# Patient Record
Sex: FEMALE | Race: OTHER | Employment: OTHER | URBAN - METROPOLITAN AREA
[De-identification: names, ages, dates, MRNs, and addresses within clinical notes are randomized per-mention and may not be internally consistent; named-entity substitution may affect disease eponyms.]

---

## 2017-09-29 ENCOUNTER — HOSPITAL ENCOUNTER (OUTPATIENT)
Dept: NEUROLOGY | Age: 62
Discharge: HOME | End: 2017-09-29
Payer: COMMERCIAL

## 2017-09-29 PROCEDURE — 95910 NRV CNDJ TEST 7-8 STUDIES: CPT

## 2017-09-29 PROCEDURE — 95886 MUSC TEST DONE W/N TEST COMP: CPT

## 2017-11-15 PROBLEM — G56.03 BILATERAL CARPAL TUNNEL SYNDROME: Chronic | Status: ACTIVE | Noted: 2017-11-15

## 2017-11-15 PROBLEM — M54.41 CHRONIC MIDLINE LOW BACK PAIN WITH BILATERAL SCIATICA: Chronic | Status: ACTIVE | Noted: 2017-11-15

## 2017-11-15 PROBLEM — M54.2 CERVICALGIA: Chronic | Status: ACTIVE | Noted: 2017-11-15

## 2017-11-15 PROBLEM — Z13.1 SCREENING FOR DIABETES MELLITUS: Chronic | Status: ACTIVE | Noted: 2017-11-15

## 2017-11-15 PROBLEM — M54.42 CHRONIC MIDLINE LOW BACK PAIN WITH BILATERAL SCIATICA: Chronic | Status: ACTIVE | Noted: 2017-11-15

## 2017-11-15 PROBLEM — Z13.220 SCREENING, LIPID: Chronic | Status: ACTIVE | Noted: 2017-11-15

## 2017-11-15 PROBLEM — G89.29 CHRONIC MIDLINE LOW BACK PAIN WITH BILATERAL SCIATICA: Chronic | Status: ACTIVE | Noted: 2017-11-15

## 2017-12-04 ENCOUNTER — HOSPITAL ENCOUNTER (OUTPATIENT)
Dept: PHYSICAL THERAPY | Age: 62
Setting detail: THERAPIES SERIES
Discharge: HOME | End: 2017-12-04
Payer: COMMERCIAL

## 2017-12-04 PROCEDURE — G8978 MOBILITY CURRENT STATUS: HCPCS

## 2017-12-04 PROCEDURE — G8979 MOBILITY GOAL STATUS: HCPCS

## 2017-12-04 PROCEDURE — 97162 PT EVAL MOD COMPLEX 30 MIN: CPT

## 2017-12-04 NOTE — PROGRESS NOTES
[address]     Ph: [phone]  Fax: [phone]    [x] Certification  [] Recertification []  Plan of Care  [] Progress Note [] Discharge      To:  Referring Practitioner: Dr. Eduarda Majano      From:  Krystian Jacobs, PT  Patient: Ashleigh Paige     : 1955  Diagnosis: Cervicalgia, Chronic midline low back pain with bilateral sciatica     Date: 2017  Treatment Diagnosis: LBP       Progress Report Period from:  2017  to 2017    Total # of Visits to Date: 1   No Show: 0    Canceled Appointment: 0     OBJECTIVE:   Long Term Goals - Time Frame for Long term goals : 4-5 weeks  Goals Current/ Discharge status Met   Long term goal 1: Improve lumbar ROM to WNL to improve functional mobility. Spine  Lumbar: Flex 30deg, Ext 20deg, SB: Rt 17deg, Lt 15deg [] yes  [] no   Long term goal 2: Reduce pain to </= 2/10 with prolonged positioning. 0-8/101. Sitting down 6-7/101 then after a while becomes an 8/10 [] yes  [] no   Long term goal 3: Improve janeth LE strength to increased ease with functional mobility. Strength RLE  Comment: Hip abd 4/5, hip ext 4-/5  Strength LLE  Comment: Hip abd 4/5, hip ext 4-/5 [] yes  [] no   Long term goal 4: Oswestry </= 30% 18/45 (40%) [] yes  [] no     Body structures, Functions, Activity limitations: Decreased functional mobility , Decreased ROM, Decreased strength  Assessment: Pt presents with ongoing LBP that worsens with prolonged positioning especially sitting. Pt's primary complaint this day was her LB therefore her cervical spine was not assessed. Pt with decreased flexibility and strength in janeth LEs likely contibuting to her pain as well as her poor posture with an increased lumbar lordosisand thoracic kyphosis.   Prognosis: Good  Discharge Recommendations: Continue to assess pending progress      PLAN: [x] Evaluate and Treat  Frequency/Duration:  Plan  Times per week: 2  Plan weeks: 4-5  Current Treatment Recommendations: Strengthening, ROM, Functional Mobility Training, Neuromuscular Re-education, Manual Therapy - Soft Tissue Mobilization, Manual Therapy - Joint Manipulation, Home Exercise Program, Patient/Caregiver Education & Training, Equipment Evaluation, Education, & procurement, Modalities  Plan Comment: Transfer care to Ramon Salazar PT, DPT     Patient Status:[x] Continue/ Initiate plan of Care    [] Discharge PT. Recommend pt continue with HEP. [] Additional visits requested, Please re-certify for additional visits:          Signature: Electronically signed by Mayra Rodriguez PT on 12/4/17 at 5:16 PM      If you have any questions or concerns, please don't hesitate to call. Thank you for your referral.    I have reviewed this plan of care and certify a need for medically necessary rehabilitation services.     Physician Signature:__________________________________________________________  Date:  Please sign and return

## 2017-12-04 NOTE — PROGRESS NOTES
functional mobility , Decreased ROM, Decreased strength  Assessment: Pt presents with ongoing LBP that worsens with prolonged positioning especially sitting. Pt's primary complaint this day was her LB therefore her cervical spine was not assessed. Pt with decreased flexibility and strength in janeth LEs likely contibuting to her pain as well as her poor posture with an increased lumbar lordosisand thoracic kyphosis. Prognosis: Good  Discharge Recommendations: Continue to assess pending progress  Activity Tolerance: Patient Tolerated treatment well     Decision Making: Medium Complexity  History: PMH: Kidney problems, OA  Exam: Decreased ROM and strength with increased pain impacting ADLs and mobility. Clinical Presentation: Evolving        Plan  Frequency/Duration:  Plan  Times per week: 2  Plan weeks: 4-5  Current Treatment Recommendations: Strengthening, ROM, Functional Mobility Training, Neuromuscular Re-education, Manual Therapy - Soft Tissue Mobilization, Manual Therapy - Joint Manipulation, Home Exercise Program, Patient/Caregiver Education & Training, Equipment Evaluation, Education, & procurement, Modalities  Plan Comment: Transfer care to Mary Spangler PT, DPT    POST-PAIN     Pain Rating (0-10 pain scale):  0 /10  Location and pain description same as pre-treatment unless indicated. Action: [x] NA  [] Call Physician  [] Perform HEP  [] Meds as prescribed    Evaluation and patient rights have been reviewed and patient agrees with plan of care.   Yes  [x]  No  []   Explain:       Damian Fall Risk Assessment  Risk Factor Scale  Score   History of Falls [] Yes  [x] No 25  0 0   Secondary Diagnosis [] Yes  [x] No 15  0 0   Ambulatory Aid [] Furniture  [] Crutches/cane/walker  [x] None/bedrest/wheelchair/nurse 30  15  0 0   IV/Heparin Lock [] Yes  [x] No 20  0 0   Gait/Transferring [] Impaired  [] Weak  [x] Normal/bedrest/immobile 20  10  0 0   Mental Status [] Forgets limitations  [x] Oriented to own ability 15  0 0      Total:0     Based on the Assessment score: check the appropriate box. [x]  No intervention needed   Low =   Score of 0-24  []  Use standard prevention interventions Moderate =  Score of 24-44   [] Discuss fall prevention strategies   [] Indicate moderate falls risk on eval  []  Use high risk prevention interventions High = Score of 45 and higher   [] Discuss fall prevention strategies   [] Provide supervision during treatment time    Goals  Long term goals  Time Frame for Long term goals : 4-5 weeks  Long term goal 1: Improve lumbar ROM to WNL to improve functional mobility. Long term goal 2: Reduce pain to </= 2/10 with prolonged positioning. Long term goal 3: Improve janeth LE strength to increased ease with functional mobility.   Long term goal 4: Oswestry </= 30%    PT Individual Minutes  Time In: 1512  Time Out: 1986  Minutes: 45     Procedure Minutes:45'  Electronically signed by Mireya Palomo PT on 12/4/17 at 5:12 PM

## 2017-12-05 ENCOUNTER — APPOINTMENT (OUTPATIENT)
Dept: PHYSICAL THERAPY | Age: 62
End: 2017-12-05
Payer: COMMERCIAL

## 2017-12-06 DIAGNOSIS — Z13.220 SCREENING, LIPID: Chronic | ICD-10-CM

## 2017-12-06 DIAGNOSIS — Z13.1 SCREENING FOR DIABETES MELLITUS: Chronic | ICD-10-CM

## 2017-12-06 DIAGNOSIS — Z11.4 SCREENING FOR HIV (HUMAN IMMUNODEFICIENCY VIRUS): ICD-10-CM

## 2017-12-06 DIAGNOSIS — Z11.59 NEED FOR HEPATITIS C SCREENING TEST: ICD-10-CM

## 2017-12-06 LAB
CHOLESTEROL, FASTING: 237 MG/DL (ref 0–199)
GLUCOSE FASTING: 97 MG/DL (ref 74–109)
HDLC SERPL-MCNC: 42 MG/DL (ref 40–59)
HEPATITIS C ANTIBODY INTERPRETATION: NORMAL
LDL CHOLESTEROL CALCULATED: 169 MG/DL (ref 0–129)
TRIGLYCERIDE, FASTING: 130 MG/DL (ref 0–200)

## 2017-12-07 ENCOUNTER — APPOINTMENT (OUTPATIENT)
Dept: PHYSICAL THERAPY | Age: 62
End: 2017-12-07
Payer: COMMERCIAL

## 2017-12-08 LAB — HIV-1 AND HIV-2 ANTIBODIES: NEGATIVE

## 2017-12-11 ENCOUNTER — HOSPITAL ENCOUNTER (OUTPATIENT)
Dept: PHYSICAL THERAPY | Age: 62
Setting detail: THERAPIES SERIES
Discharge: HOME | End: 2017-12-11
Payer: COMMERCIAL

## 2017-12-11 PROCEDURE — 97110 THERAPEUTIC EXERCISES: CPT

## 2017-12-11 ASSESSMENT — PAIN DESCRIPTION - ORIENTATION: ORIENTATION: LEFT;LOWER

## 2017-12-11 ASSESSMENT — PAIN DESCRIPTION - DESCRIPTORS: DESCRIPTORS: NUMBNESS;ACHING

## 2017-12-11 ASSESSMENT — PAIN DESCRIPTION - LOCATION: LOCATION: SHOULDER;BACK

## 2017-12-11 ASSESSMENT — PAIN SCALES - GENERAL: PAINLEVEL_OUTOF10: 4

## 2017-12-11 NOTE — PROGRESS NOTES
[address] [address]  Outpatient Physical Therapy    Treatment Note        Date: 2017  Patient: Bryan Herrera  : 1955  ACCT #: [de-identified]  Referring Practitioner: Dr. Vivien Alfaro  Diagnosis: Cervicalgia, Chronic midline low back pain with bilateral sciatica    Visit Information:  PT Visit Information  PT Insurance Information: [facility]  Total # of Visits to Date: 2  No Show: 0  Canceled Appointment: 0  Progress Note Counter: 2/10    Subjective: Pt presenting to appt w/ \"mild\" pain of 4-5/10 in Lt shldr and LB. Pt reports most pain w/ prolonged standing at home. Comments: [employer] utilized during session. HEP Compliance:  [x] Good [] Fair [] Poor [] Reports not doing due to:    Vital Signs  Patient Currently in Pain: Yes   Pain Screening  Patient Currently in Pain: Yes  Pain Assessment  Pain Level: 4 (4-5)  Pain Location: Shoulder;Back  Pain Orientation: Left; Lower  Pain Descriptors: Numbness; Aching    OBJECTIVE:   Exercises  Exercise 1: LTR x10  Exercise 2: SKTC 10s x5 janeth  Exercise 3: TA sam 5\"x10  Exercise 4: DLS (3 way) L58 ea   Exercise 5: Hip series: SLR, S/L ABD x10 ea   Exercise 6: Prone hip ext x10 B  Exercise 7: PBall HS curls 5\"x10  Exercise 8: Bridges 5\"x10  Exercise 20: HEP:     Strength: [x] NT  [] MMT completed:     ROM: [x] NT  [] ROM measurements:      Assessment: Body structures, Functions, Activity limitations: Decreased functional mobility , Decreased ROM, Decreased strength  Assessment: Initiated stretchinng and core strengthening exs this date per POC. Utilized blue phones to communicate during session, good participation. Overall good aga to all exs w/o increased pain. Treatment Diagnosis: LBP  Prognosis: Good     Goals:   Long term goals  Time Frame for Long term goals : 4-5 weeks  Long term goal 1: Improve lumbar ROM to WNL to improve functional mobility. Long term goal 2: Reduce pain to </= 2/10 with prolonged positioning.   Long term goal 3: Improve janeth LE strength to increased ease with functional mobility. Long term goal 4: Oswestry </= 30%  Progress toward goals: Progress exs as able     POST-PAIN       Pain Rating (0-10 pain scale):   3/10   Location and pain description same as pre-treatment unless indicated. Action: [] NA   [x] Perform HEP  [] Meds as prescribed  [] Modalities as prescribed   [] Call Physician     Frequency/Duration:  Plan  Times per week: 2  Plan weeks: 4-5  Current Treatment Recommendations: Strengthening, ROM, Functional Mobility Training, Neuromuscular Re-education, Manual Therapy - Soft Tissue Mobilization, Manual Therapy - Joint Manipulation, Home Exercise Program, Patient/Caregiver Education & Training, Equipment Evaluation, Education, & procurement, Modalities  Plan Comment: Transfer care to [facility] PT, DPT     Pt to continue current HEP. See objective section for any therapeutic exercise changes, additions or modifications this date.     PT Individual Minutes  Time In: 4146  Time Out: 1782  Minutes: 38  Timed Code Treatment Minutes: 38 Minutes  Procedure Minutes: N/A    Signature:  Electronically signed by Manuela Bonilla PTA on 12/11/17 at 4:00 PM

## 2017-12-13 PROBLEM — Z12.11 COLON CANCER SCREENING: Chronic | Status: ACTIVE | Noted: 2017-12-13

## 2017-12-18 ENCOUNTER — HOSPITAL ENCOUNTER (OUTPATIENT)
Dept: PHYSICAL THERAPY | Age: 62
Setting detail: THERAPIES SERIES
Discharge: HOME | End: 2017-12-18
Payer: COMMERCIAL

## 2017-12-18 PROCEDURE — 97110 THERAPEUTIC EXERCISES: CPT

## 2017-12-18 ASSESSMENT — PAIN DESCRIPTION - ORIENTATION: ORIENTATION: LOWER;LEFT

## 2017-12-18 ASSESSMENT — PAIN DESCRIPTION - PAIN TYPE: TYPE: CHRONIC PAIN

## 2017-12-18 ASSESSMENT — PAIN DESCRIPTION - LOCATION: LOCATION: BACK

## 2017-12-18 ASSESSMENT — PAIN SCALES - GENERAL: PAINLEVEL_OUTOF10: 7

## 2017-12-18 ASSESSMENT — PAIN DESCRIPTION - DESCRIPTORS: DESCRIPTORS: ACHING

## 2017-12-20 ENCOUNTER — HOSPITAL ENCOUNTER (OUTPATIENT)
Dept: NEUROLOGY | Age: 62
Discharge: HOME | End: 2017-12-20
Payer: COMMERCIAL

## 2017-12-20 PROCEDURE — 95886 MUSC TEST DONE W/N TEST COMP: CPT

## 2017-12-20 PROCEDURE — 95910 NRV CNDJ TEST 7-8 STUDIES: CPT

## 2017-12-20 NOTE — PROCEDURES
[address]                       [address] [facility], [address] [facility]                               ELECTROMYOGRAM REPORT    PATIENT NAME: Yenifer Yeager                   :        1955  MED REC NO:   [id number]                            ROOM:  ACCOUNT NO:   [de-identified]                           ADMIT DATE: 2017  PROVIDER:     Gosia Ely MD    DATE OF EM2017    REFERRING PROVIDER:  Dr. Jessica Tapia. REASON FOR THE STUDY:  The patient was having pain in the back with  radiation to the thigh. She has a positive family history of diabetes. EMG study was done to look  for peripheral nerve entrapments versus peripheral neuropathy versus lumbar  radiculopathy. Motor nerve conduction velocities and F wave latencies are normal in all  the nerves tested. Distal motor latencies are normal in all the nerves tested. Distal sensory latencies could not be obtained in the right superficial peroneal nerve and are normal in all other nerves tested. Amplitude of motor and sensory responses are decreased in most of the  nerves tested. On the concentric needle electrode examination, mild denervation changes  are apparent in the L5 root distribution bilaterally. CLINICAL INTERPRETATION:  EMG studies are showing changes of mild bilateral  L5 radiculopathy. The patient could be tried on conservative management with weight control,  exercise program.    If clinically indicated, imaging of the lumbar canal may be done to look  for compromise of the spinal canal and/or foramina. Low amplitude of motor and sensory responses does suggest component of  peripheral neuropathic process. She has a positive family history of  diabetes. Other causes of peripheral neuropathy could also be looked into such as  exposure to toxins, autoimmune disorder, hypothyroidism, etc.    If clinically indicated we could repeat the study in a year.     Thank you very much Dr. Lucinda Hinds for allowing me to see the  patient. Please feel free to call me if I can be of any further assistance  regarding this patient's evaluation.       Stephanie Garnett MD    D: 12/20/2017 15:57:02       T: 12/20/2017 17:18:55     [email or web address]  Job#: [id number]     Doc#: [id number]    CC:

## 2017-12-21 NOTE — PROGRESS NOTES
[address]       Physical Therapy  Cancellation/No-show Note  Patient Name:  Quirino Flores  :  1955   Date:  2017  Referring Practitioner: Dr. Valeriano Ferro  Diagnosis: Cervicalgia, Chronic midline low back pain with bilateral sciatica    Visit Information:  PT Visit Information  PT Insurance Information: [facility]  Total # of Visits to Date: 3  No Show: 1  Canceled Appointment: 0  Progress Note Counter: 3/10( cx )    For today's appointment patient:  [x]  Cancelled  []  Rescheduled appointment  []  No-show   []  Called pt to remind of next appointment     Reason given by patient:  []  Patient ill  [x]  Conflicting appointment 24 hr. Notice for  cx  []  No transportation    []  Conflict with work  []  No reason given  []  Other:       Comments:       Signature: Electronically signed by Kelly Staples PTA on 17 at 2:32 PM

## 2017-12-22 ENCOUNTER — HOSPITAL ENCOUNTER (OUTPATIENT)
Dept: PHYSICAL THERAPY | Age: 62
Setting detail: THERAPIES SERIES
Discharge: HOME | End: 2017-12-22
Payer: COMMERCIAL

## 2017-12-27 ENCOUNTER — HOSPITAL ENCOUNTER (OUTPATIENT)
Dept: PHYSICAL THERAPY | Age: 62
Setting detail: THERAPIES SERIES
Discharge: HOME | End: 2017-12-27
Payer: COMMERCIAL

## 2017-12-27 NOTE — PROGRESS NOTES
[address]       Physical Therapy  Cancellation/No-show Note  Patient Name:  Darwin Kebede  :  1955   Date:  2017  Referring Practitioner: Dr. Rodger Loving  Diagnosis: Cervicalgia, Chronic midline low back pain with bilateral sciatica    Visit Information:  PT Visit Information  [facility]  Total # of Visits to Date: 3  No Show: 1  Canceled Appointment: 1  Progress Note Counter: 3/10( cx )    For today's appointment patient:  [x]  Cancelled  []  Rescheduled appointment  []  No-show   []  Called pt to remind of next appointment     Reason given by patient:  []  Patient ill  []  Conflicting appointment  [x]  No transportation    []  Conflict with work  []  No reason given  []  Other:       Comments:       Signature: Electronically signed by Lexi De La Torre PTA on 17 at 10:17 AM

## 2017-12-29 ENCOUNTER — HOSPITAL ENCOUNTER (OUTPATIENT)
Dept: PHYSICAL THERAPY | Age: 62
Setting detail: THERAPIES SERIES
Discharge: HOME | End: 2017-12-29
Payer: COMMERCIAL

## 2017-12-29 PROCEDURE — 97110 THERAPEUTIC EXERCISES: CPT

## 2017-12-29 PROCEDURE — G8978 MOBILITY CURRENT STATUS: HCPCS

## 2017-12-29 PROCEDURE — G8979 MOBILITY GOAL STATUS: HCPCS

## 2017-12-29 PROCEDURE — G8980 MOBILITY D/C STATUS: HCPCS

## 2017-12-29 ASSESSMENT — PAIN SCALES - GENERAL: PAINLEVEL_OUTOF10: 6

## 2017-12-29 ASSESSMENT — PAIN DESCRIPTION - LOCATION: LOCATION: BACK

## 2017-12-29 ASSESSMENT — PAIN DESCRIPTION - ORIENTATION: ORIENTATION: LOWER;LEFT

## 2017-12-29 ASSESSMENT — PAIN DESCRIPTION - DESCRIPTORS: DESCRIPTORS: ACHING

## 2017-12-29 ASSESSMENT — PAIN DESCRIPTION - FREQUENCY: FREQUENCY: CONTINUOUS

## 2017-12-29 ASSESSMENT — PAIN DESCRIPTION - PAIN TYPE: TYPE: CHRONIC PAIN

## 2017-12-29 NOTE — PROGRESS NOTES
interpretation. Concluded tx w/ HP to LB for pain/tightnes. Treatment Diagnosis: LBP  Prognosis: Good     Goals:   Long term goals  Time Frame for Long term goals : 4-5 weeks  Long term goal 1: Improve lumbar ROM to WNL to improve functional mobility. Long term goal 2: Reduce pain to </= 2/10 with prolonged positioning. Long term goal 3: Improve janeth LE strength to increased ease with functional mobility. Long term goal 4: Oswestry </= 30%  Progress toward goals: Cont to progress exs as able. POST-PAIN       Pain Rating (0-10 pain scale):   0/10   Location and pain description same as pre-treatment unless indicated. Action: [] NA   [] Perform HEP  [] Meds as prescribed  [] Modalities as prescribed   [] Call Physician     Frequency/Duration:  Plan  Times per week: 2  Plan weeks: 4-5  Current Treatment Recommendations: Strengthening, ROM, Functional Mobility Training, Neuromuscular Re-education, Manual Therapy - Soft Tissue Mobilization, Manual Therapy - Joint Manipulation, Home Exercise Program, Patient/Caregiver Education & Training, Equipment Evaluation, Education, & procurement, Modalities  Plan Comment: Transfer care to Neva Lopez PT, DPT     Pt to continue current HEP. See objective section for any therapeutic exercise changes, additions or modifications this date.     PT Individual Minutes  Time In: 2126  Time Out: 3984  Minutes: 49  Timed Code Treatment Minutes: 39 Minutes  Procedure Minutes: 10 min     Signature:  Electronically signed by Sylvia Fleming PTA on 12/29/17 at 1:51 PM

## 2018-01-03 ENCOUNTER — HOSPITAL ENCOUNTER (OUTPATIENT)
Dept: PHYSICAL THERAPY | Age: 63
Setting detail: THERAPIES SERIES
Discharge: HOME | End: 2018-01-03
Payer: COMMERCIAL

## 2018-01-05 ENCOUNTER — HOSPITAL ENCOUNTER (OUTPATIENT)
Dept: PHYSICAL THERAPY | Age: 63
Setting detail: THERAPIES SERIES
Discharge: HOME | End: 2018-01-05
Payer: COMMERCIAL

## 2018-01-05 PROCEDURE — 97110 THERAPEUTIC EXERCISES: CPT

## 2018-01-05 ASSESSMENT — PAIN DESCRIPTION - DESCRIPTORS: DESCRIPTORS: ACHING

## 2018-01-05 ASSESSMENT — PAIN DESCRIPTION - LOCATION: LOCATION: BACK

## 2018-01-05 ASSESSMENT — PAIN SCALES - GENERAL: PAINLEVEL_OUTOF10: 6

## 2018-01-05 ASSESSMENT — PAIN DESCRIPTION - ORIENTATION: ORIENTATION: LOWER;LEFT

## 2018-01-08 ENCOUNTER — HOSPITAL ENCOUNTER (OUTPATIENT)
Dept: PHYSICAL THERAPY | Age: 63
Setting detail: THERAPIES SERIES
Discharge: HOME | End: 2018-01-08
Payer: COMMERCIAL

## 2018-01-08 PROCEDURE — G8980 MOBILITY D/C STATUS: HCPCS

## 2018-01-08 PROCEDURE — 97110 THERAPEUTIC EXERCISES: CPT

## 2018-01-08 PROCEDURE — G8979 MOBILITY GOAL STATUS: HCPCS

## 2018-01-08 PROCEDURE — G8978 MOBILITY CURRENT STATUS: HCPCS

## 2018-01-08 ASSESSMENT — PAIN DESCRIPTION - LOCATION: LOCATION: BACK

## 2018-01-08 ASSESSMENT — PAIN DESCRIPTION - PAIN TYPE: TYPE: CHRONIC PAIN

## 2018-01-08 ASSESSMENT — PAIN DESCRIPTION - DESCRIPTORS: DESCRIPTORS: ACHING

## 2018-01-08 ASSESSMENT — PAIN DESCRIPTION - ORIENTATION: ORIENTATION: LOWER;LEFT

## 2018-01-08 ASSESSMENT — PAIN SCALES - GENERAL: PAINLEVEL_OUTOF10: 6

## 2018-01-12 ENCOUNTER — HOSPITAL ENCOUNTER (OUTPATIENT)
Dept: PHYSICAL THERAPY | Age: 63
Setting detail: THERAPIES SERIES
Discharge: HOME | End: 2018-01-12
Payer: COMMERCIAL

## 2018-01-15 ENCOUNTER — HOSPITAL ENCOUNTER (OUTPATIENT)
Dept: PHYSICAL THERAPY | Age: 63
Setting detail: THERAPIES SERIES
End: 2018-01-15
Payer: COMMERCIAL

## 2018-01-19 ENCOUNTER — APPOINTMENT (OUTPATIENT)
Dept: PHYSICAL THERAPY | Age: 63
End: 2018-01-19
Payer: COMMERCIAL

## 2018-01-22 ENCOUNTER — APPOINTMENT (OUTPATIENT)
Dept: PHYSICAL THERAPY | Age: 63
End: 2018-01-22
Payer: COMMERCIAL

## 2018-01-28 ENCOUNTER — HOSPITAL ENCOUNTER (EMERGENCY)
Age: 63
Discharge: HOME | End: 2018-01-28
Payer: COMMERCIAL

## 2018-01-28 VITALS
DIASTOLIC BLOOD PRESSURE: 89 MMHG | WEIGHT: 170 LBS | HEIGHT: 64 IN | BODY MASS INDEX: 29.02 KG/M2 | HEART RATE: 93 BPM | TEMPERATURE: 98.4 F | SYSTOLIC BLOOD PRESSURE: 153 MMHG | OXYGEN SATURATION: 97 % | RESPIRATION RATE: 20 BRPM

## 2018-01-28 DIAGNOSIS — H65.03 BILATERAL ACUTE SEROUS OTITIS MEDIA, RECURRENCE NOT SPECIFIED: Primary | ICD-10-CM

## 2018-01-28 DIAGNOSIS — J02.9 ACUTE PHARYNGITIS, UNSPECIFIED ETIOLOGY: ICD-10-CM

## 2018-01-28 DIAGNOSIS — J40 BRONCHITIS: ICD-10-CM

## 2018-01-28 LAB
RAPID INFLUENZA  B AGN: NEGATIVE
RAPID INFLUENZA A AGN: NEGATIVE
S PYO AG THROAT QL: NEGATIVE

## 2018-01-28 PROCEDURE — 86403 PARTICLE AGGLUT ANTBDY SCRN: CPT

## 2018-01-28 PROCEDURE — 99283 EMERGENCY DEPT VISIT LOW MDM: CPT

## 2018-01-28 PROCEDURE — 87880 STREP A ASSAY W/OPTIC: CPT

## 2018-01-28 PROCEDURE — 6370000000 HC RX 637 (ALT 250 FOR IP): Performed by: PHYSICIAN ASSISTANT

## 2018-01-28 PROCEDURE — 87081 CULTURE SCREEN ONLY: CPT

## 2018-01-28 RX ORDER — AMOXICILLIN 500 MG/1
500 CAPSULE ORAL ONCE
Status: COMPLETED | OUTPATIENT
Start: 2018-01-28 | End: 2018-01-28

## 2018-01-28 RX ORDER — AMOXICILLIN 500 MG/1
500 CAPSULE ORAL 3 TIMES DAILY
Qty: 30 CAPSULE | Refills: 0 | Status: SHIPPED | OUTPATIENT
Start: 2018-01-28 | End: 2018-02-07

## 2018-01-28 RX ORDER — GUAIFENESIN AND CODEINE PHOSPHATE 100; 10 MG/5ML; MG/5ML
5 SOLUTION ORAL 3 TIMES DAILY PRN
Qty: 60 ML | Refills: 0 | Status: SHIPPED | OUTPATIENT
Start: 2018-01-28 | End: 2018-01-30 | Stop reason: ALTCHOICE

## 2018-01-28 RX ORDER — CODEINE PHOSPHATE AND GUAIFENESIN 10; 100 MG/5ML; MG/5ML
10 SOLUTION ORAL ONCE
Status: COMPLETED | OUTPATIENT
Start: 2018-01-28 | End: 2018-01-28

## 2018-01-28 RX ORDER — BENZONATATE 100 MG/1
100 CAPSULE ORAL ONCE
Status: COMPLETED | OUTPATIENT
Start: 2018-01-28 | End: 2018-01-28

## 2018-01-28 RX ORDER — BENZONATATE 100 MG/1
100 CAPSULE ORAL 3 TIMES DAILY PRN
Qty: 12 CAPSULE | Refills: 0 | Status: SHIPPED | OUTPATIENT
Start: 2018-01-28 | End: 2018-02-04

## 2018-01-28 RX ADMIN — GUAIFENESIN AND CODEINE PHOSPHATE 10 ML: 100; 10 SOLUTION ORAL at 23:26

## 2018-01-28 RX ADMIN — BENZONATATE 100 MG: 100 CAPSULE ORAL at 23:26

## 2018-01-28 RX ADMIN — AMOXICILLIN 500 MG: 500 CAPSULE ORAL at 23:26

## 2018-01-28 ASSESSMENT — PAIN SCALES - GENERAL: PAINLEVEL_OUTOF10: 8

## 2018-01-28 ASSESSMENT — ENCOUNTER SYMPTOMS
ANAL BLEEDING: 0
APNEA: 0
COUGH: 1
SORE THROAT: 1
ABDOMINAL DISTENTION: 0
NAUSEA: 0
VOMITING: 0
SHORTNESS OF BREATH: 0
VOICE CHANGE: 0
CHOKING: 0
PHOTOPHOBIA: 0
EYE DISCHARGE: 0

## 2018-01-28 ASSESSMENT — PAIN DESCRIPTION - LOCATION: LOCATION: THROAT

## 2018-01-29 ENCOUNTER — HOSPITAL ENCOUNTER (OUTPATIENT)
Dept: PHYSICAL THERAPY | Age: 63
Setting detail: THERAPIES SERIES
Discharge: HOME | End: 2018-01-29
Payer: COMMERCIAL

## 2018-01-30 ENCOUNTER — OFFICE VISIT (OUTPATIENT)
Dept: FAMILY MEDICINE CLINIC | Age: 63
End: 2018-01-30
Payer: COMMERCIAL

## 2018-01-30 ENCOUNTER — HOSPITAL ENCOUNTER (OUTPATIENT)
Dept: GENERAL RADIOLOGY | Age: 63
Discharge: HOME | End: 2018-02-01
Payer: COMMERCIAL

## 2018-01-30 VITALS
RESPIRATION RATE: 18 BRPM | HEIGHT: 64 IN | OXYGEN SATURATION: 98 % | TEMPERATURE: 98.6 F | WEIGHT: 172 LBS | BODY MASS INDEX: 29.37 KG/M2 | SYSTOLIC BLOOD PRESSURE: 122 MMHG | HEART RATE: 80 BPM | DIASTOLIC BLOOD PRESSURE: 68 MMHG

## 2018-01-30 DIAGNOSIS — J06.9 VIRAL URI: Primary | ICD-10-CM

## 2018-01-30 DIAGNOSIS — R04.2 COUGH WITH HEMOPTYSIS: ICD-10-CM

## 2018-01-30 DIAGNOSIS — H69.83 EUSTACHIAN TUBE DYSFUNCTION, BILATERAL: ICD-10-CM

## 2018-01-30 DIAGNOSIS — J04.0 LARYNGITIS, ACUTE: ICD-10-CM

## 2018-01-30 LAB
APTT: 28 SEC (ref 21.6–35.4)
BASOPHILS ABSOLUTE: 0.1 K/UL (ref 0–0.2)
BASOPHILS RELATIVE PERCENT: 0.6 %
EOSINOPHILS ABSOLUTE: 0.1 K/UL (ref 0–0.7)
EOSINOPHILS RELATIVE PERCENT: 0.8 %
HCT VFR BLD CALC: 42.9 % (ref 37–47)
HEMOGLOBIN: 14.1 G/DL (ref 12–16)
INR BLD: 0.9
LYMPHOCYTES ABSOLUTE: 2.3 K/UL (ref 1–4.8)
LYMPHOCYTES RELATIVE PERCENT: 22.4 %
MCH RBC QN AUTO: 31.8 PG (ref 27–31.3)
MCHC RBC AUTO-ENTMCNC: 33 % (ref 33–37)
MCV RBC AUTO: 96.5 FL (ref 82–100)
MONOCYTES ABSOLUTE: 0.8 K/UL (ref 0.2–0.8)
MONOCYTES RELATIVE PERCENT: 7.9 %
NEUTROPHILS ABSOLUTE: 7.2 K/UL (ref 1.4–6.5)
NEUTROPHILS RELATIVE PERCENT: 68.3 %
PDW BLD-RTO: 13.2 % (ref 11.5–14.5)
PLATELET # BLD: 278 K/UL (ref 130–400)
PROTHROMBIN TIME: 9.8 SEC (ref 8.1–13.7)
RBC # BLD: 4.45 M/UL (ref 4.2–5.4)
WBC # BLD: 10.5 K/UL (ref 4.8–10.8)

## 2018-01-30 PROCEDURE — G8427 DOCREV CUR MEDS BY ELIG CLIN: HCPCS | Performed by: FAMILY MEDICINE

## 2018-01-30 PROCEDURE — G8419 CALC BMI OUT NRM PARAM NOF/U: HCPCS | Performed by: FAMILY MEDICINE

## 2018-01-30 PROCEDURE — 3014F SCREEN MAMMO DOC REV: CPT | Performed by: FAMILY MEDICINE

## 2018-01-30 PROCEDURE — 99214 OFFICE O/P EST MOD 30 MIN: CPT | Performed by: FAMILY MEDICINE

## 2018-01-30 PROCEDURE — 3017F COLORECTAL CA SCREEN DOC REV: CPT | Performed by: FAMILY MEDICINE

## 2018-01-30 PROCEDURE — 71046 X-RAY EXAM CHEST 2 VIEWS: CPT

## 2018-01-30 PROCEDURE — G8484 FLU IMMUNIZE NO ADMIN: HCPCS | Performed by: FAMILY MEDICINE

## 2018-01-30 PROCEDURE — 1036F TOBACCO NON-USER: CPT | Performed by: FAMILY MEDICINE

## 2018-01-30 RX ORDER — PREDNISONE 20 MG/1
60 TABLET ORAL DAILY
Qty: 15 TABLET | Refills: 0 | Status: SHIPPED | OUTPATIENT
Start: 2018-01-30 | End: 2018-02-04

## 2018-01-30 RX ORDER — GUAIFENESIN 600 MG/1
600 TABLET, EXTENDED RELEASE ORAL 2 TIMES DAILY
Qty: 30 TABLET | Refills: 0 | Status: SHIPPED | OUTPATIENT
Start: 2018-01-30 | End: 2018-03-13 | Stop reason: ALTCHOICE

## 2018-01-30 RX ORDER — PSEUDOEPHEDRINE HYDROCHLORIDE 30 MG/1
30 TABLET ORAL EVERY 6 HOURS PRN
Qty: 30 TABLET | Refills: 0 | Status: SHIPPED | OUTPATIENT
Start: 2018-01-30 | End: 2018-03-13 | Stop reason: ALTCHOICE

## 2018-01-30 NOTE — PROGRESS NOTES
Prescriptions   Medication Sig Dispense Refill    predniSONE (DELTASONE) 20 MG tablet Take 3 tablets by mouth daily for 5 days 15 tablet 0    guaiFENesin (MUCINEX) 600 MG extended release tablet Take 1 tablet by mouth 2 times daily 30 tablet 0    pseudoephedrine (DECONGESTANT) 30 MG tablet Take 1 tablet by mouth every 6 hours as needed for Congestion 30 tablet 0    amoxicillin (AMOXIL) 500 MG capsule Take 1 capsule by mouth 3 times daily for 10 days 30 capsule 0    benzonatate (TESSALON PERLES) 100 MG capsule Take 1 capsule by mouth 3 times daily as needed for Cough 12 capsule 0    fluticasone (FLOVENT HFA) 110 MCG/ACT inhaler Inhale 1 puff into the lungs 2 times daily 1 Inhaler 5    albuterol sulfate  (90 Base) MCG/ACT inhaler Inhale 2 puffs into the lungs every 6 hours as needed for Wheezing      miconazole (MICONAZOLE 7) 2 % vaginal cream Place 1 applicator vaginally nightly Place vaginally nightly.  FLUoxetine (PROZAC) 10 MG capsule Take 1 capsule by mouth daily 30 capsule 5    omeprazole (PRILOSEC) 40 MG delayed release capsule Take 1 capsule by mouth daily 30 capsule 2    naproxen (NAPROSYN) 500 MG tablet Take 1 tablet by mouth 2 times daily (with meals) 60 tablet 1    diphenhydrAMINE (BANOPHEN) 50 MG capsule Take 1 capsule by mouth nightly as needed for Itching 30 capsule 5     No current facility-administered medications for this visit. PMH, Surgical Hx, Family Hx, and Social Hx reviewed and updated. Health Maintenance reviewed. Objective    Vitals:    01/30/18 0935   BP: 122/68   Site: Left Arm   Position: Sitting   Cuff Size: Medium Adult   Pulse: 80   Resp: 18   Temp: 98.6 °F (37 °C)   TempSrc: Temporal   SpO2: 98%   Weight: 172 lb (78 kg)   Height: 5' 4\" (1.626 m)       Physical Exam   Constitutional: She is oriented to person, place, and time. She appears well-developed and well-nourished. HENT:   Head: Normocephalic and atraumatic.    Right Ear: External ear normal. Tympanic membrane is erythematous and bulging. A middle ear effusion (serrous) is present. Left Ear: External ear normal. Tympanic membrane is erythematous and bulging. A middle ear effusion (serrous) is present. Nose: Nose normal.   Mouth/Throat: Oropharynx is clear and moist and mucous membranes are normal.   Sputum is light yellow without blood. Voice is hoarse and hypophonic. Eyes: Conjunctivae are normal. No scleral icterus. Neck: Normal range of motion. Neck supple. No thyromegaly present. Cardiovascular: Normal rate, regular rhythm and normal heart sounds. Pulmonary/Chest: Effort normal and breath sounds normal.   Lymphadenopathy:     She has no cervical adenopathy. Neurological: She is alert and oriented to person, place, and time. Skin: Skin is warm and dry. Psychiatric: She has a normal mood and affect. Assessment & Plan   1. Viral URI  guaiFENesin (MUCINEX) 600 MG extended release tablet    pseudoephedrine (DECONGESTANT) 30 MG tablet   2. Eustachian tube dysfunction, bilateral  guaiFENesin (MUCINEX) 600 MG extended release tablet    pseudoephedrine (DECONGESTANT) 30 MG tablet   3. Cough with hemoptysis  CBC With Auto Differential    Protime-INR    APTT    XR CHEST STANDARD (2 VW)    Quantiferon (R) Tb Gold, (Incubated)    guaiFENesin (MUCINEX) 600 MG extended release tablet   4. Laryngitis, acute  predniSONE (DELTASONE) 20 MG tablet     Viral URI,laryngitis, hemoptysis, ET dysfunction - finish Amox, Mucinex, sudafed and prednisone rx'd. Check IGR, CBC, coags and chest xray. F/U in 2 weeks or sooner if worsening.     Reviewed with the patient: current clinical status, medications, activities and diet.      Side effects, adverse effects of the medication prescribed today, as well as treatment plan/ rationale and result expectations have been discussed with the patient who expresses understanding and desires to proceed.     Close follow up to evaluate treatment results and for

## 2018-01-30 NOTE — PATIENT INSTRUCTIONS
The only thing that cures a cold is time. Meanwhile to be as comfortable as possible drink at least 64 ounces of water a day to stay hydrated. Rest when you can. Take over-the-counter Sudafed or Sudafed PE up to 4 times a day for nasal congestion; Mucinex 600-1200 mg twice a day to loosen up mucous; generic Zyrtec or Allegra for itchy, watery eyes, runny nose per package instructions; OTC Tylenol and/or Naprosyn per package instructions for aches and pains. Also consider Sinus Rinse or Neti Pot twice a day to clear out secretions and decrease swelling in nose and sinuses. Patient Education        Laringitis: Instrucciones de cuidado - [ Laryngitis: Care Instructions ]  Instrucciones de cuidado    La laringitis es antonietta inflamación de la laringe que hace que ladd voz se vuelva rasposa o ronca. Puede durar poco o [employer]. [address] veces, la laringitis aparece de forma repentina y dura hasta 2 semanas. Es causada por exceso de [place], irritación o infección de las cuerdas vocales que están adentro de la laringe. Algunas de las causas más comunes son resfriado, gripe o alergias. Hablar en voz muy tiesha, gritar, vitorear o cantar también pueden ser antonietta causa de laringitis. El ácido estomacal que se devuelve a la garganta también puede causar pérdida de la voz. Darle descanso a ladd voz y seguir otras medidas en el hogar pueden ayudarle a recuperar la voz. La atención de seguimiento es antonietta parte clave de ladd tratamiento y seguridad. Asegúrese de hacer y acudir a todas las citas, y llame a ladd médico si está teniendo problemas. También es antonietta buena idea saber los resultados de nancy exámenes y mantener antonietta lista de los medicamentos que jordan. ¿Cómo puede cuidarse en el hogar? · [address] ladd médico para tratar la afección que causó la pérdida de voz. Si ladd médico le recetó antibióticos, tómelos según las indicaciones. No deje de tomarlos por el hecho de sentirse mejor.  Debe romero todos los antibióticos hasta terminarlos. · Antes de usar medicamentos para la tos y los resfriados, revise la [place]. Estos medicamentos podrían no ser seguros para los niños pequeños o las personas con ciertos problemas de [place]. · Trate de evitar que el ácido estomacal se devuelva a la garganta. No coma olivier antes de acostarse. Reduzca la cantidad de café y alcohol que jason, y consuma alimentos saludables. Raymond reductores de ácido de venta anson puede ayudar si estas medidas no son suficiente. En algunos casos, podría necesitar medicamentos recetados. · Descanse la voz. No tiene que dejar de hablar, kishor use ladd voz lo menos posible. Hable en voz baja, kishor sin susurrar; susurrar puede lastimarle la laringe más que si habla en voz baja. Evite hablar por teléfono o tratar de hablar en voz tiesha. · Trate de no carraspear. [place] puede irritar más la laringe. Si tiene tos seca que no produce mucosidad y [employer] lo recomienda, tome un antitusivo (medicamento contra la tos) de venta anson. · No fume ni permita que otros fumen cerca de usted. Si necesita ayuda para dejar de fumar, hable con ladd médico sobre programas y medicamentos para dejar de fumar. Estos pueden aumentar nancy probabilidades de dejar el hábito para siempre. · Use un humidificador o un vaporizador para añadir humedad en ladd dormitorio. La humedad ayuda a adelgazar la mucosidad de las membranas nasales que causa congestión o goteo retronasal. Siga las instrucciones para limpiar el aparato. · Lolita abundantes líquidos, suficientes zackery para que ladd orina sea de color amarillo maureen o transparente [employer]. Si tiene [employer], el corazón o el hígado y tiene que Santiago's líquidos, hable con ladd médico antes de aumentar ladd consumo. · Use un lavado nasal con solución salina (agua salada) para ayudar a mantener abiertas las fosas nasales y para eliminar la mucosidad y las bacterias.  Puede comprar gotas nasales de solución salina en un supermercado o [email or web address] e ingrese a estevez cuenta de MyChart. Linnea Power N424 en el cuadro \"Search Health Information\" para más información (en inglés) sobre \"Lavados nasales salinos: Instrucciones de cuidado - [ Saline Nasal Washes: Care Instructions ]. \"     Si no tiene antonietta cuenta, madelyn maged en el enlace \"Sign Up Now\". Revisado: [address] [place], 56  Versión del contenido: 11.5  © 7112-4598 Healthwise, Incorporated. Las instrucciones de cuidado fueron adaptadas bajo licencia por [facility] ([facility]). Si usted tiene [place] afección médica o sobre estas instrucciones, siempre pregunte a estevez profesional de joy. Healthwise, Incorporated niega toda garantía o responsabilidad por estevez uso de esta información. Patient Education        Tos con lobito: Instrucciones de cuidado - [ Coughing Up Blood: Care Instructions ]  Instrucciones de cuidado    Toser lobito puede ser algo alarmante. La lobito podría provenir de los pulmones, del estómago o de la garganta. Usted podría toser con rastros finos de lobito de color krishna vivo. Orovada probablemente no sea motivo de preocupación. [place] si tose grandes cantidades de lobito de color krishna vivo o de mucosidad herrumbrosa de los pulmones, puede ser un síntoma de antonietta afección más grave. Varias afecciones pueden hacer que tosa lobito de los pulmones. Entre ellas se incluyen bronquitis y neumonía, o problemas más graves zackery cáncer o un coágulo de lobito en los pulmones (embolia pulmonar). Dependiendo de la causa de la tos, los síntomas pueden desaparecer después de tratar estevez enfermedad. Estevez médico podría sugerirle no intentar aliviar la tos con los medicamentos para la tos si considera que es mejor que expulse la [place]. La atención de seguimiento es antonietta parte clave de estevez tratamiento y seguridad. Asegúrese de hacer y acudir a todas las citas, y llame a estevez médico si está teniendo problemas.  También es antonietta buena idea saber los resultados de nancy exámenes y [employer] Keyona Srivastava de los [place] jordan. ¿Cómo puede cuidarse en el hogar? · Anote en qué momento y [address] [place]. Asimismo, anote si tose saliva con antonietta pequeña cantidad de lobito o [employer]. Presente esta información a ladd médico en ladd siguiente consulta. · Aumente ladd consumo de líquidos hasta antonietta cantidad de al menos 8 a 10 vasos de agua diariamente. [place] ayuda a que la mucosidad sea menos espesa y a que la expectore. Si tiene antonietta enfermedad del riñón, del corazón o del hígado y tiene que Santiago's líquidos, hable con ladd médico antes de aumentar ladd consumo. · Si ladd médico le recetó antibióticos, tómelos según las indicaciones. No deje de tomarlos por el hecho de sentirse mejor. Debe romero todos los antibióticos hasta terminarlos. · No tome medicamentos para la tos sin consultar a ladd médico. Pueden causar problemas si usted tiene otros problemas de [place]. También pueden interactuar con otros medicamentos. · No fume ni use otras formas de tabaco, sobre todo si tiene tos. Fumar puede agravar la tos. Si necesita ayuda para dejar de usar tabaco, hable con ladd médico [employer] y medicamentos para dejar de usarlo. Éstos pueden aumentar nancy probabilidades de dejar el hábito para siempre. · Evite exponerse al humo, polvo y otros contaminantes. ¿Cuándo debe pedir ayuda? Llame al 911 en cualquier momento que considere que necesita atención de emergencia. Por ejemplo, llame si:  ? · Tiene dolor repentino en el pecho y falta de aire. ? · Tiene graves dificultades para respirar. ? Llame a ladd médico ahora mismo o busque atención médica inmediata si:  ? · Tiene respiración sibilante (con silbidos) y dificultades para respirar. ? · [employer] o aturdimiento, o que está a punto de [place]. ? · Tose coágulos de lobito. ?Preste especial atención a los cambios en ladd joy y asegúrese de comunicarse con ladd médico si:  ? · No mejora zackery se esperaba.    ? · Tiene nuevos síntomas, tales zackery dolor de pecho con dificultad para respirar o fiebre. ¿Dónde puede encontrar más información en inglés? Radha [place] a [email or web address] e ingrese a ladd cuenta de MyChart. Lucas Stinson M550 en el [place] \"Search Health Information\" para más información (en inglés) sobre \"Tos con lobito: Instrucciones de cuidado - [ Coughing Up Blood: Care Instructions ]. \"     Si no tiene antonietta cuenta, madelyn maged en el enlace \"Sign Up Now\". RevisadoDepatricia Hernandez 2017  Versión del contenido: 11.5  © 9266-9563 Healthwise, Incorporated. Las instrucciones de cuidado fueron adaptadas bajo licencia por [facility] ([facility]). Si usted tiene [place] afección médica o sobre estas instrucciones, siempre pregunte a ladd profesional de joy. [employer] niega toda garantía o responsabilidad por ladd uso de esta información.

## 2018-01-31 LAB — S PYO THROAT QL CULT: NORMAL

## 2018-02-02 LAB
QUANTIFERON (R) TB GOLD (INCUBATED): NEGATIVE
QUANTIFERON MITOGEN: >10 IU/ML
QUANTIFERON NIL: 0.05 IU/ML
QUANTIFERON TB AG MINUS NIL: 0.04 IU/ML (ref 0–0.34)

## 2018-02-13 ENCOUNTER — OFFICE VISIT (OUTPATIENT)
Dept: FAMILY MEDICINE CLINIC | Age: 63
End: 2018-02-13
Payer: COMMERCIAL

## 2018-02-13 VITALS
TEMPERATURE: 97 F | SYSTOLIC BLOOD PRESSURE: 118 MMHG | BODY MASS INDEX: 29.53 KG/M2 | HEIGHT: 64 IN | DIASTOLIC BLOOD PRESSURE: 66 MMHG | HEART RATE: 75 BPM | RESPIRATION RATE: 16 BRPM | OXYGEN SATURATION: 98 % | WEIGHT: 173 LBS

## 2018-02-13 DIAGNOSIS — H69.82 EUSTACHIAN TUBE DYSFUNCTION, LEFT: Primary | ICD-10-CM

## 2018-02-13 PROCEDURE — G8419 CALC BMI OUT NRM PARAM NOF/U: HCPCS | Performed by: FAMILY MEDICINE

## 2018-02-13 PROCEDURE — 1036F TOBACCO NON-USER: CPT | Performed by: FAMILY MEDICINE

## 2018-02-13 PROCEDURE — 3014F SCREEN MAMMO DOC REV: CPT | Performed by: FAMILY MEDICINE

## 2018-02-13 PROCEDURE — G8484 FLU IMMUNIZE NO ADMIN: HCPCS | Performed by: FAMILY MEDICINE

## 2018-02-13 PROCEDURE — G8427 DOCREV CUR MEDS BY ELIG CLIN: HCPCS | Performed by: FAMILY MEDICINE

## 2018-02-13 PROCEDURE — 3017F COLORECTAL CA SCREEN DOC REV: CPT | Performed by: FAMILY MEDICINE

## 2018-02-13 PROCEDURE — 99213 OFFICE O/P EST LOW 20 MIN: CPT | Performed by: FAMILY MEDICINE

## 2018-02-13 NOTE — PROGRESS NOTES
Subjective  Dede Marcial, 63 y.o. female presents today with:  Chief Complaint   Patient presents with    Follow-up     Patient is here for a follow up with her URI. She is feeling better. States that congestion, cough and laryngitis have resolved. Still has a little left ear pain and pain in upper left neck. No fevers, chills, sweats. No other questions and or concerns for today's visit      Review of Systems - as above      Past Medical History:   Diagnosis Date    Acid reflux     Asthma     Chronic back pain     [facility]ch[facility] disease) ([facility])     Depression     Hyperlipidemia     Obesity     Venous insufficiency      Past Surgical History:   Procedure Laterality Date    KIDNEY SURGERY       Social History     Social History    Marital status:      Spouse name: N/A    Number of children: N/A    Years of education: N/A     Occupational History    Not on file.      Social History Main Topics    Smoking status: Never Smoker    Smokeless tobacco: Never Used    Alcohol use No    Drug use: No    Sexual activity: Yes     Other Topics Concern    Not on file     Social History Narrative    No narrative on file     Family History   Problem Relation Age of Onset    Diabetes Mother     High Blood Pressure Mother     Diabetes Brother     Diabetes Maternal Grandmother     Diabetes Maternal Grandfather     Diabetes Paternal Grandmother     Diabetes Paternal Grandfather     Diabetes Other     High Blood Pressure Sister      Allergies   Allergen Reactions    Other      Perfumes      Current Outpatient Prescriptions   Medication Sig Dispense Refill    guaiFENesin (MUCINEX) 600 MG extended release tablet Take 1 tablet by mouth 2 times daily 30 tablet 0    pseudoephedrine (DECONGESTANT) 30 MG tablet Take 1 tablet by mouth every 6 hours as needed for Congestion 30 tablet 0    fluticasone (FLOVENT HFA) 110 MCG/ACT inhaler Inhale 1 puff into the lungs 2 times daily 1 Inhaler 5    albuterol sulfate  (90 Base) MCG/ACT inhaler Inhale 2 puffs into the lungs every 6 hours as needed for Wheezing      miconazole (MICONAZOLE 7) 2 % vaginal cream Place 1 applicator vaginally nightly Place vaginally nightly.  FLUoxetine (PROZAC) 10 MG capsule Take 1 capsule by mouth daily 30 capsule 5    omeprazole (PRILOSEC) 40 MG delayed release capsule Take 1 capsule by mouth daily 30 capsule 2    naproxen (NAPROSYN) 500 MG tablet Take 1 tablet by mouth 2 times daily (with meals) 60 tablet 1     No current facility-administered medications for this visit. PMH, Surgical Hx, Family Hx, and Social Hx reviewed and updated. Health Maintenance reviewed. Objective    Vitals:    02/13/18 1532   BP: 118/66   Site: Left Arm   Position: Sitting   Cuff Size: Medium Adult   Pulse: 75   Resp: 16   Temp: 97 °F (36.1 °C)   TempSrc: Temporal   SpO2: 98%   Weight: 173 lb (78.5 kg)   Height: 5' 4\" (1.626 m)       Physical Exam   Constitutional: She is oriented to person, place, and time. She appears well-developed and well-nourished. HENT:   Head: Normocephalic and atraumatic. Right Ear: Tympanic membrane, external ear and ear canal normal.   Left Ear: Tympanic membrane, external ear and ear canal normal. No decreased hearing is noted. Nose: Nose normal.   Mouth/Throat: Oropharynx is clear and moist.   Eyes: Conjunctivae are normal. No scleral icterus. Neck: Normal range of motion. Neck supple. No thyromegaly present. Cardiovascular: Normal rate, regular rhythm and normal heart sounds. Pulmonary/Chest: Effort normal and breath sounds normal.   Lymphadenopathy:     She has no cervical adenopathy. Neurological: She is alert and oriented to person, place, and time. Skin: Skin is warm and dry. Psychiatric: She has a normal mood and affect. Assessment & Plan   1.  Eustachian tube dysfunction, left       Naproxen BID prn with food and water as previously

## 2018-02-16 ENCOUNTER — OFFICE VISIT (OUTPATIENT)
Dept: FAMILY MEDICINE CLINIC | Age: 63
End: 2018-02-16
Payer: COMMERCIAL

## 2018-02-16 VITALS
OXYGEN SATURATION: 98 % | HEIGHT: 64 IN | WEIGHT: 171 LBS | HEART RATE: 70 BPM | BODY MASS INDEX: 29.19 KG/M2 | SYSTOLIC BLOOD PRESSURE: 110 MMHG | TEMPERATURE: 97.6 F | DIASTOLIC BLOOD PRESSURE: 66 MMHG | RESPIRATION RATE: 14 BRPM

## 2018-02-16 DIAGNOSIS — Z12.4 SCREENING FOR CERVICAL CANCER: Chronic | ICD-10-CM

## 2018-02-16 DIAGNOSIS — N76.1 CHRONIC VAGINITIS: Chronic | ICD-10-CM

## 2018-02-16 PROCEDURE — 3017F COLORECTAL CA SCREEN DOC REV: CPT | Performed by: FAMILY MEDICINE

## 2018-02-16 PROCEDURE — 1036F TOBACCO NON-USER: CPT | Performed by: FAMILY MEDICINE

## 2018-02-16 PROCEDURE — G8484 FLU IMMUNIZE NO ADMIN: HCPCS | Performed by: FAMILY MEDICINE

## 2018-02-16 PROCEDURE — G8427 DOCREV CUR MEDS BY ELIG CLIN: HCPCS | Performed by: FAMILY MEDICINE

## 2018-02-16 PROCEDURE — 99213 OFFICE O/P EST LOW 20 MIN: CPT | Performed by: FAMILY MEDICINE

## 2018-02-16 PROCEDURE — 3014F SCREEN MAMMO DOC REV: CPT | Performed by: FAMILY MEDICINE

## 2018-02-16 PROCEDURE — G8419 CALC BMI OUT NRM PARAM NOF/U: HCPCS | Performed by: FAMILY MEDICINE

## 2018-02-16 RX ORDER — ESTRADIOL 0.1 MG/G
CREAM VAGINAL
Qty: 1 TUBE | Refills: 3 | Status: SHIPPED | OUTPATIENT
Start: 2018-02-16 | End: 2018-03-13

## 2018-02-17 LAB
CLUE CELLS: NORMAL
TRICHOMONAS PREP: NORMAL
TRICHOMONAS VAGINALIS SCREEN: NEGATIVE
YEAST WET PREP: NORMAL

## 2018-02-22 LAB
HPV COMMENT: NORMAL
HPV TYPE 16: NOT DETECTED
HPV TYPE 18: NOT DETECTED
HPVOH (OTHER TYPES): NOT DETECTED

## 2018-03-13 ENCOUNTER — OFFICE VISIT (OUTPATIENT)
Dept: FAMILY MEDICINE CLINIC | Age: 63
End: 2018-03-13
Payer: COMMERCIAL

## 2018-03-13 VITALS
HEART RATE: 73 BPM | SYSTOLIC BLOOD PRESSURE: 122 MMHG | WEIGHT: 171 LBS | HEIGHT: 64 IN | DIASTOLIC BLOOD PRESSURE: 60 MMHG | OXYGEN SATURATION: 99 % | BODY MASS INDEX: 29.19 KG/M2 | TEMPERATURE: 97.5 F | RESPIRATION RATE: 14 BRPM

## 2018-03-13 DIAGNOSIS — M54.42 CHRONIC MIDLINE LOW BACK PAIN WITH BILATERAL SCIATICA: Primary | Chronic | ICD-10-CM

## 2018-03-13 DIAGNOSIS — G89.29 CHRONIC MIDLINE LOW BACK PAIN WITH BILATERAL SCIATICA: Primary | Chronic | ICD-10-CM

## 2018-03-13 DIAGNOSIS — K21.9 GASTROESOPHAGEAL REFLUX DISEASE WITHOUT ESOPHAGITIS: ICD-10-CM

## 2018-03-13 DIAGNOSIS — M54.41 CHRONIC MIDLINE LOW BACK PAIN WITH BILATERAL SCIATICA: Primary | Chronic | ICD-10-CM

## 2018-03-13 DIAGNOSIS — F39 MOOD DISORDER (HCC): Chronic | ICD-10-CM

## 2018-03-13 DIAGNOSIS — M54.2 CERVICALGIA: Chronic | ICD-10-CM

## 2018-03-13 DIAGNOSIS — N95.2 ATROPHIC VAGINITIS: Chronic | ICD-10-CM

## 2018-03-13 PROCEDURE — G8419 CALC BMI OUT NRM PARAM NOF/U: HCPCS | Performed by: FAMILY MEDICINE

## 2018-03-13 PROCEDURE — 3014F SCREEN MAMMO DOC REV: CPT | Performed by: FAMILY MEDICINE

## 2018-03-13 PROCEDURE — G8482 FLU IMMUNIZE ORDER/ADMIN: HCPCS | Performed by: FAMILY MEDICINE

## 2018-03-13 PROCEDURE — G8427 DOCREV CUR MEDS BY ELIG CLIN: HCPCS | Performed by: FAMILY MEDICINE

## 2018-03-13 PROCEDURE — 99214 OFFICE O/P EST MOD 30 MIN: CPT | Performed by: FAMILY MEDICINE

## 2018-03-13 PROCEDURE — 3017F COLORECTAL CA SCREEN DOC REV: CPT | Performed by: FAMILY MEDICINE

## 2018-03-13 PROCEDURE — 1036F TOBACCO NON-USER: CPT | Performed by: FAMILY MEDICINE

## 2018-03-13 RX ORDER — OMEPRAZOLE 40 MG/1
40 CAPSULE, DELAYED RELEASE ORAL DAILY
Qty: 30 CAPSULE | Refills: 2 | Status: SHIPPED | OUTPATIENT
Start: 2018-03-13 | End: 2018-05-08 | Stop reason: SDUPTHER

## 2018-03-13 RX ORDER — NAPROXEN 500 MG/1
500 TABLET ORAL 2 TIMES DAILY WITH MEALS
Qty: 60 TABLET | Refills: 1 | Status: SHIPPED | OUTPATIENT
Start: 2018-03-13 | End: 2018-05-05 | Stop reason: SDUPTHER

## 2018-03-13 RX ORDER — DIPHENHYDRAMINE HCL 50 MG/1
CAPSULE ORAL
COMMUNITY
Start: 2018-03-06 | End: 2018-03-13 | Stop reason: ALTCHOICE

## 2018-03-13 RX ORDER — ACETAMINOPHEN 160 MG
TABLET,DISINTEGRATING ORAL
COMMUNITY
End: 2018-07-13 | Stop reason: SDUPTHER

## 2018-03-13 ASSESSMENT — PATIENT HEALTH QUESTIONNAIRE - PHQ9
SUM OF ALL RESPONSES TO PHQ9 QUESTIONS 1 & 2: 0
2. FEELING DOWN, DEPRESSED OR HOPELESS: 0
1. LITTLE INTEREST OR PLEASURE IN DOING THINGS: 0
SUM OF ALL RESPONSES TO PHQ QUESTIONS 1-9: 0

## 2018-03-13 NOTE — PROGRESS NOTES
Subjective  Alaina Pang, 66 y.o. female presents today with:  Chief Complaint   Patient presents with    Follow-up     Patient is here for her 3 month follow up with chronic conditions    Otalgia     Patient has pain in her left ear, x1 week. Has chronic ear pain, intermittent headaches and intermittent low back pain for which she take an occasional naproxen with food and water. Has gone to PT and does her exercises at home. She says they help her a good bit. Steroid cream no help for atrophic vaginitis. Couldn't afford estradiol cream.     Depression and anxiety is well-controlled on Prozac. No SI. No hallucinations. GERD controlled with PRilosec. Avoids caffeine and low pH foods. Stops eating 3 hours before HS. No other questions and or concerns for today's visit      Review of Systems      Past Medical History:   Diagnosis Date    Acid reflux     Asthma     Chronic back pain     COPD (chronic obstructive pulmonary disease) (HCC)     Depression     Hyperlipidemia     Obesity     Venous insufficiency      Past Surgical History:   Procedure Laterality Date    KIDNEY SURGERY       Social History     Social History    Marital status:      Spouse name: N/A    Number of children: N/A    Years of education: N/A     Occupational History    Not on file.      Social History Main Topics    Smoking status: Never Smoker    Smokeless tobacco: Never Used    Alcohol use No    Drug use: No    Sexual activity: Yes     Other Topics Concern    Not on file     Social History Narrative    No narrative on file     Family History   Problem Relation Age of Onset    Diabetes Mother     High Blood Pressure Mother     Diabetes Brother     Diabetes Maternal Grandmother     Diabetes Maternal Grandfather     Diabetes Paternal Grandmother     Diabetes Paternal Grandfather     Diabetes Other     High Blood Pressure Sister      Allergies   Allergen Reactions    Other

## 2018-03-27 ENCOUNTER — HOSPITAL ENCOUNTER (OUTPATIENT)
Dept: WOMENS IMAGING | Age: 63
Discharge: HOME | End: 2018-03-29
Payer: COMMERCIAL

## 2018-03-27 DIAGNOSIS — Z12.39 SCREENING FOR BREAST CANCER: ICD-10-CM

## 2018-03-27 PROCEDURE — 77067 SCR MAMMO BI INCL CAD: CPT

## 2018-04-11 PROBLEM — Z12.11 COLON CANCER SCREENING: Chronic | Status: RESOLVED | Noted: 2017-12-13 | Resolved: 2018-04-11

## 2018-05-05 DIAGNOSIS — G89.29 CHRONIC MIDLINE LOW BACK PAIN WITH BILATERAL SCIATICA: Chronic | ICD-10-CM

## 2018-05-05 DIAGNOSIS — M54.41 CHRONIC MIDLINE LOW BACK PAIN WITH BILATERAL SCIATICA: Chronic | ICD-10-CM

## 2018-05-05 DIAGNOSIS — M54.42 CHRONIC MIDLINE LOW BACK PAIN WITH BILATERAL SCIATICA: Chronic | ICD-10-CM

## 2018-05-05 DIAGNOSIS — M54.2 CERVICALGIA: Chronic | ICD-10-CM

## 2018-05-08 NOTE — PROGRESS NOTES
[facility]    Progress Note    Marlene Moon Patient Status:  Inpatient    1947 MRN [id number]   Location [facility] 5SW/SE Attending Marvell Soulier, MD   Hosp Day # 4 PCP Alexandr Anguiano MD     Subjective:     Psychiatric/B 05/05/2018   K 3.8 05/05/2018    05/05/2018   CO2 24 05/05/2018    (H) 05/05/2018   CA 8.8 05/05/2018   ALB 3.8 04/02/2018   ALKPHO 56 04/02/2018   BILT 0.4 04/02/2018   TP 6.1 04/02/2018   AST 20 04/02/2018   ALT 15 04/02/2018   T4F 0.95 12/2 nightly.  FLUoxetine (PROZAC) 10 MG capsule Take 1 capsule by mouth daily 30 capsule 5    omeprazole (PRILOSEC) 40 MG delayed release capsule Take 1 capsule by mouth daily 30 capsule 2    naproxen (NAPROSYN) 500 MG tablet Take 1 tablet by mouth 2 times daily (with meals) 60 tablet 1    diphenhydrAMINE (BANOPHEN) 50 MG capsule Take 1 capsule by mouth nightly as needed for Itching 30 capsule 5     No current facility-administered medications for this visit. PMH, Surgical Hx, Family Hx, and Social Hx reviewed and updated. Health Maintenance reviewed. Objective    There were no vitals filed for this visit. Physical Exam    Assessment & Plan   No diagnosis found. ***  No orders of the defined types were placed in this encounter. No orders of the defined types were placed in this encounter. There are no discontinued medications. No Follow-up on file.         Controlled Substances Monitoring:                                Tiffanie Sanchez MD

## 2018-06-13 ENCOUNTER — OFFICE VISIT (OUTPATIENT)
Dept: FAMILY MEDICINE CLINIC | Age: 63
End: 2018-06-13
Payer: COMMERCIAL

## 2018-06-13 VITALS
HEIGHT: 64 IN | DIASTOLIC BLOOD PRESSURE: 80 MMHG | WEIGHT: 170.25 LBS | BODY MASS INDEX: 29.06 KG/M2 | RESPIRATION RATE: 14 BRPM | TEMPERATURE: 97.3 F | OXYGEN SATURATION: 98 % | HEART RATE: 72 BPM | SYSTOLIC BLOOD PRESSURE: 118 MMHG

## 2018-06-13 DIAGNOSIS — F39 MOOD DISORDER (HCC): Primary | Chronic | ICD-10-CM

## 2018-06-13 DIAGNOSIS — N95.2 ATROPHIC VAGINITIS: Chronic | ICD-10-CM

## 2018-06-13 DIAGNOSIS — R11.0 NAUSEA: Chronic | ICD-10-CM

## 2018-06-13 DIAGNOSIS — R10.9 ABDOMINAL PAIN, UNSPECIFIED ABDOMINAL LOCATION: ICD-10-CM

## 2018-06-13 PROCEDURE — 3017F COLORECTAL CA SCREEN DOC REV: CPT | Performed by: FAMILY MEDICINE

## 2018-06-13 PROCEDURE — G8427 DOCREV CUR MEDS BY ELIG CLIN: HCPCS | Performed by: FAMILY MEDICINE

## 2018-06-13 PROCEDURE — 1036F TOBACCO NON-USER: CPT | Performed by: FAMILY MEDICINE

## 2018-06-13 PROCEDURE — G8419 CALC BMI OUT NRM PARAM NOF/U: HCPCS | Performed by: FAMILY MEDICINE

## 2018-06-13 PROCEDURE — 99214 OFFICE O/P EST MOD 30 MIN: CPT | Performed by: FAMILY MEDICINE

## 2018-06-13 RX ORDER — DIPHENHYDRAMINE HCL 50 MG/1
CAPSULE ORAL
Refills: 2 | COMMUNITY
Start: 2018-05-01 | End: 2020-01-03

## 2018-06-13 RX ORDER — ESCITALOPRAM OXALATE 10 MG/1
10 TABLET ORAL DAILY
Qty: 30 TABLET | Refills: 5 | Status: SHIPPED | OUTPATIENT
Start: 2018-06-13 | End: 2018-11-24 | Stop reason: SDUPTHER

## 2018-06-13 ASSESSMENT — ENCOUNTER SYMPTOMS
SHORTNESS OF BREATH: 0
VOMITING: 0
ABDOMINAL PAIN: 1
DIARRHEA: 0
NAUSEA: 1
CHEST TIGHTNESS: 0
COUGH: 0
CONSTIPATION: 0
RECTAL PAIN: 0
BLOOD IN STOOL: 0

## 2018-07-13 ENCOUNTER — OFFICE VISIT (OUTPATIENT)
Dept: FAMILY MEDICINE CLINIC | Age: 63
End: 2018-07-13
Payer: COMMERCIAL

## 2018-07-13 VITALS
OXYGEN SATURATION: 98 % | DIASTOLIC BLOOD PRESSURE: 66 MMHG | RESPIRATION RATE: 16 BRPM | SYSTOLIC BLOOD PRESSURE: 104 MMHG | HEIGHT: 63 IN | HEART RATE: 66 BPM | WEIGHT: 171 LBS | TEMPERATURE: 97.5 F | BODY MASS INDEX: 30.3 KG/M2

## 2018-07-13 DIAGNOSIS — K21.9 GASTROESOPHAGEAL REFLUX DISEASE WITHOUT ESOPHAGITIS: ICD-10-CM

## 2018-07-13 DIAGNOSIS — N95.2 ATROPHIC VAGINITIS: Primary | Chronic | ICD-10-CM

## 2018-07-13 DIAGNOSIS — M54.41 CHRONIC MIDLINE LOW BACK PAIN WITH BILATERAL SCIATICA: Chronic | ICD-10-CM

## 2018-07-13 DIAGNOSIS — M54.42 CHRONIC MIDLINE LOW BACK PAIN WITH BILATERAL SCIATICA: Chronic | ICD-10-CM

## 2018-07-13 DIAGNOSIS — G89.29 CHRONIC MIDLINE LOW BACK PAIN WITH BILATERAL SCIATICA: Chronic | ICD-10-CM

## 2018-07-13 DIAGNOSIS — L29.9 ITCHING: Chronic | ICD-10-CM

## 2018-07-13 DIAGNOSIS — E55.9 VITAMIN D DEFICIENCY: Chronic | ICD-10-CM

## 2018-07-13 DIAGNOSIS — E78.00 PURE HYPERCHOLESTEROLEMIA: Chronic | ICD-10-CM

## 2018-07-13 DIAGNOSIS — F39 MOOD DISORDER (HCC): Chronic | ICD-10-CM

## 2018-07-13 PROCEDURE — 3017F COLORECTAL CA SCREEN DOC REV: CPT | Performed by: FAMILY MEDICINE

## 2018-07-13 PROCEDURE — 1036F TOBACCO NON-USER: CPT | Performed by: FAMILY MEDICINE

## 2018-07-13 PROCEDURE — 99214 OFFICE O/P EST MOD 30 MIN: CPT | Performed by: FAMILY MEDICINE

## 2018-07-13 PROCEDURE — G8417 CALC BMI ABV UP PARAM F/U: HCPCS | Performed by: FAMILY MEDICINE

## 2018-07-13 PROCEDURE — G8427 DOCREV CUR MEDS BY ELIG CLIN: HCPCS | Performed by: FAMILY MEDICINE

## 2018-07-13 RX ORDER — OMEPRAZOLE 40 MG/1
40 CAPSULE, DELAYED RELEASE ORAL DAILY
Qty: 30 CAPSULE | Refills: 2 | Status: SHIPPED | OUTPATIENT
Start: 2018-07-13 | End: 2019-03-04 | Stop reason: SDUPTHER

## 2018-07-13 RX ORDER — ALBUTEROL SULFATE 90 UG/1
2 AEROSOL, METERED RESPIRATORY (INHALATION) EVERY 6 HOURS PRN
Qty: 1 INHALER | Status: CANCELLED | OUTPATIENT
Start: 2018-07-13

## 2018-07-13 RX ORDER — ACETAMINOPHEN 160 MG
1 TABLET,DISINTEGRATING ORAL DAILY
Qty: 30 CAPSULE | Refills: 11 | Status: SHIPPED | OUTPATIENT
Start: 2018-07-13 | End: 2019-05-29 | Stop reason: SDUPTHER

## 2018-07-13 NOTE — PROGRESS NOTES
 Not on file     Social History Narrative    No narrative on file     Family History   Problem Relation Age of Onset    Diabetes Mother     High Blood Pressure Mother     Diabetes Brother     Diabetes Maternal Grandmother     Diabetes Maternal Grandfather     Diabetes Paternal Grandmother     Diabetes Paternal Grandfather     Diabetes Other     High Blood Pressure Sister      Allergies   Allergen Reactions    Other      Perfumes      Current Outpatient Prescriptions   Medication Sig Dispense Refill    omeprazole (PRILOSEC) 40 MG delayed release capsule Take 1 capsule by mouth daily 30 capsule 2    Cholecalciferol (VITAMIN D3) 2000 units CAPS Take 1 capsule by mouth daily 30 capsule 11    NAPROXEN 500 MG EC tablet TAKE 1 TABLET BY MOUTH 2 TIMES DAILY (WITH MEALS) 60 tablet 1    BANOPHEN 50 MG capsule   2    escitalopram (LEXAPRO) 10 MG tablet Take 1 tablet by mouth daily 30 tablet 5    albuterol sulfate  (90 Base) MCG/ACT inhaler Inhale 2 puffs into the lungs every 6 hours as needed for Wheezing      ESTRING 2 MG vaginal ring PLACE 2 MG VAGINALLY ONCE FOR 1 DOSE FOLLOW PACKAGE DIRECTIONS. REMOVE AFTER 90 DAYS AND REPLACE WITH NEW RING. 1 each 3     No current facility-administered medications for this visit. PMH, Surgical Hx, Family Hx, and Social Hx reviewed and updated. Health Maintenance reviewed. Objective    Vitals:    07/13/18 1124   BP: 104/66   Site: Left Arm   Position: Sitting   Cuff Size: Medium Adult   Pulse: 66   Resp: 16   Temp: 97.5 °F (36.4 °C)   TempSrc: Temporal   SpO2: 98%   Weight: 171 lb (77.6 kg)   Height: 5' 3\" (1.6 m)       Physical Exam   Constitutional: She is oriented to person, place, and time. She appears well-developed and well-nourished. HENT:   Head: Normocephalic. Eyes: Conjunctivae are normal.   Pulmonary/Chest: Effort normal.   Neurological: She is alert and oriented to person, place, and time. Psychiatric: She has a normal mood and affect.

## 2018-09-18 DIAGNOSIS — E78.00 PURE HYPERCHOLESTEROLEMIA: Chronic | ICD-10-CM

## 2018-09-18 DIAGNOSIS — L29.9 ITCHING: Chronic | ICD-10-CM

## 2018-09-18 LAB
ALBUMIN SERPL-MCNC: 4.2 G/DL (ref 3.9–4.9)
ALP BLD-CCNC: 87 U/L (ref 40–130)
ALT SERPL-CCNC: 17 U/L (ref 0–33)
ANION GAP SERPL CALCULATED.3IONS-SCNC: 14 MEQ/L (ref 7–13)
AST SERPL-CCNC: 17 U/L (ref 0–35)
BILIRUB SERPL-MCNC: 0.7 MG/DL (ref 0–1.2)
BUN BLDV-MCNC: 19 MG/DL (ref 8–23)
CALCIUM SERPL-MCNC: 9.4 MG/DL (ref 8.6–10.2)
CHLORIDE BLD-SCNC: 102 MEQ/L (ref 98–107)
CHOLESTEROL, FASTING: 224 MG/DL (ref 0–199)
CO2: 25 MEQ/L (ref 22–29)
CREAT SERPL-MCNC: 0.75 MG/DL (ref 0.5–0.9)
GFR AFRICAN AMERICAN: >60
GFR NON-AFRICAN AMERICAN: >60
GLOBULIN: 2.5 G/DL (ref 2.3–3.5)
GLUCOSE FASTING: 93 MG/DL (ref 74–109)
HDLC SERPL-MCNC: 50 MG/DL (ref 40–59)
LDL CHOLESTEROL CALCULATED: 153 MG/DL (ref 0–129)
POTASSIUM SERPL-SCNC: 4.9 MEQ/L (ref 3.5–5.1)
SODIUM BLD-SCNC: 141 MEQ/L (ref 132–144)
TOTAL PROTEIN: 6.7 G/DL (ref 6.4–8.1)
TRIGLYCERIDE, FASTING: 106 MG/DL (ref 0–200)

## 2018-10-19 ENCOUNTER — OFFICE VISIT (OUTPATIENT)
Dept: FAMILY MEDICINE CLINIC | Age: 63
End: 2018-10-19
Payer: COMMERCIAL

## 2018-10-19 VITALS
HEIGHT: 63 IN | DIASTOLIC BLOOD PRESSURE: 76 MMHG | HEART RATE: 71 BPM | BODY MASS INDEX: 31.01 KG/M2 | WEIGHT: 175 LBS | SYSTOLIC BLOOD PRESSURE: 134 MMHG | TEMPERATURE: 97.6 F | OXYGEN SATURATION: 98 % | RESPIRATION RATE: 16 BRPM

## 2018-10-19 DIAGNOSIS — J30.89 CHRONIC NON-SEASONAL ALLERGIC RHINITIS: ICD-10-CM

## 2018-10-19 DIAGNOSIS — K21.9 GASTROESOPHAGEAL REFLUX DISEASE WITHOUT ESOPHAGITIS: Primary | ICD-10-CM

## 2018-10-19 DIAGNOSIS — Z80.0 FAMILY HISTORY OF GASTRIC CANCER: Chronic | ICD-10-CM

## 2018-10-19 DIAGNOSIS — Z23 FLU VACCINE NEED: ICD-10-CM

## 2018-10-19 DIAGNOSIS — N95.2 ATROPHIC VAGINITIS: Chronic | ICD-10-CM

## 2018-10-19 DIAGNOSIS — R10.12 LUQ PAIN: Chronic | ICD-10-CM

## 2018-10-19 DIAGNOSIS — F39 MOOD DISORDER (HCC): Chronic | ICD-10-CM

## 2018-10-19 DIAGNOSIS — J45.21 MILD INTERMITTENT ASTHMA WITH ACUTE EXACERBATION: Chronic | ICD-10-CM

## 2018-10-19 DIAGNOSIS — E55.9 VITAMIN D DEFICIENCY: Chronic | ICD-10-CM

## 2018-10-19 DIAGNOSIS — L29.9 PRURITIC DERMATITIS: Chronic | ICD-10-CM

## 2018-10-19 PROBLEM — L30.8 PRURITIC DERMATITIS: Chronic | Status: ACTIVE | Noted: 2018-10-19

## 2018-10-19 PROCEDURE — G8417 CALC BMI ABV UP PARAM F/U: HCPCS | Performed by: FAMILY MEDICINE

## 2018-10-19 PROCEDURE — 3017F COLORECTAL CA SCREEN DOC REV: CPT | Performed by: FAMILY MEDICINE

## 2018-10-19 PROCEDURE — 90688 IIV4 VACCINE SPLT 0.5 ML IM: CPT | Performed by: FAMILY MEDICINE

## 2018-10-19 PROCEDURE — G0008 ADMIN INFLUENZA VIRUS VAC: HCPCS | Performed by: FAMILY MEDICINE

## 2018-10-19 PROCEDURE — G8482 FLU IMMUNIZE ORDER/ADMIN: HCPCS | Performed by: FAMILY MEDICINE

## 2018-10-19 PROCEDURE — 99215 OFFICE O/P EST HI 40 MIN: CPT | Performed by: FAMILY MEDICINE

## 2018-10-19 PROCEDURE — G8427 DOCREV CUR MEDS BY ELIG CLIN: HCPCS | Performed by: FAMILY MEDICINE

## 2018-10-19 PROCEDURE — 1036F TOBACCO NON-USER: CPT | Performed by: FAMILY MEDICINE

## 2018-10-19 RX ORDER — ESTRADIOL 2 MG/1
RING VAGINAL
Refills: 3 | COMMUNITY
Start: 2018-09-10 | End: 2018-10-19 | Stop reason: SDUPTHER

## 2018-10-19 RX ORDER — MONTELUKAST SODIUM 10 MG/1
10 TABLET ORAL DAILY
Qty: 30 TABLET | Refills: 5 | Status: SHIPPED | OUTPATIENT
Start: 2018-10-19 | End: 2019-05-29 | Stop reason: SDUPTHER

## 2018-11-01 DIAGNOSIS — L29.9 PRURITIC DERMATITIS: Chronic | ICD-10-CM

## 2018-11-01 LAB
BASOPHILS ABSOLUTE: 0.1 K/UL (ref 0–0.2)
BASOPHILS RELATIVE PERCENT: 1.1 %
EOSINOPHILS ABSOLUTE: 0.3 K/UL (ref 0–0.7)
EOSINOPHILS RELATIVE PERCENT: 5.7 %
HCT VFR BLD CALC: 43 % (ref 37–47)
HEMOGLOBIN: 14.9 G/DL (ref 12–16)
LYMPHOCYTES ABSOLUTE: 2.3 K/UL (ref 1–4.8)
LYMPHOCYTES RELATIVE PERCENT: 42.6 %
MCH RBC QN AUTO: 32.5 PG (ref 27–31.3)
MCHC RBC AUTO-ENTMCNC: 34.7 % (ref 33–37)
MCV RBC AUTO: 93.7 FL (ref 82–100)
MONOCYTES ABSOLUTE: 0.4 K/UL (ref 0.2–0.8)
MONOCYTES RELATIVE PERCENT: 7.2 %
NEUTROPHILS ABSOLUTE: 2.3 K/UL (ref 1.4–6.5)
NEUTROPHILS RELATIVE PERCENT: 43.4 %
PDW BLD-RTO: 12.8 % (ref 11.5–14.5)
PLATELET # BLD: 252 K/UL (ref 130–400)
RBC # BLD: 4.59 M/UL (ref 4.2–5.4)
WBC # BLD: 5.4 K/UL (ref 4.8–10.8)

## 2018-11-13 ENCOUNTER — OFFICE VISIT (OUTPATIENT)
Dept: GASTROENTEROLOGY | Age: 63
End: 2018-11-13
Payer: COMMERCIAL

## 2018-11-13 ENCOUNTER — ANESTHESIA EVENT (OUTPATIENT)
Dept: ENDOSCOPY | Age: 63
End: 2018-11-13
Payer: COMMERCIAL

## 2018-11-13 VITALS
SYSTOLIC BLOOD PRESSURE: 123 MMHG | HEART RATE: 74 BPM | WEIGHT: 175 LBS | DIASTOLIC BLOOD PRESSURE: 76 MMHG | BODY MASS INDEX: 31 KG/M2

## 2018-11-13 DIAGNOSIS — R10.12 LUQ ABDOMINAL PAIN: ICD-10-CM

## 2018-11-13 DIAGNOSIS — R13.10 DYSPHAGIA, UNSPECIFIED TYPE: Primary | ICD-10-CM

## 2018-11-13 PROCEDURE — G8482 FLU IMMUNIZE ORDER/ADMIN: HCPCS | Performed by: INTERNAL MEDICINE

## 2018-11-13 PROCEDURE — 99203 OFFICE O/P NEW LOW 30 MIN: CPT | Performed by: INTERNAL MEDICINE

## 2018-11-13 PROCEDURE — G8417 CALC BMI ABV UP PARAM F/U: HCPCS | Performed by: INTERNAL MEDICINE

## 2018-11-13 PROCEDURE — G8427 DOCREV CUR MEDS BY ELIG CLIN: HCPCS | Performed by: INTERNAL MEDICINE

## 2018-11-13 PROCEDURE — 1036F TOBACCO NON-USER: CPT | Performed by: INTERNAL MEDICINE

## 2018-11-13 PROCEDURE — 3017F COLORECTAL CA SCREEN DOC REV: CPT | Performed by: INTERNAL MEDICINE

## 2018-11-14 ENCOUNTER — HOSPITAL ENCOUNTER (OUTPATIENT)
Age: 63
Setting detail: OUTPATIENT SURGERY
Discharge: HOME | End: 2018-11-14
Attending: INTERNAL MEDICINE | Admitting: INTERNAL MEDICINE
Payer: COMMERCIAL

## 2018-11-14 ENCOUNTER — ANESTHESIA (OUTPATIENT)
Dept: ENDOSCOPY | Age: 63
End: 2018-11-14
Payer: COMMERCIAL

## 2018-11-14 VITALS
RESPIRATION RATE: 16 BRPM | HEIGHT: 65 IN | TEMPERATURE: 98.5 F | SYSTOLIC BLOOD PRESSURE: 120 MMHG | DIASTOLIC BLOOD PRESSURE: 67 MMHG | WEIGHT: 170 LBS | BODY MASS INDEX: 28.32 KG/M2 | HEART RATE: 69 BPM | OXYGEN SATURATION: 98 %

## 2018-11-14 VITALS
OXYGEN SATURATION: 100 % | RESPIRATION RATE: 23 BRPM | DIASTOLIC BLOOD PRESSURE: 70 MMHG | SYSTOLIC BLOOD PRESSURE: 138 MMHG

## 2018-11-14 PROCEDURE — 6370000000 HC RX 637 (ALT 250 FOR IP): Performed by: INTERNAL MEDICINE

## 2018-11-14 PROCEDURE — 2580000003 HC RX 258: Performed by: NURSE ANESTHETIST, CERTIFIED REGISTERED

## 2018-11-14 PROCEDURE — 7100000011 HC PHASE II RECOVERY - ADDTL 15 MIN: Performed by: INTERNAL MEDICINE

## 2018-11-14 PROCEDURE — 43248 EGD GUIDE WIRE INSERTION: CPT | Performed by: INTERNAL MEDICINE

## 2018-11-14 PROCEDURE — 88305 TISSUE EXAM BY PATHOLOGIST: CPT

## 2018-11-14 PROCEDURE — 88342 IMHCHEM/IMCYTCHM 1ST ANTB: CPT

## 2018-11-14 PROCEDURE — 6360000002 HC RX W HCPCS: Performed by: NURSE ANESTHETIST, CERTIFIED REGISTERED

## 2018-11-14 PROCEDURE — 43251 EGD REMOVE LESION SNARE: CPT | Performed by: INTERNAL MEDICINE

## 2018-11-14 PROCEDURE — 3700000001 HC ADD 15 MINUTES (ANESTHESIA): Performed by: INTERNAL MEDICINE

## 2018-11-14 PROCEDURE — 43239 EGD BIOPSY SINGLE/MULTIPLE: CPT | Performed by: INTERNAL MEDICINE

## 2018-11-14 PROCEDURE — 7100000010 HC PHASE II RECOVERY - FIRST 15 MIN: Performed by: INTERNAL MEDICINE

## 2018-11-14 PROCEDURE — 2580000003 HC RX 258: Performed by: INTERNAL MEDICINE

## 2018-11-14 PROCEDURE — 2500000003 HC RX 250 WO HCPCS: Performed by: NURSE ANESTHETIST, CERTIFIED REGISTERED

## 2018-11-14 PROCEDURE — 3700000000 HC ANESTHESIA ATTENDED CARE: Performed by: INTERNAL MEDICINE

## 2018-11-14 PROCEDURE — 3609017100 HC EGD: Performed by: INTERNAL MEDICINE

## 2018-11-14 RX ORDER — PROPOFOL 10 MG/ML
INJECTION, EMULSION INTRAVENOUS PRN
Status: DISCONTINUED | OUTPATIENT
Start: 2018-11-14 | End: 2018-11-14 | Stop reason: SDUPTHER

## 2018-11-14 RX ORDER — ONDANSETRON 2 MG/ML
4 INJECTION INTRAMUSCULAR; INTRAVENOUS
Status: DISCONTINUED | OUTPATIENT
Start: 2018-11-14 | End: 2018-11-14 | Stop reason: HOSPADM

## 2018-11-14 RX ORDER — LIDOCAINE HYDROCHLORIDE 20 MG/ML
INJECTION, SOLUTION INFILTRATION; PERINEURAL PRN
Status: DISCONTINUED | OUTPATIENT
Start: 2018-11-14 | End: 2018-11-14 | Stop reason: SDUPTHER

## 2018-11-14 RX ORDER — SODIUM CHLORIDE 0.9 % (FLUSH) 0.9 %
10 SYRINGE (ML) INJECTION EVERY 12 HOURS SCHEDULED
Status: DISCONTINUED | OUTPATIENT
Start: 2018-11-14 | End: 2018-11-14 | Stop reason: HOSPADM

## 2018-11-14 RX ORDER — LIDOCAINE HYDROCHLORIDE 10 MG/ML
1 INJECTION, SOLUTION EPIDURAL; INFILTRATION; INTRACAUDAL; PERINEURAL
Status: DISCONTINUED | OUTPATIENT
Start: 2018-11-14 | End: 2018-11-14 | Stop reason: HOSPADM

## 2018-11-14 RX ORDER — GLYCOPYRROLATE 1 MG/5 ML
SYRINGE (ML) INTRAVENOUS PRN
Status: DISCONTINUED | OUTPATIENT
Start: 2018-11-14 | End: 2018-11-14 | Stop reason: SDUPTHER

## 2018-11-14 RX ORDER — SODIUM CHLORIDE 0.9 % (FLUSH) 0.9 %
10 SYRINGE (ML) INJECTION PRN
Status: DISCONTINUED | OUTPATIENT
Start: 2018-11-14 | End: 2018-11-14 | Stop reason: HOSPADM

## 2018-11-14 RX ORDER — SODIUM CHLORIDE 9 MG/ML
INJECTION, SOLUTION INTRAVENOUS CONTINUOUS PRN
Status: DISCONTINUED | OUTPATIENT
Start: 2018-11-14 | End: 2018-11-14 | Stop reason: SDUPTHER

## 2018-11-14 RX ORDER — SODIUM CHLORIDE 9 MG/ML
INJECTION, SOLUTION INTRAVENOUS CONTINUOUS
Status: DISCONTINUED | OUTPATIENT
Start: 2018-11-14 | End: 2018-11-14 | Stop reason: HOSPADM

## 2018-11-14 RX ADMIN — SODIUM CHLORIDE: 9 INJECTION, SOLUTION INTRAVENOUS at 11:43

## 2018-11-14 RX ADMIN — Medication 0.2 MG: at 11:53

## 2018-11-14 RX ADMIN — PROPOFOL 50 MG: 10 INJECTION, EMULSION INTRAVENOUS at 11:59

## 2018-11-14 RX ADMIN — LIDOCAINE HYDROCHLORIDE 60 MG: 20 INJECTION, SOLUTION INFILTRATION; PERINEURAL at 11:53

## 2018-11-14 RX ADMIN — PROPOFOL 50 MG: 10 INJECTION, EMULSION INTRAVENOUS at 12:04

## 2018-11-14 RX ADMIN — PROPOFOL 50 MG: 10 INJECTION, EMULSION INTRAVENOUS at 11:54

## 2018-11-14 RX ADMIN — SODIUM CHLORIDE: 9 INJECTION, SOLUTION INTRAVENOUS at 11:49

## 2018-11-14 RX ADMIN — PROPOFOL 50 MG: 10 INJECTION, EMULSION INTRAVENOUS at 11:53

## 2018-11-14 RX ADMIN — LIDOCAINE HYDROCHLORIDE 15 ML: 20 SOLUTION ORAL; TOPICAL at 11:51

## 2018-11-14 ASSESSMENT — PAIN SCALES - GENERAL
PAINLEVEL_OUTOF10: 0
PAINLEVEL_OUTOF10: 0

## 2018-11-14 ASSESSMENT — PAIN - FUNCTIONAL ASSESSMENT: PAIN_FUNCTIONAL_ASSESSMENT: 0-10

## 2018-11-14 NOTE — ANESTHESIA POSTPROCEDURE EVALUATION
Department of Anesthesiology  Postprocedure Note    Patient: Ignacio Class  MRN: [id number]  YOB: 1955  Date of evaluation: 11/14/2018  Time:  12:06 PM     Procedure Summary     Date:  11/14/18 Room / Location:  [address]    Anesthesia Start:  6320 Anesthesia Stop:      Procedure:  EGD ESOPHAGOGASTRODUODENOSCOPY WITH DILATION (N/A ) Diagnosis:  (LUQ PAIN, DYSPHAGIA R10.12, R13.10  (CPT [phone]))    Surgeon:  Katy Levy MD Responsible Provider:  ASAEL Stephens - CRNA    Anesthesia Type:  MAC ASA Status:  3          Anesthesia Type: MAC    Fady Phase I: Fady Score: 10    Fady Phase II:      Last vitals: Reviewed and per EMR flowsheets.        Anesthesia Post Evaluation    Patient location during evaluation: PACU  Patient participation: complete - patient cannot participate  Level of consciousness: sleepy but conscious  Pain score: 0  Airway patency: patent  Nausea & Vomiting: no nausea and no vomiting  Complications: no  Cardiovascular status: hemodynamically stable  Respiratory status: acceptable  Hydration status: euvolemic

## 2018-11-20 ENCOUNTER — OFFICE VISIT (OUTPATIENT)
Dept: FAMILY MEDICINE CLINIC | Age: 63
End: 2018-11-20
Payer: COMMERCIAL

## 2018-11-20 VITALS
OXYGEN SATURATION: 100 % | BODY MASS INDEX: 29.52 KG/M2 | RESPIRATION RATE: 14 BRPM | TEMPERATURE: 97.7 F | HEART RATE: 73 BPM | DIASTOLIC BLOOD PRESSURE: 66 MMHG | HEIGHT: 65 IN | SYSTOLIC BLOOD PRESSURE: 116 MMHG | WEIGHT: 177.2 LBS

## 2018-11-20 DIAGNOSIS — J45.21 MILD INTERMITTENT ASTHMA WITH ACUTE EXACERBATION: Primary | Chronic | ICD-10-CM

## 2018-11-20 DIAGNOSIS — K29.50 CHRONIC GASTRITIS WITHOUT BLEEDING, UNSPECIFIED GASTRITIS TYPE: ICD-10-CM

## 2018-11-20 DIAGNOSIS — F39 MOOD DISORDER (HCC): Chronic | ICD-10-CM

## 2018-11-20 DIAGNOSIS — L29.9 ITCHING: Chronic | ICD-10-CM

## 2018-11-20 DIAGNOSIS — J30.89 CHRONIC NON-SEASONAL ALLERGIC RHINITIS: ICD-10-CM

## 2018-11-20 PROCEDURE — G8417 CALC BMI ABV UP PARAM F/U: HCPCS | Performed by: FAMILY MEDICINE

## 2018-11-20 PROCEDURE — G8482 FLU IMMUNIZE ORDER/ADMIN: HCPCS | Performed by: FAMILY MEDICINE

## 2018-11-20 PROCEDURE — 1036F TOBACCO NON-USER: CPT | Performed by: FAMILY MEDICINE

## 2018-11-20 PROCEDURE — G8427 DOCREV CUR MEDS BY ELIG CLIN: HCPCS | Performed by: FAMILY MEDICINE

## 2018-11-20 PROCEDURE — 3017F COLORECTAL CA SCREEN DOC REV: CPT | Performed by: FAMILY MEDICINE

## 2018-11-20 PROCEDURE — 99214 OFFICE O/P EST MOD 30 MIN: CPT | Performed by: FAMILY MEDICINE

## 2018-11-20 RX ORDER — FLUTICASONE PROPIONATE 110 UG/1
2 AEROSOL, METERED RESPIRATORY (INHALATION) 2 TIMES DAILY
Qty: 1 INHALER | Refills: 3 | Status: SHIPPED | OUTPATIENT
Start: 2018-11-20 | End: 2019-05-29 | Stop reason: SDUPTHER

## 2018-11-20 RX ORDER — HYDROXYZINE HYDROCHLORIDE 25 MG/1
25 TABLET, FILM COATED ORAL EVERY 8 HOURS PRN
Qty: 60 TABLET | Refills: 2 | Status: SHIPPED | OUTPATIENT
Start: 2018-11-20 | End: 2019-01-14 | Stop reason: SDUPTHER

## 2018-11-20 RX ORDER — ALBUTEROL SULFATE 90 UG/1
2 AEROSOL, METERED RESPIRATORY (INHALATION) EVERY 6 HOURS PRN
Qty: 1 INHALER | Refills: 2 | Status: SHIPPED | OUTPATIENT
Start: 2018-11-20 | End: 2020-01-03 | Stop reason: SDUPTHER

## 2018-11-20 RX ORDER — RANITIDINE 150 MG/1
150 TABLET ORAL 2 TIMES DAILY
Qty: 60 TABLET | Refills: 3 | Status: SHIPPED | OUTPATIENT
Start: 2018-11-20 | End: 2019-05-29 | Stop reason: SDUPTHER

## 2018-11-20 NOTE — PROGRESS NOTES
Subjective  [facility], 66 y.o. female presents today with:  Chief Complaint   Patient presents with    Check-Up     4 week follow up           HPI    Asthma - States that it is well controlled over the summer but that it gets worse over the winter with frequent use of albuterol. Has EGD recently - gastric polyp and chronic gastritis. Still with epigastric burning in spite of high dose omeprazole. Has f/u with GI. Chronic AR and chronic diffuse itching. Unrelieved by Singulair. ZYrtec not paid for by insurance. Patient is being treated for depression and has been compliant with meds which do not cause side effects. Mood is improved. No suicidal ideation. Sleep is normal.        No other questions and or concerns for today's visit      Review of Systems  This patient has no chest pain or pressure. There is no shortness of breath. There is no nausea, diaphoresis or radiation of pain into the neck or arm or back. Past Medical History:   Diagnosis Date    Acid reflux     Asthma     Chronic back pain     COPD (chronic obstructive pulmonary disease) (HCC)     Depression     Hyperlipidemia     Obesity     Venous insufficiency      Past Surgical History:   Procedure Laterality Date    COLONOSCOPY      ENDOSCOPY, COLON, DIAGNOSTIC      KIDNEY SURGERY      OVARIAN CYST REMOVAL Left 11/06/2004    TN ESOPHAGOGASTRODUODENOSCOPY TRANSORAL DIAGNOSTIC N/A 11/14/2018    EGD ESOPHAGOGASTRODUODENOSCOPY WITH DILATION performed by Mychal Phillips MD at [facility] 32 Marital status:      Spouse name: N/A    Number of children: N/A    Years of education: N/A     Occupational History    Not on file.      Social History Main Topics    Smoking status: Never Smoker    Smokeless tobacco: Never Used    Alcohol use No    Drug use: No    Sexual activity: Yes     Other Topics Concern    Not on file     Social History Narrative    No narrative oriented to person, place, and time. She appears well-developed and well-nourished. No distress. HENT:   Head: Normocephalic and atraumatic. Eyes: Conjunctivae are normal. No scleral icterus. Neck: Normal range of motion. Neck supple. No thyromegaly present. Cardiovascular: Normal rate, regular rhythm and normal heart sounds. Pulmonary/Chest: No respiratory distress. She has no wheezes. She has no rales. Lymphadenopathy:     She has no cervical adenopathy. Neurological: She is alert and oriented to person, place, and time. Skin: Skin is warm and dry. Psychiatric: She has a normal mood and affect. Lab Results   Component Value Date    LABA1C 6.0 (H) 03/09/2015     Lab Results   Component Value Date    CREATININE 0.75 09/18/2018     Lab Results   Component Value Date    ALT 17 09/18/2018    AST 17 09/18/2018     Lab Results   Component Value Date    CHOL 219 (H) 04/01/2015    TRIG 105 04/01/2015    HDL 50 09/18/2018    LDLCALC 153 (H) 09/18/2018        Assessment & Plan   Visit Diagnoses and Associated Orders     Mild intermittent asthma with acute exacerbation    -  Primary    Add Flovent and reassess in 1 month    fluticasone (FLOVENT HFA) 110 MCG/ACT inhaler [59556]      albuterol sulfate  (90 Base) MCG/ACT inhaler [[id number]]           Chronic non-seasonal allergic rhinitis        Not improved with SIngulair. Consider stopping    hydrOXYzine (ATARAX) 25 MG tablet [[id number]]           Chronic gastritis without bleeding, unspecified gastritis type        Continue Prilosec. ranitidine (ZANTAC) 150 MG tablet [[id number]]           Itching        PResumed allergy related. Start Atarax prn.    hydrOXYzine (ATARAX) 25 MG tablet [[id number]]           Mood disorder (HCC)        Stable on Lexapro. Follow clinically.                Reviewed with the patient: all disease processes, current clinical status, medications, activities and diet.      Side effects, adverse effects of the medication prescribed was reviewed today.        Karolina Peterson MD

## 2018-11-24 DIAGNOSIS — F39 MOOD DISORDER (HCC): Chronic | ICD-10-CM

## 2018-11-26 RX ORDER — ESCITALOPRAM OXALATE 10 MG/1
10 TABLET ORAL DAILY
Qty: 30 TABLET | Refills: 5 | Status: SHIPPED | OUTPATIENT
Start: 2018-11-26 | End: 2019-05-29 | Stop reason: SDUPTHER

## 2018-12-04 ENCOUNTER — OFFICE VISIT (OUTPATIENT)
Dept: GASTROENTEROLOGY | Age: 63
End: 2018-12-04
Payer: COMMERCIAL

## 2018-12-04 VITALS
HEART RATE: 84 BPM | WEIGHT: 178 LBS | RESPIRATION RATE: 16 BRPM | OXYGEN SATURATION: 98 % | BODY MASS INDEX: 29.66 KG/M2 | SYSTOLIC BLOOD PRESSURE: 146 MMHG | HEIGHT: 65 IN | DIASTOLIC BLOOD PRESSURE: 80 MMHG

## 2018-12-04 DIAGNOSIS — R13.10 DYSPHAGIA, UNSPECIFIED TYPE: Primary | ICD-10-CM

## 2018-12-04 DIAGNOSIS — R04.2 HEMOPTYSIS: ICD-10-CM

## 2018-12-04 PROCEDURE — 99212 OFFICE O/P EST SF 10 MIN: CPT | Performed by: INTERNAL MEDICINE

## 2018-12-04 PROCEDURE — 1036F TOBACCO NON-USER: CPT | Performed by: INTERNAL MEDICINE

## 2018-12-04 PROCEDURE — 3017F COLORECTAL CA SCREEN DOC REV: CPT | Performed by: INTERNAL MEDICINE

## 2018-12-04 PROCEDURE — G8482 FLU IMMUNIZE ORDER/ADMIN: HCPCS | Performed by: INTERNAL MEDICINE

## 2018-12-04 PROCEDURE — G8427 DOCREV CUR MEDS BY ELIG CLIN: HCPCS | Performed by: INTERNAL MEDICINE

## 2018-12-04 PROCEDURE — G8417 CALC BMI ABV UP PARAM F/U: HCPCS | Performed by: INTERNAL MEDICINE

## 2018-12-18 ENCOUNTER — OFFICE VISIT (OUTPATIENT)
Dept: FAMILY MEDICINE CLINIC | Age: 63
End: 2018-12-18
Payer: COMMERCIAL

## 2018-12-18 VITALS
BODY MASS INDEX: 29.66 KG/M2 | HEIGHT: 65 IN | SYSTOLIC BLOOD PRESSURE: 112 MMHG | DIASTOLIC BLOOD PRESSURE: 62 MMHG | TEMPERATURE: 97.3 F | HEART RATE: 65 BPM | OXYGEN SATURATION: 98 % | RESPIRATION RATE: 18 BRPM | WEIGHT: 178 LBS

## 2018-12-18 DIAGNOSIS — K21.9 GASTROESOPHAGEAL REFLUX DISEASE WITHOUT ESOPHAGITIS: ICD-10-CM

## 2018-12-18 DIAGNOSIS — J45.21 MILD INTERMITTENT ASTHMA WITH ACUTE EXACERBATION: Primary | Chronic | ICD-10-CM

## 2018-12-18 DIAGNOSIS — L29.9 ITCHING: Chronic | ICD-10-CM

## 2018-12-18 DIAGNOSIS — R13.10 DYSPHAGIA, UNSPECIFIED TYPE: ICD-10-CM

## 2018-12-18 DIAGNOSIS — F39 MOOD DISORDER (HCC): Chronic | ICD-10-CM

## 2018-12-18 DIAGNOSIS — R04.2 HEMOPTYSIS: ICD-10-CM

## 2018-12-18 PROBLEM — Z80.0 FAMILY HISTORY OF GASTRIC CANCER: Chronic | Status: RESOLVED | Noted: 2018-10-19 | Resolved: 2018-12-18

## 2018-12-18 PROBLEM — R11.0 NAUSEA: Chronic | Status: RESOLVED | Noted: 2018-06-13 | Resolved: 2018-12-18

## 2018-12-18 PROCEDURE — 1036F TOBACCO NON-USER: CPT | Performed by: FAMILY MEDICINE

## 2018-12-18 PROCEDURE — G8428 CUR MEDS NOT DOCUMENT: HCPCS | Performed by: FAMILY MEDICINE

## 2018-12-18 PROCEDURE — G8417 CALC BMI ABV UP PARAM F/U: HCPCS | Performed by: FAMILY MEDICINE

## 2018-12-18 PROCEDURE — 99214 OFFICE O/P EST MOD 30 MIN: CPT | Performed by: FAMILY MEDICINE

## 2018-12-18 PROCEDURE — 3017F COLORECTAL CA SCREEN DOC REV: CPT | Performed by: FAMILY MEDICINE

## 2018-12-18 PROCEDURE — G8482 FLU IMMUNIZE ORDER/ADMIN: HCPCS | Performed by: FAMILY MEDICINE

## 2018-12-18 NOTE — PROGRESS NOTES
esophageal dilatation               Reviewed with the patient: all disease processes, current clinical status, medications, activities and diet.      Side effects, adverse effects of the medication prescribed today, as well as treatment plan/ rationale and result expectations have been discussed with the patient who expresses understanding and desires to proceed.     Close follow up to evaluate treatment results and for coordination of care. I have reviewed the patient's medical history in detail and updated the computerized patient record. More than 50% of the appointment was spent in face-to-face counseling, education and care coordination. Please note this report has been partially produced using speech recognition software and may contain mistakes related to that system including errors in grammar, punctuation and spelling as well as words and phrases that may seem inappropriate. If there are questions or concerns, please feel free to contact me to clarify. Orders Placed This Encounter   Procedures    Amb External Referral To ENT     Referral Priority:   Routine     Referral Type:   Eval and Treat     Referral Reason:   Specialty Services Required     Referred to Provider:   Olegario Montes MD     Requested Specialty:   Otolaryngology     Number of Visits Requested:   1     No orders of the defined types were placed in this encounter. There are no discontinued medications. Return in about 6 months (around 6/18/2019) for Mood Disorder, Asthma. Controlled Substances Monitoring:     RX Monitoring 11/15/2017   Attestation The Prescription Monitoring Report for this patient was reviewed today.        Mary Anne Quintanilla MD

## 2019-01-14 DIAGNOSIS — J30.89 CHRONIC NON-SEASONAL ALLERGIC RHINITIS: ICD-10-CM

## 2019-01-14 DIAGNOSIS — L29.9 ITCHING: Chronic | ICD-10-CM

## 2019-01-14 RX ORDER — HYDROXYZINE HYDROCHLORIDE 25 MG/1
25 TABLET, FILM COATED ORAL EVERY 8 HOURS PRN
Qty: 60 TABLET | Refills: 2 | Status: SHIPPED | OUTPATIENT
Start: 2019-01-14 | End: 2019-04-17 | Stop reason: SDUPTHER

## 2019-03-02 DIAGNOSIS — K21.9 GASTROESOPHAGEAL REFLUX DISEASE WITHOUT ESOPHAGITIS: ICD-10-CM

## 2019-03-04 RX ORDER — OMEPRAZOLE 40 MG/1
40 CAPSULE, DELAYED RELEASE ORAL DAILY
Qty: 30 CAPSULE | Refills: 2 | Status: SHIPPED | OUTPATIENT
Start: 2019-03-04 | End: 2019-05-29 | Stop reason: SDUPTHER

## 2019-05-29 ENCOUNTER — OFFICE VISIT (OUTPATIENT)
Dept: FAMILY MEDICINE CLINIC | Age: 64
End: 2019-05-29
Payer: COMMERCIAL

## 2019-05-29 VITALS
OXYGEN SATURATION: 98 % | DIASTOLIC BLOOD PRESSURE: 68 MMHG | TEMPERATURE: 98.1 F | SYSTOLIC BLOOD PRESSURE: 128 MMHG | WEIGHT: 185 LBS | HEIGHT: 65 IN | BODY MASS INDEX: 30.82 KG/M2 | RESPIRATION RATE: 16 BRPM | HEART RATE: 74 BPM

## 2019-05-29 DIAGNOSIS — K29.50 CHRONIC GASTRITIS WITHOUT BLEEDING, UNSPECIFIED GASTRITIS TYPE: ICD-10-CM

## 2019-05-29 DIAGNOSIS — K21.9 GASTROESOPHAGEAL REFLUX DISEASE WITHOUT ESOPHAGITIS: ICD-10-CM

## 2019-05-29 DIAGNOSIS — M54.41 CHRONIC MIDLINE LOW BACK PAIN WITH BILATERAL SCIATICA: Chronic | ICD-10-CM

## 2019-05-29 DIAGNOSIS — E55.9 VITAMIN D DEFICIENCY: Chronic | ICD-10-CM

## 2019-05-29 DIAGNOSIS — R73.09 ABNORMAL GLUCOSE: Chronic | ICD-10-CM

## 2019-05-29 DIAGNOSIS — K44.9 HIATAL HERNIA: Chronic | ICD-10-CM

## 2019-05-29 DIAGNOSIS — F39 MOOD DISORDER (HCC): Chronic | ICD-10-CM

## 2019-05-29 DIAGNOSIS — Z12.39 BREAST CANCER SCREENING: Primary | ICD-10-CM

## 2019-05-29 DIAGNOSIS — M54.42 CHRONIC MIDLINE LOW BACK PAIN WITH BILATERAL SCIATICA: Chronic | ICD-10-CM

## 2019-05-29 DIAGNOSIS — L29.9 ITCHING: Chronic | ICD-10-CM

## 2019-05-29 DIAGNOSIS — Z12.11 COLON CANCER SCREENING: ICD-10-CM

## 2019-05-29 DIAGNOSIS — G89.29 CHRONIC MIDLINE LOW BACK PAIN WITH BILATERAL SCIATICA: Chronic | ICD-10-CM

## 2019-05-29 DIAGNOSIS — J30.89 CHRONIC NON-SEASONAL ALLERGIC RHINITIS: ICD-10-CM

## 2019-05-29 LAB — HBA1C MFR BLD: 5.7 % (ref 4.8–5.9)

## 2019-05-29 PROCEDURE — G8427 DOCREV CUR MEDS BY ELIG CLIN: HCPCS | Performed by: FAMILY MEDICINE

## 2019-05-29 PROCEDURE — 1036F TOBACCO NON-USER: CPT | Performed by: FAMILY MEDICINE

## 2019-05-29 PROCEDURE — 99214 OFFICE O/P EST MOD 30 MIN: CPT | Performed by: FAMILY MEDICINE

## 2019-05-29 PROCEDURE — 3017F COLORECTAL CA SCREEN DOC REV: CPT | Performed by: FAMILY MEDICINE

## 2019-05-29 PROCEDURE — G8417 CALC BMI ABV UP PARAM F/U: HCPCS | Performed by: FAMILY MEDICINE

## 2019-05-29 RX ORDER — RANITIDINE 150 MG/1
150 TABLET ORAL 2 TIMES DAILY
Qty: 60 TABLET | Refills: 5 | Status: SHIPPED | OUTPATIENT
Start: 2019-05-29 | End: 2019-11-09 | Stop reason: SDUPTHER

## 2019-05-29 RX ORDER — HYDROXYZINE HYDROCHLORIDE 25 MG/1
25 TABLET, FILM COATED ORAL EVERY 8 HOURS PRN
Qty: 60 TABLET | Refills: 5 | Status: SHIPPED | OUTPATIENT
Start: 2019-05-29 | End: 2020-01-03 | Stop reason: SDUPTHER

## 2019-05-29 RX ORDER — ACETAMINOPHEN 160 MG
1 TABLET,DISINTEGRATING ORAL DAILY
Qty: 30 CAPSULE | Refills: 11 | Status: SHIPPED | OUTPATIENT
Start: 2019-05-29 | End: 2020-01-03 | Stop reason: SDUPTHER

## 2019-05-29 RX ORDER — FLUTICASONE PROPIONATE 110 UG/1
2 AEROSOL, METERED RESPIRATORY (INHALATION) 2 TIMES DAILY
Qty: 1 INHALER | Refills: 3 | Status: SHIPPED | OUTPATIENT
Start: 2019-05-29 | End: 2020-01-03 | Stop reason: SDUPTHER

## 2019-05-29 RX ORDER — OMEPRAZOLE 40 MG/1
40 CAPSULE, DELAYED RELEASE ORAL DAILY PRN
Qty: 30 CAPSULE | Refills: 2 | Status: SHIPPED | OUTPATIENT
Start: 2019-05-29 | End: 2019-09-21 | Stop reason: SDUPTHER

## 2019-05-29 RX ORDER — ESCITALOPRAM OXALATE 10 MG/1
10 TABLET ORAL DAILY
Qty: 30 TABLET | Refills: 5 | Status: SHIPPED | OUTPATIENT
Start: 2019-05-29 | End: 2019-10-29 | Stop reason: SDUPTHER

## 2019-05-29 RX ORDER — MONTELUKAST SODIUM 10 MG/1
10 TABLET ORAL DAILY
Qty: 30 TABLET | Refills: 5 | Status: SHIPPED | OUTPATIENT
Start: 2019-05-29 | End: 2019-05-29 | Stop reason: ALTCHOICE

## 2019-05-29 ASSESSMENT — PATIENT HEALTH QUESTIONNAIRE - PHQ9
2. FEELING DOWN, DEPRESSED OR HOPELESS: 0
SUM OF ALL RESPONSES TO PHQ QUESTIONS 1-9: 0
SUM OF ALL RESPONSES TO PHQ9 QUESTIONS 1 & 2: 0
SUM OF ALL RESPONSES TO PHQ QUESTIONS 1-9: 0
1. LITTLE INTEREST OR PLEASURE IN DOING THINGS: 0
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
## 2019-07-02 ENCOUNTER — HOSPITAL ENCOUNTER (OUTPATIENT)
Dept: WOMENS IMAGING | Age: 64
Discharge: HOME | End: 2019-07-04
Payer: COMMERCIAL

## 2019-07-02 DIAGNOSIS — Z12.39 BREAST CANCER SCREENING: ICD-10-CM

## 2019-07-02 PROCEDURE — 77063 BREAST TOMOSYNTHESIS BI: CPT

## 2019-10-29 ENCOUNTER — OFFICE VISIT (OUTPATIENT)
Dept: FAMILY MEDICINE CLINIC | Age: 64
End: 2019-10-29
Payer: COMMERCIAL

## 2019-10-29 VITALS
OXYGEN SATURATION: 99 % | DIASTOLIC BLOOD PRESSURE: 60 MMHG | WEIGHT: 190.8 LBS | BODY MASS INDEX: 31.79 KG/M2 | HEART RATE: 77 BPM | TEMPERATURE: 96.3 F | RESPIRATION RATE: 16 BRPM | SYSTOLIC BLOOD PRESSURE: 130 MMHG | HEIGHT: 65 IN

## 2019-10-29 DIAGNOSIS — J30.89 CHRONIC NON-SEASONAL ALLERGIC RHINITIS: ICD-10-CM

## 2019-10-29 DIAGNOSIS — K21.9 GASTROESOPHAGEAL REFLUX DISEASE WITHOUT ESOPHAGITIS: ICD-10-CM

## 2019-10-29 DIAGNOSIS — F39 MOOD DISORDER (HCC): Primary | Chronic | ICD-10-CM

## 2019-10-29 DIAGNOSIS — R73.09 ABNORMAL GLUCOSE: Chronic | ICD-10-CM

## 2019-10-29 DIAGNOSIS — Z23 NEED FOR VACCINATION: ICD-10-CM

## 2019-10-29 DIAGNOSIS — B36.0 TINEA VERSICOLOR: Chronic | ICD-10-CM

## 2019-10-29 PROCEDURE — G8482 FLU IMMUNIZE ORDER/ADMIN: HCPCS | Performed by: FAMILY MEDICINE

## 2019-10-29 PROCEDURE — 99214 OFFICE O/P EST MOD 30 MIN: CPT | Performed by: FAMILY MEDICINE

## 2019-10-29 PROCEDURE — G8417 CALC BMI ABV UP PARAM F/U: HCPCS | Performed by: FAMILY MEDICINE

## 2019-10-29 PROCEDURE — 3017F COLORECTAL CA SCREEN DOC REV: CPT | Performed by: FAMILY MEDICINE

## 2019-10-29 PROCEDURE — 90688 IIV4 VACCINE SPLT 0.5 ML IM: CPT | Performed by: FAMILY MEDICINE

## 2019-10-29 PROCEDURE — G8427 DOCREV CUR MEDS BY ELIG CLIN: HCPCS | Performed by: FAMILY MEDICINE

## 2019-10-29 PROCEDURE — G0008 ADMIN INFLUENZA VIRUS VAC: HCPCS | Performed by: FAMILY MEDICINE

## 2019-10-29 PROCEDURE — 1036F TOBACCO NON-USER: CPT | Performed by: FAMILY MEDICINE

## 2019-10-29 RX ORDER — FLUCONAZOLE 100 MG/1
100 TABLET ORAL DAILY
Qty: 14 TABLET | Refills: 0 | Status: SHIPPED | OUTPATIENT
Start: 2019-10-29 | End: 2019-11-12

## 2019-10-29 RX ORDER — GUAIFENESIN 600 MG/1
600 TABLET, EXTENDED RELEASE ORAL 2 TIMES DAILY
Qty: 30 TABLET | Refills: 0 | Status: SHIPPED | OUTPATIENT
Start: 2019-10-29 | End: 2020-01-03

## 2019-10-29 RX ORDER — FLUTICASONE PROPIONATE 50 MCG
2 SPRAY, SUSPENSION (ML) NASAL DAILY
Qty: 3 BOTTLE | Refills: 1 | Status: SHIPPED | OUTPATIENT
Start: 2019-10-29 | End: 2020-01-03

## 2019-10-29 RX ORDER — PANTOPRAZOLE SODIUM 40 MG/1
40 TABLET, DELAYED RELEASE ORAL
Qty: 30 TABLET | Refills: 2 | Status: SHIPPED | OUTPATIENT
Start: 2019-10-29 | End: 2020-01-03 | Stop reason: SDUPTHER

## 2019-10-29 RX ORDER — ESCITALOPRAM OXALATE 10 MG/1
10 TABLET ORAL DAILY
Qty: 30 TABLET | Refills: 5 | Status: SHIPPED | OUTPATIENT
Start: 2019-10-29 | End: 2020-01-03 | Stop reason: SDUPTHER

## 2019-11-09 DIAGNOSIS — K29.50 CHRONIC GASTRITIS WITHOUT BLEEDING, UNSPECIFIED GASTRITIS TYPE: ICD-10-CM

## 2019-11-10 RX ORDER — RANITIDINE 150 MG/1
150 TABLET ORAL 2 TIMES DAILY
Qty: 60 TABLET | Refills: 5 | Status: SHIPPED | OUTPATIENT
Start: 2019-11-10 | End: 2020-01-03 | Stop reason: ALTCHOICE

## 2019-12-26 DIAGNOSIS — R73.09 ABNORMAL GLUCOSE: Chronic | ICD-10-CM

## 2019-12-26 LAB — HBA1C MFR BLD: 6 % (ref 4.8–5.9)

## 2020-01-03 ENCOUNTER — OFFICE VISIT (OUTPATIENT)
Dept: FAMILY MEDICINE CLINIC | Age: 65
End: 2020-01-03
Payer: COMMERCIAL

## 2020-01-03 VITALS
OXYGEN SATURATION: 98 % | SYSTOLIC BLOOD PRESSURE: 124 MMHG | HEART RATE: 78 BPM | RESPIRATION RATE: 16 BRPM | DIASTOLIC BLOOD PRESSURE: 68 MMHG | TEMPERATURE: 96.7 F | WEIGHT: 190 LBS | HEIGHT: 65 IN | BODY MASS INDEX: 31.65 KG/M2

## 2020-01-03 PROBLEM — J45.20 MILD INTERMITTENT ASTHMA WITHOUT COMPLICATION: Chronic | Status: ACTIVE | Noted: 2018-10-19

## 2020-01-03 PROCEDURE — 3017F COLORECTAL CA SCREEN DOC REV: CPT | Performed by: FAMILY MEDICINE

## 2020-01-03 PROCEDURE — G8417 CALC BMI ABV UP PARAM F/U: HCPCS | Performed by: FAMILY MEDICINE

## 2020-01-03 PROCEDURE — G8427 DOCREV CUR MEDS BY ELIG CLIN: HCPCS | Performed by: FAMILY MEDICINE

## 2020-01-03 PROCEDURE — 1036F TOBACCO NON-USER: CPT | Performed by: FAMILY MEDICINE

## 2020-01-03 PROCEDURE — G8482 FLU IMMUNIZE ORDER/ADMIN: HCPCS | Performed by: FAMILY MEDICINE

## 2020-01-03 PROCEDURE — 99215 OFFICE O/P EST HI 40 MIN: CPT | Performed by: FAMILY MEDICINE

## 2020-01-03 RX ORDER — OMEPRAZOLE 40 MG/1
40 CAPSULE, DELAYED RELEASE ORAL DAILY
COMMUNITY
End: 2020-01-03 | Stop reason: ALTCHOICE

## 2020-01-03 RX ORDER — ACETAMINOPHEN 160 MG
1 TABLET,DISINTEGRATING ORAL DAILY
Qty: 90 CAPSULE | Refills: 4 | Status: SHIPPED | OUTPATIENT
Start: 2020-01-03 | End: 2020-09-23

## 2020-01-03 RX ORDER — SODIUM FLUORIDE 5 MG/ML
PASTE, DENTIFRICE DENTAL
Status: ON HOLD | COMMUNITY
End: 2021-02-03

## 2020-01-03 RX ORDER — NYSTATIN 100000 U/G
CREAM TOPICAL
Qty: 60 G | Refills: 5 | Status: SHIPPED | OUTPATIENT
Start: 2020-01-03 | End: 2021-12-02 | Stop reason: SDUPTHER

## 2020-01-03 RX ORDER — OMEPRAZOLE 40 MG/1
40 CAPSULE, DELAYED RELEASE ORAL DAILY
Qty: 30 CAPSULE | Refills: 5 | Status: CANCELLED | OUTPATIENT
Start: 2020-01-03

## 2020-01-03 RX ORDER — ALBUTEROL SULFATE 90 UG/1
2 AEROSOL, METERED RESPIRATORY (INHALATION) EVERY 6 HOURS PRN
Qty: 1 INHALER | Refills: 2 | Status: SHIPPED | OUTPATIENT
Start: 2020-01-03 | End: 2021-07-26 | Stop reason: ALTCHOICE

## 2020-01-03 RX ORDER — ESCITALOPRAM OXALATE 10 MG/1
10 TABLET ORAL DAILY
Qty: 90 TABLET | Refills: 2 | Status: SHIPPED | OUTPATIENT
Start: 2020-01-03 | End: 2020-05-19

## 2020-01-03 RX ORDER — HYDROXYZINE HYDROCHLORIDE 25 MG/1
25 TABLET, FILM COATED ORAL EVERY 8 HOURS PRN
Qty: 90 TABLET | Refills: 4 | Status: SHIPPED | OUTPATIENT
Start: 2020-01-03 | End: 2020-08-18

## 2020-01-03 RX ORDER — METHYLPREDNISOLONE 4 MG/1
4 TABLET ORAL SEE ADMIN INSTRUCTIONS
COMMUNITY
End: 2020-01-03 | Stop reason: ALTCHOICE

## 2020-01-03 RX ORDER — FLUCONAZOLE 100 MG/1
100 TABLET ORAL DAILY
COMMUNITY
End: 2020-01-03 | Stop reason: ALTCHOICE

## 2020-01-03 RX ORDER — PANTOPRAZOLE SODIUM 40 MG/1
40 TABLET, DELAYED RELEASE ORAL
Qty: 90 TABLET | Refills: 2 | Status: SHIPPED | OUTPATIENT
Start: 2020-01-03 | End: 2020-06-28

## 2020-01-03 RX ORDER — FLUCONAZOLE 100 MG/1
100 TABLET ORAL DAILY
Qty: 30 TABLET | Refills: 5 | Status: CANCELLED | OUTPATIENT
Start: 2020-01-03

## 2020-01-03 RX ORDER — FLUTICASONE PROPIONATE 110 UG/1
2 AEROSOL, METERED RESPIRATORY (INHALATION) 2 TIMES DAILY
Qty: 3 INHALER | Refills: 4 | Status: ON HOLD | OUTPATIENT
Start: 2020-01-03 | End: 2021-02-03

## 2020-01-03 RX ORDER — DEXTRAN 70, GLYCERIN, HYPROMELLOSE 1; 2; 3 MG/ML; MG/ML; MG/ML
SOLUTION/ DROPS OPHTHALMIC
Refills: 3 | COMMUNITY
Start: 2019-10-30 | End: 2020-01-03

## 2020-01-03 RX ORDER — ESTRADIOL 2 MG/1
2 RING VAGINAL
Qty: 1 EACH | Refills: 4 | Status: SHIPPED | OUTPATIENT
Start: 2020-01-03 | End: 2020-10-08 | Stop reason: ALTCHOICE

## 2020-01-03 RX ORDER — SELENIUM SULFIDE 2.5 MG/100ML
LOTION TOPICAL
Qty: 118 ML | Refills: 5 | Status: SHIPPED | OUTPATIENT
Start: 2020-01-03 | End: 2021-07-26

## 2020-01-03 ASSESSMENT — PATIENT HEALTH QUESTIONNAIRE - PHQ9
1. LITTLE INTEREST OR PLEASURE IN DOING THINGS: 1
SUM OF ALL RESPONSES TO PHQ9 QUESTIONS 1 & 2: 1
2. FEELING DOWN, DEPRESSED OR HOPELESS: 0
SUM OF ALL RESPONSES TO PHQ QUESTIONS 1-9: 1
SUM OF ALL RESPONSES TO PHQ QUESTIONS 1-9: 1

## 2020-01-03 NOTE — PROGRESS NOTES
[address], 60 y.o. female presents today with:  Chief Complaint   Patient presents with    Diabetes     3 month follow up. 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 No thoughts of suicide. Sleep is normal.  Significant mood swings.     Notes that she has several small pale areas on her arms which seem to cause significant itching. She thinks that she has a fungus in her hands and arms caused by working in soil. The rash does seem to be worse and itch year when exposed to heat insulin.     She continues to experience postnasal drip and mild nasal congestion with expectoration of thick green clots of sputum especially in the morning. She states that her heartburn is not always very well controlled on Prilosec. She does not consume significant amounts of caffeine.     She is concerned that she has diabetes because she is thirsty all of the time. She is has no weight loss and no polyphasia. Mild polyuria. Last hemoglobin A1c was 5.7.    01/03/2019: Her most recent A1C was 6.0. Prior one was 5.7. GERD. Well-controlled with PPI, caffeine restriction, diet restriction. No weight loss. No abdominal pain. No bloody or black tarry stools. Patient is being treated for depression and has been compliant with meds which does cause side effects - anorgasmia and low libido. Mood is improved. No suicidal ideation. Sleep is normal.    Patient is here for hyperglycemia f/u. Sugars have been moderately well-controlled and patient has not been experiencing hyper- or hypoglycemic symptoms. Patient exercises occasionally and tries to eat healthy. Is up to date on foot and eye exams and immunizations. Complains of B/L shoulder pain and swelling over the lastseveral months. With associated tingling in B/L hands. Left hand feels numb, Naprosyn, taken only occasionally, helps some.  Pain in No    Sexual activity: Yes   Lifestyle    Physical activity:     Days per week: Not on file     Minutes per session: Not on file    Stress: Not on file   Relationships    Social connections:     Talks on phone: Not on file     Gets together: Not on file     Attends Hinduism service: Not on file     Active member of club or organization: Not on file     Attends meetings of clubs or organizations: Not on file     Relationship status: Not on file    Intimate partner violence:     Fear of current or ex partner: Not on file     Emotionally abused: Not on file     Physically abused: Not on file     Forced sexual activity: Not on file   Other Topics Concern    Not on file   Social History Narrative    Not on file     Family History   Problem Relation Age of Onset    Diabetes Mother     High Blood Pressure Mother     Diabetes Brother     Diabetes Maternal Grandmother     Diabetes Maternal Grandfather     Diabetes Paternal Grandmother     Diabetes Paternal Grandfather     Diabetes Other     High Blood Pressure Sister     Colon Cancer Paternal Uncle      Allergies   Allergen Reactions    Other      Perfumes      Current Outpatient Medications   Medication Sig Dispense Refill    SODIUM FLUORIDE, DENTAL GEL, 1.1 % CREA Place onto teeth      nystatin (MYCOSTATIN) 819990 UNIT/GM cream Apply topically 2 times daily. 60 g 5    selenium sulfide (SELSUN) 2.5 % lotion Apply for 10 minutes on skin daily for 1-4 weeks OR one overnight application.  118 mL 5    hydrOXYzine (ATARAX) 25 MG tablet Take 1 tablet by mouth every 8 hours as needed for Itching 90 tablet 4    pantoprazole (PROTONIX) 40 MG tablet Take 1 tablet by mouth every morning (before breakfast) 90 tablet 2    escitalopram (LEXAPRO) 10 MG tablet Take 1 tablet by mouth daily 90 tablet 2    Cholecalciferol (VITAMIN D3) 50 MCG (2000 UT) CAPS Take 1 capsule by mouth daily 90 capsule 4    albuterol sulfate  (90 Base) MCG/ACT inhaler Inhale 2 puffs into the lungs every 6 hours as needed for Wheezing 1 Inhaler 2    fluticasone (FLOVENT HFA) 110 MCG/ACT inhaler Inhale 2 puffs into the lungs 2 times daily 3 Inhaler 4    ESTRING 2 MG vaginal ring Place 2 mg vaginally every 3 months 1 each 4     No current facility-administered medications for this visit. PMH, Surgical Hx, Family Hx, and Social Hxreviewed and updated. Health Maintenance reviewed. Objective    Vitals:    01/03/20 0826   BP: 124/68   Site: Left Upper Arm   Position: Sitting   Cuff Size: Medium Adult   Pulse: 78   Resp: 16   Temp: 96.7 °F (35.9 °C)   TempSrc: Temporal   SpO2: 98%   Weight: 190 lb (86.2 kg)   Height: 5' 5\" (1.651 m)        Physical Exam  Constitutional:       Appearance: She is well-developed. HENT:      Head: Normocephalic. Eyes:      Conjunctiva/sclera: Conjunctivae normal.   Pulmonary:      Effort: Pulmonary effort is normal.   Neurological:      Mental Status: She is alert and oriented to person, place, and time. Psychiatric:         Behavior: Behavior normal.         Thought Content: Thought content normal.         Judgment: Judgment normal.           Lab Results   Component Value Date    LABA1C 6.0 (H) 12/26/2019    LABA1C 5.7 05/29/2019    LABA1C 6.0 (H) 03/09/2015     Lab Results   Component Value Date    CREATININE 0.75 09/18/2018     Lab Results   Component Value Date    ALT 17 09/18/2018    AST 17 09/18/2018     Lab Results   Component Value Date    CHOL 219 (H) 04/01/2015    TRIG 105 04/01/2015    HDL 50 09/18/2018    LDLCALC 153 (H) 09/18/2018        Assessment & Plan   Visit Diagnoses and Associated Orders     Mild intermittent asthma without complication    -  Primary    Stable and well-controlled on current meds    fluticasone (FLOVENT HFA) 110 MCG/ACT inhaler [[id number]]           Tinea versicolor        Myconazole.  Selenium sulfide    nystatin (MYCOSTATIN) 414636 UNIT/GM cream [5732]      selenium sulfide (SELSUN) 2.5 % lotion [41089] Gastroesophageal reflux disease without esophagitis        Improving but required clarification re: which PPI and H2B she should take. Reviewed diet. pantoprazole (PROTONIX) 40 MG tablet [26282]           Mood disorder ([address]        improving, well-controlled with SEs. Does not desire change in meds. escitalopram (LEXAPRO) 10 MG tablet [43295]           Abnormal glucose        Reviewed diet and exercise    Hemoglobin A1C [09658 Custom]   - Future Order         Itching        Suspect anxiety related. Cont Atarax prn.    hydrOXYzine (ATARAX) 25 MG tablet [0554]           Chronic non-seasonal allergic rhinitis        flonase. No singuair    hydrOXYzine (ATARAX) 25 MG tablet [3774]           Gastroesophageal reflux disease without esophagitis        Change to Protonix. Reviewed symptom reduction strategies. pantoprazole (PROTONIX) 40 MG tablet [26225]           Mood disorder (HCC)        continue Lexapro 10mg, consider Buspar    escitalopram (LEXAPRO) 10 MG tablet [[id number]]           Vitamin D deficiency        Resume vitamin D. Cholecalciferol (VITAMIN D3) 50 MCG (2000 UT) CAPS [36682]           Mild intermittent asthma with acute exacerbation        Add Flovent and reassess in 1 month    albuterol sulfate  (90 Base) MCG/ACT inhaler [54852]           Atrophic vaginitis        ESTRING 2 MG vaginal ring [38843]           Cervicalgia        Defers further treatment until her return from [address]. Bilateral carpal tunnel syndrome        Defers further treatment until her return from [address]          ORDERS WITHOUT AN ASSOCIATED DIAGNOSIS    SODIUM FLUORIDE, DENTAL GEL, 1.1 % CREA [32188]              Reviewed with the patient: all disease processes, current clinical status, medications, activities and diet.      Side effects, adverse effects of the medication prescribed today, as well as treatment plan/ rationale and result expectations have been discussed with the patient who expresses understanding and desires to proceed.     Close follow up to evaluate treatment results and for coordination of care. I have reviewed the patient's medical history in detail and updated the computerized patient record. More than 50% of the 40 minute appointment was spent in face-to-face counseling, education and care coordination. Please note this report has been partially produced using speech recognition software and may contain mistakes related to that system including errors in grammar, punctuation and spelling as well as words and phrases that may seem inappropriate. If there are questions or concerns, please feel free to contact me to clarify. Orders Placed This Encounter   Procedures    Hemoglobin A1C     Standing Status:   Future     Standing Expiration Date:   1/3/2021     Orders Placed This Encounter   Medications    nystatin (MYCOSTATIN) 926716 UNIT/GM cream     Sig: Apply topically 2 times daily. Dispense:  60 g     Refill:  5    selenium sulfide (SELSUN) 2.5 % lotion     Sig: Apply for 10 minutes on skin daily for 1-4 weeks OR one overnight application.      Dispense:  118 mL     Refill:  5    hydrOXYzine (ATARAX) 25 MG tablet     Sig: Take 1 tablet by mouth every 8 hours as needed for Itching     Dispense:  90 tablet     Refill:  4    pantoprazole (PROTONIX) 40 MG tablet     Sig: Take 1 tablet by mouth every morning (before breakfast)     Dispense:  90 tablet     Refill:  2    escitalopram (LEXAPRO) 10 MG tablet     Sig: Take 1 tablet by mouth daily     Dispense:  90 tablet     Refill:  2    Cholecalciferol (VITAMIN D3) 50 MCG (2000 UT) CAPS     Sig: Take 1 capsule by mouth daily     Dispense:  90 capsule     Refill:  4    albuterol sulfate  (90 Base) MCG/ACT inhaler     Sig: Inhale 2 puffs into the lungs every 6 hours as needed for Wheezing     Dispense:  1 Inhaler     Refill:  2    fluticasone (FLOVENT HFA) 110 MCG/ACT inhaler     Sig: Inhale 2 puffs into the lungs 2 times daily

## 2020-01-27 ENCOUNTER — OFFICE VISIT (OUTPATIENT)
Dept: FAMILY MEDICINE CLINIC | Age: 65
End: 2020-01-27
Payer: COMMERCIAL

## 2020-01-27 VITALS
HEIGHT: 65 IN | RESPIRATION RATE: 16 BRPM | TEMPERATURE: 97.9 F | SYSTOLIC BLOOD PRESSURE: 120 MMHG | BODY MASS INDEX: 32.06 KG/M2 | OXYGEN SATURATION: 100 % | DIASTOLIC BLOOD PRESSURE: 78 MMHG | HEART RATE: 79 BPM | WEIGHT: 192.4 LBS

## 2020-01-27 DIAGNOSIS — R30.0 DYSURIA: ICD-10-CM

## 2020-01-27 LAB
BILIRUBIN, POC: NEGATIVE
BLOOD URINE, POC: ABNORMAL
CLARITY, POC: ABNORMAL
COLOR, POC: YELLOW
GLUCOSE URINE, POC: NEGATIVE
KETONES, POC: NEGATIVE
LEUKOCYTE EST, POC: ABNORMAL
NITRITE, POC: NEGATIVE
PH, POC: 6
PROTEIN, POC: ABNORMAL
SPECIFIC GRAVITY, POC: 1.01
UROBILINOGEN, POC: NEGATIVE

## 2020-01-27 PROCEDURE — 1036F TOBACCO NON-USER: CPT | Performed by: PHYSICIAN ASSISTANT

## 2020-01-27 PROCEDURE — 3017F COLORECTAL CA SCREEN DOC REV: CPT | Performed by: PHYSICIAN ASSISTANT

## 2020-01-27 PROCEDURE — 81003 URINALYSIS AUTO W/O SCOPE: CPT | Performed by: PHYSICIAN ASSISTANT

## 2020-01-27 PROCEDURE — G8417 CALC BMI ABV UP PARAM F/U: HCPCS | Performed by: PHYSICIAN ASSISTANT

## 2020-01-27 PROCEDURE — 99213 OFFICE O/P EST LOW 20 MIN: CPT | Performed by: PHYSICIAN ASSISTANT

## 2020-01-27 PROCEDURE — G8427 DOCREV CUR MEDS BY ELIG CLIN: HCPCS | Performed by: PHYSICIAN ASSISTANT

## 2020-01-27 PROCEDURE — G8482 FLU IMMUNIZE ORDER/ADMIN: HCPCS | Performed by: PHYSICIAN ASSISTANT

## 2020-01-27 RX ORDER — FLUTICASONE PROPIONATE 50 MCG
2 SPRAY, SUSPENSION (ML) NASAL DAILY
Qty: 48 G | Refills: 1 | Status: SHIPPED | OUTPATIENT
Start: 2020-01-27 | End: 2020-07-14 | Stop reason: ALTCHOICE

## 2020-01-27 RX ORDER — METHYLPREDNISOLONE 4 MG/1
TABLET ORAL
COMMUNITY
Start: 2020-01-07 | End: 2020-05-13 | Stop reason: ALTCHOICE

## 2020-01-27 RX ORDER — NITROFURANTOIN 25; 75 MG/1; MG/1
100 CAPSULE ORAL 2 TIMES DAILY
Qty: 20 CAPSULE | Refills: 0 | Status: SHIPPED | OUTPATIENT
Start: 2020-01-27 | End: 2020-02-06

## 2020-01-27 RX ORDER — IBUPROFEN 800 MG/1
TABLET ORAL
COMMUNITY
Start: 2020-01-07 | End: 2021-01-08

## 2020-01-27 ASSESSMENT — ENCOUNTER SYMPTOMS
ABDOMINAL DISTENTION: 0
DIARRHEA: 0
NAUSEA: 0
CONSTIPATION: 0

## 2020-01-27 NOTE — PROGRESS NOTES
[address], 60 y.o. female presents today with:  Chief Complaint   Patient presents with    Urinary Tract Infection     10 days, taking over the counter medications     RICARDO Lopez is in the office today with complaints of UTI symptoms.  phone used today as patient is Amharic speaking. Dysuria. Complaining of pelvic fullness/dysuria x 10 days. Initially had at-home assessment with RN from insurance. Was told that she has a uti per u/a. Initially had no symptoms. Progressively worsened over the past couple of days. Has tried OTC UTI-relief which has not given relief. Denies gross hematuria. Denies flank pain. She is sexually active. Denies vaginal discharge or pain at this time. Review of Systems   Constitutional: Negative for activity change, chills, diaphoresis, fatigue and fever. Gastrointestinal: Negative for abdominal distention, constipation, diarrhea and nausea. Genitourinary: Positive for decreased urine volume, dysuria, frequency, pelvic pain and urgency. Negative for difficulty urinating.      Past Medical History:   Diagnosis Date    Acid reflux     Asthma     Chronic back pain     COPD (chronic obstructive pulmonary disease) (HCC)     Depression     Hyperlipidemia     Obesity     Venous insufficiency      Past Surgical History:   Procedure Laterality Date    COLONOSCOPY      ENDOSCOPY, COLON, DIAGNOSTIC      KIDNEY SURGERY      OVARIAN CYST REMOVAL Left 11/06/2004    NH ESOPHAGOGASTRODUODENOSCOPY TRANSORAL DIAGNOSTIC N/A 11/14/2018    EGD ESOPHAGOGASTRODUODENOSCOPY WITH DILATION performed by Qamar Menjivar MD at [address] [place] [address] Marital status:      Spouse name: Not on file    Number of children: Not on file    Years of education: Not on file    Highest education level: Not on file   Occupational History    Not on file   Social Needs    Financial resource strain: Not on file    Food insecurity:     Worry: Not on file     Inability: Not on file    Transportation needs:     Medical: Not on file     Non-medical: Not on file   Tobacco Use    Smoking status: Never Smoker    Smokeless tobacco: Never Used   Substance and Sexual Activity    Alcohol use: No    Drug use: No    Sexual activity: Yes   Lifestyle    Physical activity:     Days per week: Not on file     Minutes per session: Not on file    Stress: Not on file   Relationships    Social connections:     Talks on phone: Not on file     Gets together: Not on file     Attends Latter day service: Not on file     Active member of club or organization: Not on file     Attends meetings of clubs or organizations: Not on file     Relationship status: Not on file    Intimate partner violence:     Fear of current or ex partner: Not on file     Emotionally abused: Not on file     Physically abused: Not on file     Forced sexual activity: Not on file   Other Topics Concern    Not on file   Social History Narrative    Not on file     Family History   Problem Relation Age of Onset    Diabetes Mother     High Blood Pressure Mother     Diabetes Brother     Diabetes Maternal Grandmother     Diabetes Maternal Grandfather     Diabetes Paternal Grandmother     Diabetes Paternal Grandfather     Diabetes Other     High Blood Pressure Sister     Colon Cancer Paternal Uncle      Allergies   Allergen Reactions    Other      Perfumes      Current Outpatient Medications   Medication Sig Dispense Refill    fluticasone (FLONASE) 50 MCG/ACT nasal spray 2 SPRAYS BY EACH NOSTRIL ROUTE DAILY 48 g 1    methylPREDNISolone (MEDROL DOSEPACK) 4 MG tablet TAKE BY MOUTH AS DIRECTED ON INSIDE OF PACKAGE      ibuprofen (ADVIL;MOTRIN) 800 MG tablet TAKE 1 TABLET BY MOUTH EVERY 6 HOURS AS NEEDED FOR PAIN      nitrofurantoin, macrocrystal-monohydrate, (MACROBID) 100 MG capsule Take 1 capsule by mouth 2 times daily for 10 days 20 capsule 0    are moist.   Cardiovascular:      Rate and Rhythm: Normal rate. Pulmonary:      Effort: Pulmonary effort is normal.   Abdominal:      Palpations: Abdomen is soft. Tenderness: There is abdominal tenderness in the suprapubic area. There is no right CVA tenderness or left CVA tenderness. Skin:     General: Skin is warm and dry. Neurological:      General: No focal deficit present. Mental Status: She is alert and oriented to person, place, and time. Grady Jackson was seen today for urinary tract infection. Diagnoses and all orders for this visit:    Acute cystitis with hematuria  -     nitrofurantoin, macrocrystal-monohydrate, (MACROBID) 100 MG capsule; Take 1 capsule by mouth 2 times daily for 10 days    Dysuria  -     POCT Urinalysis No Micro (Auto)  -     Urine Culture; Future    Follow up if symptoms worsen or fail to improve. No other questions at this time. Orders Placed This Encounter   Procedures    Urine Culture     Standing Status:   Future     Standing Expiration Date:   2/27/2020     Order Specific Question:   Specify (ex-cath, midstream, cysto, etc)? Answer:   midstream    POCT Urinalysis No Micro (Auto)     Orders Placed This Encounter   Medications    nitrofurantoin, macrocrystal-monohydrate, (MACROBID) 100 MG capsule     Sig: Take 1 capsule by mouth 2 times daily for 10 days     Dispense:  20 capsule     Refill:  0     There are no discontinued medications. No follow-ups on file. Reviewed with the patient: current clinical status, medications, activities and diet. Side effects, adverse effects of the medication prescribed today, as well as treatment plan/ rationale and result expectations have been discussed with the patient who expresses understanding and desires to proceed. Close follow up to evaluate treatment results and for coordination of care.   I have reviewed the patient's medical history in detail and updated the computerized patient record.     Brinda Mcneil PA-C

## 2020-01-30 LAB
ORGANISM: ABNORMAL
URINE CULTURE, ROUTINE: ABNORMAL

## 2020-02-06 ENCOUNTER — OFFICE VISIT (OUTPATIENT)
Dept: FAMILY MEDICINE CLINIC | Age: 65
End: 2020-02-06
Payer: COMMERCIAL

## 2020-02-06 VITALS
RESPIRATION RATE: 16 BRPM | BODY MASS INDEX: 32.52 KG/M2 | HEART RATE: 81 BPM | DIASTOLIC BLOOD PRESSURE: 72 MMHG | TEMPERATURE: 97.4 F | WEIGHT: 195.2 LBS | SYSTOLIC BLOOD PRESSURE: 124 MMHG | OXYGEN SATURATION: 98 % | HEIGHT: 65 IN

## 2020-02-06 LAB
BILIRUBIN, POC: NORMAL
BLOOD URINE, POC: NORMAL
CLARITY, POC: CLEAR
COLOR, POC: YELLOW
GLUCOSE URINE, POC: NORMAL
KETONES, POC: NORMAL
LEUKOCYTE EST, POC: NORMAL
NITRITE, POC: NORMAL
PH, POC: 6
PROTEIN, POC: NORMAL
SPECIFIC GRAVITY, POC: 1.02
UROBILINOGEN, POC: 3.5

## 2020-02-06 PROCEDURE — 1036F TOBACCO NON-USER: CPT | Performed by: FAMILY MEDICINE

## 2020-02-06 PROCEDURE — G8427 DOCREV CUR MEDS BY ELIG CLIN: HCPCS | Performed by: FAMILY MEDICINE

## 2020-02-06 PROCEDURE — 3017F COLORECTAL CA SCREEN DOC REV: CPT | Performed by: FAMILY MEDICINE

## 2020-02-06 PROCEDURE — 99213 OFFICE O/P EST LOW 20 MIN: CPT | Performed by: FAMILY MEDICINE

## 2020-02-06 PROCEDURE — 81002 URINALYSIS NONAUTO W/O SCOPE: CPT | Performed by: FAMILY MEDICINE

## 2020-02-06 PROCEDURE — G8482 FLU IMMUNIZE ORDER/ADMIN: HCPCS | Performed by: FAMILY MEDICINE

## 2020-02-06 PROCEDURE — G8417 CALC BMI ABV UP PARAM F/U: HCPCS | Performed by: FAMILY MEDICINE

## 2020-02-06 RX ORDER — DEXTRAN 70, GLYCERIN, HYPROMELLOSE 1; 2; 3 MG/ML; MG/ML; MG/ML
SOLUTION/ DROPS OPHTHALMIC
COMMUNITY
Start: 2020-01-30 | End: 2022-01-26 | Stop reason: SDUPTHER

## 2020-02-06 NOTE — PROGRESS NOTES
[address], 60 y.o. female presents today with:  Chief Complaint   Patient presents with    Urinary Tract Infection     Patient present today for her [facility] Follow Up for UTI. Patient was seen by PA. Zhang Mary was giving nitrofurantoin, macrocrystal-monohydrate, (MACROBID) 100 MG capsule; Take 1 capsule by mouth 2 times daily for 10 days. Patient states feel better. HPI    Mrs. Alley Clarke was recently treated with nitrofurantoin for UTI for 10 days. Her urine culture grew out fewer than 25,000 colonies of E. coli. Culture was sensitive to nitrofurantoin. Patient states that she has no further symptoms. No dysuria, hematuria, polyuria. No other questions and or concerns for today's visit      Review of Systems  No fevers, chills, sweats. No unintended weight loss. No abdominal pain, nausea, vomiting, diarrhea, constipation, bloody stools, black tarry stools. No rashes. No swollen glands.       Past Medical History:   Diagnosis Date    Acid reflux     Asthma     Chronic back pain     COPD (chronic obstructive pulmonary disease) (HCC)     Depression     Hyperlipidemia     Obesity     Venous insufficiency      Past Surgical History:   Procedure Laterality Date    COLONOSCOPY      ENDOSCOPY, COLON, DIAGNOSTIC      KIDNEY SURGERY      OVARIAN CYST REMOVAL Left 11/06/2004    [place] ESOPHAGOGASTRODUODENOSCOPY TRANSORAL DIAGNOSTIC N/A 11/14/2018    EGD ESOPHAGOGASTRODUODENOSCOPY WITH DILATION performed by Velasquez Rojas MD at [address] [place] [address] Marital status:      Spouse name: Not on file    Number of children: Not on file    Years of education: Not on file    Highest education level: Not on file   Occupational History    Not on file   Social Needs    Financial resource strain: Not on file    Food insecurity:     Worry: Not on file     Inability: Not on file    Transportation needs:     Medical: Not on file This Encounter   Procedures    POCT Urinalysis no Micro     No orders of the defined types were placed in this encounter. There are no discontinued medications. Return if symptoms worsen or fail to improve. Controlled Substance Monitoring:    Acute and Chronic Pain Monitoring:   RX Monitoring 11/15/2017   Attestation The Prescription Monitoring Report for this patient was reviewed today.            Gladis Schwab MD

## 2020-05-13 ENCOUNTER — TELEPHONE (OUTPATIENT)
Dept: ADMINISTRATIVE | Age: 65
End: 2020-05-13

## 2020-05-13 ENCOUNTER — NURSE TRIAGE (OUTPATIENT)
Dept: OTHER | Facility: CLINIC | Age: 65
End: 2020-05-13

## 2020-05-13 ENCOUNTER — OFFICE VISIT (OUTPATIENT)
Dept: FAMILY MEDICINE CLINIC | Age: 65
End: 2020-05-13
Payer: COMMERCIAL

## 2020-05-13 VITALS
BODY MASS INDEX: 37.11 KG/M2 | WEIGHT: 189 LBS | SYSTOLIC BLOOD PRESSURE: 122 MMHG | HEIGHT: 60 IN | TEMPERATURE: 98.3 F | HEART RATE: 76 BPM | OXYGEN SATURATION: 98 % | DIASTOLIC BLOOD PRESSURE: 70 MMHG | RESPIRATION RATE: 16 BRPM

## 2020-05-13 PROCEDURE — G8427 DOCREV CUR MEDS BY ELIG CLIN: HCPCS | Performed by: NURSE PRACTITIONER

## 2020-05-13 PROCEDURE — G8417 CALC BMI ABV UP PARAM F/U: HCPCS | Performed by: NURSE PRACTITIONER

## 2020-05-13 PROCEDURE — 1036F TOBACCO NON-USER: CPT | Performed by: NURSE PRACTITIONER

## 2020-05-13 PROCEDURE — 3017F COLORECTAL CA SCREEN DOC REV: CPT | Performed by: NURSE PRACTITIONER

## 2020-05-13 PROCEDURE — 99213 OFFICE O/P EST LOW 20 MIN: CPT | Performed by: NURSE PRACTITIONER

## 2020-05-13 RX ORDER — METHYLPREDNISOLONE 4 MG/1
TABLET ORAL
Qty: 1 KIT | Refills: 0 | Status: SHIPPED | OUTPATIENT
Start: 2020-05-13 | End: 2020-10-08 | Stop reason: ALTCHOICE

## 2020-05-13 RX ORDER — FLUCONAZOLE 100 MG/1
100 TABLET ORAL DAILY
Qty: 3 TABLET | Refills: 0 | Status: SHIPPED | OUTPATIENT
Start: 2020-05-13 | End: 2020-05-16

## 2020-05-13 RX ORDER — TIZANIDINE 2 MG/1
2 TABLET ORAL EVERY 8 HOURS PRN
Qty: 40 TABLET | Refills: 0 | Status: SHIPPED | OUTPATIENT
Start: 2020-05-13 | End: 2020-07-14 | Stop reason: ALTCHOICE

## 2020-05-13 ASSESSMENT — ENCOUNTER SYMPTOMS
VOMITING: 0
SHORTNESS OF BREATH: 0
COUGH: 0
NAUSEA: 0
DIARRHEA: 0
ABDOMINAL PAIN: 0
CHEST TIGHTNESS: 0

## 2020-05-13 NOTE — TELEPHONE ENCOUNTER
PER SENG - NURSE TRIAGE  Pt having lt neck pain w/ numbness. See pt today - if not able to see pt - pt to go to [facility]. Unable to see today - pt will be going to [facility] for assessment.

## 2020-05-13 NOTE — PROGRESS NOTES
[address], 63 y.o. female presents today with:  Chief Complaint   Patient presents with    Shoulder Pain     She complains of having left shoulder pain, pain radiates down left arm , numbness in her arm and left side of face since last night    Nausea     When she has the pain and numbness she feels nausea.  Vaginal Itching     She  complains of vaginal itch. Shoulder Pain    The pain is present in the left shoulder and left arm (face and chest, nausea with pain). This is a new problem. The current episode started yesterday. Associated symptoms include tingling (burning sensation from left finger to shoulder). Pertinent negatives include no fever or numbness. Vaginal Discharge   The patient's primary symptoms include genital itching and vaginal discharge (white clumpy). Pertinent negatives include no abdominal pain, diarrhea, fever, headaches, nausea or vomiting. The vaginal discharge was white and thick. She has not been passing clots. She has not been passing tissue. She has tried nothing for the symptoms. The treatment provided no relief. Review of Systems   Constitutional: Positive for activity change (d/t neck and arm pain). Negative for diaphoresis, fatigue and fever. Respiratory: Negative for cough, chest tightness and shortness of breath. Cardiovascular: Negative for chest pain. Gastrointestinal: Negative for abdominal pain, diarrhea, nausea and vomiting. Genitourinary: Positive for vaginal discharge (white clumpy) and vaginal pain (itching and discomfort). Musculoskeletal: Positive for myalgias and neck pain (left side). Neurological: Positive for tingling (burning sensation from left finger to shoulder) and weakness (left arm weakness with push/pulls due to pain). Negative for dizziness, facial asymmetry, numbness and headaches. PMH, Surgical Hx, Family Hx, and Social Hx reviewed and updated.   Health Maintenance

## 2020-06-15 ENCOUNTER — NURSE TRIAGE (OUTPATIENT)
Dept: OTHER | Facility: CLINIC | Age: 65
End: 2020-06-15
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
## 2020-07-14 ENCOUNTER — OFFICE VISIT (OUTPATIENT)
Dept: FAMILY MEDICINE CLINIC | Age: 65
End: 2020-07-14
Payer: COMMERCIAL

## 2020-07-14 VITALS
HEIGHT: 66 IN | DIASTOLIC BLOOD PRESSURE: 76 MMHG | WEIGHT: 191.6 LBS | SYSTOLIC BLOOD PRESSURE: 124 MMHG | RESPIRATION RATE: 16 BRPM | OXYGEN SATURATION: 98 % | HEART RATE: 68 BPM | TEMPERATURE: 98 F | BODY MASS INDEX: 30.79 KG/M2

## 2020-07-14 LAB
BILIRUBIN, POC: NORMAL
BLOOD URINE, POC: NORMAL
CLARITY, POC: CLEAR
COLOR, POC: YELLOW
GLUCOSE URINE, POC: NORMAL
KETONES, POC: NORMAL
LEUKOCYTE EST, POC: NORMAL
NITRITE, POC: NORMAL
PH, POC: 7.5
PROTEIN, POC: NORMAL
SPECIFIC GRAVITY, POC: 1.01
UROBILINOGEN, POC: 3.5

## 2020-07-14 PROCEDURE — 3017F COLORECTAL CA SCREEN DOC REV: CPT | Performed by: FAMILY MEDICINE

## 2020-07-14 PROCEDURE — G8427 DOCREV CUR MEDS BY ELIG CLIN: HCPCS | Performed by: FAMILY MEDICINE

## 2020-07-14 PROCEDURE — G8417 CALC BMI ABV UP PARAM F/U: HCPCS | Performed by: FAMILY MEDICINE

## 2020-07-14 PROCEDURE — 1036F TOBACCO NON-USER: CPT | Performed by: FAMILY MEDICINE

## 2020-07-14 PROCEDURE — 99214 OFFICE O/P EST MOD 30 MIN: CPT | Performed by: FAMILY MEDICINE

## 2020-07-14 PROCEDURE — 81003 URINALYSIS AUTO W/O SCOPE: CPT | Performed by: FAMILY MEDICINE

## 2020-07-14 RX ORDER — ESCITALOPRAM OXALATE 10 MG/1
10 TABLET ORAL DAILY
Qty: 30 TABLET | Refills: 5 | Status: SHIPPED | OUTPATIENT
Start: 2020-07-14 | End: 2021-12-02 | Stop reason: SDUPTHER

## 2020-07-14 NOTE — PROGRESS NOTES
Surgical History:   Procedure Laterality Date    COLONOSCOPY      ENDOSCOPY, COLON, DIAGNOSTIC      KIDNEY SURGERY      OVARIAN CYST REMOVAL Left 11/06/2004    [place] ESOPHAGOGASTRODUODENOSCOPY TRANSORAL DIAGNOSTIC N/A 11/14/2018    EGD ESOPHAGOGASTRODUODENOSCOPY WITH DILATION performed by Park Downs MD at [address] [place] [address] Marital status:      Spouse name: Not on file    Number of children: Not on file    Years of education: Not on file    Highest education level: Not on file   Occupational History    Not on file   Social Needs    Financial resource strain: Not on file    Food insecurity     Worry: Not on file     Inability: Not on file    Transportation needs     Medical: Not on file     Non-medical: Not on file   Tobacco Use    Smoking status: Never Smoker    Smokeless tobacco: Never Used   Substance and Sexual Activity    Alcohol use: No    Drug use: No    Sexual activity: Yes   Lifestyle    Physical activity     Days per week: Not on file     Minutes per session: Not on file    Stress: Not on file   Relationships    Social connections     Talks on phone: Not on file     Gets together: Not on file     Attends Quaker service: Not on file     Active member of club or organization: Not on file     Attends meetings of clubs or organizations: Not on file     Relationship status: Not on file    Intimate partner violence     Fear of current or ex partner: Not on file     Emotionally abused: Not on file     Physically abused: Not on file     Forced sexual activity: Not on file   Other Topics Concern    Not on file   Social History Narrative    Not on file     Family History   Problem Relation Age of Onset    Diabetes Mother     High Blood Pressure Mother     Diabetes Brother     Diabetes Maternal Grandmother     Diabetes Maternal Grandfather     Diabetes Paternal Grandmother     Diabetes Paternal Grandfather     Diabetes Other     High Blood Pressure Sister     Colon Cancer Paternal Uncle      Allergies   Allergen Reactions    Other      Perfumes      Current Outpatient Medications   Medication Sig Dispense Refill    escitalopram (LEXAPRO) 10 MG tablet Take 1 tablet by mouth daily 30 tablet 5    pantoprazole (PROTONIX) 40 MG tablet TAKE 1 TABLET BY MOUTH EVERY MORNING (BEFORE BREAKFAST) 90 tablet 1    methylPREDNISolone (MEDROL, AMAYA,) 4 MG tablet Take by mouth. 1 kit 0    Artificial Tear Solution (GENTEAL TEARS) 0.1-0.2-0.3 % SOLN       ibuprofen (ADVIL;MOTRIN) 800 MG tablet TAKE 1 TABLET BY MOUTH EVERY 6 HOURS AS NEEDED FOR PAIN      SODIUM FLUORIDE, DENTAL GEL, 1.1 % CREA Place onto teeth      nystatin (MYCOSTATIN) 073672 UNIT/GM cream Apply topically 2 times daily. 60 g 5    selenium sulfide (SELSUN) 2.5 % lotion Apply for 10 minutes on skin daily for 1-4 weeks OR one overnight application. 118 mL 5    hydrOXYzine (ATARAX) 25 MG tablet Take 1 tablet by mouth every 8 hours as needed for Itching 90 tablet 4    Cholecalciferol (VITAMIN D3) 50 MCG (2000 UT) CAPS Take 1 capsule by mouth daily 90 capsule 4    albuterol sulfate  (90 Base) MCG/ACT inhaler Inhale 2 puffs into the lungs every 6 hours as needed for Wheezing 1 Inhaler 2    fluticasone (FLOVENT HFA) 110 MCG/ACT inhaler Inhale 2 puffs into the lungs 2 times daily 3 Inhaler 4    ESTRING 2 MG vaginal ring Place 2 mg vaginally every 3 months 1 each 4     No current facility-administered medications for this visit. PMH, Surgical Hx, Family Hx, and Social Hxreviewed and updated. Health Maintenance reviewed. Objective    Vitals:    07/14/20 1334   BP: 124/76   Site: Right Upper Arm   Position: Sitting   Cuff Size: Large Adult   Pulse: 68   Resp: 16   Temp: 98 °F (36.7 °C)   TempSrc: Tympanic   SpO2: 98%   Weight: 191 lb 9.6 oz (86.9 kg)   Height: 5' 6\" (1.676 m)        Physical Exam  Constitutional:       Appearance: She is well-developed.    HENT: Head: Normocephalic and atraumatic. Eyes:      General: No scleral icterus. Conjunctiva/sclera: Conjunctivae normal.   Neck:      Thyroid: No thyroid mass, thyromegaly or thyroid tenderness. Cardiovascular:      Rate and Rhythm: Normal rate and regular rhythm. Heart sounds: Normal heart sounds. Pulmonary:      Effort: Pulmonary effort is normal.      Breath sounds: Normal breath sounds. Abdominal:      General: Bowel sounds are normal. There is no distension. Palpations: Abdomen is soft. There is no mass. Tenderness: There is no abdominal tenderness. There is no right CVA tenderness or left CVA tenderness. Lymphadenopathy:      Cervical: No cervical adenopathy. Skin:     General: Skin is warm and dry. Neurological:      Mental Status: She is alert and oriented to person, place, and time. Psychiatric:         Attention and Perception: Perception normal. She is inattentive. Mood and Affect: Mood and affect normal.         Speech: Speech is tangential.         Behavior: Behavior is cooperative. Cognition and Memory: Cognition and memory normal.         Judgment: Judgment normal.      Comments: Rapid speech and flight of thought present, baseline           Lab Results   Component Value Date    LABA1C 6.0 (H) 12/26/2019    LABA1C 5.7 05/29/2019    LABA1C 6.0 (H) 03/09/2015     Lab Results   Component Value Date    CREATININE 0.75 09/18/2018     Lab Results   Component Value Date    ALT 17 09/18/2018    AST 17 09/18/2018     Lab Results   Component Value Date    CHOL 219 (H) 04/01/2015    TRIG 105 04/01/2015    HDL 50 09/18/2018    LDLCALC 153 (H) 09/18/2018        Assessment & Plan   Visit Diagnoses and Associated Orders     Abnormal glucose    -  Primary    POC hemoglobin A1c. Sent to lab for blood draw.     POCT glycosylated hemoglobin (Hb A1C) [[id number] Custom]      Hemoglobin A1C [[id number] Custom]   - Future Order         Mood disorder (Qiana Venegas)        improving, well-controlled with SEs. Does not desire change in meds. escitalopram (LEXAPRO) 10 MG tablet [[id number]]           Mild intermittent asthma without complication         Stable and well-controlled on Flovent and albuterol         Gastroesophageal reflux disease without esophagitis        Stable and well-controlled with pantoprazole         Encounter for screening mammogram for malignant neoplasm of breast        AFSHAN DIGITAL SCREEN W CAD BILATERAL [[id number] Custom]   - Future Order         Throat pain        Normal exam.  Given the high level of Anxiety we will check TSH    TSH Without Reflex [[id number] Custom]   - Future Order         Lower abdominal pain        Return for further evaluation    POCT Urinalysis No Micro (Auto) [[id number] Custom]           Cervicalgia        Chronic persistent. Did not follow-up with pain management or NSI. Defers physical therapy. Referred to Dr. Alka Arce DO, Physical Medicine Rehab, [facility] [[id number] Custom]           Chronic midline low back pain with bilateral sciatica        see above    Liza Borges DO, Physical Medicine Rehab, [facility] [[id number] Custom]               This visit with Jory Juan was challenging in that she had significant flight of thoughts/tangential thought. However, this observation has been made by me at several visits over the last three years. will bring back for evaluation of additional problems. Reviewed with the patient: all disease processes, current clinical status, medications, activities and diet.      Side effects, adverse effects of the medication prescribed today, as well as treatment plan/ rationale and result expectations have been discussed with the patient who expresses understanding and desires to proceed.     Close follow up to evaluate treatment results and for coordination of care. I have reviewed the patient's medical history in detail and updated the computerized patient record.     More than 50% of the appointment was spent in face-to-face counseling, education and care coordination. Orders Placed This Encounter   Procedures    AFSHAN DIGITAL SCREEN W CAD BILATERAL     Standing Status:   Future     Standing Expiration Date:   9/14/2021    TSH Without Reflex     Standing Status:   Future     Standing Expiration Date:   7/14/2021    Hemoglobin A1C     Standing Status:   Future     Standing Expiration Date:   9/14/2020   [address] DO, Physical Medicine Rehab, [place]     Referral Priority:   Routine     Referral Type:   Eval and Treat     Referral Reason:   Specialty Services Required     Referred to Provider:   Jg Salcedo DO     Requested Specialty:   Physical Medicine and Rehab     Number of Visits Requested:   1    POCT glycosylated hemoglobin (Hb A1C)    POCT Urinalysis No Micro (Auto)     Orders Placed This Encounter   Medications    escitalopram (LEXAPRO) 10 MG tablet     Sig: Take 1 tablet by mouth daily     Dispense:  30 tablet     Refill:  5     Medications Discontinued During This Encounter   Medication Reason    tiZANidine (ZANAFLEX) 2 MG tablet Therapy completed    fluticasone (FLONASE) 50 MCG/ACT nasal spray Therapy completed    escitalopram (LEXAPRO) 10 MG tablet REORDER     Return for vaginal discharge - OV. Controlled Substance Monitoring:    Acute and Chronic Pain Monitoring:   RX Monitoring 11/15/2017   Attestation The Prescription Monitoring Report for this patient was reviewed today.            Mp Martínez MD

## 2020-07-29 ENCOUNTER — OFFICE VISIT (OUTPATIENT)
Dept: FAMILY MEDICINE CLINIC | Age: 65
End: 2020-07-29
Payer: COMMERCIAL

## 2020-07-29 VITALS
DIASTOLIC BLOOD PRESSURE: 82 MMHG | HEART RATE: 89 BPM | OXYGEN SATURATION: 98 % | RESPIRATION RATE: 15 BRPM | TEMPERATURE: 98 F | SYSTOLIC BLOOD PRESSURE: 124 MMHG

## 2020-07-29 DIAGNOSIS — N39.0 URINARY TRACT INFECTION WITH HEMATURIA, SITE UNSPECIFIED: ICD-10-CM

## 2020-07-29 DIAGNOSIS — R73.09 ABNORMAL GLUCOSE: ICD-10-CM

## 2020-07-29 DIAGNOSIS — R31.9 URINARY TRACT INFECTION WITH HEMATURIA, SITE UNSPECIFIED: ICD-10-CM

## 2020-07-29 DIAGNOSIS — R07.0 THROAT PAIN: ICD-10-CM

## 2020-07-29 LAB
BILIRUBIN, POC: ABNORMAL
BLOOD URINE, POC: ABNORMAL
CLARITY, POC: ABNORMAL
COLOR, POC: YELLOW
GLUCOSE URINE, POC: ABNORMAL
HBA1C MFR BLD: 6.2 % (ref 4.8–5.9)
KETONES, POC: ABNORMAL
LEUKOCYTE EST, POC: ABNORMAL
NITRITE, POC: ABNORMAL
PH, POC: 8.5
PROTEIN, POC: ABNORMAL
SPECIFIC GRAVITY, POC: 1.01
TSH SERPL DL<=0.05 MIU/L-ACNC: 1.65 UIU/ML (ref 0.44–3.86)
UROBILINOGEN, POC: ABNORMAL

## 2020-07-29 PROCEDURE — 81003 URINALYSIS AUTO W/O SCOPE: CPT | Performed by: NURSE PRACTITIONER

## 2020-07-29 PROCEDURE — G8427 DOCREV CUR MEDS BY ELIG CLIN: HCPCS | Performed by: NURSE PRACTITIONER

## 2020-07-29 PROCEDURE — 99213 OFFICE O/P EST LOW 20 MIN: CPT | Performed by: NURSE PRACTITIONER

## 2020-07-29 PROCEDURE — 3017F COLORECTAL CA SCREEN DOC REV: CPT | Performed by: NURSE PRACTITIONER

## 2020-07-29 PROCEDURE — 1036F TOBACCO NON-USER: CPT | Performed by: NURSE PRACTITIONER

## 2020-07-29 PROCEDURE — G8417 CALC BMI ABV UP PARAM F/U: HCPCS | Performed by: NURSE PRACTITIONER

## 2020-07-29 RX ORDER — PHENAZOPYRIDINE HYDROCHLORIDE 200 MG/1
200 TABLET, FILM COATED ORAL 3 TIMES DAILY PRN
Qty: 20 TABLET | Refills: 0 | Status: ON HOLD | OUTPATIENT
Start: 2020-07-29 | End: 2021-02-03

## 2020-07-29 RX ORDER — NITROFURANTOIN 25; 75 MG/1; MG/1
100 CAPSULE ORAL 2 TIMES DAILY
Qty: 14 CAPSULE | Refills: 0 | Status: SHIPPED | OUTPATIENT
Start: 2020-07-29 | End: 2020-08-05

## 2020-07-29 ASSESSMENT — ENCOUNTER SYMPTOMS
SHORTNESS OF BREATH: 0
EYES NEGATIVE: 1
ABDOMINAL PAIN: 1
VOMITING: 0
ALLERGIC/IMMUNOLOGIC NEGATIVE: 1
SORE THROAT: 0
RHINORRHEA: 0
ABDOMINAL DISTENTION: 0
BACK PAIN: 0
WHEEZING: 0
NAUSEA: 0

## 2020-07-29 NOTE — PROGRESS NOTES
CYST REMOVAL Left 11/06/2004    [place] ESOPHAGOGASTRODUODENOSCOPY TRANSORAL DIAGNOSTIC N/A 11/14/2018    EGD ESOPHAGOGASTRODUODENOSCOPY WITH DILATION performed by Aleisha Georges MD at [address] [place] [address] Marital status:      Spouse name: Not on file    Number of children: Not on file    Years of education: Not on file    Highest education level: Not on file   Occupational History    Not on file   Social Needs    Financial resource strain: Not on file    Food insecurity     Worry: Not on file     Inability: Not on file    Transportation needs     Medical: Not on file     Non-medical: Not on file   Tobacco Use    Smoking status: Never Smoker    Smokeless tobacco: Never Used   Substance and Sexual Activity    Alcohol use: No    Drug use: No    Sexual activity: Yes   Lifestyle    Physical activity     Days per week: Not on file     Minutes per session: Not on file    Stress: Not on file   Relationships    Social connections     Talks on phone: Not on file     Gets together: Not on file     Attends Evangelical service: Not on file     Active member of club or organization: Not on file     Attends meetings of clubs or organizations: Not on file     Relationship status: Not on file    Intimate partner violence     Fear of current or ex partner: Not on file     Emotionally abused: Not on file     Physically abused: Not on file     Forced sexual activity: Not on file   Other Topics Concern    Not on file   Social History Narrative    Not on file     Family History   Problem Relation Age of Onset    Diabetes Mother     High Blood Pressure Mother     Diabetes Brother     Diabetes Maternal Grandmother     Diabetes Maternal Grandfather     Diabetes Paternal Grandmother     Diabetes Paternal Grandfather     Diabetes Other     High Blood Pressure Sister     Colon Cancer Paternal Uncle      Allergies   Allergen Reactions    Other      Perfumes Current Outpatient Medications   Medication Sig Dispense Refill    escitalopram (LEXAPRO) 10 MG tablet Take 1 tablet by mouth daily 30 tablet 5    pantoprazole (PROTONIX) 40 MG tablet TAKE 1 TABLET BY MOUTH EVERY MORNING (BEFORE BREAKFAST) 90 tablet 1    methylPREDNISolone (MEDROL, AMAYA,) 4 MG tablet Take by mouth. 1 kit 0    Artificial Tear Solution (GENTEAL TEARS) 0.1-0.2-0.3 % SOLN       ibuprofen (ADVIL;MOTRIN) 800 MG tablet TAKE 1 TABLET BY MOUTH EVERY 6 HOURS AS NEEDED FOR PAIN      SODIUM FLUORIDE, DENTAL GEL, 1.1 % CREA Place onto teeth      nystatin (MYCOSTATIN) 512636 UNIT/GM cream Apply topically 2 times daily. 60 g 5    selenium sulfide (SELSUN) 2.5 % lotion Apply for 10 minutes on skin daily for 1-4 weeks OR one overnight application. 118 mL 5    hydrOXYzine (ATARAX) 25 MG tablet Take 1 tablet by mouth every 8 hours as needed for Itching 90 tablet 4    Cholecalciferol (VITAMIN D3) 50 MCG (2000 UT) CAPS Take 1 capsule by mouth daily 90 capsule 4    albuterol sulfate  (90 Base) MCG/ACT inhaler Inhale 2 puffs into the lungs every 6 hours as needed for Wheezing 1 Inhaler 2    fluticasone (FLOVENT HFA) 110 MCG/ACT inhaler Inhale 2 puffs into the lungs 2 times daily 3 Inhaler 4    ESTRING 2 MG vaginal ring Place 2 mg vaginally every 3 months 1 each 4     No current facility-administered medications for this visit. PMH, Surgical Hx, Family Hx, and Social Hxreviewed and updated. Health Maintenance reviewed. Objective    Vitals:    07/29/20 1125   BP: 124/82   Pulse: 89   Resp: 15   Temp: 98 °F (36.7 °C)   TempSrc: Oral   SpO2: 98%       Physical Exam    Assessment & Plan    Diagnosis Orders   1. Urinary frequency  POCT Urinalysis No Micro (Auto)   2.  Urinary tract infection with hematuria, site unspecified  Culture, Urine     Orders Placed This Encounter   Procedures    Culture, Urine     Standing Status:   Future     Standing Expiration Date:   7/29/2021     Order Specific

## 2020-07-29 NOTE — PATIENT INSTRUCTIONS
Patient Education        Nena Kenny en las mujeres: Instrucciones de cuidado  Urinary Tract Infection in Women: Care Instructions  Instrucciones de cuidado    Antonietta infección urinaria (UTI, por nancy siglas en inglés) es un término general que hace referencia a antonietta infección que se produce en cualquier parte entre los riñones y la uretra (conducto por el cual se expulsa la orina). La mayoría de las UTI son infecciones de la vejiga. Con frecuencia, causan dolor o ardor al [place]. Las UTI son causadas por bacterias y pueden curarse con antibióticos. Asegúrese de completar el tratamiento para que la infección desaparezca. La atención de seguimiento es antonietta parte clave de ladd tratamiento y seguridad. Asegúrese de hacer y acudir a todas las citas, y llame a ladd médico si está teniendo problemas. También es antonietta buena idea saber los resultados de nancy exámenes y mantener antonietta lista de los medicamentos que jordan. ¿Cómo puede cuidarse en el hogar? · [address] No deje de tomarlos por el hecho de sentirse mejor. Debe romero todos los antibióticos hasta terminarlos. · [place] los próximos lorne o [address], lolita mayor cantidad de [place] y otros líquidos. [place] puede ayudar a eliminar las bacterias que provocan la infección. (Si tiene antonietta enfermedad de los riñones, el corazón o el hígado y tiene que [place]'s líquidos, hable con ladd médico antes de aumentar ladd consumo). · Evite las bebidas gaseosas o con cafeína. Pueden irritar la vejiga. · Orine con frecuencia. Trate de vaciar la vejiga cada vez que orine. · Para aliviar el dolor, tome un baño caliente o colóquese antonietta almohadilla térmica a baja temperatura sobre la parte baja del abdomen o la jesu genital. Nunca se duerma mientras [place] antonietta almohadilla térmica. Para prevenir las infecciones urinarias  · Lolita abundante agua todos los días. [place] la ayuda a orinar con frecuencia, lo que elimina las bacterias de ladd organismo.  (Si tiene [place] enfermedad de los riñones, el corazón o el hígado y tiene que [place]'s líquidos, hable con ladd médico antes de aumentar ladd consumo). · Orine cuando necesite hacerlo. · Orine inmediatamente después de nikki tenido [employer]. · Cámbiese las toallas sanitarias con frecuencia. · Evite el uso de lavados vaginales, los cortez de burbujas, los [place] de higiene femenina y otros productos para la higiene femenina que contengan desodorantes. · Después de ir al baño, límpiese de adelante hacia atrás. ¿Cuándo debes pedir ayuda? Llama a tu médico ahora mismo o busca atención médica inmediata si:  · Aparecen síntomas zackery fiebre, escalofríos, náuseas o vómitos por Lynnda Bakes, o empeoran. · Te empieza a doler la espalda, olivier debajo de la caja torácica. A esto se le llama dolor en el flanco.  · Aparece lobito o pus en la orina. · Tienes problemas con los antibióticos. Presta especial atención a los cambios en tu joy y asegúrate de comunicarte con tu médico si:  · No mejoras después de nikki tomado un antibiótico benito 2 días. · Los síntomas desaparecen y [place]. ¿Dónde puede encontrar más información en [facility]? Lizbet Guillory a [email or web address] e ingrese a ladd cuenta de Celestine Cedillo G021 en el [place] \"Search Health Information\" para más información (en inglés) sobre \"Infección urinaria en las mujeres: Instrucciones de cuidado. \"     Si no tiene antonietta cuenta, madelyn saraic en el enlace \"Sign Up Now\". Revisado: 22 agosto, 1579               MTXLZWY del contenido: 12.5  © 1621-9381 [employer], [employer]. Las instrucciones de cuidado fueron adaptadas bajo licencia por [facility] ([facility]). Si usted tiene [place] afección médica o sobre estas instrucciones, siempre pregunte a ladd profesional de joy. [employer] niega toda garantía o responsabilidad por ladd uso de esta información.

## 2020-08-01 LAB
ORGANISM: ABNORMAL
URINE CULTURE, ROUTINE: ABNORMAL

## 2020-08-27 ENCOUNTER — OFFICE VISIT (OUTPATIENT)
Dept: FAMILY MEDICINE CLINIC | Age: 65
End: 2020-08-27
Payer: COMMERCIAL

## 2020-08-27 VITALS
TEMPERATURE: 97.2 F | BODY MASS INDEX: 30.22 KG/M2 | HEART RATE: 62 BPM | WEIGHT: 188 LBS | HEIGHT: 66 IN | OXYGEN SATURATION: 100 % | DIASTOLIC BLOOD PRESSURE: 72 MMHG | RESPIRATION RATE: 16 BRPM | SYSTOLIC BLOOD PRESSURE: 122 MMHG

## 2020-08-27 DIAGNOSIS — Z11.51 SCREENING FOR HUMAN PAPILLOMAVIRUS (HPV): ICD-10-CM

## 2020-08-27 DIAGNOSIS — Z12.4 SCREENING FOR CERVICAL CANCER: ICD-10-CM

## 2020-08-27 DIAGNOSIS — R10.2 PELVIC PAIN: ICD-10-CM

## 2020-08-27 PROCEDURE — 99214 OFFICE O/P EST MOD 30 MIN: CPT | Performed by: FAMILY MEDICINE

## 2020-08-27 PROCEDURE — G8417 CALC BMI ABV UP PARAM F/U: HCPCS | Performed by: FAMILY MEDICINE

## 2020-08-27 PROCEDURE — 3017F COLORECTAL CA SCREEN DOC REV: CPT | Performed by: FAMILY MEDICINE

## 2020-08-27 PROCEDURE — 1036F TOBACCO NON-USER: CPT | Performed by: FAMILY MEDICINE

## 2020-08-27 PROCEDURE — G8427 DOCREV CUR MEDS BY ELIG CLIN: HCPCS | Performed by: FAMILY MEDICINE

## 2020-08-27 RX ORDER — ZOSTER VACCINE RECOMBINANT, ADJUVANTED 50 MCG/0.5
0.5 KIT INTRAMUSCULAR SEE ADMIN INSTRUCTIONS
Qty: 0.5 ML | Refills: 0 | Status: SHIPPED | OUTPATIENT
Start: 2020-08-27 | End: 2020-10-08

## 2020-08-27 RX ORDER — FLUCONAZOLE 150 MG/1
TABLET ORAL
Qty: 2 TABLET | Refills: 1 | Status: SHIPPED | OUTPATIENT
Start: 2020-08-27 | End: 2020-10-08 | Stop reason: ALTCHOICE

## 2020-08-27 RX ORDER — NITROFURANTOIN MACROCRYSTALS 100 MG/1
CAPSULE ORAL
COMMUNITY
Start: 2020-07-29 | End: 2021-07-26

## 2020-08-27 NOTE — PROGRESS NOTES
organization: Not on file     Attends meetings of clubs or organizations: Not on file     Relationship status: Not on file    Intimate partner violence     Fear of current or ex partner: Not on file     Emotionally abused: Not on file     Physically abused: Not on file     Forced sexual activity: Not on file   Other Topics Concern    Not on file   Social History Narrative    Not on file     Family History   Problem Relation Age of Onset    Diabetes Mother     High Blood Pressure Mother     Diabetes Brother     Diabetes Maternal Grandmother     Diabetes Maternal Grandfather     Diabetes Paternal Grandmother     Diabetes Paternal Grandfather     Diabetes Other     High Blood Pressure Sister     Colon Cancer Paternal Uncle      Allergies   Allergen Reactions    Other      Perfumes      Current Outpatient Medications   Medication Sig Dispense Refill    zoster recombinant adjuvanted vaccine (SHINGRIX) 50 MCG/0.5ML SUSR injection Inject 0.5 mLs into the muscle See Admin Instructions 1 dose now and repeat in 2-6 months 0.5 mL 0    fluconazole (DIFLUCAN) 150 MG tablet Take one tablet now and repeat in 48 hours if symptoms persist 2 tablet 1    hydrOXYzine (ATARAX) 25 MG tablet NANCY 1 TABLETA POR BOCA CADA 8 HORAS CUANDO SEA NECESARIO PARA EL PICOR 60 tablet 0    escitalopram (LEXAPRO) 10 MG tablet Take 1 tablet by mouth daily 30 tablet 5    pantoprazole (PROTONIX) 40 MG tablet TAKE 1 TABLET BY MOUTH EVERY MORNING (BEFORE BREAKFAST) 90 tablet 1    methylPREDNISolone (MEDROL, AMAYA,) 4 MG tablet Take by mouth. 1 kit 0    Artificial Tear Solution (GENTEAL TEARS) 0.1-0.2-0.3 % SOLN       ibuprofen (ADVIL;MOTRIN) 800 MG tablet TAKE 1 TABLET BY MOUTH EVERY 6 HOURS AS NEEDED FOR PAIN      SODIUM FLUORIDE, DENTAL GEL, 1.1 % CREA Place onto teeth      nystatin (MYCOSTATIN) 534359 UNIT/GM cream Apply topically 2 times daily.  60 g 5    selenium sulfide (SELSUN) 2.5 % lotion Apply for 10 minutes on skin daily for 1-4 weeks OR one overnight application. 118 mL 5    Cholecalciferol (VITAMIN D3) 50 MCG (2000 UT) CAPS Take 1 capsule by mouth daily 90 capsule 4    albuterol sulfate  (90 Base) MCG/ACT inhaler Inhale 2 puffs into the lungs every 6 hours as needed for Wheezing 1 Inhaler 2    fluticasone (FLOVENT HFA) 110 MCG/ACT inhaler Inhale 2 puffs into the lungs 2 times daily 3 Inhaler 4    nitrofurantoin (MACRODANTIN) 100 MG capsule       phenazopyridine (PYRIDIUM) 200 MG tablet Take 1 tablet by mouth 3 times daily as needed for Pain (Painful Urination) May turn urine orange. (Patient not taking: Reported on 8/27/2020) 20 tablet 0    ESTRING 2 MG vaginal ring Place 2 mg vaginally every 3 months (Patient not taking: Reported on 8/27/2020) 1 each 4     No current facility-administered medications for this visit. PMH, Surgical Hx, Family Hx, and Social Hxreviewed and updated. Health Maintenance reviewed. Objective    Vitals:    08/27/20 1159   BP: 122/72   Site: Right Upper Arm   Position: Sitting   Cuff Size: Medium Adult   Pulse: 62   Resp: 16   Temp: 97.2 °F (36.2 °C)   TempSrc: Tympanic   SpO2: 100%   Weight: 188 lb (85.3 kg)   Height: 5' 6\" (1.676 m)        Physical Exam  Constitutional:       Appearance: She is well-developed. HENT:      Head: Normocephalic. Eyes:      Conjunctiva/sclera: Conjunctivae normal.   Pulmonary:      Effort: Pulmonary effort is normal.   Genitourinary:     Labia:         Right: No rash, tenderness, lesion or injury. Left: No rash, tenderness, lesion or injury. Urethra: No prolapse. Vagina: Normal.      Cervix: Discharge (white, cottage cheesy discharge) present. No cervical motion tenderness, friability, lesion, erythema, cervical bleeding or eversion. Uterus: Normal.       Adnexa: Right adnexa normal and left adnexa normal.   Lymphadenopathy:      Lower Body: No right inguinal adenopathy. No left inguinal adenopathy.    Neurological:      Mental Status: She is alert and oriented to person, place, and time. Psychiatric:         Behavior: Behavior normal.         Thought Content:  Thought content normal.         Judgment: Judgment normal.           Lab Results   Component Value Date    LABA1C 6.2 (H) 07/29/2020    LABA1C 6.0 (H) 12/26/2019    LABA1C 5.7 05/29/2019     Lab Results   Component Value Date    CREATININE 0.75 09/18/2018     Lab Results   Component Value Date    ALT 17 09/18/2018    AST 17 09/18/2018     Lab Results   Component Value Date    CHOL 219 (H) 04/01/2015    TRIG 105 04/01/2015    HDL 50 09/18/2018    LDLCALC 153 (H) 09/18/2018        Assessment & Plan   Visit Diagnoses and Associated Orders     Pelvic pain    -  Primary    Wet Prep, Genital [31586 Custom]   - Future Order    US PELVIS COMPLETE [07302 Custom]   - Future Order    US NON OB TRANSVAGINAL [[id number] Custom]   - Future Order    Pap Smear [ Custom]   - Future Order         Candidal vaginitis        fluconazole (DIFLUCAN) 150 MG tablet [[id number]]           Screen for colon cancer        Cologuard (For External Results Only) [[id number] Custom]   - Future Order         Need for zoster vaccination        zoster recombinant adjuvanted vaccine [facility]) 50 MCG/0.5ML SUSR injection [[id number]]           Screening for cervical cancer        Pap Smear [ Custom]   - Future Order         Screening for human papillomavirus (HPV)        Pap Smear [ Custom]   - Future Order         ORDERS WITHOUT AN ASSOCIATED DIAGNOSIS    nitrofurantoin (MACRODANTIN) 100 MG capsule [[id number]]              Reviewed with the patient: all disease processes, current clinical status, medications, activities and diet.      Side effects, adverse effects of the medication prescribed today, as well as treatment plan/ rationale and result expectations have been discussed with the patient who expresses understanding and desires to proceed.     Close follow up to evaluate treatment results and for coordination of care.  I have reviewed the patient's medical history in detail and updated the computerized patient record. More than 50% of the appointment was spent in face-to-face counseling, education and care coordination. Orders Placed This Encounter   Procedures    Cologuard (For External Results Only)     This test is performed by an external laboratory and is used for result attachment only. It is required that this order requisition be faxed to: Freddie Lopez @ [phone]. See [email or web address] for further information. Standing Status:   Future     Standing Expiration Date:   8/27/2021    Wet Prep, Genital     Standing Status:   Future     Standing Expiration Date:   8/27/2021    US PELVIS COMPLETE     Standing Status:   Future     Standing Expiration Date:   8/27/2021     Order Specific Question:   Reason for exam:     Answer:   left pelvic pain x 3 months    US NON OB TRANSVAGINAL     Standing Status:   Future     Standing Expiration Date:   8/27/2021     Order Specific Question:   Reason for exam:     Answer:   left pelvic pain    Pap Smear     Patient History:    No LMP recorded (lmp unknown). Patient is postmenopausal.  OBGYN Status: Postmenopausal  Past Surgical History:  No date: COLONOSCOPY  No date: ENDOSCOPY, COLON, DIAGNOSTIC  No date: KIDNEY SURGERY  11/06/2004: OVARIAN CYST REMOVAL; Left  11/14/2018: OR ESOPHAGOGASTRODUODENOSCOPY TRANSORAL DIAGNOSTIC; N/A      Comment:  EGD ESOPHAGOGASTRODUODENOSCOPY WITH DILATION performed                by Tre Adler MD at [address] [place] [address] History    Tobacco Use      Smoking status: Never Smoker      Smokeless tobacco: Never Used       Standing Status:   Future     Standing Expiration Date:   8/27/2021     Order Specific Question:   Collection Type     Answer:    Thin Prep     Order Specific Question:   Prior Abnormal Pap Test     Answer:   No     Order Specific Question:   Screening or Diagnostic     Answer:   Screening

## 2020-08-30 ENCOUNTER — HOSPITAL ENCOUNTER (OUTPATIENT)
Dept: ULTRASOUND IMAGING | Age: 65
Discharge: HOME | End: 2020-09-01
Payer: COMMERCIAL

## 2020-08-30 PROCEDURE — 76830 TRANSVAGINAL US NON-OB: CPT

## 2020-08-30 PROCEDURE — 76856 US EXAM PELVIC COMPLETE: CPT

## 2020-09-16 ENCOUNTER — HOSPITAL ENCOUNTER (OUTPATIENT)
Dept: WOMENS IMAGING | Age: 65
Discharge: HOME | End: 2020-09-18
Payer: COMMERCIAL

## 2020-09-16 PROCEDURE — 77063 BREAST TOMOSYNTHESIS BI: CPT

## 2020-09-19 NOTE — TELEPHONE ENCOUNTER
Rx request   Requested Prescriptions     Pending Prescriptions Disp Refills    pantoprazole (PROTONIX) 40 MG tablet [Pharmacy Med Name: PANTOPRAZOLE SOD DR 40 MG T 40 Tablet] 90 tablet 2     Sig: TAKE 1 TABLET BY MOUTH EVERY MORNING (BEFORE BREAKFAST)     LOV 8/27/2020  Next Visit Date:  Future Appointments   Date Time Provider Ellie Dunbar   9/23/2020  1:45 PM DO Isaiah Wesley [facility]   10/2/2020  9:00 AM Tolu Arevalo DO [address]   10/8/2020  1:30 PM MD [facility]   10/8/2020  1:45 PM Korina Shrestha MD [address] [place] [address]

## 2020-09-21 RX ORDER — PANTOPRAZOLE SODIUM 40 MG/1
40 TABLET, DELAYED RELEASE ORAL
Qty: 90 TABLET | Refills: 2 | Status: ON HOLD | OUTPATIENT
Start: 2020-09-21 | End: 2021-02-03

## 2020-09-23 ENCOUNTER — OFFICE VISIT (OUTPATIENT)
Dept: PHYSICAL MEDICINE AND REHAB | Age: 65
End: 2020-09-23
Payer: COMMERCIAL

## 2020-09-23 VITALS
BODY MASS INDEX: 33.31 KG/M2 | HEIGHT: 63 IN | WEIGHT: 188 LBS | SYSTOLIC BLOOD PRESSURE: 136 MMHG | DIASTOLIC BLOOD PRESSURE: 80 MMHG

## 2020-09-23 PROBLEM — M47.22 OSTEOARTHRITIS OF SPINE WITH RADICULOPATHY, CERVICAL REGION: Status: ACTIVE | Noted: 2020-09-23

## 2020-09-23 PROBLEM — M54.17 LUMBOSACRAL RADICULOPATHY AT L5: Status: ACTIVE | Noted: 2020-09-23

## 2020-09-23 PROBLEM — M54.12 C6 RADICULOPATHY: Status: ACTIVE | Noted: 2020-09-23

## 2020-09-23 PROBLEM — M47.26 OSTEOARTHRITIS OF SPINE WITH RADICULOPATHY, LUMBAR REGION: Status: ACTIVE | Noted: 2020-09-23

## 2020-09-23 PROBLEM — M54.12 C7 RADICULOPATHY: Status: ACTIVE | Noted: 2020-09-23

## 2020-09-23 PROCEDURE — 99205 OFFICE O/P NEW HI 60 MIN: CPT | Performed by: PHYSICAL MEDICINE & REHABILITATION

## 2020-09-23 PROCEDURE — G8427 DOCREV CUR MEDS BY ELIG CLIN: HCPCS | Performed by: PHYSICAL MEDICINE & REHABILITATION

## 2020-09-23 PROCEDURE — 3017F COLORECTAL CA SCREEN DOC REV: CPT | Performed by: PHYSICAL MEDICINE & REHABILITATION

## 2020-09-23 PROCEDURE — 1036F TOBACCO NON-USER: CPT | Performed by: PHYSICAL MEDICINE & REHABILITATION

## 2020-09-23 PROCEDURE — G8417 CALC BMI ABV UP PARAM F/U: HCPCS | Performed by: PHYSICAL MEDICINE & REHABILITATION

## 2020-09-23 RX ORDER — MULTIVIT-MIN/IRON/FOLIC ACID/K 18-600-40
1 CAPSULE ORAL 2 TIMES DAILY
Qty: 30 CAPSULE | Refills: 5 | Status: SHIPPED | OUTPATIENT
Start: 2020-09-23 | End: 2020-09-24 | Stop reason: SDUPTHER

## 2020-09-23 SDOH — ECONOMIC STABILITY: TRANSPORTATION INSECURITY
IN THE PAST 12 MONTHS, HAS LACK OF TRANSPORTATION KEPT YOU FROM MEETINGS, WORK, OR FROM GETTING THINGS NEEDED FOR DAILY LIVING?: NO

## 2020-09-23 SDOH — SOCIAL STABILITY: SOCIAL NETWORK: HOW OFTEN DO YOU GET TOGETHER WITH FRIENDS OR RELATIVES?: MORE THAN THREE TIMES A WEEK

## 2020-09-23 SDOH — SOCIAL STABILITY: SOCIAL INSECURITY: WITHIN THE LAST YEAR, HAVE YOU BEEN HUMILIATED OR EMOTIONALLY ABUSED IN OTHER WAYS BY YOUR PARTNER OR EX-PARTNER?: NO

## 2020-09-23 SDOH — ECONOMIC STABILITY: FOOD INSECURITY: WITHIN THE PAST 12 MONTHS, YOU WORRIED THAT YOUR FOOD WOULD RUN OUT BEFORE YOU GOT MONEY TO BUY MORE.: NEVER TRUE

## 2020-09-23 SDOH — ECONOMIC STABILITY: TRANSPORTATION INSECURITY
IN THE PAST 12 MONTHS, HAS THE LACK OF TRANSPORTATION KEPT YOU FROM MEDICAL APPOINTMENTS OR FROM GETTING MEDICATIONS?: NO

## 2020-09-23 SDOH — HEALTH STABILITY: PHYSICAL HEALTH: ON AVERAGE, HOW MANY MINUTES DO YOU ENGAGE IN EXERCISE AT THIS LEVEL?: 0 MIN

## 2020-09-23 SDOH — SOCIAL STABILITY: SOCIAL NETWORK
DO YOU BELONG TO ANY CLUBS OR ORGANIZATIONS SUCH AS CHURCH GROUPS UNIONS, FRATERNAL OR ATHLETIC GROUPS, OR SCHOOL GROUPS?: NO

## 2020-09-23 SDOH — ECONOMIC STABILITY: FOOD INSECURITY: WITHIN THE PAST 12 MONTHS, THE FOOD YOU BOUGHT JUST DIDN'T LAST AND YOU DIDN'T HAVE MONEY TO GET MORE.: NEVER TRUE

## 2020-09-23 SDOH — HEALTH STABILITY: PHYSICAL HEALTH: ON AVERAGE, HOW MANY DAYS PER WEEK DO YOU ENGAGE IN MODERATE TO STRENUOUS EXERCISE (LIKE A BRISK WALK)?: 0 DAYS

## 2020-09-23 SDOH — HEALTH STABILITY: MENTAL HEALTH
STRESS IS WHEN SOMEONE FEELS TENSE, NERVOUS, ANXIOUS, OR CAN'T SLEEP AT NIGHT BECAUSE THEIR MIND IS TROUBLED. HOW STRESSED ARE YOU?: TO SOME EXTENT

## 2020-09-23 SDOH — SOCIAL STABILITY: SOCIAL NETWORK: ARE YOU MARRIED, WIDOWED, DIVORCED, SEPARATED, NEVER MARRIED, OR LIVING WITH A PARTNER?: DIVORCED

## 2020-09-23 SDOH — SOCIAL STABILITY: SOCIAL INSECURITY: WITHIN THE LAST YEAR, HAVE YOU BEEN AFRAID OF YOUR PARTNER OR EX-PARTNER?: NO

## 2020-09-23 SDOH — SOCIAL STABILITY: SOCIAL NETWORK
IN A TYPICAL WEEK, HOW MANY TIMES DO YOU TALK ON THE PHONE WITH FAMILY, FRIENDS, OR NEIGHBORS?: MORE THAN THREE TIMES A WEEK

## 2020-09-23 SDOH — ECONOMIC STABILITY: INCOME INSECURITY: HOW HARD IS IT FOR YOU TO PAY FOR THE VERY BASICS LIKE FOOD, HOUSING, MEDICAL CARE, AND HEATING?: SOMEWHAT HARD

## 2020-09-23 SDOH — SOCIAL STABILITY: SOCIAL INSECURITY
WITHIN THE LAST YEAR, HAVE YOU BEEN KICKED, HIT, SLAPPED, OR OTHERWISE PHYSICALLY HURT BY YOUR PARTNER OR EX-PARTNER?: NO

## 2020-09-23 SDOH — SOCIAL STABILITY: SOCIAL NETWORK: HOW OFTEN DO YOU ATTEND CHURCH OR RELIGIOUS SERVICES?: 1 TO 4 TIMES PER YEAR

## 2020-09-23 SDOH — SOCIAL STABILITY: SOCIAL NETWORK: HOW OFTEN DO YOU ATTENT MEETINGS OF THE CLUB OR ORGANIZATION YOU BELONG TO?: NEVER

## 2020-09-23 SDOH — SOCIAL STABILITY: SOCIAL INSECURITY
WITHIN THE LAST YEAR, HAVE TO BEEN RAPED OR FORCED TO HAVE ANY KIND OF SEXUAL ACTIVITY BY YOUR PARTNER OR EX-PARTNER?: NO

## 2020-09-23 ASSESSMENT — ENCOUNTER SYMPTOMS
EYE REDNESS: 0
VOMITING: 0
BLOOD IN STOOL: 0
BACK PAIN: 1
DIARRHEA: 0
COUGH: 0
SHORTNESS OF BREATH: 1
EYE PAIN: 0
CONSTIPATION: 1
ABDOMINAL PAIN: 0
WHEEZING: 0
PHOTOPHOBIA: 0
NAUSEA: 0
STRIDOR: 0
SORE THROAT: 0
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
## 2020-09-24 RX ORDER — MULTIVIT-MIN/IRON/FOLIC ACID/K 18-600-40
1 CAPSULE ORAL 2 TIMES DAILY
Qty: 30 CAPSULE | Refills: 5 | Status: SHIPPED | OUTPATIENT
Start: 2020-09-24 | End: 2020-12-21

## 2020-10-02 ENCOUNTER — OFFICE VISIT (OUTPATIENT)
Dept: OBGYN CLINIC | Age: 65
End: 2020-10-02
Payer: COMMERCIAL

## 2020-10-02 VITALS
HEART RATE: 70 BPM | BODY MASS INDEX: 32.34 KG/M2 | WEIGHT: 184 LBS | DIASTOLIC BLOOD PRESSURE: 66 MMHG | SYSTOLIC BLOOD PRESSURE: 114 MMHG

## 2020-10-02 DIAGNOSIS — N89.8 VAGINAL DISCHARGE: ICD-10-CM

## 2020-10-02 PROCEDURE — 99203 OFFICE O/P NEW LOW 30 MIN: CPT | Performed by: OBSTETRICS & GYNECOLOGY

## 2020-10-02 PROCEDURE — 3017F COLORECTAL CA SCREEN DOC REV: CPT | Performed by: OBSTETRICS & GYNECOLOGY

## 2020-10-02 PROCEDURE — 1036F TOBACCO NON-USER: CPT | Performed by: OBSTETRICS & GYNECOLOGY

## 2020-10-02 PROCEDURE — G8427 DOCREV CUR MEDS BY ELIG CLIN: HCPCS | Performed by: OBSTETRICS & GYNECOLOGY

## 2020-10-02 PROCEDURE — G8417 CALC BMI ABV UP PARAM F/U: HCPCS | Performed by: OBSTETRICS & GYNECOLOGY

## 2020-10-02 PROCEDURE — G8484 FLU IMMUNIZE NO ADMIN: HCPCS | Performed by: OBSTETRICS & GYNECOLOGY

## 2020-10-02 ASSESSMENT — ENCOUNTER SYMPTOMS
VOMITING: 0
ABDOMINAL PAIN: 0
COUGH: 0
CONSTIPATION: 0
SHORTNESS OF BREATH: 0
BLOOD IN STOOL: 0
ABDOMINAL DISTENTION: 0
CHEST TIGHTNESS: 0
SORE THROAT: 0
BACK PAIN: 0
WHEEZING: 0
NAUSEA: 0
VOICE CHANGE: 0
COLOR CHANGE: 0
TROUBLE SWALLOWING: 0

## 2020-10-02 NOTE — PROGRESS NOTES
Subjective: Patient is a 44-year-old female who is being evaluated for vaginal discomfort. Her ultrasound was normal.  Her most recent Pap smear were as was normal.  She apparently was using Estring for about a year. It sounds as if she had a yeast infection and when she removed the Estring had an abnormal appearing discharge. She is being evaluated today for that issue. Patient ID: Bunny Marquis is a 63 y.o. female. HPI    Review of Systems   Constitutional: Negative for activity change, appetite change, fatigue and unexpected weight change. HENT: Negative for dental problem, ear pain, hearing loss, nosebleeds, sore throat, trouble swallowing and voice change. Eyes: Negative for visual disturbance. Respiratory: Negative for cough, chest tightness, shortness of breath and wheezing. Cardiovascular: Negative for chest pain and palpitations. Gastrointestinal: Negative for abdominal distention, abdominal pain, blood in stool, constipation, nausea and vomiting. Endocrine: Negative for cold intolerance, heat intolerance, polydipsia, polyphagia and polyuria. Genitourinary: Negative for difficulty urinating, dyspareunia, dysuria, flank pain, frequency, genital sores, hematuria, menstrual problem, pelvic pain, urgency, vaginal bleeding, vaginal discharge and vaginal pain. Musculoskeletal: Negative for arthralgias, back pain, joint swelling and myalgias. Skin: Negative for color change and rash. Allergic/Immunologic: Negative for environmental allergies, food allergies and immunocompromised state. Neurological: Negative for dizziness, seizures, syncope, speech difficulty, weakness, numbness and headaches. Hematological: Negative for adenopathy. Does not bruise/bleed easily. Psychiatric/Behavioral: Negative for agitation, behavioral problems, confusion, decreased concentration, dysphoric mood and suicidal ideas. The patient is not nervous/anxious and is not hyperactive. Objective: Vulva is normal.  Vagina is well estrogenized no abnormal discharge is obtained but we did do a wet prep.   Pelvic exam is negative   Physical Exam    Assessment:    Lower genital atrophy      Plan:    Recommend Premarin vaginal cream half a gram twice weekly patient to follow-up here in 1 month for reevaluation        Geneva Acosta DO

## 2020-10-08 ENCOUNTER — OFFICE VISIT (OUTPATIENT)
Dept: FAMILY MEDICINE CLINIC | Age: 65
End: 2020-10-08
Payer: COMMERCIAL

## 2020-10-08 VITALS
RESPIRATION RATE: 16 BRPM | DIASTOLIC BLOOD PRESSURE: 70 MMHG | OXYGEN SATURATION: 98 % | HEART RATE: 63 BPM | SYSTOLIC BLOOD PRESSURE: 128 MMHG | WEIGHT: 187 LBS | TEMPERATURE: 96.8 F | HEIGHT: 63 IN | BODY MASS INDEX: 33.13 KG/M2

## 2020-10-08 VITALS
TEMPERATURE: 96.8 F | OXYGEN SATURATION: 98 % | HEIGHT: 63 IN | DIASTOLIC BLOOD PRESSURE: 70 MMHG | RESPIRATION RATE: 16 BRPM | BODY MASS INDEX: 33.13 KG/M2 | SYSTOLIC BLOOD PRESSURE: 128 MMHG | HEART RATE: 63 BPM | WEIGHT: 187 LBS

## 2020-10-08 PROBLEM — R93.89 ABNORMAL PELVIC ULTRASOUND: Chronic | Status: ACTIVE | Noted: 2020-10-08

## 2020-10-08 PROCEDURE — 3017F COLORECTAL CA SCREEN DOC REV: CPT | Performed by: FAMILY MEDICINE

## 2020-10-08 PROCEDURE — 99214 OFFICE O/P EST MOD 30 MIN: CPT | Performed by: FAMILY MEDICINE

## 2020-10-08 PROCEDURE — G8427 DOCREV CUR MEDS BY ELIG CLIN: HCPCS | Performed by: FAMILY MEDICINE

## 2020-10-08 PROCEDURE — 90688 IIV4 VACCINE SPLT 0.5 ML IM: CPT | Performed by: FAMILY MEDICINE

## 2020-10-08 PROCEDURE — G0438 PPPS, INITIAL VISIT: HCPCS | Performed by: FAMILY MEDICINE

## 2020-10-08 PROCEDURE — G8417 CALC BMI ABV UP PARAM F/U: HCPCS | Performed by: FAMILY MEDICINE

## 2020-10-08 PROCEDURE — G8482 FLU IMMUNIZE ORDER/ADMIN: HCPCS | Performed by: FAMILY MEDICINE

## 2020-10-08 PROCEDURE — 1036F TOBACCO NON-USER: CPT | Performed by: FAMILY MEDICINE

## 2020-10-08 PROCEDURE — G0008 ADMIN INFLUENZA VIRUS VAC: HCPCS | Performed by: FAMILY MEDICINE

## 2020-10-08 ASSESSMENT — LIFESTYLE VARIABLES: HOW OFTEN DO YOU HAVE A DRINK CONTAINING ALCOHOL: 0

## 2020-10-08 ASSESSMENT — PATIENT HEALTH QUESTIONNAIRE - PHQ9
SUM OF ALL RESPONSES TO PHQ QUESTIONS 1-9: 0
2. FEELING DOWN, DEPRESSED OR HOPELESS: 0
SUM OF ALL RESPONSES TO PHQ9 QUESTIONS 1 & 2: 0
SUM OF ALL RESPONSES TO PHQ QUESTIONS 1-9: 0
1. LITTLE INTEREST OR PLEASURE IN DOING THINGS: 0

## 2020-10-08 NOTE — PATIENT INSTRUCTIONS
Patient Education        Planificación anticipada de la atención médica: Instrucciones de cuidado  Advance Care Planning: Care Instructions  Instrucciones de cuidado    Puede ser difícil vivir con antonietta enfermedad incurable. Yoandy si ladd joy está empeorando, sería conveniente romero decisiones acerca de los cuidados al final de ladd jesus manuel. Planear el final de ladd jesus manuel no significa que se esté dando por vencido. Es antonietta forma de asegurarse de que se cumplan nancy deseos. Expresar claramente nancy deseos puede hacer todo más fácil para nancy seres queridos. Hacer planes mientras aún sea capaz también podría tranquilizarle y hacer que nancy últimos días tengan sentido y jan menos estresantes. La atención de seguimiento es antonietta parte clave de ladd tratamiento y seguridad. Asegúrese de hacer y acudir a todas las citas, y llame a ladd médico si está teniendo problemas. También es antonietta buena idea saber los resultados de nancy exámenes y mantener antonietta lista de los medicamentos que jordan. ¿Qué puede hacer para planear el final de ladd jesus manuel? · Usted puede sacar estos temas con ladd médico. No tiene que esperar a que ladd médico empiece la conversación. Podría comenzar diciendo \"No quisiera vivir con. .. \". Completar erika frase ayudará a ladd médico a entender nancy deseos. · Hable abierta y honestamente con ladd médico. Esta es la mejor manera de entender las decisiones que deberá romero a medida que cambie ladd joy. Sepa que siempre puede cambiar de opinión. · Pregúntele a ladd médico acerca de los tratamientos de soporte vital (mantenimiento artificial de la jesus manuel) más utilizados. Estos incluyen alimentación por sondas, respiradores y líquidos administrados a través de antonietta vena (por vía intravenosa o IV). Entender estos tratamientos le ayudará a decidir si desea recibirlos. · Podría elegir recibir Amaryllis [place] tratamiento de soporte vital benito un tiempo limitado. [place] le permitirá un período de prueba para kira si [employer] ayudan de [place].  Es posible que decida también que ladd médico tome solo ciertas medidas para mantenerle con jesus manuel. Es importante que especifique estas condiciones para que ladd médico y ladd bruce puedan entenderlas. · Hable con ladd médico sobre el tiempo estimado que le [facility]. Es posible que pueda darle antonietta idea de lo que sucede normalmente con ladd enfermedad específica. · Piense en preparar papeles que certifiquen nancy deseos. De esta manera, no habrá ninguna confusión respecto a nancy deseos. Puede cambiar nancy instrucciones en cualquier momento. ¿Qué documentos debería preparar? Las instrucciones médicas por anticipado son documentos legales que les indican a los médicos cómo quiere ser atendido al final de ladd jesus manuel. No necesita un abogado para redactar estas declaraciones. Pídale información a ladd médico o a ladd departamento de joy estatal sobre cómo redactar nancy instrucciones médicas por anticipado. Podrían darle formularios para cada lorne de estos tipos de certificación. Asegúrese de que ladd médico tenga antonietta copia de esos documentos en ladd expediente médico y sully otra copia a un familiar o Danice Hodgkins. · Considere antonietta orden de no reanimación (DNR, por nancy siglas en inglés). Esta orden pide que no se empleen tratamientos adicionales si ladd corazón araceli de latir o usted araceli de respirar. Los tratamientos adicionales pueden incluir reanimación cardiopulmonar (RCP), choques eléctricos para reactivarle el corazón o antonietta máquina que respira por usted. Si decide tener antonietta orden de no reanimación, pídale a ladd médico que se la explique y la escriba. Coloque la orden en ladd hogar en un lugar que todos puedan verla fácilmente. · Considere un testamento vital. Un testamento vital explica nancy deseos relacionados con el soporte vital y otros tratamientos al final de la jesus manuel si pierde la capacidad de hablar por sí mismo. Los testamentos vitales les informan a los médicos si deben utilizar o no tratamientos para mantenerle con jesus manuel.  [facility] antonietta enfermedad terminal?  Instrucciones de cuidado  Los líquidos que se administran en antonietta vena (por vía intravenosa o IV) y la alimentación por sonda se pueden utilizar cuando usted ya no pueda comer ni beber por la boca. Los líquidos intravenosos se administran a través de antonietta aguja que se coloca dentro de antonietta vena. Se pueden administrar alimentos líquidos a través de antonietta sonda que va por la nariz hasta el [place] Jackson. También se pueden administrar a través de antonietta sonda que se coloca en el abdomen mediante cirugía. Usted tiene la opción de decidir si desea recibir líquidos intravenosos o alimentación por sonda si ya no puede comer ni beber por sí solo. [place] no curará ladd enfermedad y puede resultarle incómodo. Yoandy también podría hacerle sentir mejor o vivir por [employer]. Sin los líquidos intravenosos y la alimentación por sonda, ladd cuerpo decaerá en forma natural. Lo más probable es que no sienta hambre. Además, ladd médico tomará medidas para mantenerlo cómodo hasta que fallezca. La decisión de recibir líquidos intravenosos y alimentación por sonda es personal. Podría decidir que prefiere lorne yoandy no el otro. Asegúrese de hablar con ladd médico y nancy seres queridos sobre esta decisión. La atención de seguimiento es antonietta parte clave de ladd tratamiento y seguridad. Asegúrese de hacer y acudir a todas las citas, y llame a ladd médico si está teniendo problemas. También es antonietta buena idea saber los resultados de nancy exámenes y mantener antonietta lista de los medicamentos que jordan. ¿Por qué podría querer recibir líquidos intravenosos o alimentación por sonda? · Es posible que le ayude a recuperarse de antonietta breve enfermedad o lesión. · Podría ayudarle a mejorar la calidad del tiempo que le [place]. · Jessica que se debería romero toda medida posible para preservar la jesus manuel, independientemente de ladd calidad. · Hay laith de que existe o pronto existirá antonietta spencer para ladd afección.   ¿Por qué podría no querer recibir líquidos intravenosos o alimentación por sonda? · No puede curar antonietta enfermedad terminal.  · Podría prolongarle la jesus manuel, kishor padmini vez no lo madelyn sentir más cómodo ni aumente la calidad de la jesus manuel que le [place]. · Hay más riesgos que beneficios. Los riesgos [employer], neumonía y [address], tales zackery diarrea. · No desea que lo mantengan vivo en forma artificial.  ¿Cuándo debe pedir ayuda? Asegúrese de comunicarse con ladd médico si:  · Desea más información sobre los líquidos intravenosos y la alimentación por sonda. · Desea cambiar ladd decisión sobre recibir líquidos intravenosos y alimentación por sonda. ¿Dónde puede encontrar más información en inglés? Bonnee Pleasure a [email or web address] e ingrese a ladd cuenta de MyCmayuri Alvarado P814 en el Eather Artie \"Search Health Information\" para más información (en inglés) sobre \"Decisión sobre la administración de líquidos intravenosos (IV) o alimentación por sonda cuando tiene antonietta enfermedad terminal.\"     Si no tiene antonietta cuenta, madelyn maged en el enlace \"Sign Up Now\". Revisado: 9 diciembre, 2019               Versión del contenido: 12.5  © 7983-7837 Healthwise, Incorporated. Las instrucciones de cuidado fueron adaptadas bajo licencia por [facility] Si usted tiene [place] afección médica o sobre estas instrucciones, siempre pregunte a ladd profesional de joy. Healthwise, Incorporated niega toda garantía o responsabilidad por ladd uso de esta información. Patient Education        Decisión sobre el tratamiento para prolongar la jesus manuel  Deciding About Life-Prolonging Treatment  ¿Cómo puede decidir sobre el tratamiento para prolongar la jesus manuel? ¿Qué es el tratamiento para prolongar la jesus manuel? Hay muchos tipos de tratamiento que pueden ayudarle a vivir [employer]. Estos pueden ser [employer] solo benito un corto tiempo hasta que ladd enfermedad mejore. O podría usarlos a ernestina plazo para ayudar a mantenerle vivo.   Algunos tratamientos bruce y a ladd médico sobre nancy deseos cuando no puede hablar por sí mismo. Es usado por los profesionales de joy que lo tratarán cuando usted está cerca del final de ladd jesus manuel o si se accidenta o enferma gravemente. Si usted pone por escrito nancy deseos, nancy seres queridos y los demás sabrán qué tipo de atención desea. No tendrán que adivinar. [place] puede darle ruiz interior y ser útil a los demás. Y usted puede cambiar o anular ladd testamento vital en cualquier momento. Un testamento vital no es lo mismo que un testamento de patrimonio o propiedad. Un testamento de patrimonio explica lo que usted quiere que suceda con ladd dinero y nancy bienes después de ladd muerte. ¿Cómo se [place]? Un testamento vital se [place] para describir las clases de tratamiento o de soporte vital que desea al acercarse al final de la jesus manuel o si se accidenta o enferma gravemente. Tenga en cuenta lo siguiente sobre los testamentos vitales. · Ladd testamento vital solo se utiliza si no puede hablar ni romero decisiones por sí mismo. La mayoría de las veces, lorne o más médicos deben certificar que usted no puede hablar ni decidir por sí mismo antes de que ladd testamento vital entre en vigor. · Si usted mejora y puede hablar por sí mismo nuevamente, puede aceptar o negarse a cualquier tratamiento. No importa lo que usted haya dicho en ladd testamento vital.  · Algunos estados pueden restringir ladd derecho a negarse a tratamiento en casos determinados. Por ejemplo, es posible que usted tenga que indicar claramente en ladd testamento vital que no desea hidratación ni nutrición artificial, zackery alimentación por sonda. ¿Es un testamento vital un documento legal?  Un testamento vital es un documento legal. Cada estado tiene nancy propias leyes sobre los testamentos vitales. Y un testamento vital podría llamarse de otra manera en ladd estado. A continuación hay algunas cosas que debe saber sobre los testamentos vitales.   · Usted no necesita un abogado para completar un máquinas? · ¿Desea que se realicen ciertos ritos religiosos si se enferma muy gravemente? · Si puede elegir, ¿dónde quiere ser atendido? ¿En ladd casa? ¿En un hospital o en un hogar Asha Sers? · Si tiene antonietta opción al final de ladd jesus manuel, ¿dónde preferiría morir? ¿En casa? ¿En un hospital u [facility]? Crys peres? · ¿Preferiría ser Liana Crea o cremado? · ¿Quiere que nancy órganos se donen después de ladd muerte? ¿Qué debe hacer con ladd testamento vital?  · Asegúrese de que nancy familiares y ladd representante de atención médica tengan copias de ladd testamento vital (también llamado declaración). · Nish a ladd médico antonietta copia de ladd testamento vital. Pídale que lo incluya zackery parte de ladd expediente médico. Si tiene más de un médico, asegúrese de que cada lorne tenga antonietta copia. · Coloque antonietta copia de ladd testamento vital donde pueda encontrarse fácilmente. Por ejemplo, algunas personas podrían poner antonietta copia en la mando de ladd refrigerador. Si [place] antonietta copia digital, asegúrese de que ladd médico, familiares y representante de atención médica sepan cómo encontrarlo y acceder al mismo. ¿Dónde puede encontrar más información en inglés? Akil Lindsey a [email or web address] e ingrese a ladd cuenta de MyChart. Brii Gracia E795 en el Vance Bliss \"Search Health Information\" para más información (en inglés) sobre \"Aprenda sobre los testamentos vitales. \"     Si no tiene antonietta cuenta, madelyn maged en el enlace \"Sign Up Now\". Revisado: 9 diciembre, 2019               Versión del contenido: 12.5  © 6749-0723 Healthwise, Incorporated. Las instrucciones de cuidado fueron adaptadas bajo licencia por [facility] Si usted tiene [place] afección médica o sobre estas instrucciones, siempre pregunte a ladd profesional de joy. Healthwise, Incorporated niega toda garantía o responsabilidad por ladd uso de esta información.          Patient Education        Adonis Loja poder notarial médico  Learning About Medical Power of   ¿Qué es un poder notarial médico?    Un poder notarial médico, también llamado poder notarial permanente para asuntos médicos, es un tipo de los documentos legales llamados instrucciones médicas por anticipado. Le permite designar a la persona que usted desea para que tome decisiones sobre tratamientos en ladd nombre si usted no puede hablar ni decidir por sí mismo. La persona que elija se llama representante de [employer]. Esta persona también se llama apoderado en asuntos de joy o agente de [employer]. En [employer], un poder notarial médico podría recibir un [facility]. ¿Cómo elige un representante de atención médica? Elija ladd representante de atención médica con cuidado. Esta persona puede ser o no ser un familiar. Hable con la persona antes de romero ladd decisión final. Asegúrese de que se sienta a gusto asumiendo esta responsabilidad. Es [place] buena idea considerar elegir a alguien que:  · Tenga al menos 25 años de edad. · Lo conozca john y entienda lo que tiene sentido en la jesus manuel para usted. · Entienda nancy valores religiosos y [place]. · Vaya a hacer lo que usted quiere, no lo que él o [place]. · Sea capaz de romero decisiones difíciles en momentos estresantes. · Sea capaz de rechazar o interrumpir un tratamiento, si eso es lo que usted desearía, incluso si usted pudiera morir. · Sea firme y seguro de sí mismo frente a los profesionales de la joy si es necesario. · Griselda preguntas para obtener la información necesaria. · Viva cerca de usted o esté dispuesto a viajar si fuera necesario. Ladd bruce puede ayudarle a romero decisiones médicas mientras usted todavía puede ser parte del [place]. Yoandy es importante elegir a antonietta persona para que sea ladd representante de atención médica en nichol de que usted no pueda romero decisiones por sí mismo.   Si usted no completa el documento legal y [place] un representante de atención médica, las decisiones que ladd bruce puede romero pueden ser Jessica [facility]. En [employer], un representante de [address] podría recibir [employer]. ¿Quién tomará decisiones por usted si no tiene un representante de atención médica? Si usted no tiene un representante de atención médica ni un testamento vital, padmini vez no reciba la atención que desea. Familiares que no están de acuerdo con ladd atención médica podrían ser [employer]. O quien jordan las decisiones podría ser un profesional médico que no lo conoce john a usted. En algunos casos, es un [place] Constable jordan las decisiones. Cuando usted [place] a un representante de atención médica, es muy maureen quién tiene la potestad de romero decisiones de joy por usted. ¿Cómo se nombra a un representante de atención médica? Usted nombra a ladd representante de atención médica en un documento legal. Nisa documento suele llamarse poder notarial médico. Pregunte en ladd hospital, colegio de abogados de ladd estado u oficina de la tercera edad acerca de dónde encontrar estos documentos. Debe firmar el documento para que sea legal. Iver Surya estados requieren que un notario certifique el documento. Iota significa que antonietta persona llamada notario público lo ve firmar el documento y luego firma él mismo. Algunos estados además exigen que al [place] testigos firmen el documento. Asegúrese de decirles a nancy familiares y [place] es ladd representante de [address]. ¿Dónde puede encontrar más información en inglés? Maryjane Powell a [email or web address] e ingrese a ladd cuenta de Celestine Emerson P737 en el Hahn Stagger \"Search Health Information\" para más información (en inglés) sobre \"Aprenda sobre el poder notarial médico.\"     Si no tiene antonietta cuenta, madelyn clic en el enlace \"Sign Up Now\". Revisado: 9 diciembre, 2019               Versión del contenido: 12.5  © 8873-5450 Healthwise, Incorporated. Las instrucciones de cuidado fueron adaptadas bajo licencia por [facility] ([facility]).  Si usted tiene [place] afección médica o sobre estas instrucciones, siempre pregunte a ladd profesional de joy. [facility], Incorporated niega toda garantía o responsabilidad por ladd uso de esta información. Patient Education        Instrucciones médicas por anticipado: Instrucciones de cuidado  Advance Directives: Care Instructions  Generalidades  Las instrucciones médicas por anticipado son Merribeth Srini legal de afirmar nancy deseos al final de ladd jesus manuel. Informan a ladd bruce y ladd médico acerca de lo que deben hacer si usted no puede expresar lo que desea. Yepez Hotels tipos principales de instrucciones médicas por anticipado. Usted puede modificarlas en cualquier momento que nancy deseos cambien. Testamento vital.   Nisa documento les comunica a ladd bruce y a ladd médico nancy deseos sobre soporte vital y demás tratamientos. El documento también se llama declaración. Poder notarial médico.   Nisa documento le permite designar a antonietta persona para que tome decisiones sobre el tratamiento en ladd nombre cuando usted no puede hablar por sí mismo. Esta persona se llama representante de atención médica (apoderado para asuntos médicos). El documento [place] se llama poder notarial permanente para asuntos médicos. Si no tiene instrucciones médicas por anticipado, las decisiones relacionadas con nancy cuidados médicos podrían ser [employer] por un familiar, o por un médico o un juez que no lo conoce. Puede ser útil pensar en las instrucciones médicas por anticipado zackery un regalo a las personas que lo Dala Fray. Si tiene instrucciones médicas por anticipado, nancy seres queridos no tendrán que romero decisiones difíciles por sí solos. La atención de seguimiento es antonietta parte clave de ladd tratamiento y seguridad. Asegúrese de hacer y acudir a todas las citas, y llame a ladd médico si está teniendo problemas. También es antonietta buena idea saber los resultados de nancy exámenes y mantener antonietta lista de los medicamentos que jordan.   ¿Qué debe incluir en nancy instrucciones médicas por anticipado? Muchos estados tienen un documento único de instrucciones médicas por anticipado. (Podría requerir que aborde cuestiones específicas). O podría usar un documento universal que haya sido aprobado por muchos estados. Si ladd documento no indica lo que debe tratar, puede ser difícil saber lo que debe incluir en nancy instrucciones por anticipado. Utilice las preguntas a continuación zackery ayuda para empezar. · ¿Quién quiere que tome las decisiones sobre ladd atención médica si usted no puede hacerlo? · ¿Qué medidas de soporte vital desea si tiene antonietta enfermedad grave que empeora con el tiempo o que no puede curarse? · ¿Qué es lo que más teme que podría pasar? (Podría tener miedo al [place], a perder la independencia o a que aparatos lo mantengan con jesus manuel). · ¿Dónde preferiría morir? (¿En ladd casa? ¿Un hospital? ¿Un hogar para ancianos o clínica?)  · ¿Le gustaría donar nancy órganos al morir? · ¿Le gustaría hacer alguna ceremonia religiosa antes de morir? ¿Cuándo debe pedir ayuda? Asegúrese de comunicarse con ladd médico si tiene preguntas. ¿Dónde puede encontrar más información en inglés? Julita Fleming a [email or web address] e ingrese a ladd cuenta de MyChart. Lois Yi B527 en el Monty Kussmaul \"Search Health Information\" para más información (en inglés) sobre \"Instrucciones médicas por anticipado: Instrucciones de cuidado. \"     Si no tiene antonietta cuenta, madelyn maged en el enlace \"Sign Up Now\". Revisado: 9 ashleymbmati, 2019               Versión del contenido: 12.5  © 0895-8945 Healthwise, Incorporated. Las instrucciones de cuidado fueron adaptadas bajo licencia por [facility] ([facility] Si usted tiene [place] afección médica o sobre estas instrucciones, siempre pregunte a ladd profesional de joy. Healthwise, Incorporated niega toda garantía o responsabilidad por ladd uso de esta información.          Patient Education        Zula Kinzaer estar conectado a un respirador cuando tiene antonietta enfermedad terminal  Deciding About Being on a Ventilator When You Have a Terminal Illness  ¿Cómo puede romero antonietta decisión acerca de estar conectado a un respirador cuando tiene antonietta enfermedad terminal?  Instrucciones de cuidado  Un respirador es antonietta máquina de soporte vital que le ayuda a respirar si ya no puede hacerlo por sí solo. Esta máquina lleva oxígeno a los pulmones a través de un tubo. El tubo entra por la boca y pasa por la garganta hasta los pulmones. La mayoría de las personas conectadas a un respirador deben alimentarse mediante otro tubo que llega al [place]. Es posible que piense que estar conectado a un respirador puede prevenir antonietta muerte \"natural\" o mantenerle con jesus manuel por más tiempo de lo necesario. O padmini vez piense que estar conectado a un respirador prolongaría ladd jesus manuel para poder hacer determinadas cosas, zackery despedirse de nancy seres queridos. La decisión de estar conectado a un respirador es personal. Asegúrese de hablar con ladd médico y nancy seres queridos sobre esta decisión. La atención de seguimiento es antonietta parte clave de ladd tratamiento y seguridad. Asegúrese de hacer y acudir a todas las citas, y llame a ladd médico si está teniendo problemas. También es antonietta buena idea saber los resultados de nancy exámenes y mantener antonietta lista de los medicamentos que jordan. ¿Por qué podría querer estar conectado a un respirador? · Piensa que podría retomar nancy actividades habituales. · Necesita ayuda para respirar debido a antonietta enfermedad a corto plazo o un problema que no está relacionado con ladd enfermedad terminal.  · Le gustaría tener más tiempo para despedirse. · Piensa que hay más beneficios que riesgos. ¿Por qué podría no querer estar conectado a un respirador? · Tiene otros problemas crónicos de [place]. · Quizás no pueda retomar nancy actividades habituales. · Desea antonietta muerte tranquila y en ruiz. No quiere pasar el humaira de ladd jesus manuel conectado a un respirador.   · Piensa que hay más riesgos que beneficios. ¿Cuándo debe pedir ayuda? Asegúrese de comunicarse con ladd médico si:  · Desea aprender más acerca de estar conectado a un respirador. · Cambia de opinión acerca de estar conectado a un respirador. ¿Dónde puede encontrar más información en inglés? Bonnee Pleasure a [email or web address] e ingrese a ladd cuenta de MyChartMarisol Alvarado Y946 en el Eather Artie \"Search Health Information\" para más información (en inglés) sobre \"Decisión sobre estar conectado a un respirador cuando tiene antonietta enfermedad terminal.\"     Si no tiene antonietta cuenta, madelyn maged en el enlace \"Sign Up Now\". Revisado: 9 diciembre, 2019               Versión del contenido: 12.5  © 0986-8687 Healthwise, Incorporated. Las instrucciones de cuidado fueron adaptadas bajo licencia por [facility] ([facility]). Si usted tiene [place] afección médica o sobre estas instrucciones, siempre pregunte a ladd profesional de joy. Healthwise, Incorporated niega toda garantía o responsabilidad por ladd uso de esta información. Patient Education        Nutrición para las personas mayores: Instrucciones de cuidado  Nutrition for Older Adults: Care Instructions  Instrucciones de cuidado    Antonietta buena nutrición es importante a cualquier edad. Yoandy es especialmente importante para las personas mayores. Alimentarse en forma saludable ayuda a ladd cuerpo a mantenerse jaquan. Y puede ayudar a reducir ladd riesgo de enfermedades. Con el paso de Cong Maynard Final, nancy necesidades de Tato. Ladd organismo necesita más de ciertos nutrientes. Estos incluyen vitamina B12, calcio y vitamina D. Yoandy puede ser más difícil para usted obtener estos y otros nutrientes importantes. Hawley podría ser por muchos motivos. Es posible que no tenga tanta hambre zackery Manpower Inc. O podría tener problemas con los dientes o la boca que pueden darle dificultades para masticar. O padmini vez no disfrute planear y preparar comidas, especialmente si vive solo.   Ahora que necesita obtener todos nancy nutrientes de menos cantidad de comida, es importante que planifique lo que come. Leldon Fell a continuación pueden ayudarle a conseguir la nutrición que necesita. Si todavía necesita ayuda, hable con ladd médico. Charli vez le recomiende que colabore con un dietista. Un dietista puede ayudarle a planificar las comidas. La atención de seguimiento es antonietta parte clave de ladd tratamiento y seguridad. Asegúrese de hacer y acudir a todas las citas, y llame a ladd médico si está teniendo problemas. También es antonietta buena idea saber los resultados de nancy exámenes y mantener antonietta lista de los medicamentos que jordan. ¿Cómo puede cuidarse en el hogar? Para mantenerse saludable  · Coma alimentos variados. Mientras mayor sea la variedad de alimentos que coma, más vitaminas, minerales y otros nutrientes obtiene. · [place] un multivitamínico todos los todd. Elija lorne con aproximadamente el 100% del valor diario (VD) de vitaminas y minerales. No tome más del 100% del valor diario de ninguna vitamina o mineral, a menos que ladd médico se lo indique. Hable con ladd médico si no está seguro de qué multivitamínico es el adecuado para usted. · Coma muchas frutas y verduras. Las verduras y las frutas frescas, congeladas o enlatadas, sin agregado de sal y con nancy propios jugos o almíbar con poca azúcar, son Ozella Wil opciones. · Incluya en ladd dieta alimentos que jan ricos en vitamina B12. Un desayuno que incluya cereales enriquecidos, [place] y otros productos lácteos sin Vincent Doreen o bajos en grasa, carne, aves, pescado y huevos son Ozella Wil opciones. · Obtenga suficiente calcio y vitamina D. Bony [place] y yogur descremados y semidescremados. Otras buenas opciones son tofu, jugo de naranja con calcio agregado, y algunas verduras de hoja, zackery la col berza y la col rizada.  Si no consume productos lácteos, hable con ladd médico acerca de suplementos de calcio y de vitamina D.  · Coma alimentos con proteínas todos los Leodis Check buenas opciones incluyen rogelio [place], pescado, carne de [place], irene y Leyla-barre. Otras buenas opciones son frijoles (habichuelas) cocidos, mantequilla de cacahuate (maní) y eddie secos y semillas. · Asegúrese de que la mitad de los granos que consume jna granos integrales. Busque que el pan sea 100% eitan integral, y ePark Systems, el arroz y los otros granos también jan integrales. Si tiene estreñimiento  · Coma alimentos ricos en BOOK A TIGER. Estos incluyen frutas, verduras, frijoles secos cocidos y granos integrales. · Lolita líquidos en abundancia, lo suficiente zackery para que ladd orina sea de color amarillo maureen o transparente zackery el agua. Si tiene enfermedad renal, cardíaca o hepática y tiene que restringir los líquidos, hable con ladd médico antes de aumentar la cantidad de líquidos que jason. · Pregúntele a ladd médico si los ablandadores de heces pueden ayudarle a regularizar el intestino. Si tiene problemas en la boca que le dificultan la masticación  · Elija frutas y verduras enlatadas o cocidas. Estas suelen ser más blandas. · Fidelina la carne de res, de ave y el pescado en trozos o tiras. Agréguele a la carne salsa o jugo de la propia carne para que no se seque. · Escoja otros alimentos con proteínas que jan blandos. Estos General Electric, la [place] Leroy, los frijoles cocidos, el queso requrafael y Lewis. Si tiene dificultades para hacer las compras  · Pida en la vijay que le envíen a ladd casa los productos que compre. · Comuníquese con un centro de voluntarios y [place]. · Pídale a un familiar o a un vecino que le ayude. Si tiene dificultades para cocinar  · Si puede, tome clases de cocina. · Use un horno de microondas para cocinar comidas preparadas en bandeja y otros alimentos congelados o preparados. · Participe en un programa grupal de comidas. Usted puede encontrar estos por medio de programas para la tercera edad.   · Hágase enviar las comidas a ladd domicilio. Ladd comunidad puede ofrecer programas que entreguen comidas a domicilio, zackery \"Meals on Publ[employer]". Si no tiene mucho apetito  · Trate de comer pequeñas cantidades de alimentos con más frecuencia. Por ejemplo, coma 4 o 5 comidas ligeras al día en lugar de 1 o 2 comidas grandes. · Coma con amigos y familiares. O participe de programas grupales de comidas ofrecidos por medio de programas voluntarios. [place] con otros puede ayudar con ladd apetito. Y le ayuda a tener más jesus manuel social.  · Pregúntele a ladd médico si dulce maria medicamentos podrían estar causándole problemas de apetito o con el sabor de los alimentos. Si es así, pregúntele si puede [employer] Beyond. · Trudell Jarbidge y hierbas para darles más sabor a las comidas. · Si piensa que está deprimido, pídale ayuda a ladd médico. La depresión puede afectarle el apetito. Y puede darle dificultades para hacer actividades diarias zackery hacer las compras y cocinar. El tratamiento puede ayudar. ¿Cuándo debe pedir ayuda? Llame [id number] en cualquier momento que considere que necesita atención de emergencia. Por ejemplo, llame si:  · Tiene dolor de estómago intenso. · Se desmayó (perdió el conocimiento). Llame a ladd médico ahora mismo o busque atención médica inmediata si:  · Tiene señales de necesitar más líquidos. Tiene los ojos hundidos y la boca seca, y orina sólo poca cantidad de color oscuro. · Tiene dolor e hinchazón en la jesu anal, o pus que drena de la jesu anal.  · Tiene fiebre o escalofríos. · Siente el estómago inflado. Vigile de cerca los cambios en ladd joy y asegúrese de comunicarse con ladd médico si:  · Dulce Maria síntomas Stevphen Chill. · Tiene diarrea por más de 2 semanas. · Tiene pérdida de peso inexplicada. ¿Dónde puede encontrar más información en inglés? Tyrese Cross a [email or web address] e ingrese a ladd cuenta de MyChart.  Mac Bryant F769 en el [facility] \"Search Health Information\" para más información (en inglés) sobre \"Nutrición para las personas mayores: Instrucciones de cuidado. \"     Si no tiene antonietta cuenta, madelyn clic en el enlace \"Sign Up Now\". Revisado: 22 agosto, 2019               Versión del contenido: 12.5  © 6679-6326 [employer], Gamida Cell. Las instrucciones de cuidado fueron adaptadas bajo licencia por [facility] ([facility]). Si usted tiene B[place] afección médica o sobre estas instrucciones, siempre pregunte a ladd profesional de joy. HealthVallejo, Incorporated niega toda garantía o responsabilidad por ladd uso de esta información. Patient Education        Aprenda sobre hacer actividad cuando es un adulto mayor  Learning About Being Active as an Older Adult  ¿Por qué es importante estar activo a medida que envejece? Estar activo es antonietta de las mejores cosas que puede hacer por ladd joy. Y nunca es demasiado tarde para comenzar. Mantenerse activo, o comenzar a hacer actividad si aún no la hace, tiene jovani beneficios. Puede:  · Darle más energía. · Mantener la [employer]South. · Mejorar el equilibrio para reducir el riesgo de caídas. · Ayudar a controlar las enfermedades crónicas con menos medicamentos. Sin importar la edad que tenga, ladd estado físico o los problemas de joy que tenga, hay un tipo de actividad que le funcionará a usted. Y cuanta más actividad física pueda hacer, mejor será ladd joy general.  ¿Cómo puede incorporar la actividad en ladd jesus manuel? Ser más Dole Food facilitará las actividades diarias. La actividad física incluye el ejercicio planificado y cosas que usted hace en ladd jesus manuel diaria. Hay cuatro tipos de [place]:  Aeróbica. Hacer actividad aeróbica fortalece el corazón y los pulmones. Incluye caminar, bailar y practicar la jardinería. Intente hacer al menos 2½ horas de [place] a lo ernestina de la semana. La Altria Group más energía y podría ayudarle a dormir mejor. Fortalecimiento muscular. Nisa tipo de actividad puede ayudarle a mantener la musculatura y Coca Cola.   Incluye subir escaleras, usar bandas elásticas y levantar o transportar cargas pesadas. Trate de Celanese Thundersoft tipo de actividad al [place] veces por semana. Puede ayudar a proteger Tilden Clarity y otras articulaciones. Estiramientos. Los estiramientos le brindan antonietta mayor amplitud de movimiento en las articulaciones y los músculos. Incluyen estiramientos de la parte superior del brazo, estiramientos de la pantorrilla y yoga suave. Trate de hacer estiramientos al SoundTag por semana, preferentemente después de que los músculos se hayan calentado matthew a Briones. Pueden ayudarle a desempeñarse mejor en la jesus manuel diaria. Equilibrio. Auberry le ayuda a mantener la coordinación y Willye Armstrong. Incluye la marcha en tándem, el ulises chi y ciertos tipos de yoga. Trate de hacer esto al menos mabel días a la semana. Puede reducir Smur[employer] Container de caídas. Incluso si tiene dificultad para cumplir con las recomendaciones, es mejor estar más activo que [place]. Niesha Burrs actividad que madelyn en cada categoría cuenta para el total semanal. Le sorprendería saber cómo se Aff[employer] Computer Services cotidianas, tales zackery cargar bolsas de alimentos, jugar con nancy nietos y subir las escaleras. ¿Qué le impide estar activo? Si ha tenido dificultad para hacer más actividad, usted no está solo. Quizás recuerde que antes podía Danielito's. O padmini vez nunca se haya considerado antonietta persona activa. Es frustrante no poder Barrie Perezewshreya Sons que desea. Estar más activo puede ayudar. ¿Qué lo detiene a usted? Cómo empezar. Tenga antonietta meta, kishor divídala en tareas fáciles. Los pequeños pasos se convierten en grandes logros. Cómo mantenerse motivado. Si siente que no tiene ganas de Wei, recuerde ladd meta. Padmini vez desea poder moverse mejor y mantenerse independiente. Niesha Burrs actividad que madelyn le servirá para acercarse a erika meta. Cloria Barnacle si no se siente perfectamente.    Comience con 5 minutos de [place] que disfrute. Demuéstrese que puede hacerlo. A medida que se sienta más cómodo, aumente la cantidad de [place]. Es posible que aún no se encuentre donde quiere estar. Yoandy está en proceso de llegar allí. Todo el filiberto empieza en algún lugar. ¿Cómo puede encontrar formas seguras de [place] activo? Hable con ladd médico sobre cualquier desafío físico que enfrente. Madelyn un plan con ladd médico si tiene un problema de joy o no está seguro de cómo empezar a hacer actividad. Si tiende a sentirse BJ's de romero medicamentos, evite la actividad en tracy momento. Trate de hacer actividad antes de romero nancy medicamentos. [place] reducirá ladd riesgo de caerse. Si planea hacer actividad en casa, asegúrese de despejar ladd espacio antes de comenzar. Retire cosas zackery cables de televisores, mesitas de café y tapetes. Lo más seguro es tener mucho espacio para moverse libremente. La clave para estar más activo es comenzar poco a poco y hacerlo con constancia. Procure mejorar solo de a poco por vez. Elija solo un área para mejorar al principio. Y si antonietta actividad le causa dolor, deténgase y hable con ladd médico.  ¿Dónde puede encontrar más información en inglés? Mat Somers a [email or web address] e ingrese a ladd cuenta de MyChart. Jayne Virgen P600 en el cuadro \"Search Health Information\" para más información (en inglés) sobre \"Aprenda sobre hacer actividad cuando es un adulto mayor. \"     Si no tiene antonietta cuenta, madelyn clic en el enlace \"Sign Up Now\". Revisado: 16 enero, 2020               Versión del contenido: 12.5  © 9657-5560 Healthwise, [employer]ox. Las instrucciones de cuidado fueron adaptadas bajo licencia por [facility] ([facility]). Si usted tiene [place] afección médica o sobre estas instrucciones, siempre pregunte a ladd profesional de joy. NetMovie, CertiVox niega toda garantía o responsabilidad por ladd uso de esta información.          Patient Education        Alarmas de seguridad en el hogar: Instrucciones de cuidado  Home Safety Alarms: Care Instructions  Instrucciones de cuidado  Las alarmas de seguridad en el hogar salvan vidas. Por ejemplo, un detector de humo puede detectar pequeñas cantidades de humo. Lawai puede darle tiempo para escapar de un incendio. Y un detector de monóxido de carbono puede avisarle de nisa gas mortal antes de que empiece a hacerle sentirse mal. Es importante tener las [address] clases de alarmas cerca de todos los lugares donde se duerme y en cada piso de ladd casa. Usted puede comprar alarmas con diferentes opciones. Por ejemplo, si usted tiene un detector de humo que se activa por el vapor o el humo de la cocina, puede comprar lorne con silenciador. Lawai le permite oprimir un botón que desactiva la alarma y hace al detector menos sensible benito un corto período de [place]. Si va a poner nuevas alarmas, busque alarmas de larga duración con pilas de litio. Es posible que también quiera fijarse en aquellas que pueden detectar tanto humo zackery monóxido de carbono. En construcciones más recientes las alarmas son instaladas por un electricista. Nisa tipo de alarma es eléctrica, con antonietta pila de respaldo. Ladd departamento de bomberos local puede darle más información sobre cómo prevenir incendios e intoxicación por monóxido de carbono. También puede ayudarle a preparar un plan de evacuación en nichol de incendio, utilizar dispositivos de seguridad contra incendios y proporcionar primeros auxilios. La atención de seguimiento es antonietta parte clave de ladd tratamiento y seguridad. Asegúrese de hacer y acudir a todas las citas, y llame a ladd médico si está teniendo problemas. También es antonietta buena idea saber los resultados de nancy exámenes y mantener antonietta lista de los medicamentos que jordan. ¿Cómo puede cuidarse en el hogar? · Instale detectores de humo y detectores de monóxido de carbono en ladd hogar. · Instale detectores de humo:  ?  En cada planta de ladd hogar, en el pasillo afuera de los dormitorios y en el interior de cada dormitorio. ? En el centro del techo o en antonietta pared a entre 6 y 15 pulgadas (entre 15 y 27 cm) del techo. Es aquí donde el humo se desplaza delicia. Evite los lugares cerca de las karly, las ventanas o los conductos de ventilación. · Instale un detector de monóxido de carbono en el pasillo afuera de las habitaciones en cada jesu del hogar en que haya dormitorios. El detector debería instalarse en la parte más tiesha de la pared. Asegúrese de que los muebles o las abdirahman no tapen el detector. · Asegúrese de que nancy alarmas de seguridad funcionen en todo momento. Puede probarlas cada mes presionando el botón de prueba. · Si antonietta alarma emite un chirrido, reemplace la pila de inmediato. · Reemplace dos veces al año las pilas que no jan de litio. Ponga esto en ladd calendario anticipadamente. Algunas personas McKesson pilas de las alarmas cuando hacen el cambio de horario en nancy relojes en la primavera y en el otoño. · Reemplace los detectores de humo cada 10 años. · Planifique y practique rutas de escape de incendios. Asegúrese de tener por lo Ramonwal 115 para cada área de ladd casa. [place] incluye los pisos superiores y el sótano. ¿Cuándo debe pedir ayuda? Llame [id number] en cualquier momento que considere que necesita atención de [place]. Por ejemplo, llame si:  · Suena el detector de humo o de monóxido de carbono. Dígales a todos que salgan del edificio. Permanezcan afuera hasta que lleguen los bomberos. Preste especial atención a los cambios en ladd joy y asegúrese de comunicarse con ladd médico si tiene preguntas sobre cómo utilizar antonietta alarma de seguridad en el hogar. ¿Dónde puede encontrar más información en inglés? Joanne Vargas a [email or web address] e ingrese a ladd cuenta de MyChart. [place]  C861 en el [place] \"Search Health Information\" para más información (en inglés) sobre \"Alarmas de seguridad en el hogar: Instrucciones de cuidado. \"     Si no tiene antonietta Marcelyn Cea maged en el enlace \"Sign Up Now\". Revisado: 22 agosto, 2019               Versión del contenido: 12.5  © 7199-3312 [facility], "GiveProps, Inc.". Las instrucciones de cuidado fueron adaptadas bajo licencia por [facility] ([facility]). Si usted tiene [place] afección médica o sobre estas instrucciones, siempre pregunte a ladd profesional de joy. HealthBango, Greenbox Technologies niega toda garantía o responsabilidad por ladd uso de esta información. Patient Education        Rasheeda Yaa a entrar y salir de la bañera en forma dupree  Learning About Getting In and Out of a Bathtub Safely  Introducción  Muchas caídas ocurren al bañarse. Raymundo Marmolejo erika agua hace que el cuarto de baño sea un lugar resbaladizo. · Es posible que ya no pueda superar el borde de la bañera con comodidad y seguridad. · Usted podría apoyarse en cosas que no están destinadas a soportar ladd peso, zackery el [address] [place] [address] Sgnam o la [place] de [place]. Hay varios tipos de dispositivos de apoyo que le ayudarán a [facility]. Puede comprarlos en ferreterías, en tiendas de mejoras para el hogar o en Internet. ¿Qué dispositivos puede utilizar para entrar y salir de la bañera? Angelique Solomon y tawanda de sujeción para la bañera   Hay muchos tipos de barras y barandillas que puede instalar en las dowell junto a la bañera o en el borde de la bañera. Gilda Hails a mantenerse seguro al entrar o salir de la bañera. Es importante instalarlas permanentemente, en lugar de hacerlo por medio de succión. Banco de transferencia   Hay varios tipos de bancos o asientos que se pueden usar en la bañera. Tristen de ellos se coloca con dos patas dentro la bañera y [place] fuera de [facility]. Usted se sienta en el banco y mete delicia antonietta pierna y luego la otra en la bañera, deslizando al mismo tiempo ladd cuerpo hacia el interior de la bañera. Otro banco común para bañera se asienta dentro de esta. Para usar jeevan tipo, usted debe ser capaz de entrar en forma dupree en la bañera antes de sentarse. Alfombrillas antideslizantes   El agua hace que el fondo de la bañera y el piso del baño estén resbaladizos. Puede comprar tiras Hernandez's que se adhieren al fondo de la bañera. O puede usar antonietta alfombrilla antideslizante para la bañera. Fuera de la bañera, asegúrese de usar antonietta alfombrilla que sea antideslizante por debajo. No use antonietta toalla zackery alfombrilla. Ducha manual   Con antonietta ducha (regadera) manual, usted puede lavarse sin tener que sentarse en la bañera. Use antonietta ind[place] de ducha para evitar que el agua se derrame en el piso. La atención de seguimiento es antonietta parte clave de estevez tratamiento y seguridad. Asegúrese de hacer y acudir a todas las citas, y llame a estevez médico si está teniendo problemas. También es antonietta buena idea saber los resultados de nancy exámenes y mantener antonietta lista de los medicamentos que jordan. ¿Dónde puede encontrar más información en inglés? Drew Glaeliot a [email or web address] e ingrese a estevez cuenta de MyChart. Caleb Porter YBora en el cuadro \"Search Health Information\" para más información (en inglés) sobre \"Aprenda a entrar y salir de la bañera en forma dupree. \"     Si no tiene antonietta cuenta, griselda maged en el enlace \"Sign Up Now\". Revisado: 2 marzo, 2020               Versión del contenido: 12.5  © 3736-1395 Healthwise, Incorporated. Las instrucciones de cuidado fueron adaptadas bajo licencia por [facility] ([facility]). Si usted tiene [place] afección médica o sobre estas instrucciones, siempre pregunte a estevez profesional de joy. Healthwise, Incorporated niega toda garantía o responsabilidad por estevez uso de esta información. Patient Education         Cómo prevenir caídas: Griselda que estevez casa sea un lugar seguro (subtitulado) (01:08)  Preventing Falls: Make Your Home Safe  Estevez profesional de la joy recomienda que sundeep jeevan breve video de joy en [place]. Aprenda a corregir algunos peligros comunes en estevez casa que podrían causar tropiezos.      [address] [facility] Kady código QR   o visite el Sustainable Real Estate Solutionsio web    [email or web address]   Revisado: 7 agosto, 2019               Versión del contenido: 12.5  © 2006-2020 [employer]. Las instrucciones de cuidado fueron adaptadas bajo licencia por [facility] ([facility]). Si usted tiene [place] afección médica o sobre estas instrucciones, siempre pregunte a ladd profesional de joy. Peerform, Incorporated niega toda garantía o responsabilidad por ladd uso de esta información. Patient Education         Prevención de caídas: Seguridad de los medicamentos (subtitulado) (01:41)  Preventing Falls: Medicine Safety  Ladd profesional de la joy recomienda que sundeep jeevan breve video de joy en [place]. Aprenda por qué algunos medicamentos pueden causarle mareos o somnolencia y cómo puede mantenerse seguro. Cómo mirar el video    Escanee el código QR   o visite el sitio web    [email or web address]   Revisado: 7 agosto, 2019               Versión del contenido: 12.5  © 2006-2020 [employer]. Las instrucciones de cuidado fueron adaptadas bajo licencia por [facility] ([facility]). Si usted tiene [employer] [place] afección médica o sobre estas instrucciones, siempre pregunte a aldd profesional de joy. Peerform, Incorporated niega toda garantía o responsabilidad por ladd uso de esta información. Patient Education         Prevención de caídas: Uso de antonietta lista de verificación de la seguridad en el hogar (subtitulado) (01:07)  Preventing Falls: Use a Home Safety Checklist  Ladd profesional de la joy recomienda que sundeep jeevan breve video de joy en [place]. Aprenda a detectar los peligros que hay en ladd casa usando antonietta lista de verificación de la seguridad en el hogar. Cómo mirar el video    Escanee el código QR   o visite el sitio web    [email or web address]   Revisado: 7 agosto, 2019               Versión del contenido: 12.5  © 8260-7540 [facility], [facility].    [address] fueron adaptadas bajo licencia por [facility] ([facility]). Si usted tiene [place] afección médica o sobre estas instrucciones, siempre pregunte a ladd profesional de joy. [facility] Incorporated niega toda garantía o responsabilidad por ladd uso de esta información. Personalized Preventive Plan for José  - 10/8/2020  Medicare offers a range of preventive health benefits. Some of the tests and screenings are paid in full while other may be subject to a deductible, co-insurance, and/or copay. Some of these benefits include a comprehensive review of your medical history including lifestyle, illnesses that may run in your family, and various assessments and screenings as appropriate. After reviewing your medical record and screening and assessments performed today your provider may have ordered immunizations, labs, imaging, and/or referrals for you. A list of these orders (if applicable) as well as your Preventive Care list are included within your After Visit Summary for your review. Other Preventive Recommendations:    · A preventive eye exam performed by an eye specialist is recommended every 1-2 years to screen for glaucoma; cataracts, macular degeneration, and other eye disorders. · A preventive dental visit is recommended every 6 months. · Try to get at least 150 minutes of exercise per week or 10,000 steps per day on a pedometer . · Order or download the FREE \"Exercise & Physical Activity: Your Everyday Guide\" from [employer]. Call 8-181.681.2416 or search [employer] online. · You need 6226-4571 mg of calcium and 1464-0037 IU of vitamin D per day. It is possible to meet your calcium requirement with diet alone, but a vitamin D supplement is usually necessary to meet this goal.  · When exposed to the sun, use a sunscreen that protects against both UVA and UVB radiation with an SPF of 30 or greater.  Reapply every 2 to 3 hours or after sweating, drying off with a towel, or swimming. · Always wear a seat belt when traveling in a car. Always wear a helmet when riding a bicycle or motorcycle. Heart-Healthy Diet   Sodium, Fat, and Cholesterol Controlled Diet       What Is a Heart Healthy Diet? A heart-healthy diet is one that limits sodium , certain types of fat , and cholesterol . This type of diet is recommended for:   People with any form of cardiovascular disease (eg, coronary heart disease , peripheral vascular disease , previous heart attack , previous stroke )   People with risk factors for cardiovascular disease, such as high blood pressure , high cholesterol , or diabetes   Anyone who wants to lower their risk of developing cardiovascular disease   Sodium    Sodium is a mineral found in many foods. In general, most people consume much more sodium than they need. Diets high in sodium can increase blood pressure and lead to edema (water retention). On a heart-healthy diet, you should consume no more than 2,300 mg (milligrams) of sodium per dayabout the amount in one teaspoon of table salt. The foods highest in sodium include table salt (about 50% sodium), processed foods, convenience foods, and preserved foods. Cholesterol    Cholesterol is a fat-like, waxy substance in your blood. Our bodies make some cholesterol. It is also found in animal products, with the highest amounts in fatty meat, egg yolks, whole milk, cheese, shellfish, and organ meats. On a heart-healthy diet, you should limit your cholesterol intake to less than 200 mg per day. It is normal and important to have some cholesterol in your bloodstream. But too much cholesterol can cause plaque to build up within your arteries, which can eventually lead to a heart attack or stroke.    The two types of cholesterol that are most commonly referred to are:   Low-density lipoprotein (LDL) cholesterol  Also known as bad cholesterol, this is the cholesterol that tends to build up along your arteries. Bad cholesterol levels are increased by eating fats that are saturated or hydrogenated. Optimal level of this cholesterol is less than 100. Over 130 starts to get risky for heart disease. High-density lipoprotein (HDL) cholesterol  Also known as good cholesterol, this type of cholesterol actually carries cholesterol away from your arteries and may, therefore, help lower your risk of having a heart attack. You want this level to be high (ideally greater than 60). It is a risk to have a level less than 40. You can raise this good cholesterol by eating olive oil, canola oil, avocados, or nuts. Exercise raises this level, too. Fat    Fat is calorie dense and packs a lot of calories into a small amount of food. Even though fats should be limited due to their high calorie content, not all fats are bad. In fact, some fats are quite healthful. Fat can be broken down into four main types.    The good-for-you fats are:   Monounsaturated fat  found in oils such as olive and canola, avocados, and nuts and natural nut butters; can decrease cholesterol levels, while keeping levels of HDL cholesterol high   Polyunsaturated fat  found in oils such as safflower, sunflower, soybean, corn, and sesame; can decrease total cholesterol and LDL cholesterol   Omega-3 fatty acids  particularly those found in fatty fish (such as salmon, trout, tuna, mackerel, herring, and sardines); can decrease risk of arrhythmias, decrease triglyceride levels, and slightly lower blood pressure   The fats that you want to limit are:   Saturated fat  found in animal products, many fast foods, and a few vegetables; increases total blood cholesterol, including LDL levels   Animal fats that are saturated include: butter, lard, whole-milk dairy products, meat fat, and poultry skin   Vegetable fats that are saturated include: hydrogenated shortening, palm oil, coconut oil, cocoa butter   Hydrogenated or trans fat  found in margarine and vegetable shortening, most shelf stable snack foods, and fried foods; increases LDL and decreases HDL     It is generally recommended that you limit your total fat for the day to less than 30% of your total calories. If you follow an 1800-calorie heart healthy diet, for example, this would mean 60 grams of fat or less per day. Saturated fat and trans fat in your diet raises your blood cholesterol the most, much more than dietary cholesterol does. For this reason, on a heart-healthy diet, less than 7% of your calories should come from saturated fat and ideally 0% from trans fat. On an 1800-calorie diet, this translates into less than 14 grams of saturated fat per day, leaving 46 grams of fat to come from mono- and polyunsaturated fats.    Food Choices on a Heart Healthy Diet   Food Category   Foods Recommended   Foods to Avoid   Grains   Breads and rolls without salted tops Most dry and cooked cereals Unsalted crackers and breadsticks Low-sodium or homemade breadcrumbs or stuffing All rice and pastas   Breads, rolls, and crackers with salted tops High-fat baked goods (eg, muffins, donuts, pastries) Quick breads, self-rising flour, and biscuit mixes Regular bread crumbs Instant hot cereals Commercially prepared rice, pasta, or stuffing mixes   Vegetables   Most fresh, frozen, and low-sodium canned vegetables Low-sodium and salt-free vegetable juices Canned vegetables if unsalted or rinsed   Regular canned vegetables and juices, including sauerkraut and pickled vegetables Frozen vegetables with sauces Commercially prepared potato and vegetable mixes   Fruits   Most fresh, frozen, and canned fruits All fruit juices   Fruits processed with salt or sodium   Milk   Nonfat or low-fat (1%) milk Nonfat or low-fat yogurt Cottage cheese, low-fat ricotta, cheeses labeled as low-fat and low-sodium   Whole milk Reduced-fat (2%) milk Malted and chocolate milk Full fat yogurt Most cheeses (unless low-fat and low salt) Buttermilk (no more than 1 cup per week)   Meats and Beans   Lean cuts of fresh or frozen beef, veal, lamb, or pork (look for the word loin) Fresh or frozen poultry without the skin Fresh or frozen fish and some shellfish Egg whites and egg substitutes (Limit whole eggs to three per week) Tofu Nuts or seeds (unsalted, dry-roasted), low-sodium peanut butter Dried peas, beans, and lentils   Any smoked, cured, salted, or canned meat, fish, or poultry (including moon, chipped beef, cold cuts, hot dogs, sausages, sardines, and anchovies) Poultry skins Breaded and/or fried fish or meats Canned peas, beans, and lentils Salted nuts   Fats and Oils   Olive oil and canola oil Low-sodium, low-fat salad dressings and mayonnaise   Butter, margarine, coconut and palm oils, moon fat   Snacks, Sweets, and Condiments   Low-sodium or unsalted versions of broths, soups, soy sauce, and condiments Pepper, herbs, and spices; vinegar, lemon, or lime juice Low-fat frozen desserts (yogurt, sherbet, fruit bars) Sugar, cocoa powder, honey, syrup, jam, and preserves Low-fat, trans-fat free cookies, cakes, and pies Chano and animal crackers, fig bars, suki snaps   High-fat desserts Broth, soups, gravies, and sauces, made from instant mixes or other high-sodium ingredients Salted snack foods Canned olives Meat tenderizers, seasoning salt, and most flavored vinegars   Beverages   Low-sodium carbonated beverages Tea and coffee in moderation Soy milk   Commercially softened water   Suggestions   Make whole grains, fruits, and vegetables the base of your diet. Choose heart-healthy fats such as canola, olive, and flaxseed oil, and foods high in heart-healthy fats, such as nuts, seeds, soybeans, tofu, and fish. Eat fish at least twice per week; the fish highest in omega-3 fatty acids and lowest in mercury include salmon, herring, mackerel, sardines, and canned chunk light tuna.  If you eat fish less than twice per week or have high triglycerides, talk to your doctor about taking fish oil supplements. Read food labels. For products low in fat and cholesterol, look for fat free, low-fat, cholesterol free, saturated fat free, and trans fat freeAlso scan the Nutrition Facts Label, which lists saturated fat, trans fat, and cholesterol amounts. For products low in sodium, look for sodium free, very low sodium, low sodium, no added salt, and unsalted   Skip the salt when cooking or at the table; if food needs more flavor, get creative and try out different herbs and spices. Garlic and onion also add substantial flavor to foods. Trim any visible fat off meat and poultry before cooking, and drain the fat off after sampson. Use cooking methods that require little or no added fat, such as grilling, boiling, baking, poaching, broiling, roasting, steaming, stir-frying, and sauting. Avoid fast food and convenience food. They tend to be high in saturated and trans fat and have a lot of added salt. Talk to a registered dietitian for individualized diet advice. Last Reviewed: March 2011 Indira Jones MS, MPH, RD   Updated: 3/29/2011   ·   Patient information: Weight loss treatments    INTRODUCTION -- Obesity is a major international problem, and Americans are among the heaviest people in the world. The percentage of obese people in the [place] has risen steadily. Many people find that although they initially lose weight by dieting, they quickly regain the weight after the diet ends. Because it so hard to keep weight off over time, it is important to have as much information and support as possible before starting a diet. You are most likely to be successful in losing weight and keeping it off when you believe that your body weight can be controlled. STARTING A WEIGHT LOSS PROGRAM -- Some people like to talk to their doctor or nurse to get help choosing the best plan, monitoring progress, and getting advice and support along the way.   To know what treatment (or combination of treatments) will work best, determine your body mass index (BMI) and waist circumference (measurement). The BMI is calculated from your height and weight. A person with a BMI between 25 and 29.9 is considered overweight   A person with a BMI of 30 or greater is considered to be obese  A waist circumference greater than 35 inches (88 cm) in women and 40 inches (102 cm) in men increases the risk of obesity-related complications, such as heart disease and diabetes. People who are obese and who have a larger waist size may need more aggressive weight loss treatment than others. Talk to your doctor or nurse for advice. Types of treatment -- Based on your measurements and your medical history, your doctor or nurse can determine what combination of weight loss treatments would work best for you. Treatments may include changes in lifestyle, exercise, dieting, and, in some cases, weight loss medicines or weight loss surgery. Weight loss surgery, also called bariatric surgery, is reserved for people with severe obesity who have not responded to other weight loss treatments. SETTING A WEIGHT LOSS GOAL -- It is important to set a realistic weight loss goal. Your first goal should be to avoid gaining more weight and staying at your current weight (or within 5 percent). Many people have a \"dream\" weight that is difficult or impossible to achieve. People at high risk of developing diabetes who are able to lose 5 percent of their body weight and maintain this weight will reduce their risk of developing diabetes by about 50 percent and reduce their blood pressure. This is a success. Losing more than 15 percent of your body weight and staying at this weight is an extremely good result, even if you never reach your \"dream\" or \"ideal\" weight. LIFESTYLE CHANGES -- Programs that help you to change your lifestyle are usually run by psychologists or other professionals.  The goals of lifestyle changes are to help you change your eating habits, become more active, and be more aware of how much you eat and exercise, helping you to make healthier choices. This type of treatment can be broken down into three steps: The triggers that make you want to eat   Eating   What happens after you eat  Triggers to eat -- Determining what triggers you to eat involves figuring out what foods you eat and where and when you eat. To figure out what triggers you to eat, keep a record for a few days of everything you eat, the places where you eat, how often you eat, and the emotions you were feeling when you ate. For some people, the trigger is related to a certain time of day or night. For others, the trigger is related to a certain place, like sitting at a desk working. Eating -- You can change your eating habits by breaking the chain of events between the trigger for eating and eating itself. There are many ways to do this. For instance, you can:  Limit where you eat to a few places (eg, dining room)   Restrict the number of utensils (eg, only a fork) used for eating   Drink a sip of water between each bite   Chew your food a certain number of times   Get up and stop eating every few minutes  What happens after you eat -- Rewarding yourself for good eating behaviors can help you to develop better habits. This is not a reward for weight loss; instead, it is a reward for changing unhealthy behaviors. Do not use food as a reward. Some people find money, clothing, or personal care (eg, a hair cut, manicure, or massage) to be effective rewards. Treat yourself immediately after making better eating choices to reinforce the value of the good behavior. You need to have clear behavior goals, and you must have a time frame for reaching your goals.  Reward small changes along the way to your final goal.  Other factors that contribute to successful weight loss -- Changing your behavior involves more than just changing unhealthy eating habits; it also involves finding people around you to support your weight loss, reducing stress, and learning to be strong when tempted by food. Establish a \"jarvis\" system -- Having a friend or family member available to provide support and reinforce good behavior is very helpful. The support person needs to understand your goals. Learn to be strong -- Learning to be strong when tempted by food is an important part of losing weight. As an example, you will need to learn how to say \"no\" and continue to say no when urged to eat at parties and social gatherings. Develop strategies for events before you go, such as eating before you go or taking low-calorie snacks and drinks with you. Develop a support system -- Having a support system is helpful when losing weight. This is why many [place] groups are successful. Family support is also essential; if your family does not support your efforts to lose weight, this can slow your progress or even keep you from losing weight. Positive thinking -- People often have conversations with themselves in their head; these conversations can be positive or negative. If you eat a piece of cake that was not planned, you may respond by thinking, \"Oh, you stupid idiot, you've blown your diet! \" and as a result, you may eat more cake. A positive thought for the same event could be, \"Well, I ate cake when it was not on my plan. Now I should do something to get back on track. \" A positive approach is much more likely to be successful than a negative one. Reduce stress -- Although stress is a part of everyday life, it can trigger uncontrolled eating in some people. It is important to find a way to get through these difficult times without eating or by eating low-calorie food, like raw vegetables. It may be helpful to imagine a relaxing place that allows you to temporarily escape from stress. With deep breaths and closed eyes, you can imagine this relaxing place for a few minutes.    Self-help programs -- Self-help programs like [employer] Watchers®, Overeaters Anonymous®, and Take Off Willis (TOPS)© work for some people. As with all weight loss programs, you are most likely to be successful with these plans if you make long-term changes in how you eat. CHOOSING A DIET -- A calorie is a unit of energy found in food. Your body needs calories to function. The goal of any diet is to burn up more calories than you eat. How quickly you lose weight depends upon several factors, such as your age, gender, and starting weight. Older people have a slower metabolism than young people, so they lose weight more slowly. Men lose more weight than women of similar height and weight when dieting because they use more energy. People who are extremely overweight lose weight more quickly than those who are only mildly overweight. Try not to drink alcohol or drinks with added sugar, and most sweets (candy, cakes, cookies), since they rarely contain important nutrients. Portion-controlled diets -- One simple way to diet is to buy packaged foods, like frozen low-calorie meals or meal-replacement canned drinks. A typical meal plan for one day may include:  A meal-replacement drink or breakfast bar for breakfast   A meal-replacement drink or a frozen low-calorie (250 to 350 calories) meal for lunch   A frozen low-calorie meal or other prepackaged, calorie-controlled meal, along with extra vegetables for dinner  This would give you 1000 to 1500 calories per day. Low-fat diet -- To reduce the amount of fat in your diet, you can:  Eat low-fat foods. Low-fat foods are those that contain less than 30 percent of calories from fat. Fat is listed on the food facts label (figure 1). Count fat grams. For a 1500 calorie diet, this would mean about 45 g or fewer of fat per day.   Low-carbohydrate diet -- Low- and very-low-carbohydrate diets (eg, Atkins diet, [employer]) have become popular ways to lose weight occur. Therefore, blood pressure is usually monitored during treatment. There is no evidence that sibutramine causes heart or lung problems (like dexfenfluramine and fenfluramine (Phen/Fen)). However, experts agree that sibutramine should not used by people with coronary heart disease, heart failure, uncontrolled hypertension, stroke, irregular heart rhythms, or peripheral vascular disease (poor circulation in the legs). Orlistat -- Orlistat (Xenical® 120 mg capsules) is a medicine that reduces the amount of fat your body absorbs from the foods you eat. A lower-dose version is now available without a prescription (Zach® 60 mg capsules) in many countries, including the [place]. The medicine is recommended three times per day, taken with a meal; you can skip a dose if you skip a meal or if the meal contains no fat. After one year of treatment with orlistat, the average weight loss is approximately 8 to 10 percent of initial body weight (4 percent more than in those who took a placebo). Cholesterol levels often improve, and blood pressure sometimes falls. In people with diabetes, orlistat may help control blood sugar levels. Side effects occur in 15 to 10 percent of people and may include stomach cramps, gas, diarrhea, leakage of stool, or oily stools. These problems are more likely when you take orlistat with a high-fat meal (if more than 30 percent of calories in the meal are from fat). Side effects usually improve as you learn to avoid high-fat foods. Severe liver injury has been reported rarely in patients taking orlistat, but it is not known if orlistat caused the liver problems. Diet supplements -- Diet supplements are widely used by people who are trying to lose weight, although the safety and efficacy of these supplements are often unproven.  A few of the more common diet supplements are discussed below; none of these are recommended because they have not been studied carefully, and there is no proof they are safe or effective. Chitosan and wheat dextrin are ineffective for weight loss, and their use is not recommended. Ephedra, a compound related to ephedrine, is no longer available in the [place] due to safety concerns. Many nonprescription diet pills previously contained ephedra. Although some studies have shown that ephedra helps with weight loss, there can be serious side effects (psychiatric symptoms, palpitations, and stomach upset), including death. There are not enough data about the safety and efficacy of chromium, ginseng, glucomannan, green tea, hydroxycitric acid, L carnitine, psyllium, pyruvate supplements, [place] wort, and conjugated linoleic acid. Two supplements from [facility], [address] [place] (also known as the [place] 15 pill) and Herbathin dietary supplement, have been shown to contain prescription drugs. Hoodia gordonii is a dietary supplement derived from a plant in [place]. It is not recommended because there is no proof that it is safe or effective. Bitter orange (Citrus aurantium) can increase your heart rate and blood pressure and is not recommended. SHOULD I HAVE SURGERY TO LOSE WEIGHT? -- Weight loss surgery is recommended ONLY for people with one of the following:  Severe obesity (body mass index above 40) (calculator 1 and calculator 2) who have not responded to diet, exercise, or weight loss medicines   Body mass index between 35 and 40, along with a serious medical problem (including diabetes, severe joint pain, or sleep apnea) that would improve with weight loss  You should be sure that you understand the potential risks and benefits of weight loss surgery. You must be motivated and willing to make lifelong changes in how you eat to reach and maintain a healthier weight after surgery. You must also be realistic about weight loss after surgery (see 'Effectiveness of weight loss surgery' below).   PREPARING FOR WEIGHT LOSS SURGERY -- Most people who have weight loss surgery will meet with several specialists before surgery is scheduled. This often includes a dietitian, mental health counselor, a doctor who specializes in care of obese people, and a surgeon who performs weight loss surgery (bariatric surgeon). You may need to work with these providers for several weeks or months before surgery. The nutritionist will explain what and how much you will be able to eat after surgery. You may also need to lose a small amount of weight before surgery. The mental health specialist will help you to cope with stress and other factors that can make it harder to lose weight or trigger you to eat   The medical doctor will determine whether you need other tests, counseling, or treatment before surgery. He or she might also help you begin a medical weight loss program so that you can lose some weight before surgery. The bariatric surgeon will meet with you to discuss the surgeries available to treat obesity. He or she will also make sure you are a good candidate for surgery. TYPES OF WEIGHT LOSS SURGERY -- There are several types of weight loss surgeries, the most common being lap banding, gastric bypass, and gastric sleeve. Lap banding -- Laparoscopic adjustable gastric banding (LAGB), or lap banding, is a surgery that uses an adjustable band around the opening to the stomach (figure 1). This reduces the amount of food that you can eat at one time. Lap banding is done through small incisions, with a laparoscope. The band can be adjusted after surgery, allowing you to eat more or less food. Adjustments to the size and tightness of the band are made by using a needle to add or remove fluid from a port (a small container under the skin that is connected to the band). Adding fluid to the band makes it tighter which restricts the amount of food you can eat and may help you to lose more weight.   Lap banding is a popular choice because it is relatively simple to perform, can be adjusted or removed, and has a low risk of serious complications immediately after surgery. However, weight loss with the lap band depends on your ability to follow the program closely. You will need to prepare nutritious meals that [facility] with\" the band, not against it. For example, the lap band will not work well if you eat or drink a large amount of liquid calories (like ice cream). The band will not help you to feel full when you eat/drink liquid calories. Weight loss ranges from 45 to 75 percent after two years. As an example, a person who is 120 pounds overweight could expect to lose approximately 54 to 90 pounds in the two years after lap banding. Gastric bypass -- Dimitri-en-Y gastric bypass, also called gastric bypass, helps you to lose weight by reducing the amount of food you can eat and reducing the number of calories and nutrients you absorb from the food you eat. To perform gastric bypass, a surgeon creates a small stomach pouch by dividing the stomach and attaching it to the small intestine. This helps you to lose weight in two ways: The smaller stomach can hold less food than before surgery. This causes you to feel full after eating a very small amount of food or liquid. Over time, the pouch might stretch, allowing you to eat more food. The body absorbs fewer calories, since food bypasses most of the stomach as well as the upper small intestine. This new arrangement seems to decrease your appetite and change how you break down foods by changing the release of various hormones. Gastric bypass can be performed as open surgery (through an incision on the abdomen) or laparoscopically, which uses smaller incisions and smaller instruments. Both the laparoscopic and open techniques have risks and benefits. You and your surgeon should work together to decide which surgery, if any, is right for you.   Gastric bypass has a high success rate, and people lose an average of 62 to 68 percent of their excess body weight in the first year. Weight loss typically levels off after one to two years, with an overall excess weight loss between 50 and 75 percent. For a person who is 120 pounds overweight, an average of 60 to 90 pounds of weight loss would be expected. Gastric sleeve -- Gastric sleeve, also known as sleeve gastrectomy, is a surgery that reduces the size of the stomach and makes it into a narrow tube (figure 3). The new stomach is much smaller and produces less of the hormone (ghrelin) that causes hunger, helping you feel satisfied with less food. Sleeve gastrectomy is safer than gastric bypass because the intestines are not rearranged, and there is less chance of malnutrition. It also appears to control hunger better than lap banding. It might be safer than the lap banding because no foreign materials are used. The gastric sleeve has a good success rate, and people lose an average of 33 percent of their excess body weight in the first year. For a person who is 120 pounds overweight, this would mean losing about 40 pounds in the first year. WEIGHT LOSS SURGERY COMPLICATIONS -- A variety of complications can occur with weight loss surgery. The risks of surgery depend upon which surgery you have and any medical problems you had before surgery. Some of the more common early surgical complications (one to six weeks after surgery) include:  Bleeding   Infection   Blockage or tear in the bowels   Need for further surgery  Important medical complications after surgery can include blood clots in the legs or lungs, heart attack, pneumonia, and urinary tract infection. Complications are less likely when surgery is performed in centers that are experienced in weight loss surgery.  In general, centers with experience in weight loss surgery have:  Board-certified doctors and surgeons   A team of support staff (dietitians, counselors, nurses)   Long-term follow-up after surgery   Hospital staff experienced with the care of weight loss patients. This includes nurses who are trained in the care of patients immediately after surgery and anesthesiologists who are experienced in caring for the morbidly obese. EFFECTIVENESS OF WEIGHT LOSS SURGERY -- The goal of weight loss surgery is to reduce the risk of illness or death associated with obesity. Weight loss surgery can also help you to feel and look better, reduce the amount of money you spend on medicines, and cut down on sick days. As an example, weight loss surgery can improve health problems related to obesity (diabetes, high blood pressure, high cholesterol, sleep apnea) to the point that you need less or no medicine. Finally, weight loss surgery might reduce your risk of developing heart disease, cancer, and certain infections. AFTER WEIGHT LOSS SURGERY -- You will need to stay in the hospital until your team feels that it is safe for you to leave (on average, one to three days). Do not drive if you are taking prescription pain medicine. Begin exercising as soon as possible once you have healed; most weight loss centers will design an exercise program for you. Once you are home, it is important to eat and drink exactly what your doctor and dietitian recommend. You will see your doctor, nurse, and dietitian on a regular basis after surgery to monitor your health, diet, and weight loss. You will be able to slowly increase how much you eat over time, although it will always be important to:  Eat small, frequent meals and not skip meals   Chew your food slowly and completely   Avoid eating while \"distracted\" (such as eating while watching TV)   Stop eating when you feel full   Drink liquids at least 30 minutes before or after eating   Avoid foods high in fat or sugar   Take vitamin supplements, as recommended  It can take several months to learn to listen to your body so that you know when you are hungry and when you are full.  You may dislike foods you previously loved, and you may begin to prefer new foods. This can be a frustrating process for some people, so talk to your dietitian if you are having trouble. It usually takes between one and two years to lose weight after surgery. After reaching their goal weight, some people have plastic surgery (called \"body contouring\") to remove excess skin from the body, particularly in the abdominal area. Before you decide to have weight loss surgery, you must commit to staying healthy for life. This includes following up with your healthcare team, exercising most days of the week, and eating a sensible diet every day. It can be difficult to develop new eating and exercise habits after weight loss surgery, and you will have to work hard to stick to your goals. Recovering from surgery and losing weight can be stressful and emotional, and it is important to have the support of family and friends. Working with a , therapist, or support group can help you through the ups and downs. WHERE TO GET MORE INFORMATION -- Your healthcare provider is the best source of information for questions and concerns related to your medical problem. This article will be updated as needed every four months on our Web site ([email or web address])  ·     High-Fiber Diet     What Is Fiber? Dietary fiber is a form of carbohydrate found in plants that cannot be digested by humans. All plants contain fiber, including fruits, vegetables, grains, and legumes. Fiber is often classified into two categories: soluble and insoluble. Soluble fiber draws water into the bowel and can help slow digestion. Examples of foods that are high in soluble fiber include oatmeal, oat bran, barley, legumes (eg, beans and peas), apples, and strawberries. Insoluble fiber speeds digestion and can add bulk to the stool. Examples of foods that are high in insoluble fiber include whole-wheat products, wheat bran, cauliflower, green beans, and potatoes.    Why Follow a High-Fiber Diet? A high-fiber diet is often recommended to prevent and treat constipation , hemorrhoids , diverticulitis , and irritable bowel syndrome . Eating a high-fiber diet can also help improve your cholesterol levels, lower your risk of coronary heart disease , reduce your risk of type 2 diabetes , and lower your weight. For people with type 1 or 2 diabetes, a high-fiber diet can also help stabilize blood sugar levels. How Much Fiber Should I Eat? A high-fiber diet should contain  20-35 grams  of fiber a day. This is actually the amount recommended for the general adult population; however, most Americans eat only 15 grams of fiber per day. Digestion of Fiber   Eating a higher fiber diet than usual can take some getting used to by your body's digestive system. To avoid the side effects of sudden increases in dietary fiber (eg, gas, cramping, bloating, and diarrhea), increase fiber gradually and be sure to drink plenty of fluids every day. Tips for Increasing Fiber Intake   Whenever possible, choose whole grains over refined grains (eg, brown rice instead of white rice, whole-wheat bread instead of white bread). Include a variety of grains in your diet, such as wheat, rye, barley, oats, quinoa, and bulgur. Eat more vegetarian-based meals. Here are some ideas: black bean burgers, eggplant lasagna, and veggie tofu stir-callaway. Choose high-fiber snacks, such as fruits, popcorn, whole-grain crackers, and nuts. Make whole-grain cereal or whole-grain toast part of your daily breakfast regime. When eating out, whether ordering a sandwich or dinner, ask for extra vegetables. When baking, replace part of the white flour with whole-wheat flour. Whole-wheat flour is particularly easy to incorporate into a recipe.     High-Fiber Diet Eating Guide   Food Category   Foods Recommended   Notes   Grains   Whole-grain breads, muffins, bagels, or charline bread Rye bread Whole-wheat crackers or crisp breads Whole-grain or bran cereals Oatmeal, oat bran, or grits Wheat germ Whole-wheat pasta and brown rice   Read the ingredients list on food labels. Look for products that list \"whole\" as the first ingredient (eg, whole-wheat, whole oats). Choose cereals with at least 2 grams of fiber per serving. Vegetables   All vegetables, especially asparagus, bean sprouts, broccoli, [place] sprouts, cabbage, carrots, cauliflower, celery, corn, greens, green beans, green pepper, onions, peas, potatoes (with skin), snow peas, spinach, squash, sweet potatoes, tomatoes, zucchini   For maximum fiber intake, eat the peels of fruits and vegetablesjust be sure to wash them well first.   Fruits   All fruits, especially apples, berries, grapefruits, mangoes, nectarines, oranges, peaches, pears, dried fruits (figs, dates, prunes, raisins)   Choose raw fruits and vegetables over juice, cooked, or cannedraw fruit has more fiber. Dried fruit is also a good source of fiber. Milk   With the exception of yogurt containing inulin (a type of fiber), dairy foods provide little fiber. Add more fiber by topping your yogurt or cottage cheese with fresh fruit, whole grain or bran cereals, nuts, or seeds. Meats and Beans   All beans and peas, especially Garbanzo beans, kidney beans, lentils, lima beans, split peas, and calabrese beans All nuts and seeds, especially almonds, peanuts, [place] nuts, cashews, peanut butter, walnuts, sesame and sunflower seeds All meat, poultry, fish, and eggs   Increase fiber in meat dishes by adding calabrese beans, kidney beans, black-eyed peas, bran, or oatmeal. If you are following a low-fat diet, use nuts and seeds only in moderation.    Fats and Oils   All in moderation   Fats and oils do not provide fiber   Snacks, Sweets, and Condiments   Fruit Nuts Popcorn, whole-wheat pretzels, or trail mix made with dried fruits, nuts, and seeds Cakes, breads, and cookies made with oatmeal or whole-wheat flour   Most snack foods do not provide much fiber. Choose snacks with at least 2 grams of fiber per serving. Last Reviewed: March 2011 Dina Cid MS, MPH, RD   Updated: 3/29/2011   ·     Keep Your Memory [place]       Many factors can affect your ability to remembera hectic lifestyle, aging, stress, chronic disease, and certain medicines. But, there are steps you can take to sharpen your mind and help preserve your memory. Challenge Your Brain   Regularly challenging your mind may help keeps it in top shape. Good mental exercises include:   Crossword puzzlesUse a dictionary if you need it; you will learn more that way. Brainteasers Try some! Crafts, such as wood working and sewing   Hobbies, such as gardening and building model airplanes   SocializingVisit old friends or join groups to meet new ones. Reading   Learning a new language   Taking a class, whether it be art history or ulises chi   TravelingExperience the food, history, and culture of your destination   Learning to use a computer   Going to museums, the theater, or thought-provoking movies   Changing things in your daily life, such as reversing your pattern in the grocery store or brushing your teeth using your nondominant hand   Use Memory Aids   There is no need to remember every detail on your own. These memory aids can help:   Calendars and day planners   Electronic organizers to store all sorts of helpful informationThese devices can \"beep\" to remind you of appointments. A book of days to record birthdays, anniversaries, and other occasions that occur on the same date every year   Detailed \"to-do\" lists and strategically placed sticky notes   Quick \"study\" sessionsBefore a gathering, review who will be there so their names will be fresh in your mind.    Establish routinesFor example, keep your keys, wallet, and umbrella in the same place all the time or take medicine with your 8:00 AM glass of juice   Live a Healthy Life   Many actions that will keep your body strong will do the same for your mind. For example:   Talk to Your Doctor About Herbs and Supplements    Malnutrition and vitamin deficiencies can impair your mental function. For example, vitamin B12 deficiency can cause a range of symptoms, including confusion. But, what if your nutritional needs are being met? Can herbs and supplements still offer a benefit? Researchers have investigated a range of natural remedies, such as ginkgo , ginseng , and the supplement phosphatidylserine (PS). So far, though, the evidence is inconsistent as to whether these products can improve memory or thinking. If you are interested in taking herbs and supplements, talk to your doctor first because they may interact with other medicines that you are taking. Exercise Regularly    Among the many benefits of regular exercise are increased blood flow to the brain and decreased risk of certain diseases that can interfere with memory function. One study found that even moderate exercise has a beneficial effect. Examples of \"moderate\" exercise include:   Playing 18 holes of golf once a week, without a cart   Playing tennis twice a week   Walking one mile per day   Manage Stress    It can be tough to remember what is important when your mind is cluttered. Make time for relaxation. Choose activities that calm you down, and make it routine. Manage Chronic Conditions    Side effects of high blood pressure , diabetes, and heart disease can interfere with mental function. Many of the lifestyle steps discussed here can help manage these conditions. Strive to eat a healthy diet, exercise regularly, get stress under control, and follow your doctor's advice for your condition. Minimize Medications    Talk to your doctor about the medicines that you take. Some may be unnecessary. Also, healthy lifestyle habits may lower the need for certain drugs.      Last Reviewed: April 2010 Peewee Molina MD   Updated: 4/13/2010   ·     Keeping Home a [place] Health As we get older, changes in balance, gait, strength, vision, hearing, and cognition make even the most youthful senior more prone to accidents. Falls are one of the leading health risks for older people. This increased risk of falling is related to:   Aging process (eg, decreased muscle strength, slowed reflexes)   Higher incidence of chronic health problems (eg, arthritis, diabetes) that may limit mobility, agility or sensory awareness   Side effects of medicine (eg, dizziness, blurred vision)especially medicines like prescription pain medicines and drugs used to treat mental health conditions   Depending on the brittleness of your bones, the consequences of a fall can be serious and long lasting. Home Life   Research by the Association of Aging [facility]) shows that some home accidents among older adults can be prevented by making simple lifestyle changes and basic modifications and repairs to the home environment. Here are some lifestyle changes that experts recommend:   Have your hearing and vision checked regularly. Be sure to wear prescription glasses that are right for you. Speak to your doctor or pharmacist about the possible side effects of your medicines. A number of medicines can cause dizziness. If you have problems with sleep, talk to your doctor. Limit your intake of alcohol. If necessary, use a cane or walker to help maintain your balance. Wear supportive, rubber-soled shoes, even at home. If you live in a region that gets wintry weather, you may want to put special cleats on your shoes to prevent you from slipping on the snow and ice. Exercise regularly to help maintain muscle tone, agility, and balance. Always hold the banister when going up or down stairs. Also, use  bars when getting in or out of the bath or shower, or using the toilet. To avoid dizziness, get up slowly from a lying down position.  Sit up first, dangling your legs for a minute or two before rising to a properly. Keep flammable objects such as towels and pot holders away from the cooking area except when in use. Make sure kitchen curtains are tied back. Move cords and appliances away from the sink and hot surfaces. If extension cords are needed, install wiring guides so they do not hang over the sink, range, or working areas. Look for coffee pots, kettles and toaster ovens with automatic shut-offs. Keep a mop handy in the kitchen so you can wipe up spills instantly. You should also have a small fire extinguisher. Arrange your kitchen with frequently used items on lower shelves to avoid the need to stand on a stepstool to reach them. Make sure countertops are well-lit to avoid injuries while cutting and preparing food. In the Bathroom    Use a non-slip mat or decals in the tub and shower, since wet, soapy tile or porcelain surfaces are extremely slippery. Make sure bathroom rugs are non-skid or tape them firmly to the floor. Bathtubs should have at least one, preferably two, grab bars, firmly attached to structural supports in the wall. (Do not use built-in soap holders or glass shower doors as grab bars.)    Tub seats fitted with non-slip material on the legs allow you to wash sitting down. For people with limited mobility, bathtub transfer benches allow you to slide safely into the tub. Raised toilet seats and toilet safety rails are helpful for those with knee or hip problems. In the [facility]    Make sure you use a nightlight and that the area around your bed is clear of potential obstacles. Be careful with electric blankets and never go to sleep with a heating pad, which can cause serious burns even if on a low setting. Use fire-resistant mattress covers and pillows, and NEVER smoke in bed. Keep a phone next to the bed that is programmed to dial 911 at the push of a button. If you have a chronic condition, you may want to sign on with an automatic call-in service. Typically the system includes a small pendant that connects directly to an emergency medical voice-response system. You should also make arrangements to stay in contact with someonefriend, neighbor, family memberon a regular schedule. Fire Prevention   According to the [employer] (Smoke Alarms for Every) [facility], senior citizens are one of the two highest risk groups for death and serious injuries due to residential fires. When cooking, wear short-sleeved items, never a bulky long-sleeved robe. The [facility]'s Safety Checklist for Older Consumers emphasizes the importance of checking basements, garages, workshops and storage areas for fire hazards, such as volatile liquids, piles of old rags or clothing and overloaded circuits. Never smoke in bed or when lying down on a couch or recliner chair. Small portable electric or kerosene heaters are responsible for many home fires and should be used cautiously if at all. If you do use one, be sure to keep them away from flammable materials. In case of fire, make sure you have a pre-established emergency exit plan. Have a professional check your fireplace and other fuel-burning appliances yearly. Helping Hands   Baby boomers entering the fraser years will continue to see the development of new products to help older adults live safely and independently in spite of age-related changes. Making Life More Livable  , by Quirino Stanford, lists over 1,000 products for \"living well in the mature years,\" such as bathing and mobility aids, household security devices, ergonomically designed knives and peelers, and faucet valves and knobs for temperature control. Medical supply stores and organizations are good sources of information about products that improve your quality of life and insure your safety.      Last Reviewed: November 2009 Benoit Duong MD   Updated: 3/7/2011     ·        Advance Care Planning: Care Instructions  Your Care Instructions It can be hard to live with an illness that cannot be cured. But if your health is getting worse, you may want to make decisions about end-of-life care. Planning for the end of your life does not mean that you are giving up. It is a way to make sure that your wishes are met. Clearly stating your wishes can make it easier for your loved ones. Making plans while you are still able may also ease your mind and make your final days less stressful and more meaningful. Follow-up care is a key part of your treatment and safety. Be sure to make and go to all appointments, and call your doctor if you are having problems. It's also a good idea to know your test results and keep a list of the medicines you take. What can you do to plan for the end of life? You can bring these issues up with your doctor. You do not need to wait until your doctor starts the conversation. You might start with \"I would not be willing to live with . Bhupinder Carson \" When you complete this sentence it helps your doctor understand your wishes. Talk openly and honestly with your doctor. This is the best way to understand the decisions you will need to make as your health changes. Know that you can always change your mind. Ask your doctor about commonly used life-support measures. These include tube feedings, breathing machines, and fluids given through a vein (IV). Understanding these treatments will help you decide whether you want them. You may choose to have these life-supporting treatments for a limited time. This allows a trial period to see whether they will help you. You may also decide that you want your doctor to take only certain measures to keep you alive. It is important to spell out these conditions so that your doctor and family understand them. Talk to your doctor about how long you are likely to live. He or she may be able to give you an idea of what usually happens with your specific illness.   Think about preparing papers that state your papers. Where can you learn more? Go to [email or web address] and sign in to your [email or web address] account. Enter P184 in the [employer] box to learn more about \"Advance Care Planning: Care Instructions. \"     If you do not have an account, please click on the \"Sign Up Now\" link. Current as of: December 9, 2019               Content Version: 12.6  © 2006-2020 [employer]. Care instructions adapted under license by [facility] ([facility]). If you have questions about a medical condition or this instruction, always ask your healthcare professional. [address] any warranty or liability for your use of this information. ·        Learning About Albert Jerez  What is a living will? A living will, also called a declaration, is a legal form. It tells your family and your doctor your wishes when you can't speak for yourself. It's used by the health professionals who will treat you as you near the end of your life or if you get seriously hurt or ill. If you put your wishes in writing, your loved ones and others will know what kind of care you want. They won't need to guess. This can ease your mind and be helpful to others. And you can change or cancel your living will at any time. A living will is not the same as an estate or property will. An estate will explains what you want to happen with your money and property after you die. How do you use it? A living will is used to describe the kinds of treatment or life support you want as you near the end of your life or if you get seriously hurt or ill. Keep these facts in mind about living moore. Your living will is used only if you can't speak or make decisions for yourself. Most often, one or more doctors must certify that you can't speak or decide for yourself before your living will takes effect. If you get better and can speak for yourself again, you can accept or refuse any treatment.  It doesn't matter what you said in your living will. Some states may limit your right to refuse treatment in certain cases. For example, you may need to clearly state in your living will that you don't want artificial hydration and nutrition, such as being fed through a tube. Is a living will a legal document? A living will is a legal document. Each state has its own laws about living moore. And a living will may be called something else in your state. Here are some things to know about living moore. You don't need an  to complete a living will. But legal advice can be helpful if your state's laws are unclear. It can also help if your health history is complicated or your family can't agree on what should be in your living will. You can change your living will at any time. Some people find that their wishes about end-of-life care change as their health changes. If you make big changes to your living will, complete a new form. If you move to another state, make sure that your living will is legal in the state where you now live. In most cases, doctors will respect your wishes even if you have a form from a different state. You might use a universal form that has been approved by many states. This kind of form can sometimes be filled out and stored online. Your digital copy will then be available wherever you have a connection to the internet. The doctors and nurses who need to treat you can find it right away. Your state may offer an online registry. This is another place where you can store your living will online. It's a good idea to get your living will notarized. This means using a person called a  to watch two people sign, or witness, your living will. What should you know when you create a living will? Here are some questions to ask yourself as you make your living will:  Do you know enough about life support methods that might be used?  If not, talk to your doctor so you know what might be done if you can't breathe on your own, your heart stops, or you can't swallow. What things would you still want to be able to do after you receive life-support methods? Would you want to be able to walk? To speak? To eat on your own? To live without the help of machines? Do you want certain Sikhism practices performed if you become very ill? If you have a choice, where do you want to be cared for? In your home? At a hospital or nursing home? If you have a choice at the end of your life, where would you prefer to die? At home? In a hospital or nursing home? Somewhere else? Would you prefer to be buried or cremated? Do you want your organs to be donated after you die? What should you do with your living will? Make sure that your family members and your health care agent have copies of your living will (also called a declaration). Give your doctor a copy of your living will. Ask him or her to keep it as part of your medical record. If you have more than one doctor, make sure that each one has a copy. Put a copy of your living will where it can be easily found. For example, some people may put a copy on their refrigerator door. If you are using a digital copy, be sure your doctor, family members, and health care agent know how to find and access it. Where can you learn more? Go to https://chjuaneb.[email or web address] org and sign in to your [employer]o account. Enter R465 in the [facility] box to learn more about \"Learning About Living Perroy. \"     If you do not have an account, please click on the \"Sign Up Now\" link. Current as of: December 9, 2019               Content Version: 12.6  © 3080-9309 [employer]. Care instructions adapted under license by [facility] ([facility]). If you have questions about a medical condition or this instruction, always ask your healthcare professional. [address] any warranty or liability for your use of this information.     ·        Learning About Medical Power of Peggi Latin  What is a medical power of ? A medical power of , also called a durable power of  for health care, is one type of the legal forms called advance directives. It lets you name the person you want to make treatment decisions for you if you can't speak or decide for yourself. The person you choose is called your health care agent. This person is also called a health care proxy or health care surrogate. A medical power of  may be called something else in your state. How do you choose a health care agent? Choose your health care agent carefully. This person may or may not be a family member. Talk to the person before you make your final decision. Make sure he or she is comfortable with this responsibility. It's a good idea to choose someone who:  Is at least 25years old. Knows you well and understands what makes life meaningful for you. Understands your Latter day and moral values. Will do what you want, not what he or she wants. Will be able to make difficult choices at a stressful time. Will be able to refuse or stop treatment, if that is what you would want, even if you could die. Will be firm and confident with health professionals if needed. Will ask questions to get needed information. Lives near you or agrees to travel to you if needed. Your family may help you make medical decisions while you can still be part of that process. But it's important to choose one person to be your health care agent in case you aren't able to make decisions for yourself. If you don't fill out the legal form and name a health care agent, the decisions your family can make may be limited. A health care agent may be called something else in your state. Who will make decisions for you if you don't have a health care agent? If you don't have a health care agent or a living will, you may not get the care you want.  Decisions may be made by family members who disagree about your medical care. Or decisions may be made by a medical professional who doesn't know you well. In some cases, a  makes the decisions. When you name a health care agent, it is very clear who has the power to make health decisions for you. How do you name a health care agent? You name your health care agent on a legal form. This form is usually called a medical power of . Ask your hospital, state bar association, or office on aging where to find these forms. You must sign the form to make it legal. Some states require you to get the form notarized. This means that a person called a  watches you sign the form and then he or she signs the form. Some states also require that two or more witnesses sign the form. Be sure to tell your family members and doctors who your health care agent is. Where can you learn more? Go to https://[email or web address].Utility and [employer] [email or web address] org and sign in to your Clarisonic account. Enter [id number] in the [facility] box to learn more about \"Learning About [address]. \"     If you do not have an account, please click on the \"Sign Up Now\" link. Current as of: December 9, 2019               Content Version: 12.6  © 7383-6909 [employer] Incorporated. Care instructions adapted under license by [facility] ([facility]). If you have questions about a medical condition or this instruction, always ask your healthcare professional. [place] [address] any warranty or liability for your use of this information.     ·

## 2020-10-08 NOTE — PROGRESS NOTES
Subjective  Hernandez Zenaida, 63 y.o. female presents today with:  Chief Complaint   Patient presents with    Vaginitis     one month follow up   [address] [facility]     has CG at home            HPI    Vaginitis - resolved itching. Was prescribed estrogen cream by Dr. Vahe Pelaez to be used BIW but insurance didn't cover it. No vaginal itching or d/c now. ABnl pelvic US. Was referred to Dr. Vahe Pelaez for review abnormal US. No determination of pathology was made. COntinues to have chronic LLQ pain x years. No other questions and or concerns for today's visit      Review of Systems  No fevers, chills, sweats. No unintended weight loss. No abdominal pain, nausea, vomiting, diarrhea, constipation, bloody stools, black tarry stools. No rashes. No swollen glands.       Past Medical History:   Diagnosis Date    Abnormal pelvic ultrasound 10/8/2020    Acid reflux     Asthma     Chronic back pain     COPD (chronic obstructive pulmonary disease) (HCC)     Depression     Hyperlipidemia     Obesity     Venous insufficiency      Past Surgical History:   Procedure Laterality Date    COLONOSCOPY      ENDOSCOPY, COLON, DIAGNOSTIC      KIDNEY SURGERY      OVARIAN CYST REMOVAL Left 11/06/2004    [place] ESOPHAGOGASTRODUODENOSCOPY TRANSORAL DIAGNOSTIC N/A 11/14/2018    EGD ESOPHAGOGASTRODUODENOSCOPY WITH DILATION performed by Javier Merlos MD at [address] [place] [address] Marital status:      Spouse name: Not on file    Number of children: 1    Years of education: 6    Highest education level: 6th grade   Occupational History    Occupation: homemaker   Social Needs    Financial resource strain: Somewhat hard    Food insecurity     Worry: Never true     Inability: Never true    Transportation needs     Medical: No     Non-medical: No   Tobacco Use    Smoking status: Never Smoker    Smokeless tobacco: Never Used   Substance and Sexual Activity  Alcohol use: No    Drug use: No    Sexual activity: Yes     Partners: Male   Lifestyle    Physical activity     Days per week: 0 days     Minutes per session: 0 min    Stress: To some extent   Relationships    Social connections     Talks on phone: More than three times a week     Gets together: More than three times a week     Attends Mormon service: 1 to 4 times per year     Active member of club or organization: No     Attends meetings of clubs or organizations: Never     Relationship status:     Intimate partner violence     Fear of current or ex partner: No     Emotionally abused: No     Physically abused: No     Forced sexual activity: No   Other Topics Concern    Not on file   Social History Narrative    Lives with her adaptive daughter Anne Mcgill in a home in [place]--is from [address]     Family History   Problem Relation Age of Onset    Diabetes Mother     High Blood Pressure Mother     Diabetes Brother     Diabetes Maternal Grandmother     Diabetes Maternal Grandfather     Diabetes Paternal Grandmother     Diabetes Paternal Grandfather     Diabetes Other     High Blood Pressure Sister     Colon Cancer Paternal Uncle      Allergies   Allergen Reactions    Other      Perfumes      Current Outpatient Medications   Medication Sig Dispense Refill    Cholecalciferol (VITAMIN D) 50 MCG (2000 UT) CAPS capsule Take 1 capsule by mouth 2 times daily 30 capsule 5    pantoprazole (PROTONIX) 40 MG tablet TAKE 1 TABLET BY MOUTH EVERY MORNING (BEFORE BREAKFAST) 90 tablet 2    nitrofurantoin (MACRODANTIN) 100 MG capsule       hydrOXYzine (ATARAX) 25 MG tablet NANCY 1 TABLETA POR BOCA CADA [address] PARA EL PICOR 60 tablet 0    phenazopyridine (PYRIDIUM) 200 MG tablet Take 1 tablet by mouth 3 times daily as needed for Pain (Painful Urination) May turn urine orange.  20 tablet 0    escitalopram (LEXAPRO) 10 MG tablet Take 1 tablet by mouth daily 30 tablet 5    and time. Psychiatric:         Mood and Affect: Mood normal.         Behavior: Behavior normal.         Thought Content: Thought content normal.         Judgment: Judgment normal.           Lab Results   Component Value Date    LABA1C 6.2 (H) 07/29/2020    LABA1C 6.0 (H) 12/26/2019    LABA1C 5.7 05/29/2019     Lab Results   Component Value Date    CREATININE 0.75 09/18/2018     Lab Results   Component Value Date    ALT 17 09/18/2018    AST 17 09/18/2018     Lab Results   Component Value Date    CHOL 219 (H) 04/01/2015    TRIG 105 04/01/2015    HDL 50 09/18/2018    LDLCALC 153 (H) 09/18/2018        Assessment & Plan   Visit Diagnoses and Associated Orders     Abnormal pelvic ultrasound    -  Primary    No pathology identified at this time. F/u with Dr. Rosita Cranker prn. Atrophic vaginitis        Improved. Flu vaccine need        INFLUENZA, QUADV, 3 YRS AND OLDER, IM, MDV, 0.5ML (Magdalen Bugler) [92467 Custom]                   Reviewed with the patient: all disease processes, current clinical status, medications, activities and diet.      Side effects, adverse effects of the medication prescribed today, as well as treatment plan/ rationale and result expectations have been discussed with the patient who expresses understanding and desires to proceed.     Close follow up to evaluate treatment results and for coordination of care. I have reviewed the patient's medical history in detail and updated the computerized patient record. More than 50% of the appointment was spent in face-to-face counseling, education and care coordination. .  Orders Placed This Encounter   Procedures    INFLUENZA, QUADV, 3 YRS AND OLDER, IM, MDV, 0.5ML (Magdalen Bugler)     No orders of the defined types were placed in this encounter.     Medications Discontinued During This Encounter   Medication Reason    zoster recombinant adjuvanted vaccine [facility]) 50 MCG/0.5ML SUSR injection LIST CLEANUP     Return in about 6 months (around 4/8/2021) for Mood Disorder - vv. Controlled Substance Monitoring:    Acute and Chronic Pain Monitoring:   RX Monitoring 11/15/2017   Attestation The Prescription Monitoring Report for this patient was reviewed today.            Carlin Vinson MD

## 2020-10-08 NOTE — PROGRESS NOTES
Medicare Annual Wellness Visit  Name: Chrystal Pryor Date: 10/8/2020   MRN: [id number] Sex: Female   Age: 63 y.o. Ethnicity: /   : 1955 Race:       Carmencita Dk is here for Estée Lagabriella ANAIS    Screenings for behavioral, psychosocial and functional/safety risks, and cognitive dysfunction are all negative except as indicated below. These results, as well as other patient data from the [address] form, are documented in Flowsheets linked to this Encounter. Allergies   Allergen Reactions    Other      Perfumes        Prior to Visit Medications    Medication Sig Taking? Authorizing Provider   Cholecalciferol (VITAMIN D) 50 MCG (2000) CAPS capsule Take 1 capsule by mouth 2 times daily  Lorin Solo DO   pantoprazole (PROTONIX) 40 MG tablet TAKE 1 TABLET BY MOUTH EVERY MORNING (BEFORE BREAKFAST)  Saúl Childress MD   nitrofurantoin (MACRODANTIN) 100 MG capsule   Historical Provider, MD   fluconazole (DIFLUCAN) 150 MG tablet Take one tablet now and repeat in 48 hours if symptoms persist  Patient not taking: Reported on 10/8/2020  Saúl Childress MD   hydrOXYzine (ATARAX) 25 MG tablet NANCY 1 TABLETA POR BOCA CADA [address] PARA EL PICOR  Saúl Childress MD   phenazopyridine (PYRIDIUM) 200 MG tablet Take 1 tablet by mouth 3 times daily as needed for Pain (Painful Urination) May turn urine orange. Yulisa Carrasquillo RN, NP   escitalopram (LEXAPRO) 10 MG tablet Take 1 tablet by mouth daily  Saúl Childress MD   methylPREDNISolone (MEDROL, AMAYA,) 4 MG tablet Take by mouth.   ASAEL Parikh   Artificial Tear Solution (GENTEAL TEARS) 0.1-0.2-0.3 % SOLN   Historical Provider, MD   ibuprofen (ADVIL;MOTRIN) 800 MG tablet TAKE 1 TABLET BY MOUTH EVERY 6 HOURS AS NEEDED FOR PAIN  Historical Provider, MD   SODIUM FLUORIDE, DENTAL GEL, 1.1 % CREA Place onto teeth  Historical (84.8 kg)   10/02/20 184 lb (83.5 kg)     Vitals:    10/08/20 1406   BP: 128/70   Site: Right Upper Arm   Position: Sitting   Cuff Size: Medium Adult   Pulse: 63   Resp: 16   Temp: 96.8 °F (36 °C)   TempSrc: Temporal   SpO2: 98%   Weight: 187 lb (84.8 kg)   Height: 5' 3\" (1.6 m)     Body mass index is 33.13 kg/m². Based upon direct observation of the patient, evaluation of cognition reveals recent and remote memory intact. Patient's complete Health Risk Assessment and screening values have been reviewed and are found in Flowsheets. The following problems were reviewed today and where indicated follow up appointments were made and/or referrals ordered. Positive Risk Factor Screenings with Interventions:     General Health and ACP:  General  In general, how would you say your health is?: Good  In the past 7 days, have you experienced any of the following?  New or Increased Pain, New or Increased Fatigue, Loneliness, Social Isolation, Stress or Anger?: (!) Stress, Anger  Do you get the social and emotional support that you need?: Yes  Do you have a Living Will?: (!) No  Advance Directives     Power of  Living Will ACP-Advance Directive ACP-Power of     Not on File [place] on 11/15/18 Filed [address] [facility] Risk Interventions:  · No Living Will: Advance Care Planning addressed with patient today  · provided Korean language advanced directives    Health Habits/Nutrition:  Health Habits/Nutrition  Do you exercise for at least 20 minutes 2-3 times per week?: (!) No  Have you lost any weight without trying in the past 3 months?: No  Do you eat fewer than 2 meals per day?: (!) Yes  Have you seen a dentist within the past year?: (!) No  Body mass index: (!) 33.12  Health Habits/Nutrition Interventions:  · Inadequate physical activity:  educational materials provided to promote increased physical activity  · Nutritional issues:  educational materials for healthy, well-balanced diet provided  · Dental exam overdue:  patient encouraged to make appointment with his/her dentist   · Eats at least 2-3 meals a day; exercises several times a week    Hearing/Vision:  No exam data present  Hearing/Vision  Do you or your family notice any trouble with your hearing?: No  Do you have difficulty driving, watching TV, or doing any of your daily activities because of your eyesight?: (!) Yes  Have you had an eye exam within the past year?: Yes  Hearing/Vision Interventions:  · Hearing concerns:  patient declines any further evaluation/treatment for hearing issues  · Vision concerns:  patient encouraged to make appointment with his/her eye specialist    Safety:  Safety  Do you have working smoke detectors?: Yes  Have all throw rugs been removed or fastened?: (!) No  Do you have non-slip mats or surfaces in all bathtubs/showers?: (!) No  Do all of your stairways have a railing or banister?: Yes  Are your doorways, halls and stairs free of clutter?: Yes  Do you always fasten your seatbelt when you are in a car?: Yes  Safety Interventions:  · Home safety tips provided    Personalized Preventive Plan   Current Health Maintenance Status  Immunization History   Administered Date(s) Administered    Hepatitis A/Hepatitis B (Twinrix) 09/09/2016    Influenza A (A4H0-48) Vaccine PF IM 11/13/2009    Influenza Vaccine, unspecified formulation 11/07/2013, 10/10/2017    Influenza Virus Vaccine 10/10/2017, 10/19/2018, 10/29/2019    Influenza, Quadv, IM, (6 mo and older Fluzone, Flulaval, Fluarix and 3 yrs and older Afluria) 10/19/2018, 10/29/2019    Influenza, Quadv, IM, PF (6 mo and older Fluzone, Flulaval, Fluarix, and 3 yrs and older Afluria) 09/23/2014    MMR 09/09/2016    Pneumococcal Polysaccharide ([id number]) 11/07/2013    Tdap (Boostrix, Adacel) 09/09/2016    Varicella (Varivax) 09/09/2016        Health Maintenance   Topic Date Due    Shingles Vaccine (1 of 2) 11/04/2016    Colon Cancer Screen FIT/FOBT 02/13/2019    Annual Wellness Visit (AWV)  05/29/2019    Flu vaccine (1) 09/01/2020    A1C test (Diabetic or Prediabetic)  07/29/2021    Breast cancer screen  09/16/2022    Lipid screen  09/18/2023    Cervical cancer screen  08/27/2025    DTaP/Tdap/Td vaccine (2 - Td) 09/09/2026    Pneumococcal 0-64 years Vaccine  Completed    Hepatitis C screen  Completed    HIV screen  Completed    Hepatitis A vaccine  Aged Out    Hepatitis B vaccine  Aged Out    Hib vaccine  Aged Out    Meningococcal (ACWY) vaccine  Aged Out     Recommendations for [facility] Due: see orders and patient instructions/AVS.  . Recommended screening schedule for the next 5-10 years is provided to the patient in written form: see Patient Instructions/AVS.    There are no diagnoses linked to this encounter.

## 2020-10-08 NOTE — Clinical Note
Good afternoon. I hope you are well. Thank you for seeing this patient. You evaluated her for vaginitis and reassured her. I appreciate that. I had referred her to you due to chronic left sided pelvic pain and intrauterine fluid on US. What are your thoughts?

## 2020-10-08 NOTE — PROGRESS NOTES
Vaccine Information Sheet, \"Influenza - Inactivated\"  given to [facility], or parent/legal guardian of  [facility] and verbalized understanding. Patient responses:    Have you ever had a reaction to a flu vaccine? No  Are you able to eat eggs without adverse effects? Yes  Do you have any current illness? No  Have you ever had Guillian Houston Syndrome? No    Flu vaccine given per order. Please see immunization tab.

## 2020-11-05 ENCOUNTER — OFFICE VISIT (OUTPATIENT)
Dept: OBGYN CLINIC | Age: 65
End: 2020-11-05
Payer: COMMERCIAL

## 2020-11-05 VITALS
BODY MASS INDEX: 32.42 KG/M2 | SYSTOLIC BLOOD PRESSURE: 124 MMHG | DIASTOLIC BLOOD PRESSURE: 74 MMHG | WEIGHT: 183 LBS | HEART RATE: 68 BPM

## 2020-11-05 PROCEDURE — G8417 CALC BMI ABV UP PARAM F/U: HCPCS | Performed by: OBSTETRICS & GYNECOLOGY

## 2020-11-05 PROCEDURE — G8427 DOCREV CUR MEDS BY ELIG CLIN: HCPCS | Performed by: OBSTETRICS & GYNECOLOGY

## 2020-11-05 PROCEDURE — 1036F TOBACCO NON-USER: CPT | Performed by: OBSTETRICS & GYNECOLOGY

## 2020-11-05 PROCEDURE — G8482 FLU IMMUNIZE ORDER/ADMIN: HCPCS | Performed by: OBSTETRICS & GYNECOLOGY

## 2020-11-05 PROCEDURE — 3017F COLORECTAL CA SCREEN DOC REV: CPT | Performed by: OBSTETRICS & GYNECOLOGY

## 2020-11-05 PROCEDURE — 99213 OFFICE O/P EST LOW 20 MIN: CPT | Performed by: OBSTETRICS & GYNECOLOGY

## 2020-11-05 RX ORDER — CONJUGATED ESTROGENS 0.62 MG/G
CREAM VAGINAL
Qty: 1 TUBE | Refills: 3 | Status: SHIPPED | OUTPATIENT
Start: 2020-11-05 | End: 2020-11-05 | Stop reason: SDUPTHER

## 2020-11-05 RX ORDER — CONJUGATED ESTROGENS 0.62 MG/G
CREAM VAGINAL
Qty: 1 TUBE | Refills: 3 | Status: SHIPPED | OUTPATIENT
Start: 2020-11-05 | End: 2021-01-29

## 2020-11-05 ASSESSMENT — ENCOUNTER SYMPTOMS
TROUBLE SWALLOWING: 0
NAUSEA: 0
ABDOMINAL PAIN: 0
COLOR CHANGE: 0
BLOOD IN STOOL: 0
SHORTNESS OF BREATH: 0
SORE THROAT: 0
BACK PAIN: 0
VOMITING: 0
CHEST TIGHTNESS: 0
VOICE CHANGE: 0
WHEEZING: 0
CONSTIPATION: 0
ABDOMINAL DISTENTION: 0
COUGH: 0

## 2020-11-05 NOTE — PROGRESS NOTES
Subjective: Patient never got her Premarin vaginal cream she is here a month following that initial visual visit when she was evaluated for lower genital atrophy. She is also complaining of stress urinary incontinence. Patient ID: Vinny Rodriguez is a 63 y.o. female. HPI    Review of Systems   Constitutional: Negative for activity change, appetite change, fatigue and unexpected weight change. HENT: Negative for dental problem, ear pain, hearing loss, nosebleeds, sore throat, trouble swallowing and voice change. Eyes: Negative for visual disturbance. Respiratory: Negative for cough, chest tightness, shortness of breath and wheezing. Cardiovascular: Negative for chest pain and palpitations. Gastrointestinal: Negative for abdominal distention, abdominal pain, blood in stool, constipation, nausea and vomiting. Endocrine: Negative for cold intolerance, heat intolerance, polydipsia, polyphagia and polyuria. Genitourinary: Positive for difficulty urinating. Negative for dyspareunia, dysuria, flank pain, frequency, genital sores, hematuria, menstrual problem, pelvic pain, urgency, vaginal bleeding, vaginal discharge and vaginal pain. Musculoskeletal: Negative for arthralgias, back pain, joint swelling and myalgias. Skin: Negative for color change and rash. Allergic/Immunologic: Negative for environmental allergies, food allergies and immunocompromised state. Neurological: Negative for dizziness, seizures, syncope, speech difficulty, weakness, numbness and headaches. Hematological: Negative for adenopathy. Does not bruise/bleed easily. Psychiatric/Behavioral: Negative for agitation, behavioral problems, confusion, decreased concentration, dysphoric mood and suicidal ideas. The patient is not nervous/anxious and is not hyperactive. Objective:   Physical Exam  Vitals signs reviewed. Constitutional:       Appearance: Normal appearance.    Neurological:      Mental Status:

## 2020-11-28 ENCOUNTER — OFFICE VISIT (OUTPATIENT)
Dept: FAMILY MEDICINE CLINIC | Age: 65
End: 2020-11-28
Payer: COMMERCIAL

## 2020-11-28 PROBLEM — R52 GENERALIZED BODY ACHES: Status: ACTIVE | Noted: 2020-11-28

## 2020-11-28 PROCEDURE — 1036F TOBACCO NON-USER: CPT | Performed by: NURSE PRACTITIONER

## 2020-11-28 PROCEDURE — 99212 OFFICE O/P EST SF 10 MIN: CPT | Performed by: NURSE PRACTITIONER

## 2020-11-28 PROCEDURE — G8417 CALC BMI ABV UP PARAM F/U: HCPCS | Performed by: NURSE PRACTITIONER

## 2020-11-28 PROCEDURE — 3017F COLORECTAL CA SCREEN DOC REV: CPT | Performed by: NURSE PRACTITIONER

## 2020-11-28 PROCEDURE — G8428 CUR MEDS NOT DOCUMENT: HCPCS | Performed by: NURSE PRACTITIONER

## 2020-11-28 PROCEDURE — G8482 FLU IMMUNIZE ORDER/ADMIN: HCPCS | Performed by: NURSE PRACTITIONER

## 2020-11-28 NOTE — PROGRESS NOTES
TELEHEALTH EVALUATION -- Audio/Visual (During SZWVQ-86 public health emergency)    -   Ally Erickson is a 63 y.o. female being evaluated by a Virtual Visit (video visit) encounter to address concerns as mentioned above. A caregiver was present when appropriate. Due to this being a TeleHealth encounter (During JJRNI-13 public health emergency), evaluation of the following organ systems was limited: Vitals/Constitutional/EENT/Resp/CV/GI//MS/Neuro/Skin/Heme-Lymph-Imm. Pursuant to the emergency declaration under the [facility], [address] [place] [address] authority and the [employer] Act, this Virtual Visit was conducted with patient's (and/or legal guardian's) consent, to reduce the patient's risk of exposure to COVID-19 and provide necessary medical care. The patient (and/or legal guardian) has also been advised to contact this office for worsening conditions or problems, and seek emergency medical treatment and/or call 911 if deemed necessary. Patient was contacted and agreed to proceed with a virtual visit via Telephone Visit  The risks and benefits of converting to a virtual visit were discussed in light of the current infectious disease epidemic. Patient also understood that insurance coverage and co-pays are up to their individual insurance plans. Patient was located at their home. Provider was located at their office. 2020  Ally Erickson (:  1955) has requested an audio/video evaluation for the following concern(s):    HPI pt states she was exposed at her job where her co worker tested positive and were sick for a while. Pt states nausea, no fever, some headaches, body aches, pt states asthma but denies any SOB.  Pt states symptoms for about 8 days      Review of Systems- headache, nausea, body aches  No other symptoms     Prior to Visit Medications    Medication Sig Taking? Authorizing Provider   conjugated estrogens (PREMARIN) 0.625 MG/GM vaginal cream 1/2 gram vaginally each night for thirty nights then twice weekly  Saulo Lobato DO   Cholecalciferol (VITAMIN D) 50 MCG (2000 UT) CAPS capsule Take 1 capsule by mouth 2 times daily  Lorin Solo DO   pantoprazole (PROTONIX) 40 MG tablet TAKE 1 TABLET BY MOUTH EVERY MORNING (BEFORE BREAKFAST)  Byron Whiting MD   nitrofurantoin (MACRODANTIN) 100 MG capsule   Historical Provider, MD   hydrOXYzine (ATARAX) 25 MG tablet NANCY 1 TABLETA POR BOCA CADA [address] [place] [place]  Byron Whiting MD   phenazopyridine (PYRIDIUM) 200 MG tablet Take 1 tablet by mouth 3 times daily as needed for Pain (Painful Urination) May turn urine orange. Yulisa Carrasquillo RN, NP   escitalopram (LEXAPRO) 10 MG tablet Take 1 tablet by mouth daily  Byron Whiting MD   Artificial Tear Solution (GENTEAL TEARS) 0.1-0.2-0.3 % SOLN   Historical Provider, MD   ibuprofen (ADVIL;MOTRIN) 800 MG tablet TAKE 1 TABLET BY MOUTH EVERY 6 HOURS AS NEEDED FOR PAIN  Historical Provider, MD   SODIUM FLUORIDE, DENTAL GEL, 1.1 % CREA Place onto teeth  Historical Provider, MD   nystatin (MYCOSTATIN) 844193 UNIT/GM cream Apply topically 2 times daily. Byron Whiting MD   selenium sulfide (SELSUN) 2.5 % lotion Apply for 10 minutes on skin daily for 1-4 weeks OR one overnight application.   Byron Whiting MD   albuterol sulfate  (90 Base) MCG/ACT inhaler Inhale 2 puffs into the lungs every 6 hours as needed for Wheezing  Byron Whiting MD   fluticasone (FLOVENT HFA) 110 MCG/ACT inhaler Inhale 2 puffs into the lungs 2 times daily  Byron Whiting MD       Past Medical History:   Diagnosis Date    Abnormal pelvic ultrasound 10/8/2020    Acid reflux     Asthma     Chronic back pain     COPD (chronic obstructive pulmonary Value Ref Range    Trichomonas Prep See EIA     Yeast, Wet Prep None Seen     Clue Cells, Wet Prep None Seen    Trichomonas by EIA   Result Value Ref Range    TRICHOMONAS VAGINALIS SCREEN Negative        ASSESSMENT/PLAN:  Assessment & Plan   Joao Burciaga was seen today for concern for covid-19. Diagnoses and all orders for this visit:    Nausea  -     COVID-19 Ambulatory; Future    Generalized body aches  -     COVID-19 Ambulatory; Future      Orders Placed This Encounter   Procedures    COVID-19 Ambulatory     Standing Status:   Future     Standing Expiration Date:   12/5/2020     Scheduling Instructions:      Saline media preferred given current shortage of viral transport media but both acceptable     Order Specific Question:   Is this test for diagnosis or screening? Answer:   Diagnosis of ill patient     Order Specific Question:   Symptomatic for COVID-19 as defined by CDC? Answer:   Yes     Order Specific Question:   Date of Symptom Onset     Answer:   11/20/2020     Order Specific Question:   Hospitalized for COVID-19? Answer:   No     Order Specific Question:   Admitted to ICU for COVID-19? Answer:   No     Order Specific Question:   Employed in healthcare setting? Answer:   No     Order Specific Question:   Resident in a congregate (group) care setting? Answer:   No     Order Specific Question:   Pregnant? Answer:   No     Order Specific Question:   Previously tested for COVID-19? Answer:   No     No orders of the defined types were placed in this encounter. There are no discontinued medications. No follow-ups on file. Reviewed with the patient: current clinical status, medications, activities and diet. Side effects, adverse effects of the medication prescribed today, as well as treatment plan/ rationale and result expectations have been discussed with the patient who expresses understanding and desires to proceed.     Close follow up to evaluate treatment results and for coordination of care. I have reviewed the patient's medical history in detail and updated the computerized patient record. Patient identification was verified at the start of the visit: Yes    Total time spent on this encounter: Not billed by time      Patient to be contacted with results. Assume positive until testing returns, 14 day quarantine discussed, quarantine/social distance especially if family members positive in household. Discussed the importance of proper social distancing, hand washing, face masks. Patient may contact local health department with any questions or concerns. --ASAEL Rosas CNP on 11/28/2020 at 12:09 PM    An electronic signature was used to authenticate this note.

## 2020-11-29 DIAGNOSIS — R52 GENERALIZED BODY ACHES: ICD-10-CM

## 2020-11-29 DIAGNOSIS — R11.0 NAUSEA: ICD-10-CM

## 2020-12-01 LAB
SARS-COV-2: DETECTED
SOURCE: ABNORMAL

## 2020-12-03 ENCOUNTER — TELEPHONE (OUTPATIENT)
Dept: FAMILY MEDICINE CLINIC | Age: 65
End: 2020-12-03

## 2020-12-07 RX ORDER — HYDROXYZINE HYDROCHLORIDE 25 MG/1
TABLET, FILM COATED ORAL
Qty: 60 TABLET | Refills: 2 | Status: SHIPPED | OUTPATIENT
Start: 2020-12-07 | End: 2021-02-08

## 2020-12-07 NOTE — TELEPHONE ENCOUNTER
Rx request   Requested Prescriptions     Pending Prescriptions Disp Refills    hydrOXYzine (ATARAX) 25 MG tablet [Pharmacy Med Name: HYDROXYZINE HCL 25 MG TABS 25 Tablet] 60 tablet 2     Sig: NANCY 1 TABLETA POR BOCA CADA [address] [facility]     LOV 10/8/2020  Next Visit Date:  Future Appointments   Date Time Provider Ellie Dunbar   12/16/2020  9:45 AM DO Isaiah Whipple [facility]   12/30/2020 10:00 AM Beth Munoz DO [facility]   1/7/2021 10:30 AM Cici Jeffrey DO [address]   1/8/2021 10:30 AM Paulino Flores MD [address]   4/7/2021  1:30 PM Paulino Flores MD [address] [place] [address]

## 2020-12-21 RX ORDER — ACETAMINOPHEN 160 MG
TABLET,DISINTEGRATING ORAL
Qty: 90 CAPSULE | Refills: 4 | Status: SHIPPED | OUTPATIENT
Start: 2020-12-21 | End: 2021-07-26 | Stop reason: SDUPTHER

## 2020-12-21 NOTE — TELEPHONE ENCOUNTER
Rx request   Requested Prescriptions     Pending Prescriptions Disp Refills    Cholecalciferol (VITAMIN D3) 50 MCG (2000 UT) CAPS [Pharmacy Med Name: VITAMIN D3 2000 UNIT CAPS 50 MCG Capsule] 90 capsule 4     Sig: TAKE 1 CAPSULE BY MOUTH DAILY     LOV 10/8/2020  Next Visit Date:  Future Appointments   Date Time Provider Ellie Dunbar   12/30/2020 10:00 AM Parker Cotter DO [address] [place] [address]   1/7/2021 10:30 AM Majo Madison DO [address]   1/8/2021 10:30 AM MD Isaiah Wesley [facility]   1/26/2021 10:45 AM DO [facility] [place]   4/7/2021  1:30 PM Julee Weiss MD [address] [place] [address]

## 2020-12-30 ENCOUNTER — OFFICE VISIT (OUTPATIENT)
Dept: PHYSICAL MEDICINE AND REHAB | Age: 65
End: 2020-12-30
Payer: COMMERCIAL

## 2020-12-30 VITALS
WEIGHT: 183 LBS | BODY MASS INDEX: 32.43 KG/M2 | HEIGHT: 63 IN | SYSTOLIC BLOOD PRESSURE: 136 MMHG | DIASTOLIC BLOOD PRESSURE: 84 MMHG

## 2020-12-30 DIAGNOSIS — Z79.899 HIGH RISK MEDICATION USE: ICD-10-CM

## 2020-12-30 LAB
AMPHETAMINE SCREEN, URINE: NORMAL
BARBITURATE SCREEN URINE: NORMAL
BENZODIAZEPINE SCREEN, URINE: NORMAL
CANNABINOID SCREEN URINE: NORMAL
COCAINE METABOLITE SCREEN URINE: NORMAL
Lab: NORMAL
METHADONE SCREEN, URINE: NORMAL
OPIATE SCREEN URINE: NORMAL
OXYCODONE URINE: NORMAL
PHENCYCLIDINE SCREEN URINE: NORMAL
PROPOXYPHENE SCREEN: NORMAL

## 2020-12-30 PROCEDURE — 3017F COLORECTAL CA SCREEN DOC REV: CPT | Performed by: PHYSICAL MEDICINE & REHABILITATION

## 2020-12-30 PROCEDURE — G8482 FLU IMMUNIZE ORDER/ADMIN: HCPCS | Performed by: PHYSICAL MEDICINE & REHABILITATION

## 2020-12-30 PROCEDURE — G8427 DOCREV CUR MEDS BY ELIG CLIN: HCPCS | Performed by: PHYSICAL MEDICINE & REHABILITATION

## 2020-12-30 PROCEDURE — G8417 CALC BMI ABV UP PARAM F/U: HCPCS | Performed by: PHYSICAL MEDICINE & REHABILITATION

## 2020-12-30 PROCEDURE — 1036F TOBACCO NON-USER: CPT | Performed by: PHYSICAL MEDICINE & REHABILITATION

## 2020-12-30 PROCEDURE — 99215 OFFICE O/P EST HI 40 MIN: CPT | Performed by: PHYSICAL MEDICINE & REHABILITATION

## 2020-12-30 ASSESSMENT — ENCOUNTER SYMPTOMS
ABDOMINAL PAIN: 0
COUGH: 0
WHEEZING: 0
EYE REDNESS: 0
DIARRHEA: 0
VOMITING: 0
EYE PAIN: 0
CONSTIPATION: 1
NAUSEA: 0
BACK PAIN: 1
BLOOD IN STOOL: 0
PHOTOPHOBIA: 0
SORE THROAT: 0
SHORTNESS OF BREATH: 1
STRIDOR: 0

## 2020-12-30 NOTE — PROGRESS NOTES
Subjective  Janet Fuentes, 63 y.o. female presents today with:    Back Pain  Neck Pain  Shoulder Pain (Left)  Medication Refill (Vit D refill today)  Seen in September did not get talk screen done did not go to OT and did not come to her scheduled injections. States her pain is better. Back Pain  This is a chronic problem. The current episode started more than 1 year ago. The problem occurs constantly. The problem has been waxing and waning since onset. The pain is present in the lumbar spine and sacro-iliac. The quality of the pain is described as aching. The pain is at a severity of 7/10. The pain is severe. The pain is worse during the day. The symptoms are aggravated by bending and position. Stiffness is present in the morning. Associated symptoms include weakness. Pertinent negatives include no abdominal pain, chest pain, dysuria, fever, headaches, paresis or tingling. Risk factors include sedentary lifestyle, menopause and history of osteoporosis. She has tried heat, home exercises, analgesics, bed rest, muscle relaxant, NSAIDs and walking for the symptoms. The treatment provided mild relief. Shoulder Pain   The pain is present in the neck, back and left shoulder. This is a chronic problem. The current episode started more than 1 year ago. There has been no history of extremity trauma. The problem occurs constantly. The problem has been unchanged. The pain is at a severity of 7/10. The pain is moderate. Pertinent negatives include no fever or tingling. She has tried acetaminophen for the symptoms. The treatment provided mild relief. Her past medical history is significant for osteoarthritis. Neck Pain   This is a chronic problem. The current episode started more than 1 year ago. The problem occurs constantly. The problem has been gradually worsening. The pain is associated with nothing. The pain is present in the midline and left side. The pain is at a severity of 7/10. The pain is severe. The symptoms are aggravated by position and twisting. The pain is same all the time. Stiffness is present in the morning. Associated symptoms include weakness. Pertinent negatives include no chest pain, fever, headaches, pain with swallowing, paresis, photophobia, syncope or tingling. She has tried chiropractic manipulation, heat, home exercises, acetaminophen, oral narcotics, muscle relaxants and NSAIDs for the symptoms. The treatment provided mild relief. Past Medical History:   Diagnosis Date    Abnormal pelvic ultrasound 10/8/2020    Acid reflux     Asthma     Chronic back pain     COPD (chronic obstructive pulmonary disease) ([facility])     Depression     Hyperlipidemia     Obesity     Venous insufficiency      Past Surgical History:   Procedure Laterality Date    COLONOSCOPY      ENDOSCOPY, COLON, DIAGNOSTIC      KIDNEY SURGERY      OVARIAN CYST REMOVAL Left 11/06/2004    KS ESOPHAGOGASTRODUODENOSCOPY TRANSORAL DIAGNOSTIC N/A 11/14/2018    EGD ESOPHAGOGASTRODUODENOSCOPY WITH DILATION performed by Jose Luis Barrera MD at [address] [place] [address] Marital status:      Spouse name: None    Number of children: 1    Years of education: 6    Highest education level: 6th grade   Occupational History    Occupation: homemaker   Social Needs    Financial resource strain: Somewhat hard    Food insecurity     Worry: Never true     Inability: Never true    Transportation needs     Medical: No     Non-medical: No   Tobacco Use    Smoking status: Never Smoker    Smokeless tobacco: Never Used   Substance and Sexual Activity    Alcohol use: No    Drug use: No    Sexual activity: Yes     Partners: Male   Lifestyle    Physical activity     Days per week: 0 days     Minutes per session: 0 min    Stress:  To some extent   Relationships    Social connections     Talks on phone: More than three times a week     Gets together: More than three times a week Attends [employer] service: 1 to 4 times per year     Active member of club or organization: No     Attends meetings of clubs or organizations: Never     Relationship status:     Intimate partner violence     Fear of current or ex partner: No     Emotionally abused: No     Physically abused: No     Forced sexual activity: No   Other Topics Concern    None   Social History Narrative    Lives with her adaptive daughter Gary Benitez in a home in [place]--is from [address]     Family History   Problem Relation Age of Onset    Diabetes Mother     High Blood Pressure Mother     Diabetes Brother     Diabetes Maternal Grandmother     Diabetes Maternal Grandfather     Diabetes Paternal Grandmother     Diabetes Paternal Grandfather     Diabetes Other     High Blood Pressure Sister     Colon Cancer Paternal Uncle        Allergies   Allergen Reactions    Other      Perfumes        Review of Systems   Constitutional: Positive for activity change and fatigue. Negative for chills, diaphoresis and fever. HENT: Negative for congestion, ear discharge, ear pain, hearing loss, nosebleeds, sore throat and tinnitus. Eyes: Negative for photophobia, pain and redness. Respiratory: Positive for shortness of breath. Negative for cough, wheezing and stridor. Shortness of breath on exertion   Cardiovascular: Negative for chest pain, palpitations, leg swelling and syncope. Gastrointestinal: Positive for constipation. Negative for abdominal pain, blood in stool, diarrhea, nausea and vomiting. Endocrine: Negative for polydipsia. Genitourinary: Negative for dysuria, flank pain, frequency, hematuria and urgency. Musculoskeletal: Positive for back pain, gait problem, myalgias and neck pain. Skin: Negative for rash. Allergic/Immunologic: Positive for immunocompromised state. Negative for environmental allergies. Neurological: Positive for weakness.  Negative for dizziness, tingling, tremors, seizures and headaches. Hematological: Does not bruise/bleed easily. Psychiatric/Behavioral: Negative for hallucinations and suicidal ideas. The patient is not nervous/anxious. Objective    Vitals:    12/30/20 1030   BP: 136/84   Site: Left Upper Arm   Position: Sitting   Cuff Size: Small Adult   Weight: 183 lb (83 kg)   Height: 5' 3\" (1.6 m)     Pain Score: Three (Without medication)       Physical Exam  Vitals signs reviewed. Constitutional:       General: She is not in acute distress. Appearance: She is well-developed. She is not ill-appearing, toxic-appearing or diaphoretic. Comments:         HENT:      Head: Normocephalic and atraumatic. Right Ear: Hearing normal.      Left Ear: Hearing normal.      Nose: Nose normal.      Mouth/Throat:      Mouth: No oral lesions. Dentition: Normal dentition. Pharynx: No oropharyngeal exudate. Eyes:      General: No scleral icterus. Right eye: No discharge. Left eye: No discharge. Conjunctiva/sclera: Conjunctivae normal.      Right eye: No chemosis or exudate. Left eye: No chemosis or exudate. Pupils: Pupils are equal, round, and reactive to light. Neck:      Musculoskeletal: Normal range of motion and neck supple. No edema or neck rigidity. Thyroid: No thyromegaly. Vascular: No JVD. Trachea: No tracheal deviation. Cardiovascular:      Pulses: No decreased pulses. Pulmonary:      Effort: Pulmonary effort is normal. No tachypnea, bradypnea, accessory muscle usage or respiratory distress. Breath sounds: Decreased breath sounds present. No wheezing or rales. Chest:      Chest wall: No tenderness. Abdominal:      General: There is no distension. Musculoskeletal:         General: Tenderness present.       Right shoulder: Normal.      Left shoulder: Normal.      Right elbow: Normal.     Left elbow: Normal.      Right wrist: Normal.      Left wrist: Normal.      Right hip: Normal. Left hip: Normal.      Right knee: Normal.      Left knee: Normal.      Right ankle: Normal. Achilles tendon normal.      Left ankle: Normal. Achilles tendon normal.      Cervical back: She exhibits decreased range of motion, tenderness and bony tenderness. Thoracic back: She exhibits decreased range of motion, tenderness and bony tenderness. Lumbar back: She exhibits decreased range of motion, tenderness, bony tenderness and pain. She exhibits no swelling, no edema, no deformity, no laceration and normal pulse. Right upper arm: Normal.      Left upper arm: Normal.      Right forearm: Normal.      Left forearm: Normal.      Right hand: Normal.      Left hand: Normal.      Right upper leg: Normal.      Left upper leg: Normal.      Right lower leg: Normal.      Left lower leg: Normal.      Right foot: Normal.      Left foot: Normal.      Comments: Tender areas are indicated by numbered spot         Skin:     General: Skin is warm and dry. Coloration: Skin is not pale. Findings: No abrasion, bruising, ecchymosis, erythema, laceration, petechiae or rash. Rash is not macular, pustular or urticarial.      Nails: There is no clubbing. Neurological:      Mental Status: She is alert and oriented to person, place, and time. Cranial Nerves: No cranial nerve deficit. Sensory: Sensory deficit present. Motor: No tremor, atrophy or abnormal muscle tone. Coordination: Coordination normal.      Gait: Gait abnormal.      Deep Tendon Reflexes: Reflexes abnormal. Babinski sign absent on the right side. Babinski sign absent on the left side. Psychiatric:         Attention and Perception: She is attentive. Mood and Affect: Mood is not anxious or depressed. Affect is not labile, blunt, angry or inappropriate. Speech: She is communicative.  Speech is not rapid and pressured, delayed, slurred or tangential.         Behavior: Behavior normal. Behavior is not agitated, slowed, aggressive, withdrawn, hyperactive or combative. Thought Content: Thought content normal. Thought content is not paranoid or delusional. Thought content does not include homicidal or suicidal ideation. Thought content does not include homicidal or suicidal plan. Cognition and Memory: Memory is not impaired. She does not exhibit impaired recent memory or impaired remote memory. Judgment: Judgment normal. Judgment is not impulsive or inappropriate. Ortho Exam  Neurologic Exam     Mental Status   Oriented to person, place, and time. Speech: not slurred     Cranial Nerves     CN III, IV, VI   Pupils are equal, round, and reactive to light. After a thorough review and discussion of the previous medical records, patient comprehensive medical, surgical, and family and social history, Review of Systems, their OARRS, their Screener and Opioid Assessment for Patients with Pain (SOAPP®-R), recent diagnostics, and symptomatic results to previous treatment, it is my impression that the patients is suffering with progressive and severe:     Diagnosis Orders   1. Bilateral carpal tunnel syndrome     2. Cervicalgia     3. Chronic midline low back pain with bilateral sciatica     4. Vitamin D deficiency     5.  LUQ pain         I am also concerned by lifestyle and mood issues including:    Past Medical History:   Diagnosis Date    Abnormal pelvic ultrasound 10/8/2020    Acid reflux     Asthma     Chronic back pain     COPD (chronic obstructive pulmonary disease) ([facility])     Depression     Hyperlipidemia     Obesity     Venous insufficiency            Given their medication, chronic pain and lifestyle and medications they are at risk for :    Falls, constipation, addiction  Loss of livelyhood due to severe pain, debility, weight gain and  vitamin D deficiency    The patient was educated regarding proper diet, fitness routine, and regulatory restrictions concerning pain medications. Previous notes, comprehensive past medical, surgical, family history, and diagnostics were reviewed. Patient education and councelling were provided regarding off label use,treatment options and medication and injection risks. Current and old OARRS ([place] Automated Prescription Reporting System) records reviewed, all refills reviewed since last visit,  Behavioral agreement/RIKY regulations   and Toxicology screen was reviewed with patient and is up to date. There are no current red flags. They are making good progress regarding pain relief, they are performing at a functional level regarding activities of daily living, family interactions and psychological functioning, they're not having any adverse effects or side effects from the current medications, and I see no findings of aberrant drug taking or addiction related behaviors. The patient is aware that they have a chronic pain condition and they may require opiates dosing for life. All efforts will be made to wean to the lowest effective dose. Other therapies for pain have not been effective including nonopiate medications. Injections and exercises are only partially effective. A Rx for Narcan was offered to help prevent accidental overdose. RX Monitoring 11/15/2017   Attestation The Prescription Monitoring Report for this patient was reviewed today. Patient is currently taking:       I am having [facility] maintain her SODIUM FLUORIDE (DENTAL GEL), nystatin, selenium sulfide, albuterol sulfate HFA, fluticasone, ibuprofen, GenTeal Tears, escitalopram, phenazopyridine, nitrofurantoin, pantoprazole, Premarin, hydrOXYzine, and Vitamin D3. I also recommend the following Medications:    No orders of the defined types were placed in this encounter.       -which helps with pain and function. Otherwise, continue the current pain medications that I have prescibed.       Radiologic:   Old films reviewed   XR CHEST (2 VW)       CLINICAL HISTORY: R04.2 Cough with hemoptysis ICD10       COMPARISONS: October 23, 2010       FINDINGS:       Two views of the chest are submitted.  The cardiac silhouette is of normal size configuration.  The mediastinum is unremarkable. Pulmonary vascular unremarkable. Right sided trachea. No focal infiltrates.  No effusions.  No Pneumothoraces.                                                                                        Impression   NO ACUTE ACTIVE CARDIOPULMONARY PROCESS     ,     I discussed results with patients. see Follow up plans below  For any new studies. Care Everywhere Updates:  requested and reviewed. No new issues noted. Electrodiagnostic:  Previous studies requested,     I discussed results with patient. See follow-up plans for new studies. Labs:  Previous labs reviewed     Patient had SARS-CoV-2 detected 11/29/2020.     Lab Results   Component Value Date     09/18/2018    K 4.9 09/18/2018     09/18/2018    CO2 25 09/18/2018    BUN 19 09/18/2018    CREATININE 0.75 09/18/2018    CALCIUM 9.4 09/18/2018    LABALBU 4.2 09/18/2018    BILITOT 0.7 09/18/2018    ALKPHOS 87 09/18/2018    AST 17 09/18/2018    ALT 17 09/18/2018     Lab Results   Component Value Date    WBC 5.4 11/01/2018    RBC 4.59 11/01/2018    HGB 14.9 11/01/2018    HCT 43.0 11/01/2018    MCV 93.7 11/01/2018    MCH 32.5 11/01/2018    MCHC 34.7 11/01/2018    RDW 12.8 11/01/2018     11/01/2018    MPV 8.8 03/09/2015       No results found for: LABAMPH, BARBSCNU, LABBENZ, CANSU, COCAIMETSCRU, PHENCYCLIDINESCREENURINE, TRICYCLIC, DSCOMMENT    No results found for: CODEINE, MORPHINE, ACETYLMORPHI, OXYCODONE, NOROXYCODONE, NOROXYMU, HYDRCO, NORHYDU, HYDROMO, BUPREN, NORBUPRNOR, FENTA, NORFENT, MEPERIDINE, TAPENU, TAPOSULFUR, METHADONE, LABPROP, TRAM, AMPH, METHAMP, MDMA, ECMDA    No results found for: Joretta Lisco, PHENTERMINE, BENZOYL, ALPRAZ, ALPHAOHALPRA, CLONAZEPAM, 7AMINOCLONAZ, DIAZEP, CHUCK, OXAZ, TEMAZ, LORAZEPAM, MIDAZOLAM, ZOLPIDEM, KIM, ETG, MARIJMET, PCP, PAINMGTDRUGP, EERPAINMGTPA, LABCREA      , I discussed results with patient. See follow-up plans for new studies. Therapies:  HEP-gentle stretching and relaxation techniques-demonstrated with patient-they are to do them twice a day. They are also advised to make the following lifestyle changes:  Goals      SOAPP-R      12/30/20 score: 5- low risk            Injections or Epidurals:  Injection options were discussed. Call as needed for injections Patient gave verbal consent to ordered injections. See follow-up plans for planned injections. Supplements:  Vitamin D with increased dosing during the rainy months,   Education was given on:   Dietary and Fitness--daily stretches and low carb diet-in chair Yoga when possible       needs to get tox screen and reschedule FU for any meds. Follow up with Primary Care Physician regarding their general medical needs. Stressed the importance of following up with PCP and specialists for his/her chronic diseases, health, CV, and cancer screening and continued care. Will follow disease activity/progression and adjust therapeutic regimen to disease activity and severity. Discussed medication dosage, usage, goals of therapy, and side effects. Available test results were reviewed -Discussed findings, impression and plan with patient. An additional 5 minutes were spent outside of the patient visit to review records. Additional time spent with the patient to discuss their questions. Additional time spent with the patient devoted to discussing treatment strategy, planning, and implementation. Patient understands above plan; questions asked and answered. Patient agrees to plan as noted above. F/U in 2-3 months  At least 50% of the visit was involved in the discussion of the options for treatment.  We discussed exercises, medication, interventional therapies and surgery. Healthy life style is essential with patient hard work to achieve the wellness. In addition; discussion with the patient and/or family about any of the diagnostic results, impressions and/or recommended diagnostic studies, prognosis, risks and benefits of treatment options, instructions for treatment and/or follow-up, importance of compliance with chosen treatment options, risk-factor reduction, and patient/family education. They are to follow up in 2 months to review medication, efficacy of injections, pill counts, OARRS check, SOAPPR assessment, review diagnostics, to review previous and future treatment plans and assess appropriateness for continued therapy. New Diagnostics  No orders of the defined types were placed in this encounter.       Patricio Allen, DO

## 2021-01-08 ENCOUNTER — VIRTUAL VISIT (OUTPATIENT)
Dept: FAMILY MEDICINE CLINIC | Age: 66
End: 2021-01-08
Payer: MEDICARE

## 2021-01-08 DIAGNOSIS — Z12.11 SCREEN FOR COLON CANCER: ICD-10-CM

## 2021-01-08 DIAGNOSIS — M47.22 OSTEOARTHRITIS OF SPINE WITH RADICULOPATHY, CERVICAL REGION: Primary | ICD-10-CM

## 2021-01-08 PROCEDURE — 3017F COLORECTAL CA SCREEN DOC REV: CPT | Performed by: FAMILY MEDICINE

## 2021-01-08 PROCEDURE — G8427 DOCREV CUR MEDS BY ELIG CLIN: HCPCS | Performed by: FAMILY MEDICINE

## 2021-01-08 PROCEDURE — 99213 OFFICE O/P EST LOW 20 MIN: CPT | Performed by: FAMILY MEDICINE

## 2021-01-08 RX ORDER — ACETAMINOPHEN 500 MG
1000 TABLET ORAL EVERY 6 HOURS PRN
Qty: 120 TABLET | Refills: 3 | Status: SHIPPED | OUTPATIENT
Start: 2021-01-08 | End: 2022-01-26 | Stop reason: ALTCHOICE

## 2021-01-08 RX ORDER — IBUPROFEN 800 MG/1
TABLET ORAL
Qty: 120 TABLET | Status: CANCELLED | OUTPATIENT
Start: 2021-01-08

## 2021-01-08 ASSESSMENT — PATIENT HEALTH QUESTIONNAIRE - PHQ9
SUM OF ALL RESPONSES TO PHQ QUESTIONS 1-9: 0
SUM OF ALL RESPONSES TO PHQ9 QUESTIONS 1 & 2: 0
2. FEELING DOWN, DEPRESSED OR HOPELESS: 0

## 2021-01-08 NOTE — PROGRESS NOTES
Vikram Paiz is a 63 y.o. female evaluated via telephone on 2021. Consent:  She and/or health care decision maker is aware that that she may receive a bill for this telephone service, depending on her insurance coverage, and has provided verbal consent to proceed: Yes      Documentation:  I communicated with the patient and/or health care decision maker about see below. Details of this discussion including any medical advice provided: see below      I affirm this is a Patient Initiated Episode with an Established Patient who has not had a related appointment within my department in the past 7 days or scheduled within the next 24 hours. Total Time:     Note: not billable if this call serves to triage the patient into an appointment for the relevant concern      Ladonna IRVING     2021    TELEHEALTH EVALUATION -- Audio (During  public health emergency)    HPI:    Vikram Paiz (:  1955) has requested an audio evaluation for the following concern(s):    Medication Refill and Health Maintenance (colonoscopy will be scheduled through [facility])    Went to [facility] 10 yr.ago for EGD and colonoscopy. Neck pain resolved, no need for injections so she put them on hold. F/u In [facility]. Review of Systems    Prior to Visit Medications    Medication Sig Taking?  Authorizing Provider   acetaminophen (APAP EXTRA STRENGTH) 500 MG tablet Take 2 tablets by mouth every 6 hours as needed for Pain Yes Jun Hernandez MD   Cholecalciferol (VITAMIN D3) 50 MCG (2000) CAPS TAKE 1 CAPSULE BY MOUTH DAILY Yes Jun Hernandez MD   hydrOXYzine (ATARAX) 25 MG tablet [facility] [address] [facility] Yes Jun Hernandez MD   conjugated estrogens (PREMARIN) 0.625 MG/GM vaginal cream 1/2 gram vaginally each night for thirty nights then twice weekly Yes Christy Deal DO   pantoprazole (PROTONIX) 40 MG tablet TAKE 1 TABLET BY MOUTH EVERY MORNING (BEFORE BREAKFAST) Yes Lila Jimenez MD   escitalopram (LEXAPRO) 10 MG tablet Take 1 tablet by mouth daily Yes Lila Jimenez MD   Artificial Tear Solution (GENTEAL TEARS) 0.1-0.2-0.3 % SOLN  Yes Historical Provider, MD   nystatin (MYCOSTATIN) 294521 UNIT/GM cream Apply topically 2 times daily. Yes Lila Jimenez MD   selenium sulfide (SELSUN) 2.5 % lotion Apply for 10 minutes on skin daily for 1-4 weeks OR one overnight application. Yes Lila Jimenez MD   albuterol sulfate  (90 Base) MCG/ACT inhaler Inhale 2 puffs into the lungs every 6 hours as needed for Wheezing Yes Lila Jimenez MD   nitrofurantoin (MACRODANTIN) 100 MG capsule   Historical Provider, MD   phenazopyridine (PYRIDIUM) 200 MG tablet Take 1 tablet by mouth 3 times daily as needed for Pain (Painful Urination) May turn urine orange. Patient not taking: Reported on 1/8/2021  Yulisa Carrasquillo RN, NP   SODIUM FLUORIDE, DENTAL GEL, 1.1 % CREA Place onto teeth  Historical Provider, MD   fluticasone (FLOVENT HFA) 110 MCG/ACT inhaler Inhale 2 puffs into the lungs 2 times daily  Patient not taking: Reported on 1/8/2021  Lila Jimenez MD       Social History     Tobacco Use    Smoking status: Never Smoker    Smokeless tobacco: Never Used   Substance Use Topics    Alcohol use: No    Drug use: No            PHYSICAL EXAMINATION:  [ INSTRUCTIONS:  \"[x]\" Indicates a positive item  \"[]\" Indicates a negative item  -- DELETE ALL ITEMS NOT EXAMINED]  Vital Signs: unavailable    Patient appears to be alert and oriented to person, place, time, situation and is in no acute distress. Respiratory effort appears normal. Mood appears stable and speech and thought are grossly normal.    ASSESSMENT/PLAN:  Crys Abraham was seen today for medication refill and health maintenance.     Diagnoses and all orders for this visit:    Osteoarthritis of spine with radiculopathy, cervical region  Comments:  No alarm symptoms. Currently no indication for injections. Advised patient caution in taking NSAIDs. Orders:  -     acetaminophen (APAP EXTRA STRENGTH) 500 MG tablet; Take 2 tablets by mouth every 6 hours as needed for Pain    Screen for colon cancer  Comments: Will refer for colon cancer screening. Orders:  -     Edith Patel MD, Gastroenterology, [facility]          Return for has f/u. Eliza Mckeon is a 63 y.o. female being evaluated by a Virtual Visit (telephonic visit) encounter to address concerns as mentioned above. A caregiver was present when appropriate. Due to this being a TeleHealth encounter (During IBBLL-15 public health emergency), evaluation of the following organ systems was limited: Vitals/Constitutional/EENT/Resp/CV/GI//MS/Neuro/Skin/Heme-Lymph-Imm. Pursuant to the emergency declaration under the [address] [place] [address] authority and the [employer] Act, this Virtual Visit was conducted with patient's (and/or legal guardian's) consent, to reduce the patient's risk of exposure to COVID-19 and provide necessary medical care. The patient (and/or legal guardian) has also been advised to contact this office for worsening conditions or problems, and seek emergency medical treatment and/or call 911 if deemed necessary. Services were provided through a telephonic synchronous discussion virtually to substitute for in-person clinic visit. Patient and provider were located at their individual homes. --Zander Dailey MD on 1/10/2021 at 2:34 PM    An electronic signature was used to authenticate this note.

## 2021-01-11 DIAGNOSIS — Z01.818 ENCOUNTER FOR PREADMISSION TESTING: Primary | ICD-10-CM

## 2021-01-12 RX ORDER — POLYETHYLENE GLYCOL 3350, SODIUM CHLORIDE, SODIUM BICARBONATE, POTASSIUM CHLORIDE 420; 11.2; 5.72; 1.48 G/4L; G/4L; G/4L; G/4L
4000 POWDER, FOR SOLUTION ORAL ONCE
Qty: 4000 ML | Refills: 0 | Status: SHIPPED | OUTPATIENT
Start: 2021-01-12 | End: 2021-01-12

## 2021-01-27 ENCOUNTER — NURSE ONLY (OUTPATIENT)
Dept: PRIMARY CARE CLINIC | Age: 66
End: 2021-01-27

## 2021-01-27 DIAGNOSIS — Z01.818 ENCOUNTER FOR PREADMISSION TESTING: ICD-10-CM

## 2021-01-28 LAB
SARS-COV-2: NOT DETECTED
SOURCE: NORMAL

## 2021-01-29 RX ORDER — CONJUGATED ESTROGENS 0.62 MG/G
CREAM VAGINAL
Qty: 30 G | Refills: 3 | Status: SHIPPED | OUTPATIENT
Start: 2021-01-29 | End: 2021-02-23

## 2021-02-03 ENCOUNTER — ANCILLARY PROCEDURE (OUTPATIENT)
Dept: ENDOSCOPY | Age: 66
End: 2021-02-03
Attending: INTERNAL MEDICINE
Payer: MEDICARE

## 2021-02-03 ENCOUNTER — ANESTHESIA (OUTPATIENT)
Dept: ENDOSCOPY | Age: 66
End: 2021-02-03
Payer: MEDICARE

## 2021-02-03 ENCOUNTER — ANESTHESIA EVENT (OUTPATIENT)
Dept: ENDOSCOPY | Age: 66
End: 2021-02-03
Payer: MEDICARE

## 2021-02-03 ENCOUNTER — HOSPITAL ENCOUNTER (OUTPATIENT)
Age: 66
Setting detail: SURGERY ADMIT
Discharge: HOME | End: 2021-02-03
Attending: INTERNAL MEDICINE | Admitting: INTERNAL MEDICINE
Payer: MEDICARE

## 2021-02-03 VITALS
BODY MASS INDEX: 30.32 KG/M2 | HEART RATE: 67 BPM | SYSTOLIC BLOOD PRESSURE: 133 MMHG | TEMPERATURE: 97.5 F | WEIGHT: 182 LBS | OXYGEN SATURATION: 100 % | RESPIRATION RATE: 18 BRPM | DIASTOLIC BLOOD PRESSURE: 71 MMHG | HEIGHT: 65 IN

## 2021-02-03 VITALS
DIASTOLIC BLOOD PRESSURE: 55 MMHG | RESPIRATION RATE: 13 BRPM | SYSTOLIC BLOOD PRESSURE: 87 MMHG | OXYGEN SATURATION: 100 %

## 2021-02-03 PROBLEM — Z12.11 SCREENING FOR COLON CANCER: Status: ACTIVE | Noted: 2017-12-13

## 2021-02-03 PROCEDURE — 2500000003 HC RX 250 WO HCPCS: Performed by: NURSE ANESTHETIST, CERTIFIED REGISTERED

## 2021-02-03 PROCEDURE — 3700000001 HC ADD 15 MINUTES (ANESTHESIA): Performed by: INTERNAL MEDICINE

## 2021-02-03 PROCEDURE — 2580000003 HC RX 258

## 2021-02-03 PROCEDURE — 2580000003 HC RX 258: Performed by: INTERNAL MEDICINE

## 2021-02-03 PROCEDURE — 6360000002 HC RX W HCPCS: Performed by: INTERNAL MEDICINE

## 2021-02-03 PROCEDURE — 3700000000 HC ANESTHESIA ATTENDED CARE: Performed by: INTERNAL MEDICINE

## 2021-02-03 PROCEDURE — 7100000010 HC PHASE II RECOVERY - FIRST 15 MIN: Performed by: INTERNAL MEDICINE

## 2021-02-03 PROCEDURE — 2709999900 HC NON-CHARGEABLE SUPPLY: Performed by: INTERNAL MEDICINE

## 2021-02-03 PROCEDURE — 6370000000 HC RX 637 (ALT 250 FOR IP): Performed by: INTERNAL MEDICINE

## 2021-02-03 PROCEDURE — 45385 COLONOSCOPY W/LESION REMOVAL: CPT | Performed by: INTERNAL MEDICINE

## 2021-02-03 PROCEDURE — 2580000003 HC RX 258: Performed by: NURSE ANESTHETIST, CERTIFIED REGISTERED

## 2021-02-03 PROCEDURE — 6360000002 HC RX W HCPCS: Performed by: NURSE ANESTHETIST, CERTIFIED REGISTERED

## 2021-02-03 PROCEDURE — 88305 TISSUE EXAM BY PATHOLOGIST: CPT

## 2021-02-03 PROCEDURE — 3609027000 HC COLONOSCOPY: Performed by: INTERNAL MEDICINE

## 2021-02-03 RX ORDER — SODIUM CHLORIDE 9 MG/ML
INJECTION, SOLUTION INTRAVENOUS CONTINUOUS PRN
Status: DISCONTINUED | OUTPATIENT
Start: 2021-02-03 | End: 2021-02-03 | Stop reason: SDUPTHER

## 2021-02-03 RX ORDER — OMEPRAZOLE 10 MG/1
10 CAPSULE, DELAYED RELEASE ORAL DAILY
COMMUNITY
End: 2022-01-26 | Stop reason: ALTCHOICE

## 2021-02-03 RX ORDER — PROPOFOL 10 MG/ML
INJECTION, EMULSION INTRAVENOUS CONTINUOUS PRN
Status: DISCONTINUED | OUTPATIENT
Start: 2021-02-03 | End: 2021-02-03 | Stop reason: SDUPTHER

## 2021-02-03 RX ORDER — ONDANSETRON 2 MG/ML
4 INJECTION INTRAMUSCULAR; INTRAVENOUS
Status: COMPLETED | OUTPATIENT
Start: 2021-02-03 | End: 2021-02-03

## 2021-02-03 RX ORDER — MAGNESIUM HYDROXIDE 1200 MG/15ML
LIQUID ORAL PRN
Status: DISCONTINUED | OUTPATIENT
Start: 2021-02-03 | End: 2021-02-03 | Stop reason: ALTCHOICE

## 2021-02-03 RX ORDER — LIDOCAINE HYDROCHLORIDE 20 MG/ML
INJECTION, SOLUTION INFILTRATION; PERINEURAL PRN
Status: DISCONTINUED | OUTPATIENT
Start: 2021-02-03 | End: 2021-02-03 | Stop reason: SDUPTHER

## 2021-02-03 RX ORDER — SIMETHICONE 20 MG/.3ML
EMULSION ORAL PRN
Status: DISCONTINUED | OUTPATIENT
Start: 2021-02-03 | End: 2021-02-03 | Stop reason: ALTCHOICE

## 2021-02-03 RX ORDER — SODIUM CHLORIDE 9 MG/ML
INJECTION, SOLUTION INTRAVENOUS
Status: COMPLETED
Start: 2021-02-03 | End: 2021-02-03

## 2021-02-03 RX ORDER — GLYCOPYRROLATE 1 MG/5 ML
SYRINGE (ML) INTRAVENOUS PRN
Status: DISCONTINUED | OUTPATIENT
Start: 2021-02-03 | End: 2021-02-03 | Stop reason: SDUPTHER

## 2021-02-03 RX ORDER — 0.9 % SODIUM CHLORIDE 0.9 %
500 INTRAVENOUS SOLUTION INTRAVENOUS ONCE
Status: DISCONTINUED | OUTPATIENT
Start: 2021-02-03 | End: 2021-02-03 | Stop reason: HOSPADM

## 2021-02-03 RX ADMIN — ONDANSETRON 4 MG: 2 INJECTION INTRAMUSCULAR; INTRAVENOUS at 10:14

## 2021-02-03 RX ADMIN — SODIUM CHLORIDE: 9 INJECTION, SOLUTION INTRAVENOUS at 09:01

## 2021-02-03 RX ADMIN — PHENYLEPHRINE HYDROCHLORIDE 100 MCG: 10 INJECTION INTRAVENOUS at 09:31

## 2021-02-03 RX ADMIN — SODIUM CHLORIDE 500 ML: 9 INJECTION, SOLUTION INTRAVENOUS at 09:07

## 2021-02-03 RX ADMIN — PROPOFOL 120 MCG/KG/MIN: 10 INJECTION, EMULSION INTRAVENOUS at 09:13

## 2021-02-03 RX ADMIN — LIDOCAINE HYDROCHLORIDE 40 MG: 20 INJECTION, SOLUTION INFILTRATION; PERINEURAL at 09:13

## 2021-02-03 RX ADMIN — Medication 0.2 MG: at 09:24

## 2021-02-03 NOTE — H&P
Patient Name: Boyd Ybarra  : 1955  MRN: [id number]  DATE: 21      ENDOSCOPY  History and Physical    Procedure:    [] Diagnostic Colonoscopy       [x] Screening Colonoscopy  [] EGD      [] ERCP      [] EUS       [] Other    [x] Previous office notes/History and Physical reviewed from the patients chart. Please see EMR for further details of HPI. I have examined the patient's status immediately prior to the procedure and:      Indications/HPI:    []Abdominal Pain   []Cancer- GI/Lung  []Fhx of colon CA  []History of Polyps   []Davidsons   []Melena  []Abnormal Imaging   []Dysphagia    []Persistent Pneumonia  []Anemia   []Food Impaction  []History of Polyps  []GI Bleed   []Pulmonary nodule/Mass  []Change in bowel habits  []Heartburn/Reflux  []Rectal Bleed (BRBPR)  []Chest Pain - Non Cardiac  []Heme (+) Stool  []Ulcers  []Constipation   []Hemoptysis   []Varices  []Diarrhea   []Hypoxemia  []Nausea/Vomiting   [x]Screening   []Crohns/Colitis  []Other:    Anesthesia:   [x] MAC [] Moderate Sedation   [] General   [] None     ROS: 12 pt Review of Symptoms was negative unless mentioned above    Medications:   Prior to Admission medications    Medication Sig Start Date End Date Taking?  Authorizing Provider   omeprazole (PRILOSEC) 10 MG delayed release capsule Take 10 mg by mouth daily   Yes Historical Provider, MD   conjugated estrogens (PREMARIN) 0.625 MG/GM vaginal cream 1/2 GRAM TWICE WEEKLY VAGINALLY 21  Yes Steffan Alpers Matheson, DO   acetaminophen (APAP EXTRA STRENGTH) 500 MG tablet Take 2 tablets by mouth every 6 hours as needed for Pain 21  Yes Tyler Rees MD   Cholecalciferol (VITAMIN D3) 50 MCG ( UT) CAPS TAKE 1 CAPSULE BY MOUTH DAILY 20  Yes Tyler Rees MD   hydrOXYzine (ATARAX) 25 MG tablet NANCY 1 TABLETA POR BOCA CADA 8 HORAS CUANDO SEA NECESARIO PARA EL PICOR 20  Yes Tyler Rees MD   nitrofurantoin (MACRODANTIN) 100 MG capsule  7/29/20  Yes Historical Provider, MD   escitalopram (LEXAPRO) 10 MG tablet Take 1 tablet by mouth daily 7/14/20  Yes Bethene Brunner, MD   Artificial Tear Solution (GENTEAL TEARS) 0.1-0.2-0.3 % SOLN  1/30/20  Yes Historical Provider, MD   nystatin (MYCOSTATIN) 569142 UNIT/GM cream Apply topically 2 times daily. 1/3/20  Yes Bethene Brunner, MD   selenium sulfide (SELSUN) 2.5 % lotion Apply for 10 minutes on skin daily for 1-4 weeks OR one overnight application. 1/3/20  Yes Bethene Brunner, MD   albuterol sulfate  (90 Base) MCG/ACT inhaler Inhale 2 puffs into the lungs every 6 hours as needed for Wheezing 1/3/20  Yes Bethene Brunner, MD       Allergies:    Allergies   Allergen Reactions    Other      Perfumes         History of allergic reaction to anesthesia:  No    Past Medical History:  Past Medical History:   Diagnosis Date    Abnormal pelvic ultrasound 10/8/2020    Acid reflux     Asthma     Chronic back pain     COPD (chronic obstructive pulmonary disease) (HCC)     Depression     Hyperlipidemia     Obesity     Venous insufficiency        Past Surgical History:  Past Surgical History:   Procedure Laterality Date    COLONOSCOPY      ENDOSCOPY, COLON, DIAGNOSTIC      KIDNEY SURGERY      OVARIAN CYST REMOVAL Left 11/06/2004    TX ESOPHAGOGASTRODUODENOSCOPY TRANSORAL DIAGNOSTIC N/A 11/14/2018    EGD ESOPHAGOGASTRODUODENOSCOPY WITH DILATION performed by Luis Calderon MD at [facility]       Social History:  Social History     Tobacco Use    Smoking status: Never Smoker    Smokeless tobacco: Never Used   Substance Use Topics    Alcohol use: No    Drug use: No       Vital Signs:   Vitals:    02/03/21 0846   BP: (!) 146/70   Pulse: 61   Resp: 16   Temp: 97.5 °F (36.4 °C)   SpO2: 99%        Physical Exam:  Cardiac:  [x]WNL []Comments:  Pulmonary:  [x]WNL   []Comments:   Neuro/Mental Status:  [x]WNL  []Comments:  Abdominal:  [x]WNL []Comments:  Other:   []WNL  []Comments:    Informed Consent:  The risks and benefits of the procedure have been discussed with either the patient or if they cannot consent, their representative. Assessment:  Patient examined and appropriate for planned sedation and procedure. Plan:  Proceed with planned sedation and procedure as above. The patient was counseled at length about risks of rafi COVID-19 in the perioperative and any recovery window from the procedure. The patient was made aware that rafi COVID-19 may worsen their prognosis for recovery from their procedure and lend to a higher morbidity and-all mortality risk. The patient was given the option of postponing the procedure all material risks, benefits, and alternatives were discussed. The patient does wish to proceed with the procedure at this time.     Brenton Okeefe MD  9:35 AM

## 2021-02-03 NOTE — ANESTHESIA PRE PROCEDURE
Department of Anesthesiology  Preprocedure Note       Name:  Laure Haider   Age:  63 y.o.  :  1955                                          MRN:  [id number]         Date:  2/3/2021      Surgeon: Margret Dill):  Simi Green MD    Procedure: Procedure(s):  COLORECTAL CANCER SCREENING, NOT HIGH RISK    Medications prior to admission:   Prior to Admission medications    Medication Sig Start Date End Date Taking? Authorizing Provider   conjugated estrogens (PREMARIN) 0.625 MG/GM vaginal cream 1/2 GRAM TWICE WEEKLY VAGINALLY 21   Luna Hernandez DO   acetaminophen (APAP EXTRA STRENGTH) 500 MG tablet Take 2 tablets by mouth every 6 hours as needed for Pain 21   Pradeep Zelaya MD   Cholecalciferol (VITAMIN D3) 50 MCG ( UT) CAPS TAKE 1 CAPSULE BY MOUTH DAILY 20   Pradeep Zelaya MD   hydrOXYzine (ATARAX) 25 MG tablet NANCY 1 TABLETA POR BOCA CADA 8 HORAS CUANDO SEA NECESARIO PARA EL PICOR 20   Pradeep Zelaya MD   pantoprazole (PROTONIX) 40 MG tablet TAKE 1 TABLET BY MOUTH EVERY MORNING (BEFORE BREAKFAST) 20   Pradeep Zelaya MD   nitrofurantoin (MACRODANTIN) 100 MG capsule  20   Historical Provider, MD   phenazopyridine (PYRIDIUM) 200 MG tablet Take 1 tablet by mouth 3 times daily as needed for Pain (Painful Urination) May turn urine orange. Patient not taking: Reported on 2021   Yulisa Carrasquillo RN, NP   escitalopram (LEXAPRO) 10 MG tablet Take 1 tablet by mouth daily 20   Pradeep Zelaya MD   Artificial Tear Solution (GENTEAL TEARS) 0.1-0.2-0.3 % SOLN  20   Historical Provider, MD   SODIUM FLUORIDE, DENTAL GEL, 1.1 % CREA Place onto teeth    Historical Provider, MD   nystatin (MYCOSTATIN) 366794 UNIT/GM cream Apply topically 2 times daily.  1/3/20   Pradeep Zelaya MD   selenium sulfide (SELSUN) 2.5 % lotion Apply for 10 minutes on skin daily for 1-4 weeks OR one overnight application. 1/3/20   Gopal Bautisat MD   albuterol sulfate  (90 Base) MCG/ACT inhaler Inhale 2 puffs into the lungs every 6 hours as needed for Wheezing 1/3/20   Gopal Bautista MD   fluticasone [facility] HFA) 110 MCG/ACT inhaler Inhale 2 puffs into the lungs 2 times daily  Patient not taking: Reported on 1/8/2021 1/3/20   Gopal Bautista MD       Current medications:    Current Facility-Administered Medications   Medication Dose Route Frequency Provider Last Rate Last Admin    sterile water for irrigation    PRN Tc Hawthorne MD   500 mL at 02/03/21 0843    simethicone (MYLICON) 40 MO/1.6UU drops    PRN Tc Hawthorne MD   40 mg at 02/03/21 0843       Allergies:     Allergies   Allergen Reactions    Other      Perfumes        Problem List:    Patient Active Problem List   Diagnosis Code    Chronic non-seasonal allergic rhinitis J30.89    Gastroesophageal reflux disease without esophagitis K21.9    Hemoptysis R04.2    Sensory hearing loss, bilateral H90.3    Temporomandibular joint disorder M26.609    Bilateral carpal tunnel syndrome G56.03    Cervicalgia M54.2    Chronic midline low back pain with bilateral sciatica M54.41, M54.42, G89.29    Atrophic vaginitis N95.2    Mood disorder (HCC) F39    Nausea R11.0    Pure hypercholesterolemia E78.00    Itching L29.9    Vitamin D deficiency E55.9    Mild intermittent asthma without complication T03.08    LUQ pain R10.12    Pruritic dermatitis L29.9    Abnormal glucose R73.09    Hiatal hernia K44.9    Tinea versicolor B36.0    Osteoarthritis of spine with radiculopathy, cervical region M47.22    Osteoarthritis of spine with radiculopathy, lumbar region M47.26    Lumbosacral radiculopathy at L5 M54.17    C6 radiculopathy M54.12    C7 radiculopathy M54.12    Abnormal pelvic ultrasound R93.89    Generalized body aches R52       Past Medical History:        Diagnosis Date    Abnormal pelvic ultrasound 10/8/2020    Acid reflux     Asthma     Chronic back pain     COPD (chronic obstructive pulmonary disease) (HCC)     Depression     Hyperlipidemia     Obesity     Venous insufficiency        Past Surgical History:        Procedure Laterality Date    COLONOSCOPY      ENDOSCOPY, COLON, DIAGNOSTIC      KIDNEY SURGERY      OVARIAN CYST REMOVAL Left 11/06/2004    CT ESOPHAGOGASTRODUODENOSCOPY TRANSORAL DIAGNOSTIC N/A 11/14/2018    EGD ESOPHAGOGASTRODUODENOSCOPY WITH DILATION performed by Cruz Bustillo MD at [facility]       Social History:    Social History     Tobacco Use    Smoking status: Never Smoker    Smokeless tobacco: Never Used   Substance Use Topics    Alcohol use: No                                Counseling given: Not Answered      Vital Signs (Current):   Vitals:    02/03/21 0846   BP: (!) 146/70   Pulse: 61   Resp: 16   Temp: 36.4 °C (97.5 °F)   TempSrc: Temporal   SpO2: 99%   Weight: 182 lb (82.6 kg)   Height: 5' 5\" (1.651 m)                                              BP Readings from Last 3 Encounters:   02/03/21 (!) 146/70   12/30/20 136/84   11/05/20 124/74       NPO Status: Time of last liquid consumption: 0600                        Time of last solid consumption: 1700                        Date of last liquid consumption: 02/03/21                        Date of last solid food consumption: 02/01/21    BMI:   Wt Readings from Last 3 Encounters:   02/03/21 182 lb (82.6 kg)   12/30/20 183 lb (83 kg)   11/05/20 183 lb (83 kg)     Body mass index is 30.29 kg/m².     CBC:   Lab Results   Component Value Date    WBC 5.4 11/01/2018    RBC 4.59 11/01/2018    HGB 14.9 11/01/2018    HCT 43.0 11/01/2018    MCV 93.7 11/01/2018    RDW 12.8 11/01/2018     11/01/2018       CMP:   Lab Results   Component Value Date     09/18/2018    K 4.9 09/18/2018     09/18/2018    CO2 25 09/18/2018    BUN 19 09/18/2018    CREATININE 0.75 09/18/2018    GFRAA >60.0 09/18/2018    LABGLOM >60.0 09/18/2018    GLUCOSE 99 03/09/2015    PROT 6.7 09/18/2018    CALCIUM 9.4 09/18/2018    BILITOT 0.7 09/18/2018    ALKPHOS 87 09/18/2018    AST 17 09/18/2018    ALT 17 09/18/2018       POC Tests: No results for input(s): POCGLU, POCNA, POCK, POCCL, POCBUN, POCHEMO, POCHCT in the last 72 hours. Coags:   Lab Results   Component Value Date    PROTIME 9.8 01/30/2018    INR 0.9 01/30/2018    APTT 28.0 01/30/2018       HCG (If Applicable): No results found for: PREGTESTUR, PREGSERUM, HCG, HCGQUANT     ABGs: No results found for: PHART, PO2ART, AVV6LPK, ABY6QZX, BEART, I9YIOVZQ     Type & Screen (If Applicable):  No results found for: LABABO, LABRH    Drug/Infectious Status (If Applicable):  No results found for: HIV, HEPCAB    COVID-19 Screening (If Applicable):   Lab Results   Component Value Date    COVID19 Not Detected 01/27/2021         Anesthesia Evaluation  Patient summary reviewed and Nursing notes reviewed  Airway: Mallampati: II  TM distance: >3 FB   Neck ROM: full  Mouth opening: > = 3 FB Dental: normal exam         Pulmonary:normal exam    (+) COPD:  asthma:                            Cardiovascular:Negative CV ROS                      Neuro/Psych:   (+) neuromuscular disease:, psychiatric history:            GI/Hepatic/Renal:   (+) hiatal hernia, GERD:,           Endo/Other: Negative Endo/Other ROS                    Abdominal:           Vascular:                                        Anesthesia Plan      MAC     ASA 2             Anesthetic plan and risks discussed with patient. Plan discussed with CRNA.                   ASAEL Ruiz - MARLON   2/3/2021

## 2021-02-03 NOTE — LETTER
[address] [place] [facility] [address]    February 8, 2021              Dear Ms. Vaughn,    The pathology report indicated that the polyp removed from your colon was an Adenomatous polyp. This is the type of polyp that, if left unattended, may lead to colon cancer. This particular polyp no longer poses a threat to you because it has been completely removed. However, in the future, it is possible that additional polyps might grow in your colon. Your colon will need to be re-inspected in 5 years. We have updated your health maintenance to show the time for reinspection. Please wang your calendar and call the office a few weeks ahead of time to be certain to have this repeat procedure scheduled.       Sincerely,          Maru Manzanares MD

## 2021-02-03 NOTE — PROGRESS NOTES
Patient medicated with Zofran 4 mg IV for complaints of nausea. Patient spit about 20 cc of clear fluid of emesis basin. Will monitor for relief. Discharge instructions given with assist of  Syd George #[id number] [facility]. Patient verbalized understanding.

## 2021-02-03 NOTE — ANESTHESIA POSTPROCEDURE EVALUATION
Department of Anesthesiology  Postprocedure Note    Patient: Flory Leon  MRN: [id number]  YOB: 1955  Date of evaluation: 2/3/2021  Time:  9:43 AM     Procedure Summary     Date: 02/03/21 Room / Location: [address] [place] [address] 01 / Saud Mccartney    Anesthesia Start: 0911 Anesthesia Stop:     Procedure: [address] with polypectomies (N/A ) Diagnosis: (Screening)    Surgeons: Yanely Beckett MD Responsible Provider: ASAEL Prather CRNA    Anesthesia Type: MAC ASA Status: 2          Anesthesia Type: MAC    Fady Phase I:      Fady Phase II:      Last vitals: Reviewed and per EMR flowsheets.        Anesthesia Post Evaluation    Patient location during evaluation: PACU  Patient participation: complete - patient participated  Level of consciousness: awake and alert  Pain score: 0  Airway patency: patent  Nausea & Vomiting: no nausea and no vomiting  Complications: no  Cardiovascular status: blood pressure returned to baseline and hemodynamically stable  Respiratory status: acceptable  Hydration status: euvolemic

## 2021-02-11 PROBLEM — D12.6 ADENOMATOUS COLON POLYP: Status: ACTIVE | Noted: 2021-02-11

## 2021-03-05 ENCOUNTER — TELEPHONE (OUTPATIENT)
Dept: OBGYN CLINIC | Age: 66
End: 2021-03-05

## 2021-03-05 PROBLEM — Z12.11 SCREENING FOR COLON CANCER: Status: RESOLVED | Noted: 2017-12-13 | Resolved: 2021-03-05

## 2021-03-05 RX ORDER — ESTRADIOL 1 MG/1
1 TABLET ORAL DAILY
Qty: 30 TABLET | Refills: 11 | Status: ON HOLD | OUTPATIENT
Start: 2021-03-05 | End: 2021-06-28 | Stop reason: HOSPADM

## 2021-03-05 RX ORDER — CONJUGATED ESTROGENS 0.62 MG/1
TABLET, FILM COATED ORAL
COMMUNITY
Start: 2021-03-01 | End: 2021-03-05

## 2021-04-07 ENCOUNTER — OFFICE VISIT (OUTPATIENT)
Dept: PHYSICAL MEDICINE AND REHAB | Age: 66
End: 2021-04-07
Payer: MEDICAID

## 2021-04-07 VITALS
HEIGHT: 65 IN | SYSTOLIC BLOOD PRESSURE: 124 MMHG | WEIGHT: 190 LBS | DIASTOLIC BLOOD PRESSURE: 78 MMHG | BODY MASS INDEX: 31.65 KG/M2

## 2021-04-07 DIAGNOSIS — G89.29 CHRONIC PAIN OF BOTH SHOULDERS: ICD-10-CM

## 2021-04-07 DIAGNOSIS — M54.2 CERVICALGIA: Chronic | ICD-10-CM

## 2021-04-07 DIAGNOSIS — M54.17 LUMBOSACRAL RADICULOPATHY AT L5: ICD-10-CM

## 2021-04-07 DIAGNOSIS — Z79.899 HIGH RISK MEDICATION USE: ICD-10-CM

## 2021-04-07 DIAGNOSIS — M25.512 CHRONIC PAIN OF BOTH SHOULDERS: ICD-10-CM

## 2021-04-07 DIAGNOSIS — M79.10 MYALGIA: ICD-10-CM

## 2021-04-07 DIAGNOSIS — M54.12 C6 RADICULOPATHY: ICD-10-CM

## 2021-04-07 DIAGNOSIS — M54.12 C7 RADICULOPATHY: ICD-10-CM

## 2021-04-07 DIAGNOSIS — M47.26 OSTEOARTHRITIS OF SPINE WITH RADICULOPATHY, LUMBAR REGION: ICD-10-CM

## 2021-04-07 DIAGNOSIS — M54.41 CHRONIC MIDLINE LOW BACK PAIN WITH BILATERAL SCIATICA: Primary | Chronic | ICD-10-CM

## 2021-04-07 DIAGNOSIS — M25.511 CHRONIC PAIN OF BOTH SHOULDERS: ICD-10-CM

## 2021-04-07 DIAGNOSIS — G56.03 BILATERAL CARPAL TUNNEL SYNDROME: Chronic | ICD-10-CM

## 2021-04-07 DIAGNOSIS — G89.29 CHRONIC MIDLINE LOW BACK PAIN WITH BILATERAL SCIATICA: Primary | Chronic | ICD-10-CM

## 2021-04-07 DIAGNOSIS — M54.42 CHRONIC MIDLINE LOW BACK PAIN WITH BILATERAL SCIATICA: Primary | Chronic | ICD-10-CM

## 2021-04-07 PROCEDURE — G8427 DOCREV CUR MEDS BY ELIG CLIN: HCPCS | Performed by: PHYSICAL MEDICINE & REHABILITATION

## 2021-04-07 PROCEDURE — 99214 OFFICE O/P EST MOD 30 MIN: CPT | Performed by: PHYSICAL MEDICINE & REHABILITATION

## 2021-04-07 PROCEDURE — G8417 CALC BMI ABV UP PARAM F/U: HCPCS | Performed by: PHYSICAL MEDICINE & REHABILITATION

## 2021-04-07 PROCEDURE — 3017F COLORECTAL CA SCREEN DOC REV: CPT | Performed by: PHYSICAL MEDICINE & REHABILITATION

## 2021-04-07 PROCEDURE — 1036F TOBACCO NON-USER: CPT | Performed by: PHYSICAL MEDICINE & REHABILITATION

## 2021-04-07 RX ORDER — NAPROXEN 500 MG/1
500 TABLET ORAL 2 TIMES DAILY WITH MEALS
Qty: 60 TABLET | Refills: 3 | Status: SHIPPED | OUTPATIENT
Start: 2021-04-07 | End: 2021-07-26 | Stop reason: DRUGHIGH

## 2021-04-07 ASSESSMENT — ENCOUNTER SYMPTOMS
VOMITING: 0
COUGH: 0
DIARRHEA: 0
ABDOMINAL PAIN: 0
SORE THROAT: 0
EYE REDNESS: 0
EYE PAIN: 0
NAUSEA: 0
SHORTNESS OF BREATH: 1
STRIDOR: 0
BLOOD IN STOOL: 0
BACK PAIN: 1
CONSTIPATION: 1
WHEEZING: 0
PHOTOPHOBIA: 0

## 2021-04-07 NOTE — PROGRESS NOTES
Subjective  Maria Del Rosario Tai, 61 y.o. female presents today with:     Back Pain  Neck Pain  Shoulder Pain (Left)  Wrist Pain (Left)  Medication Refill (Vit D refill today)    Back Pain  This is a chronic problem. The current episode started more than 1 year ago. The problem occurs constantly. The problem has been waxing and waning since onset. The pain is present in the lumbar spine and sacro-iliac. The quality of the pain is described as aching. The pain is at a severity of 7/10. The pain is severe. The pain is worse during the day. The symptoms are aggravated by bending and position. Stiffness is present in the morning. Associated symptoms include weakness. Pertinent negatives include no abdominal pain, chest pain, dysuria, fever, headaches, paresis or tingling. Risk factors include sedentary lifestyle, menopause and history of osteoporosis. She has tried heat, home exercises, analgesics, bed rest, muscle relaxant, NSAIDs and walking for the symptoms. The treatment provided mild relief. Shoulder Pain   The pain is present in the neck, back and left shoulder. This is a chronic problem. The current episode started more than 1 year ago. There has been no history of extremity trauma. The problem occurs constantly. The problem has been unchanged. The pain is at a severity of 7/10. The pain is moderate. Pertinent negatives include no fever or tingling. She has tried acetaminophen for the symptoms. The treatment provided mild relief. Her past medical history is significant for osteoarthritis. Neck Pain   This is a chronic problem. The current episode started more than 1 year ago. The problem occurs constantly. The problem has been gradually worsening. The pain is associated with nothing. The pain is present in the midline and left side. The pain is at a severity of 7/10. The pain is severe. The symptoms are aggravated by position and twisting. The pain is same all the time.  Stiffness is present in the morning. Associated symptoms include weakness. Pertinent negatives include no chest pain, fever, headaches, pain with swallowing, paresis, photophobia, syncope or tingling. She has tried chiropractic manipulation, heat, home exercises, acetaminophen, oral narcotics, muscle relaxants and NSAIDs for the symptoms. The treatment provided mild relief. Past Medical History:   Diagnosis Date    Abnormal pelvic ultrasound 10/8/2020    Acid reflux     Adenomatous colon polyp - repeat scope 02/2026 2/11/2021    Asthma     Chronic back pain     COPD (chronic obstructive pulmonary disease) (HCC)     Depression     Hyperlipidemia     Obesity     Venous insufficiency      Past Surgical History:   Procedure Laterality Date    COLONOSCOPY      COLONOSCOPY N/A 2/3/2021    COLORECTAL CANCER SCREENING with polypectomies performed by Leslie Cedeño MD at [facility], DIAGNOSTIC      KIDNEY SURGERY      OVARIAN CYST REMOVAL Left 11/06/2004    [place] ESOPHAGOGASTRODUODENOSCOPY TRANSORAL DIAGNOSTIC N/A 11/14/2018    EGD ESOPHAGOGASTRODUODENOSCOPY WITH DILATION performed by Carlos You MD at [address] [place] Marital status:      Spouse name: None    Number of children: 1    Years of education: 6    Highest education level: 6th grade   Occupational History    Occupation: homemaker   Social Needs    Financial resource strain: Somewhat hard    Food insecurity     Worry: Never true     Inability: Never true    Transportation needs     Medical: No     Non-medical: No   Tobacco Use    Smoking status: Never Smoker    Smokeless tobacco: Never Used   Substance and Sexual Activity    Alcohol use: No    Drug use: No    Sexual activity: Yes     Partners: Male   Lifestyle    Physical activity     Days per week: 0 days     Minutes per session: 0 min    Stress:  To some extent   Relationships    Social connections     Talks on phone: More than three times a week     Gets together: More than three times a week     Attends [employer] service: 1 to 4 times per year     Active member of club or organization: No     Attends meetings of clubs or organizations: Never     Relationship status:     Intimate partner violence     Fear of current or ex partner: No     Emotionally abused: No     Physically abused: No     Forced sexual activity: No   Other Topics Concern    None   Social History Narrative    Lives with her adaptive daughter Dorie Walton in a home in [place]--is from [address]     Family History   Problem Relation Age of Onset    Diabetes Mother     High Blood Pressure Mother     Diabetes Brother     Diabetes Maternal Grandmother     Diabetes Maternal Grandfather     Diabetes Paternal Grandmother     Diabetes Paternal Grandfather     Diabetes Other     High Blood Pressure Sister     Colon Cancer Paternal Uncle        Allergies   Allergen Reactions    Other      Perfumes        Review of Systems   Constitutional: Positive for activity change and fatigue. Negative for chills, diaphoresis and fever. HENT: Negative for congestion, ear discharge, ear pain, hearing loss, nosebleeds, sore throat and tinnitus. Eyes: Negative for photophobia, pain and redness. Respiratory: Positive for shortness of breath. Negative for cough, wheezing and stridor. Shortness of breath on exertion   Cardiovascular: Negative for chest pain, palpitations, leg swelling and syncope. Gastrointestinal: Positive for constipation. Negative for abdominal pain, blood in stool, diarrhea, nausea and vomiting. Endocrine: Negative for polydipsia. Genitourinary: Negative for dysuria, flank pain, frequency, hematuria and urgency. Musculoskeletal: Positive for back pain, gait problem, myalgias and neck pain. Skin: Negative for rash. Allergic/Immunologic: Positive for immunocompromised state. Negative for environmental allergies. both shoulders  [place] INJECTION AA&/STRD SUPRASCAPULAR NERVE    naproxen (NAPROSYN) 500 MG tablet    traMADol-acetaminophen (ULTRACET) 37.5-325 MG per tablet    DISCONTINUED: traMADol-acetaminophen (ULTRACET) 37.5-325 MG per tablet   9. High risk medication use  traMADol-acetaminophen (ULTRACET) 37.5-325 MG per tablet    DISCONTINUED: traMADol-acetaminophen (ULTRACET) 37.5-325 MG per tablet   10. Myalgia  [place] INJECT TRIGGER POINTS, 3 OR GREATER    naproxen (NAPROSYN) 500 MG tablet       I am also concerned by lifestyle and mood issues including:    Past Medical History:   Diagnosis Date    Abnormal pelvic ultrasound 10/8/2020    Acid reflux     Adenomatous colon polyp - repeat scope 02/2026 2/11/2021    Asthma     Chronic back pain     COPD (chronic obstructive pulmonary disease) ([facility] [address]     Depression     Hyperlipidemia     Obesity     Venous insufficiency            Given their medication, chronic pain and lifestyle and medications they are at risk for :    Falls, constipation, addiction, toxicity on opiates  Loss of livelyhood due to severe pain, debility, weight gain and  vitamin D deficiency    The patient was educated regarding proper diet, fitness routine, and regulatory restrictions concerning pain medications. Previous notes, comprehensive past medical, surgical, family history, and diagnostics were reviewed. Patient education and councelling were provided regarding off label use,treatment options and medication and injection risks. Current and old OARRS ([place] Automated Prescription Reporting System) records reviewed, all refills reviewed since last visit,  Behavioral agreement/RIKY regulations   and Toxicology screen was reviewed with patient and is up to date.       There are   no current red flags. high risk meds review re intensive monitoring for toxicity and addiction,  They are making good progress regarding pain relief, they are performing at a functional level regarding activities of pain becomes manageable     I will trial low-dose Ultracet monitor for serotonin syndrome though this is a very low dose she should be fine. This does not work we will consider low-dose Norco.   -which helps with pain and function. Otherwise, continue the current pain medications that I have prescibed. Radiologic:   Old  unique films results reviewed  ,  XR CHEST (2 VW)       CLINICAL HISTORY: R04.2 Cough with hemoptysis ICD10       COMPARISONS: October 23, 2010       FINDINGS:       Two views of the chest are submitted.  The cardiac silhouette is of normal size configuration.  The mediastinum is unremarkable. Pulmonary vascular unremarkable. Right sided trachea. No focal infiltrates.  No effusions.  No Pneumothoraces.                                                                                        Impression   NO ACUTE ACTIVE CARDIOPULMONARY PROCESS        I discussed results with patients. see Follow up plans below  For any new studies. Unique source and Care Everywhere Updates:  prior external notes requested and reviewed. No new issues noted. Unique History obtained by caregiver/independent historian-and verified with SOAPPR. Electrodiagnostic:  Previous studies requested,     I discussed results with patient. See follow-up plans for new studies.         Labs:  Previous labs reviewed     Lab Results   Component Value Date     09/18/2018    K 4.9 09/18/2018     09/18/2018    CO2 25 09/18/2018    BUN 19 09/18/2018    CREATININE 0.75 09/18/2018    CALCIUM 9.4 09/18/2018    LABALBU 4.2 09/18/2018    BILITOT 0.7 09/18/2018    ALKPHOS 87 09/18/2018    AST 17 09/18/2018    ALT 17 09/18/2018     Lab Results   Component Value Date    WBC 5.4 11/01/2018    RBC 4.59 11/01/2018    HGB 14.9 11/01/2018    HCT 43.0 11/01/2018    MCV 93.7 11/01/2018    MCH 32.5 11/01/2018    MCHC 34.7 11/01/2018    RDW 12.8 11/01/2018     11/01/2018    MPV 8.8 03/09/2015 Lab Results   Component Value Date    LABAMPH Neg 12/30/2020    BARBSCNU Neg 12/30/2020    LABBENZ Neg 12/30/2020    CANSU Neg 12/30/2020    COCAIMETSCRU Neg 12/30/2020    PHENCYCLIDINESCREENURINE Neg 12/30/2020    DSCOMMENT see below 12/30/2020       No results found for: CODEINE, MORPHINE, ACETYLMORPHI, OXYCODONE, NOROXYCODONE, NOROXYMU, HYDRCO, NORHYDU, HYDROMO, BUPREN, NORBUPRNOR, FENTA, NORFENT, MEPERIDINE, TAPENU, TAPOSULFUR, METHADONE, LABPROP, TRAM, AMPH, METHAMP, MDMA, ECMDA    No results found for: METPHEN, PHENTERMINE, BENZOYL, ALPRAZ, ALPHAOHALPRA, CLONAZEPAM, 7AMINOCLONAZ, DIAZEP, CHUCK, OXAZ, TEMAZ, LORAZEPAM, MIDAZOLAM, ZOLPIDEM, KIM, ETG, MARIJMET, PCP, PAINMGTDRUGP, EERPAINMGTPA, LABCREA      , I discussed results with patient. See follow-up plans for new studies. Therapies:  HEP-gentle stretching and relaxation techniques-demonstrated with patient-they are to do them twice a day. They are also advised to make the following lifestyle changes:  Goals      SOAPP-R      12/30/20 score: 5- low risk  4/7/21 score: 19- high risk            Injections or Epidurals:  Injection options were discussed. Schedule a left suprascapular injection and as well as trigger point injections patient gave verbal consent to ordered injections. See follow-up plans for planned injections. Supplements:  Vitamin D with increased dosing during the rainy months,   Education was given on:   Dietary and Fitness--daily stretches and low carb diet-in chair Yoga when possible             Follow up with Primary Care Physician regarding their general medical needs. Stressed the importance of following up with PCP and specialists for his/her chronic diseases, health, CV, and cancer screening and continued care. Will follow disease activity/progression and adjust therapeutic regimen to disease activity and severity. Discussed medication dosage, usage, goals of therapy, and side effects.   Available test results were reviewed -Discussed findings, impression and plan with patient. An additional 10 minutes were spent outside of the patient visit to review records. Additional time spent with the patient to discuss their questions. Additional time spent with the patient devoted to discussing treatment strategy, planning, and implementation. Patient understands above plan; questions asked and answered. Patient agrees to plan as noted above. At least 50% of the visit was involved in the discussion of the options for treatment. We discussed exercises, medication, interventional therapies and surgery. Healthy life style is essential with patient hard work to achieve the wellness. In addition; discussion with the patient and/or family about patient's functional status any of the diagnostic results, impressions and/or recommended diagnostic studies, prognosis, risks and benefits of treatment options, instructions for treatment and/or follow-up, importance of compliance with chosen treatment options, risk-factor reduction, and patient/family education. They are to follow up in 2-2 1/2 months to review medication, efficacy of injections, pill counts, OARRS check, high risk med review re intensive monitoring for toxicity and addiction, SOAPPR assessment, review diagnostics, to review previous and future treatment plans and assess appropriateness for continued therapy.         New Diagnostics  Orders Placed This Encounter   Procedures    [place] INJECTION AA&/STRD SUPRASCAPULAR NERVE    AL INJECT TRIGGER POINTS, 3 OR GREATER       Lorin Solo,

## 2021-04-13 ENCOUNTER — HOSPITAL ENCOUNTER (EMERGENCY)
Age: 66
Discharge: HOME | End: 2021-04-13
Payer: MEDICARE

## 2021-04-13 ENCOUNTER — APPOINTMENT (OUTPATIENT)
Dept: CT IMAGING | Age: 66
End: 2021-04-13
Payer: MEDICARE

## 2021-04-13 ENCOUNTER — APPOINTMENT (OUTPATIENT)
Dept: GENERAL RADIOLOGY | Age: 66
End: 2021-04-13
Payer: MEDICARE

## 2021-04-13 VITALS
BODY MASS INDEX: 31.65 KG/M2 | RESPIRATION RATE: 17 BRPM | HEIGHT: 65 IN | SYSTOLIC BLOOD PRESSURE: 156 MMHG | WEIGHT: 190 LBS | TEMPERATURE: 97.8 F | DIASTOLIC BLOOD PRESSURE: 91 MMHG | HEART RATE: 80 BPM | OXYGEN SATURATION: 100 %

## 2021-04-13 DIAGNOSIS — M75.82 TENDINITIS OF LEFT ROTATOR CUFF: ICD-10-CM

## 2021-04-13 DIAGNOSIS — M25.512 ACUTE PAIN OF LEFT SHOULDER: Primary | ICD-10-CM

## 2021-04-13 LAB
ALBUMIN SERPL-MCNC: 4.3 G/DL (ref 3.5–4.6)
ALP BLD-CCNC: 82 U/L (ref 40–130)
ALT SERPL-CCNC: 29 U/L (ref 0–33)
ANION GAP SERPL CALCULATED.3IONS-SCNC: 7 MEQ/L (ref 9–15)
AST SERPL-CCNC: 27 U/L (ref 0–35)
BASOPHILS ABSOLUTE: 0.1 K/UL (ref 0–0.2)
BASOPHILS RELATIVE PERCENT: 0.8 %
BILIRUB SERPL-MCNC: 0.8 MG/DL (ref 0.2–0.7)
BUN BLDV-MCNC: 16 MG/DL (ref 8–23)
CALCIUM SERPL-MCNC: 9.9 MG/DL (ref 8.5–9.9)
CHLORIDE BLD-SCNC: 103 MEQ/L (ref 95–107)
CK MB: 4.4 NG/ML (ref 0–3.8)
CO2: 27 MEQ/L (ref 20–31)
CREAT SERPL-MCNC: 0.66 MG/DL (ref 0.5–0.9)
CREATINE KINASE-MB INDEX: 1.8 % (ref 0–3.5)
EKG ATRIAL RATE: 75 BPM
EKG P AXIS: 61 DEGREES
EKG P-R INTERVAL: 142 MS
EKG Q-T INTERVAL: 374 MS
EKG QRS DURATION: 74 MS
EKG QTC CALCULATION (BAZETT): 417 MS
EKG R AXIS: 18 DEGREES
EKG T AXIS: 34 DEGREES
EKG VENTRICULAR RATE: 75 BPM
EOSINOPHILS ABSOLUTE: 0.3 K/UL (ref 0–0.7)
EOSINOPHILS RELATIVE PERCENT: 3.8 %
GFR AFRICAN AMERICAN: >60
GFR NON-AFRICAN AMERICAN: >60
GLOBULIN: 2.6 G/DL (ref 2.3–3.5)
GLUCOSE BLD-MCNC: 109 MG/DL (ref 70–99)
HCT VFR BLD CALC: 43.3 % (ref 37–47)
HEMOGLOBIN: 14.6 G/DL (ref 12–16)
LYMPHOCYTES ABSOLUTE: 3 K/UL (ref 1–4.8)
LYMPHOCYTES RELATIVE PERCENT: 36.4 %
MCH RBC QN AUTO: 31.7 PG (ref 27–31.3)
MCHC RBC AUTO-ENTMCNC: 33.9 % (ref 33–37)
MCV RBC AUTO: 93.7 FL (ref 82–100)
MONOCYTES ABSOLUTE: 0.8 K/UL (ref 0.2–0.8)
MONOCYTES RELATIVE PERCENT: 10 %
NEUTROPHILS ABSOLUTE: 4.1 K/UL (ref 1.4–6.5)
NEUTROPHILS RELATIVE PERCENT: 49 %
PDW BLD-RTO: 13.1 % (ref 11.5–14.5)
PLATELET # BLD: 230 K/UL (ref 130–400)
POTASSIUM SERPL-SCNC: 3.9 MEQ/L (ref 3.4–4.9)
RBC # BLD: 4.62 M/UL (ref 4.2–5.4)
SODIUM BLD-SCNC: 137 MEQ/L (ref 135–144)
TOTAL CK: 240 U/L (ref 0–170)
TOTAL PROTEIN: 6.9 G/DL (ref 6.3–8)
TROPONIN: <0.01 NG/ML (ref 0–0.01)
WBC # BLD: 8.3 K/UL (ref 4.8–10.8)

## 2021-04-13 PROCEDURE — 96375 TX/PRO/DX INJ NEW DRUG ADDON: CPT

## 2021-04-13 PROCEDURE — 72125 CT NECK SPINE W/O DYE: CPT

## 2021-04-13 PROCEDURE — 6360000002 HC RX W HCPCS: Performed by: PHYSICIAN ASSISTANT

## 2021-04-13 PROCEDURE — 82553 CREATINE MB FRACTION: CPT

## 2021-04-13 PROCEDURE — 73030 X-RAY EXAM OF SHOULDER: CPT

## 2021-04-13 PROCEDURE — 85025 COMPLETE CBC W/AUTO DIFF WBC: CPT

## 2021-04-13 PROCEDURE — 93005 ELECTROCARDIOGRAM TRACING: CPT | Performed by: PHYSICIAN ASSISTANT

## 2021-04-13 PROCEDURE — 96374 THER/PROPH/DIAG INJ IV PUSH: CPT

## 2021-04-13 PROCEDURE — 82550 ASSAY OF CK (CPK): CPT

## 2021-04-13 PROCEDURE — 36415 COLL VENOUS BLD VENIPUNCTURE: CPT

## 2021-04-13 PROCEDURE — 80053 COMPREHEN METABOLIC PANEL: CPT

## 2021-04-13 PROCEDURE — 84484 ASSAY OF TROPONIN QUANT: CPT

## 2021-04-13 PROCEDURE — 99282 EMERGENCY DEPT VISIT SF MDM: CPT

## 2021-04-13 RX ORDER — MORPHINE SULFATE 2 MG/ML
4 INJECTION, SOLUTION INTRAMUSCULAR; INTRAVENOUS ONCE
Status: COMPLETED | OUTPATIENT
Start: 2021-04-13 | End: 2021-04-13

## 2021-04-13 RX ORDER — KETOROLAC TROMETHAMINE 30 MG/ML
30 INJECTION, SOLUTION INTRAMUSCULAR; INTRAVENOUS ONCE
Status: COMPLETED | OUTPATIENT
Start: 2021-04-13 | End: 2021-04-13

## 2021-04-13 RX ORDER — ORPHENADRINE CITRATE 30 MG/ML
60 INJECTION INTRAMUSCULAR; INTRAVENOUS ONCE
Status: COMPLETED | OUTPATIENT
Start: 2021-04-13 | End: 2021-04-13

## 2021-04-13 RX ORDER — TIZANIDINE 4 MG/1
4 TABLET ORAL 3 TIMES DAILY PRN
Qty: 15 TABLET | Refills: 0 | Status: SHIPPED | OUTPATIENT
Start: 2021-04-13 | End: 2021-07-26 | Stop reason: ALTCHOICE

## 2021-04-13 RX ORDER — ONDANSETRON 2 MG/ML
4 INJECTION INTRAMUSCULAR; INTRAVENOUS ONCE
Status: COMPLETED | OUTPATIENT
Start: 2021-04-13 | End: 2021-04-13

## 2021-04-13 RX ADMIN — HYDROMORPHONE HYDROCHLORIDE 0.5 MG: 1 INJECTION, SOLUTION INTRAMUSCULAR; INTRAVENOUS; SUBCUTANEOUS at 20:55

## 2021-04-13 RX ADMIN — MORPHINE SULFATE 4 MG: 2 INJECTION, SOLUTION INTRAMUSCULAR; INTRAVENOUS at 19:56

## 2021-04-13 RX ADMIN — ONDANSETRON 4 MG: 2 INJECTION INTRAMUSCULAR; INTRAVENOUS at 19:57

## 2021-04-13 RX ADMIN — ORPHENADRINE CITRATE 60 MG: 30 INJECTION INTRAMUSCULAR; INTRAVENOUS at 19:33

## 2021-04-13 RX ADMIN — KETOROLAC TROMETHAMINE 30 MG: 30 INJECTION, SOLUTION INTRAMUSCULAR; INTRAVENOUS at 19:33

## 2021-04-13 ASSESSMENT — PAIN DESCRIPTION - DESCRIPTORS: DESCRIPTORS: BURNING;NUMBNESS

## 2021-04-13 ASSESSMENT — ENCOUNTER SYMPTOMS
TROUBLE SWALLOWING: 0
COLOR CHANGE: 0
ALLERGIC/IMMUNOLOGIC NEGATIVE: 1
ABDOMINAL PAIN: 0
APNEA: 0
BACK PAIN: 1
EYE PAIN: 0
SHORTNESS OF BREATH: 0

## 2021-04-13 ASSESSMENT — PAIN DESCRIPTION - ORIENTATION: ORIENTATION: LEFT

## 2021-04-13 ASSESSMENT — PAIN DESCRIPTION - LOCATION: LOCATION: ARM

## 2021-04-13 ASSESSMENT — PAIN SCALES - GENERAL: PAINLEVEL_OUTOF10: 9

## 2021-04-13 NOTE — ED PROVIDER NOTES
Hematological: Negative. Psychiatric/Behavioral: Negative. All other systems reviewed and are negative. Except as noted above the remainder of the review of systems was reviewed and negative. PAST MEDICAL HISTORY     Past Medical History:   Diagnosis Date    Abnormal pelvic ultrasound 10/8/2020    Acid reflux     Adenomatous colon polyp - repeat scope 02/2026 2/11/2021    Asthma     Chronic back pain     COPD (chronic obstructive pulmonary disease) (HCC)     Depression     Hyperlipidemia     Obesity     Venous insufficiency          SURGICAL HISTORY       Past Surgical History:   Procedure Laterality Date    COLONOSCOPY      COLONOSCOPY N/A 2/3/2021    COLORECTAL CANCER SCREENING with polypectomies performed by Georgia Arellano MD at [address] [place] [address], DIAGNOSTIC      KIDNEY SURGERY      OVARIAN CYST REMOVAL Left 11/06/2004    [place] ESOPHAGOGASTRODUODENOSCOPY TRANSORAL DIAGNOSTIC N/A 11/14/2018    EGD ESOPHAGOGASTRODUODENOSCOPY WITH DILATION performed by Anahi Levine MD at [address] [place] [address]       Discharge Medication List as of 4/13/2021  9:10 PM      CONTINUE these medications which have NOT CHANGED    Details   naproxen (NAPROSYN) 500 MG tablet Take 1 tablet by mouth 2 times daily (with meals), Disp-60 tablet, R-3Normal      traMADol-acetaminophen (ULTRACET) 37.5-325 MG per tablet Take 0.5-1 tablets by mouth every 8 hours as needed for Pain (Maximum of 2 tablets daily. Do not get narcotic pain medication from any other providers.) for up to 30 days. , Disp-60 tablet, R-0Normal      estradiol (ESTRACE) 1 MG tablet Take 1 tablet by mouth daily, Disp-30 tablet, R-11Normal      PREMARIN 0.625 MG/GM vaginal cream 1/2 GRAM TWICE WEEKLY VAGINALLY, Disp-30 g, R-3, Normal      hydrOXYzine (ATARAX) 25 MG tablet NANCY 1 TABLETA POR BOCA CADA 8 [address] CU[address] SE[address] [place]ESARIO PARA EL PICOR, Disp-60 tablet, R-1Normal      omeprazole (PRILOSEC) 10 MG delayed release capsule Take 10 mg by mouth dailyHistorical Med      acetaminophen (APAP EXTRA STRENGTH) 500 MG tablet Take 2 tablets by mouth every 6 hours as needed for Pain, Disp-120 tablet, R-3Normal      Cholecalciferol (VITAMIN D3) 50 MCG (2000 UT) CAPS TAKE 1 CAPSULE BY MOUTH DAILY, Disp-90 capsule, R-4Normal      nitrofurantoin (MACRODANTIN) 100 MG capsule Historical Med      escitalopram (LEXAPRO) 10 MG tablet Take 1 tablet by mouth daily, Disp-30 tablet,R-5Normal      Artificial Tear Solution (GENTEAL TEARS) 0.1-0.2-0.3 % SOLN Historical Med      nystatin (MYCOSTATIN) 872895 UNIT/GM cream Apply topically 2 times daily. , Disp-60 g, R-5, Normal      selenium sulfide (SELSUN) 2.5 % lotion Apply for 10 minutes on skin daily for 1-4 weeks OR one overnight application. , Disp-118 mL, R-5, Normal      albuterol sulfate  (90 Base) MCG/ACT inhaler Inhale 2 puffs into the lungs every 6 hours as needed for Wheezing, Disp-1 Inhaler, R-2Normal             ALLERGIES     Other    FAMILY HISTORY       Family History   Problem Relation Age of Onset    Diabetes Mother     High Blood Pressure Mother     Diabetes Brother     Diabetes Maternal Grandmother     Diabetes Maternal Grandfather     Diabetes Paternal Grandmother     Diabetes Paternal Grandfather     Diabetes Other     High Blood Pressure Sister     Colon Cancer Paternal Uncle           SOCIAL HISTORY       Social History     Socioeconomic History    Marital status:      Spouse name: None    Number of children: 1    Years of education: 6    Highest education level: 6th grade   Occupational History    Occupation: homemaker   Social Needs    Financial resource strain: Somewhat hard    Food insecurity     Worry: Never true     Inability: Never true    Transportation needs     Medical: No     Non-medical: No   Tobacco Use    Smoking status: Never Smoker    Smokeless tobacco: Never Used   Substance and Sexual Activity    Alcohol use:  No There is no distension. Palpations: Abdomen is soft. Musculoskeletal: Normal range of motion. Left shoulder: She exhibits tenderness. Left elbow: Tenderness found. Cervical back: She exhibits tenderness. Skin:     General: Skin is warm and dry. Findings: No erythema or rash. Neurological:      Mental Status: She is alert and oriented to person, place, and time. Cranial Nerves: No cranial nerve deficit. Motor: No abnormal muscle tone. Psychiatric:         Behavior: Behavior normal.         Thought Content: Thought content normal.         Judgment: Judgment normal.         DIAGNOSTIC RESULTS     EKG: All EKG's are interpreted by the Emergency Department Physician who either signs or Co-signs this chart in the absence of a cardiologist.    Normal sinus rhythm, rate 75 bpm, no acute ST elevation or ischemic changes    RADIOLOGY:   Non-plain film images such as CT, Ultrasound and MRI are read by the radiologist. Plain radiographic images are visualized and preliminarily interpreted by the emergency physician with the below findings:        Interpretation per the Radiologist below, if available at the time of this note:    CT CERVICAL SPINE WO CONTRAST   Final Result      NO ACUTE FRACTURE OR EVIDENCE OF [address] CERVICAL SPONDYLOSIS, AS NOTED. XR SHOULDER LEFT (MIN 2 VIEWS)    (Results Pending)         ED BEDSIDE ULTRASOUND:   Performed by ED Physician - none    LABS:  Labs Reviewed   COMPREHENSIVE METABOLIC PANEL - Abnormal; Notable for the following components:       Result Value    Anion Gap 7 (*)     Glucose 109 (*)     Total Bilirubin 0.8 (*)     All other components within normal limits   CBC WITH AUTO DIFFERENTIAL - Abnormal; Notable for the following components:    MCH 31.7 (*)     All other components within normal limits   CK - Abnormal; Notable for the following components:     Total  (*)     All other components within normal limits   CKMB & RELATIVE PERCENT - Abnormal; Notable for the following components:    CK-MB 4.4 (*)     All other components within normal limits   TROPONIN       All other labs were within normal range or not returned as of this dictation. EMERGENCY DEPARTMENT COURSE and DIFFERENTIAL DIAGNOSIS/MDM:   Vitals:    Vitals:    04/13/21 1820   BP: (!) 156/91   Pulse: 80   Resp: 17   Temp: 97.8 °F (36.6 °C)   TempSrc: Temporal   SpO2: 100%   Weight: 190 lb (86.2 kg)   Height: 5' 5\" (1.651 m)         MDM      REASSESSMENT      Patient presented the emergency department with reproducible left arm, neck, chest and back pain that has been ongoing for the past several weeks after receiving Covid injection. Patient has no signs of erythema, abscess or cellulitis. EKG is normal and this does not seem to be a presentation of ACS. Patient already sees pain management. She was medicated for pain in the emergency room and I will refer her back to pain management for further care of her pain    CONSULTS:  None    PROCEDURES:  Unless otherwise noted below, none     Procedures        FINAL IMPRESSION      1. Acute pain of left shoulder    2. Tendinitis of left rotator cuff          DISPOSITION/PLAN   DISPOSITION Decision To Discharge 04/13/2021 08:32:09 PM      PATIENT REFERRED TO:  Miya Ortiz MD  [address] [facility], [address] [facility]  [phone]    Call in 2 days      [address]  [address] [facility]  [address]  [address] [id number]  Call in 2 days        DISCHARGE MEDICATIONS:  Discharge Medication List as of 4/13/2021  9:10 PM      START taking these medications    Details   tiZANidine (ZANAFLEX) 4 MG tablet Take 1 tablet by mouth 3 times daily as needed (pain/spasm), Disp-15 tablet, R-0Print           Controlled Substances Monitoring:     RX Monitoring 11/15/2017   Attestation The Prescription Monitoring Report for this patient was reviewed today.        (Please note that portions of this note were completed with a voice recognition program.  Efforts were made to edit the dictations but occasionally words are mis-transcribed.)    Shannan Vazquez PA-C (electronically signed)  Attending Emergency Physician            Shannan Vazquez PA-C  04/13/21 1820

## 2021-04-14 ENCOUNTER — PROCEDURE VISIT (OUTPATIENT)
Dept: PHYSICAL MEDICINE AND REHAB | Age: 66
End: 2021-04-14
Payer: MEDICARE

## 2021-04-14 DIAGNOSIS — M25.512 CHRONIC PAIN OF BOTH SHOULDERS: ICD-10-CM

## 2021-04-14 DIAGNOSIS — G89.29 CHRONIC PAIN OF BOTH SHOULDERS: ICD-10-CM

## 2021-04-14 DIAGNOSIS — M25.511 CHRONIC PAIN OF BOTH SHOULDERS: ICD-10-CM

## 2021-04-14 PROCEDURE — 76942 ECHO GUIDE FOR BIOPSY: CPT | Performed by: PHYSICAL MEDICINE & REHABILITATION

## 2021-04-14 PROCEDURE — 64418 NJX AA&/STRD SPRSCAP NRV: CPT | Performed by: PHYSICAL MEDICINE & REHABILITATION

## 2021-04-14 PROCEDURE — 93010 ELECTROCARDIOGRAM REPORT: CPT | Performed by: INTERNAL MEDICINE

## 2021-04-14 RX ORDER — LIDOCAINE HYDROCHLORIDE 20 MG/ML
5 INJECTION, SOLUTION INFILTRATION; PERINEURAL ONCE
Status: COMPLETED | OUTPATIENT
Start: 2021-04-14 | End: 2021-04-14

## 2021-04-14 RX ORDER — LIDOCAINE HYDROCHLORIDE 10 MG/ML
8 INJECTION, SOLUTION INFILTRATION; PERINEURAL ONCE
Status: COMPLETED | OUTPATIENT
Start: 2021-04-14 | End: 2021-04-14

## 2021-04-14 RX ADMIN — LIDOCAINE HYDROCHLORIDE 5 ML: 20 INJECTION, SOLUTION INFILTRATION; PERINEURAL at 15:53

## 2021-04-14 RX ADMIN — LIDOCAINE HYDROCHLORIDE 8 ML: 10 INJECTION, SOLUTION INFILTRATION; PERINEURAL at 15:53

## 2021-04-14 NOTE — PROGRESS NOTES
Patient here for left suprascapular injection with U/S. Patient taken back to exam room, and placed on drape locking stool. Areas to be injected marked appropriately, and cleansed with alcohol. 8cc of 1% Lidocaine, and 5cc of 2% Lidocaine to be injected by provider.

## 2021-04-17 DIAGNOSIS — J30.89 CHRONIC NON-SEASONAL ALLERGIC RHINITIS: ICD-10-CM

## 2021-04-17 DIAGNOSIS — L29.9 ITCHING: Chronic | ICD-10-CM

## 2021-04-18 NOTE — TELEPHONE ENCOUNTER
Rx request   Requested Prescriptions     Pending Prescriptions Disp Refills    hydrOXYzine (ATARAX) 25 MG tablet [Pharmacy Med Name: HYDROXYZINE HCL 25 MG TABS 25 Tablet] 60 tablet 1     Sig: NANCY 1 TABLETA POR BOCA CADA 8 HORAS CUANDO  [facility] 1/8/2021  Next Visit Date:  Future Appointments   Date Time Provider Ellie Dunbar   4/28/2021  2:45 PM Rissa Rainey, DO [address]   5/11/2021  1:45 PM Rissa Rainey, DO [address]   5/24/2021  1:30 PM Rissa Rainey, DO [address]   7/6/2021  2:00 PM Rissa Rainey, DO [address]

## 2021-04-19 RX ORDER — HYDROXYZINE HYDROCHLORIDE 25 MG/1
TABLET, FILM COATED ORAL
Qty: 60 TABLET | Refills: 1 | Status: SHIPPED | OUTPATIENT
Start: 2021-04-19 | End: 2021-12-02 | Stop reason: SDUPTHER

## 2021-04-19 NOTE — TELEPHONE ENCOUNTER
I have refilled the prescription but the patient needs to schedule a follow-up appointment in the next 3 to 4 months. May be virtual or in person.   No further refills after this until seen

## 2021-04-23 ENCOUNTER — TELEPHONE (OUTPATIENT)
Dept: OBGYN CLINIC | Age: 66
End: 2021-04-23

## 2021-04-23 RX ORDER — ESTRADIOL 0.1 MG/G
CREAM VAGINAL
Qty: 1 TUBE | Refills: 3 | Status: SHIPPED | OUTPATIENT
Start: 2021-04-23 | End: 2021-07-26

## 2021-04-28 ENCOUNTER — PROCEDURE VISIT (OUTPATIENT)
Dept: PHYSICAL MEDICINE AND REHAB | Age: 66
End: 2021-04-28
Payer: MEDICARE

## 2021-04-28 DIAGNOSIS — G89.29 CHRONIC PAIN OF BOTH SHOULDERS: Primary | ICD-10-CM

## 2021-04-28 DIAGNOSIS — M25.512 CHRONIC PAIN OF BOTH SHOULDERS: Primary | ICD-10-CM

## 2021-04-28 DIAGNOSIS — M25.511 CHRONIC PAIN OF BOTH SHOULDERS: Primary | ICD-10-CM

## 2021-04-28 PROCEDURE — 20611 DRAIN/INJ JOINT/BURSA W/US: CPT | Performed by: PHYSICAL MEDICINE & REHABILITATION

## 2021-04-28 RX ORDER — LIDOCAINE HYDROCHLORIDE 10 MG/ML
8 INJECTION, SOLUTION INFILTRATION; PERINEURAL ONCE
Status: COMPLETED | OUTPATIENT
Start: 2021-04-28 | End: 2021-04-28

## 2021-04-28 RX ORDER — LIDOCAINE HYDROCHLORIDE 20 MG/ML
2 INJECTION, SOLUTION INFILTRATION; PERINEURAL ONCE
Status: COMPLETED | OUTPATIENT
Start: 2021-04-28 | End: 2021-04-28

## 2021-04-28 RX ADMIN — LIDOCAINE HYDROCHLORIDE 2 ML: 20 INJECTION, SOLUTION INFILTRATION; PERINEURAL at 16:11

## 2021-04-28 RX ADMIN — LIDOCAINE HYDROCHLORIDE 8 ML: 10 INJECTION, SOLUTION INFILTRATION; PERINEURAL at 16:11

## 2021-05-22 DIAGNOSIS — L29.9 ITCHING: Chronic | ICD-10-CM

## 2021-05-22 DIAGNOSIS — J30.89 CHRONIC NON-SEASONAL ALLERGIC RHINITIS: ICD-10-CM

## 2021-05-22 RX ORDER — HYDROXYZINE HYDROCHLORIDE 25 MG/1
TABLET, FILM COATED ORAL
Qty: 60 TABLET | Refills: 1 | OUTPATIENT
Start: 2021-05-22

## 2021-05-24 RX ORDER — PANTOPRAZOLE SODIUM 40 MG/1
TABLET, DELAYED RELEASE ORAL
COMMUNITY
Start: 2021-03-17 | End: 2021-07-26

## 2021-05-25 ENCOUNTER — OFFICE VISIT (OUTPATIENT)
Dept: OBGYN CLINIC | Age: 66
End: 2021-05-25
Payer: MEDICARE

## 2021-05-25 VITALS
WEIGHT: 192 LBS | BODY MASS INDEX: 31.95 KG/M2 | HEART RATE: 81 BPM | SYSTOLIC BLOOD PRESSURE: 106 MMHG | DIASTOLIC BLOOD PRESSURE: 70 MMHG

## 2021-05-25 DIAGNOSIS — N93.9 ABNORMAL UTERINE BLEEDING (AUB): Primary | ICD-10-CM

## 2021-05-25 PROCEDURE — 99213 OFFICE O/P EST LOW 20 MIN: CPT | Performed by: OBSTETRICS & GYNECOLOGY

## 2021-05-25 PROCEDURE — 3017F COLORECTAL CA SCREEN DOC REV: CPT | Performed by: OBSTETRICS & GYNECOLOGY

## 2021-05-25 PROCEDURE — G8427 DOCREV CUR MEDS BY ELIG CLIN: HCPCS | Performed by: OBSTETRICS & GYNECOLOGY

## 2021-05-25 PROCEDURE — G8417 CALC BMI ABV UP PARAM F/U: HCPCS | Performed by: OBSTETRICS & GYNECOLOGY

## 2021-05-25 PROCEDURE — 1036F TOBACCO NON-USER: CPT | Performed by: OBSTETRICS & GYNECOLOGY

## 2021-05-25 NOTE — PROGRESS NOTES
Mauricio Mena (: 1955) is a 61 y.o. female, Established patient, here for evaluation of the following chief complaint(s):  Vaginal Bleeding (bled for 4 days beginning 21 and llq pain)  Postmenopausal bleeding . ultrasound 6 months ago shows thickened endometrium and a fluid some fluid collecting      SUBJECTIVE/OBJECTIVE:  HPI    Past Surgical History:   Procedure Laterality Date    COLONOSCOPY      COLONOSCOPY N/A 2/3/2021    COLORECTAL CANCER SCREENING with polypectomies performed by Cherisse Mortimer, MD at [address] [place] [address], DIAGNOSTIC      KIDNEY SURGERY      OVARIAN CYST REMOVAL Left 2004    MA ESOPHAGOGASTRODUODENOSCOPY TRANSORAL DIAGNOSTIC N/A 2018    EGD ESOPHAGOGASTRODUODENOSCOPY WITH DILATION performed by Dillon James MD at [facility]        Review of Systems   Genitourinary: Positive for vaginal bleeding. Physical Exam  Constitutional:       Appearance: She is well-developed. HENT:      Head: Normocephalic and atraumatic. Eyes:      Pupils: Pupils are equal, round, and reactive to light. Neck:      Thyroid: No thyromegaly. Trachea: No tracheal deviation. Cardiovascular:      Rate and Rhythm: Normal rate and regular rhythm. Heart sounds: Normal heart sounds. Pulmonary:      Effort: Pulmonary effort is normal.      Breath sounds: Normal breath sounds. Chest:      Chest wall: No tenderness. Abdominal:      General: Bowel sounds are normal. There is no distension. Palpations: Abdomen is soft. There is no mass. Tenderness: There is no abdominal tenderness. There is no guarding or rebound. Hernia: There is no hernia in the left inguinal area. Genitourinary:     Labia:         Right: No rash, tenderness, lesion or injury. Left: No rash, tenderness, lesion or injury. Vagina: Normal. No foreign body. No vaginal discharge, erythema, tenderness or bleeding.       Cervix: No cervical motion tenderness or discharge. Uterus: Not deviated, not enlarged, not fixed and not tender. Adnexa:         Right: No mass, tenderness or fullness. Left: No mass, tenderness or fullness. Rectum: Normal. No mass, tenderness, anal fissure, external hemorrhoid or internal hemorrhoid. Normal anal tone. Musculoskeletal:         General: Normal range of motion. Cervical back: Normal range of motion. Lymphadenopathy:      Cervical: No cervical adenopathy. Neurological:      Mental Status: She is alert and oriented to person, place, and time. Vitals:    05/25/21 1515   BP: 106/70   Pulse: 81   Weight: 192 lb (87.1 kg)       ASSESSMENT/PLAN:   Diagnosis Orders   1. Abnormal uterine bleeding (AUB)     post menopausal bleeding  Cervical stenosis  Plan Cytotec prep  Hysteroscopy fractional D&C  Risk benefits alternatives of the procedure explained to her in detail      No follow-ups on file. On this date 5/25/2021 I have spent 25 minutes reviewing previous notes, test results and face to face with the patient discussing the diagnosis and importance of compliance with the treatment plan as well as documenting on the day of the visit. An electronic signature was used to authenticate this note.

## 2021-05-26 ENCOUNTER — TELEPHONE (OUTPATIENT)
Dept: OBGYN CLINIC | Age: 66
End: 2021-05-26

## 2021-05-26 NOTE — TELEPHONE ENCOUNTER
Pt's daughter calling stating mother was supposed to get a prescription sent to the pharmacy yesterday and nothing was sent. She states it was a medication to take before her surgery.  [facility]

## 2021-06-21 RX ORDER — MISOPROSTOL 200 UG/1
TABLET ORAL
Qty: 4 TABLET | Refills: 0 | Status: SHIPPED | OUTPATIENT
Start: 2021-06-21 | End: 2021-06-28

## 2021-06-24 ENCOUNTER — TELEPHONE (OUTPATIENT)
Dept: FAMILY MEDICINE CLINIC | Age: 66
End: 2021-06-24

## 2021-06-24 ENCOUNTER — OFFICE VISIT (OUTPATIENT)
Dept: FAMILY MEDICINE CLINIC | Age: 66
End: 2021-06-24
Payer: MEDICARE

## 2021-06-24 ENCOUNTER — TELEPHONE (OUTPATIENT)
Dept: OBGYN CLINIC | Age: 66
End: 2021-06-24

## 2021-06-24 VITALS
HEIGHT: 65 IN | WEIGHT: 192 LBS | TEMPERATURE: 97.1 F | OXYGEN SATURATION: 99 % | BODY MASS INDEX: 31.99 KG/M2 | SYSTOLIC BLOOD PRESSURE: 122 MMHG | HEART RATE: 77 BPM | DIASTOLIC BLOOD PRESSURE: 80 MMHG

## 2021-06-24 DIAGNOSIS — Z01.818 PRE-OP EXAMINATION: Primary | ICD-10-CM

## 2021-06-24 PROCEDURE — 99214 OFFICE O/P EST MOD 30 MIN: CPT | Performed by: PHYSICIAN ASSISTANT

## 2021-06-24 NOTE — TELEPHONE ENCOUNTER
Spoke to pt today and confirmed that she understands the procedure she is have done on Monday. Pt states that she went to PAT today with her daughter but neither of them understand english well, pt did not have testing done due to provider stating orders were not in. Per Deb Ruiz pt will have labs drawn Monday morning before surgery. Pt is aware and understands and is ok with that decision.

## 2021-06-24 NOTE — TELEPHONE ENCOUNTER
Spoke with the patient today with  on the phone. Patient informed me that she will see Dr. Gianna Finney tomorrow. Patient states that she will have examination and consent form explained and signed at her appointment tomorrow at [facility] office.

## 2021-06-24 NOTE — PROGRESS NOTES
Preoperative Consultation      Mauricio Trina  YOB: 1955    Date of Service:  6/24/2021    Vitals:    06/24/21 1342   BP: 122/80   Site: Right Upper Arm   Position: Sitting   Cuff Size: Medium Adult   Pulse: 77   Temp: 97.1 °F (36.2 °C)   SpO2: 99%   Weight: 192 lb (87.1 kg)   Height: 5' 5\" (1.651 m)      Wt Readings from Last 2 Encounters:   06/24/21 192 lb (87.1 kg)   05/25/21 192 lb (87.1 kg)     BP Readings from Last 3 Encounters:   06/24/21 122/80   05/25/21 106/70   04/13/21 (!) 156/91        Chief Complaint   Patient presents with    Pre-op Exam     Surgery 6/28/21     Allergies   Allergen Reactions    Other      Perfumes      Outpatient Medications Marked as Taking for the 6/24/21 encounter (Office Visit) with KRISTAL Wong   Medication Sig Dispense Refill    miSOPROStol (CYTOTEC) 200 MCG tablet Take 2 evening prior to procedure. Take 2 morning of procedure with just a little bit of water 4 tablet 0    tiZANidine (ZANAFLEX) 4 MG tablet Take 1 tablet by mouth 3 times daily as needed (pain/spasm) 15 tablet 0    naproxen (NAPROSYN) 500 MG tablet Take 1 tablet by mouth 2 times daily (with meals) 60 tablet 3    omeprazole (PRILOSEC) 10 MG delayed release capsule Take 10 mg by mouth daily       acetaminophen (APAP EXTRA STRENGTH) 500 MG tablet Take 2 tablets by mouth every 6 hours as needed for Pain 120 tablet 3    Cholecalciferol (VITAMIN D3) 50 MCG (2000 UT) CAPS TAKE 1 CAPSULE BY MOUTH DAILY 90 capsule 4    escitalopram (LEXAPRO) 10 MG tablet Take 1 tablet by mouth daily 30 tablet 5       This patient presents to the office today for a preoperative consultation at the request of surgeon,  [facility] - MARIA EUGENIA URIAS, who plans on performing Hysteroscopy and fractional dilation and curettage on June 28 at [facility]. The current problem began 1 month ago, and symptoms have been unchanged with time.   Conservative therapy: No.    Planned anesthesia: General   Known anesthesia problems: None Bleeding risk: No recent or remote history of abnormal bleeding  Personal or FH of DVT/PE: No    Patient objection to receiving blood products: No    Patient Active Problem List   Diagnosis    Chronic non-seasonal allergic rhinitis    Gastroesophageal reflux disease without esophagitis    Hemoptysis    Sensory hearing loss, bilateral    Temporomandibular joint disorder    Bilateral carpal tunnel syndrome    Cervicalgia    Chronic midline low back pain with bilateral sciatica    Atrophic vaginitis    Mood disorder (HCC)    Nausea    Pure hypercholesterolemia    Itching    Vitamin D deficiency    Mild intermittent asthma without complication    LUQ pain    Pruritic dermatitis    Abnormal glucose    Hiatal hernia    Tinea versicolor    Osteoarthritis of spine with radiculopathy, cervical region    Osteoarthritis of spine with radiculopathy, lumbar region    Lumbosacral radiculopathy at L5    C6 radiculopathy    C7 radiculopathy    Abnormal pelvic ultrasound    Generalized body aches    Polyp of ascending colon    Polyp of transverse colon    Adenomatous colon polyp - repeat scope 02/2026       Past Medical History:   Diagnosis Date    Abnormal pelvic ultrasound 10/8/2020    Acid reflux     Adenomatous colon polyp - repeat scope 02/2026 2/11/2021    Asthma     Chronic back pain     COPD (chronic obstructive pulmonary disease) ([facility] [address]     Depression     Hyperlipidemia     Obesity     Venous insufficiency      Past Surgical History:   Procedure Laterality Date    COLONOSCOPY      COLONOSCOPY N/A 2/3/2021    COLORECTAL CANCER SCREENING with polypectomies performed by Cherisse Mortimer, MD at [address] [place] [address], [place], DIAGNOSTIC      KIDNEY SURGERY      OVARIAN CYST REMOVAL Left 11/06/2004    ME ESOPHAGOGASTRODUODENOSCOPY TRANSORAL DIAGNOSTIC N/A 11/14/2018    EGD ESOPHAGOGASTRODUODENOSCOPY WITH DILATION performed by Dillon James MD at [facility] Meetings: Never    Marital Status:    Intimate Partner Violence: Not At Risk    Fear of Current or Ex-Partner: No    Emotionally Abused: No    Physically Abused: No    Sexually Abused: No       Review of Systems  A comprehensive review of systems was negative except for what was noted in the HPI. Physical Exam   Unable to complete in office. EKG Interpretation:  normal EKG, normal sinus rhythm, unchanged from previous tracings. Performed on 4/13/21    Lab Review : Will have drawn tomorrow. Assessment:       61 y.o. patient with planned surgery as above. Known risk factors for perioperative complications: None  Current medications which may produce withdrawal symptoms if withheld perioperatively: none      Plan:     1. Preoperative workup as follows: none  2. Change in medication regimen before surgery: Hold all medications on morning of surgery  3. Prophylaxis for cardiac events with perioperative beta-blockers: Not indicated  ACC/AHA indications for pre-operative beta-blocker use:    · Vascular surgery with history of postitive stress test  · Intermediate or high risk surgery with history of CAD   · Intermediate or high risk surgery with multiple clinical predictors of CAD- 2 of the following: history of compensated or prior heart failure, history of cerebrovascular disease, DM, or renal insufficiency    Routine administration of higher-dose, long-acting metoprolol in beta-blocker-naïve patients on the day of surgery, and in the absence of dose titration is associated with an overall increase in mortality. Beta-blockers should be started days to weeks prior to surgery and titrated to pulse < 70.  4. Deep vein thrombosis prophylaxis: regimen to be chosen by surgical team  5. Surgery should be delayed until the patient is able to properly informed of the surgery. Patient states that she needs a  to be present. Patient did not feel comfortable to proceed at this time. Patient's Surgeon was notified.

## 2021-06-25 ENCOUNTER — OFFICE VISIT (OUTPATIENT)
Dept: OBGYN CLINIC | Age: 66
End: 2021-06-25
Payer: MEDICARE

## 2021-06-25 VITALS
WEIGHT: 187 LBS | SYSTOLIC BLOOD PRESSURE: 122 MMHG | HEART RATE: 69 BPM | DIASTOLIC BLOOD PRESSURE: 72 MMHG | BODY MASS INDEX: 31.12 KG/M2

## 2021-06-25 DIAGNOSIS — R93.89 THICKENED ENDOMETRIUM: ICD-10-CM

## 2021-06-25 DIAGNOSIS — N88.2 CERVICAL STENOSIS (UTERINE CERVIX): ICD-10-CM

## 2021-06-25 DIAGNOSIS — N95.0 POST-MENOPAUSAL BLEEDING: Primary | ICD-10-CM

## 2021-06-25 PROCEDURE — 99213 OFFICE O/P EST LOW 20 MIN: CPT | Performed by: OBSTETRICS & GYNECOLOGY

## 2021-06-25 PROCEDURE — 3017F COLORECTAL CA SCREEN DOC REV: CPT | Performed by: OBSTETRICS & GYNECOLOGY

## 2021-06-25 PROCEDURE — G8417 CALC BMI ABV UP PARAM F/U: HCPCS | Performed by: OBSTETRICS & GYNECOLOGY

## 2021-06-25 PROCEDURE — G8428 CUR MEDS NOT DOCUMENT: HCPCS | Performed by: OBSTETRICS & GYNECOLOGY

## 2021-06-25 PROCEDURE — 1036F TOBACCO NON-USER: CPT | Performed by: OBSTETRICS & GYNECOLOGY

## 2021-06-25 NOTE — PROGRESS NOTES
HPI:  Marisela Cruz (: 1955) is a 61 y.o. female, Established patient, here for evaluation of the following chief complaint(s):  Surgical Consult (discuss surgery with . Unable to do PAT)  Patient has postmenopausal bleeding thickened endometrium and cervical stenosis we are unable to evaluate her in the office she is having surgery on Monday. There was some confusion with PAT as to what was happening so the patient came in today to clarify the procedure as well as her instructions. This visit was conducted with a . She will be having a hysteroscopy and a D&C. She is going to be taking Cytotec prior to the procedure. This was explained to her in detail      SUBJECTIVE/OBJECTIVE:    Past Surgical History:   Procedure Laterality Date    COLONOSCOPY      COLONOSCOPY N/A 2/3/2021    COLORECTAL CANCER SCREENING with polypectomies performed by Johnnie Boyle MD at [address] [place] [address], COLON, DIAGNOSTIC      KIDNEY SURGERY      OVARIAN CYST REMOVAL Left 2004    NE ESOPHAGOGASTRODUODENOSCOPY TRANSORAL DIAGNOSTIC N/A 2018    EGD ESOPHAGOGASTRODUODENOSCOPY WITH DILATION performed by Loreta Mcgregor MD at [facility]        Review of Systems    Physical Exam    Vitals:    21 0937   BP: 122/72   Pulse: 69   Weight: 187 lb (84.8 kg)         ASSESSMENT/PLAN:    Postmenopausal bleeding  Cervical stenosis  Thickened endometrium    PLAN: Cytotec prep  Hysteroscopy D&C. Risk benefits alternatives of the procedure explained to her in detail      No follow-ups on file. On this date 2021 I have spent 20 minutes reviewing previous notes, test results and face to face with the patient discussing the diagnosis and importance of compliance with the treatment plan as well as documenting on the day of the visit. An electronic signature was used to authenticate this note.  Please note this report has been partially produced using speech recognition software and may cause contain errors related to that system including grammar, punctuation and spelling as well as words and phrases that may seem inappropriate. If there are questions or concerns please feel free to contact me to clarify.

## 2021-06-28 ENCOUNTER — ANESTHESIA EVENT (OUTPATIENT)
Dept: OPERATING ROOM | Age: 66
End: 2021-06-28
Payer: MEDICARE

## 2021-06-28 ENCOUNTER — ANESTHESIA (OUTPATIENT)
Dept: OPERATING ROOM | Age: 66
End: 2021-06-28
Payer: MEDICARE

## 2021-06-28 ENCOUNTER — HOSPITAL ENCOUNTER (OUTPATIENT)
Age: 66
Setting detail: OUTPATIENT SURGERY
Discharge: HOME | End: 2021-06-28
Attending: OBSTETRICS & GYNECOLOGY | Admitting: OBSTETRICS & GYNECOLOGY
Payer: MEDICARE

## 2021-06-28 VITALS — SYSTOLIC BLOOD PRESSURE: 94 MMHG | DIASTOLIC BLOOD PRESSURE: 52 MMHG | OXYGEN SATURATION: 100 % | TEMPERATURE: 95 F

## 2021-06-28 VITALS
WEIGHT: 192 LBS | RESPIRATION RATE: 16 BRPM | DIASTOLIC BLOOD PRESSURE: 68 MMHG | HEART RATE: 62 BPM | HEIGHT: 65 IN | TEMPERATURE: 97 F | SYSTOLIC BLOOD PRESSURE: 143 MMHG | BODY MASS INDEX: 31.99 KG/M2 | OXYGEN SATURATION: 100 %

## 2021-06-28 DIAGNOSIS — G89.18 POSTOPERATIVE PAIN: Primary | ICD-10-CM

## 2021-06-28 LAB
ANION GAP SERPL CALCULATED.3IONS-SCNC: 10 MEQ/L (ref 9–15)
BUN BLDV-MCNC: 13 MG/DL (ref 8–23)
CALCIUM SERPL-MCNC: 9.2 MG/DL (ref 8.5–9.9)
CHLORIDE BLD-SCNC: 105 MEQ/L (ref 95–107)
CO2: 24 MEQ/L (ref 20–31)
CREAT SERPL-MCNC: 0.68 MG/DL (ref 0.5–0.9)
GFR AFRICAN AMERICAN: >60
GFR NON-AFRICAN AMERICAN: >60
GLUCOSE BLD-MCNC: 106 MG/DL (ref 70–99)
HCT VFR BLD CALC: 41.6 % (ref 37–47)
HEMOGLOBIN: 14.3 G/DL (ref 12–16)
MCH RBC QN AUTO: 32.4 PG (ref 27–31.3)
MCHC RBC AUTO-ENTMCNC: 34.5 % (ref 33–37)
MCV RBC AUTO: 93.9 FL (ref 82–100)
PDW BLD-RTO: 13.1 % (ref 11.5–14.5)
PLATELET # BLD: 241 K/UL (ref 130–400)
POTASSIUM SERPL-SCNC: 4.5 MEQ/L (ref 3.4–4.9)
RBC # BLD: 4.43 M/UL (ref 4.2–5.4)
SODIUM BLD-SCNC: 139 MEQ/L (ref 135–144)
WBC # BLD: 5.8 K/UL (ref 4.8–10.8)

## 2021-06-28 PROCEDURE — 3600000014 HC SURGERY LEVEL 4 ADDTL 15MIN: Performed by: OBSTETRICS & GYNECOLOGY

## 2021-06-28 PROCEDURE — 80048 BASIC METABOLIC PNL TOTAL CA: CPT

## 2021-06-28 PROCEDURE — 58558 HYSTEROSCOPY BIOPSY: CPT | Performed by: OBSTETRICS & GYNECOLOGY

## 2021-06-28 PROCEDURE — 2580000003 HC RX 258: Performed by: ANESTHESIOLOGY

## 2021-06-28 PROCEDURE — 7100000000 HC PACU RECOVERY - FIRST 15 MIN: Performed by: OBSTETRICS & GYNECOLOGY

## 2021-06-28 PROCEDURE — 6360000002 HC RX W HCPCS: Performed by: NURSE ANESTHETIST, CERTIFIED REGISTERED

## 2021-06-28 PROCEDURE — 3700000000 HC ANESTHESIA ATTENDED CARE: Performed by: OBSTETRICS & GYNECOLOGY

## 2021-06-28 PROCEDURE — 6360000002 HC RX W HCPCS: Performed by: OBSTETRICS & GYNECOLOGY

## 2021-06-28 PROCEDURE — 7100000011 HC PHASE II RECOVERY - ADDTL 15 MIN: Performed by: OBSTETRICS & GYNECOLOGY

## 2021-06-28 PROCEDURE — 88305 TISSUE EXAM BY PATHOLOGIST: CPT

## 2021-06-28 PROCEDURE — 85027 COMPLETE CBC AUTOMATED: CPT

## 2021-06-28 PROCEDURE — 3600000004 HC SURGERY LEVEL 4 BASE: Performed by: OBSTETRICS & GYNECOLOGY

## 2021-06-28 PROCEDURE — 2709999900 HC NON-CHARGEABLE SUPPLY: Performed by: OBSTETRICS & GYNECOLOGY

## 2021-06-28 PROCEDURE — 7100000010 HC PHASE II RECOVERY - FIRST 15 MIN: Performed by: OBSTETRICS & GYNECOLOGY

## 2021-06-28 PROCEDURE — 2580000003 HC RX 258: Performed by: OBSTETRICS & GYNECOLOGY

## 2021-06-28 PROCEDURE — 3700000001 HC ADD 15 MINUTES (ANESTHESIA): Performed by: OBSTETRICS & GYNECOLOGY

## 2021-06-28 PROCEDURE — 7100000001 HC PACU RECOVERY - ADDTL 15 MIN: Performed by: OBSTETRICS & GYNECOLOGY

## 2021-06-28 RX ORDER — ONDANSETRON 2 MG/ML
4 INJECTION INTRAMUSCULAR; INTRAVENOUS
Status: DISCONTINUED | OUTPATIENT
Start: 2021-06-28 | End: 2021-06-28 | Stop reason: HOSPADM

## 2021-06-28 RX ORDER — FENTANYL CITRATE 50 UG/ML
25 INJECTION, SOLUTION INTRAMUSCULAR; INTRAVENOUS EVERY 5 MIN PRN
Status: DISCONTINUED | OUTPATIENT
Start: 2021-06-28 | End: 2021-06-28 | Stop reason: HOSPADM

## 2021-06-28 RX ORDER — DIPHENHYDRAMINE HYDROCHLORIDE 50 MG/ML
12.5 INJECTION INTRAMUSCULAR; INTRAVENOUS
Status: DISCONTINUED | OUTPATIENT
Start: 2021-06-28 | End: 2021-06-28 | Stop reason: HOSPADM

## 2021-06-28 RX ORDER — MEPERIDINE HYDROCHLORIDE 25 MG/ML
12.5 INJECTION INTRAMUSCULAR; INTRAVENOUS; SUBCUTANEOUS EVERY 5 MIN PRN
Status: DISCONTINUED | OUTPATIENT
Start: 2021-06-28 | End: 2021-06-28 | Stop reason: HOSPADM

## 2021-06-28 RX ORDER — LIDOCAINE HYDROCHLORIDE 20 MG/ML
INJECTION, SOLUTION INTRAVENOUS PRN
Status: DISCONTINUED | OUTPATIENT
Start: 2021-06-28 | End: 2021-06-28 | Stop reason: SDUPTHER

## 2021-06-28 RX ORDER — PROPOFOL 10 MG/ML
INJECTION, EMULSION INTRAVENOUS PRN
Status: DISCONTINUED | OUTPATIENT
Start: 2021-06-28 | End: 2021-06-28 | Stop reason: SDUPTHER

## 2021-06-28 RX ORDER — METOCLOPRAMIDE HYDROCHLORIDE 5 MG/ML
10 INJECTION INTRAMUSCULAR; INTRAVENOUS
Status: DISCONTINUED | OUTPATIENT
Start: 2021-06-28 | End: 2021-06-28 | Stop reason: HOSPADM

## 2021-06-28 RX ORDER — MIDAZOLAM HYDROCHLORIDE 2 MG/2ML
INJECTION, SOLUTION INTRAMUSCULAR; INTRAVENOUS PRN
Status: DISCONTINUED | OUTPATIENT
Start: 2021-06-28 | End: 2021-06-28 | Stop reason: SDUPTHER

## 2021-06-28 RX ORDER — DEXAMETHASONE SODIUM PHOSPHATE 4 MG/ML
INJECTION, SOLUTION INTRA-ARTICULAR; INTRALESIONAL; INTRAMUSCULAR; INTRAVENOUS; SOFT TISSUE PRN
Status: DISCONTINUED | OUTPATIENT
Start: 2021-06-28 | End: 2021-06-28 | Stop reason: SDUPTHER

## 2021-06-28 RX ORDER — HYDROCODONE BITARTRATE AND ACETAMINOPHEN 5; 325 MG/1; MG/1
2 TABLET ORAL PRN
Status: DISCONTINUED | OUTPATIENT
Start: 2021-06-28 | End: 2021-06-28 | Stop reason: HOSPADM

## 2021-06-28 RX ORDER — CEFAZOLIN SODIUM 2 G/50ML
2000 SOLUTION INTRAVENOUS ONCE
Status: COMPLETED | OUTPATIENT
Start: 2021-06-28 | End: 2021-06-28

## 2021-06-28 RX ORDER — MISOPROSTOL 200 UG/1
TABLET ORAL
Qty: 4 TABLET | Refills: 0 | Status: SHIPPED | OUTPATIENT
Start: 2021-06-28 | End: 2021-07-26 | Stop reason: ALTCHOICE

## 2021-06-28 RX ORDER — LABETALOL HYDROCHLORIDE 5 MG/ML
5 INJECTION, SOLUTION INTRAVENOUS EVERY 10 MIN PRN
Status: DISCONTINUED | OUTPATIENT
Start: 2021-06-28 | End: 2021-06-28 | Stop reason: HOSPADM

## 2021-06-28 RX ORDER — SODIUM CHLORIDE, SODIUM LACTATE, POTASSIUM CHLORIDE, CALCIUM CHLORIDE 600; 310; 30; 20 MG/100ML; MG/100ML; MG/100ML; MG/100ML
INJECTION, SOLUTION INTRAVENOUS CONTINUOUS
Status: DISCONTINUED | OUTPATIENT
Start: 2021-06-28 | End: 2021-06-28 | Stop reason: HOSPADM

## 2021-06-28 RX ORDER — 0.9 % SODIUM CHLORIDE 0.9 %
500 INTRAVENOUS SOLUTION INTRAVENOUS
Status: DISCONTINUED | OUTPATIENT
Start: 2021-06-28 | End: 2021-06-28 | Stop reason: HOSPADM

## 2021-06-28 RX ORDER — MAGNESIUM HYDROXIDE 1200 MG/15ML
LIQUID ORAL CONTINUOUS PRN
Status: COMPLETED | OUTPATIENT
Start: 2021-06-28 | End: 2021-06-28

## 2021-06-28 RX ORDER — ONDANSETRON 2 MG/ML
INJECTION INTRAMUSCULAR; INTRAVENOUS PRN
Status: DISCONTINUED | OUTPATIENT
Start: 2021-06-28 | End: 2021-06-28 | Stop reason: SDUPTHER

## 2021-06-28 RX ORDER — FENTANYL CITRATE 50 UG/ML
INJECTION, SOLUTION INTRAMUSCULAR; INTRAVENOUS PRN
Status: DISCONTINUED | OUTPATIENT
Start: 2021-06-28 | End: 2021-06-28 | Stop reason: SDUPTHER

## 2021-06-28 RX ORDER — HYDROCODONE BITARTRATE AND ACETAMINOPHEN 5; 325 MG/1; MG/1
1 TABLET ORAL EVERY 6 HOURS PRN
Qty: 12 TABLET | Refills: 0 | Status: SHIPPED | OUTPATIENT
Start: 2021-06-28 | End: 2021-07-01

## 2021-06-28 RX ORDER — HYDROCODONE BITARTRATE AND ACETAMINOPHEN 5; 325 MG/1; MG/1
1 TABLET ORAL PRN
Status: DISCONTINUED | OUTPATIENT
Start: 2021-06-28 | End: 2021-06-28 | Stop reason: HOSPADM

## 2021-06-28 RX ADMIN — SODIUM CHLORIDE, POTASSIUM CHLORIDE, SODIUM LACTATE AND CALCIUM CHLORIDE: 600; 310; 30; 20 INJECTION, SOLUTION INTRAVENOUS at 10:55

## 2021-06-28 RX ADMIN — MIDAZOLAM HYDROCHLORIDE 2 MG: 1 INJECTION, SOLUTION INTRAMUSCULAR; INTRAVENOUS at 11:32

## 2021-06-28 RX ADMIN — CEFAZOLIN SODIUM 2000 MG: 2 SOLUTION INTRAVENOUS at 11:38

## 2021-06-28 RX ADMIN — LIDOCAINE HYDROCHLORIDE 100 MG: 20 INJECTION, SOLUTION INTRAVENOUS at 11:43

## 2021-06-28 RX ADMIN — PROPOFOL 200 MG: 10 INJECTION, EMULSION INTRAVENOUS at 11:43

## 2021-06-28 RX ADMIN — FENTANYL CITRATE 25 MCG: 50 INJECTION, SOLUTION INTRAMUSCULAR; INTRAVENOUS at 12:00

## 2021-06-28 RX ADMIN — PROPOFOL 50 MG: 10 INJECTION, EMULSION INTRAVENOUS at 11:50

## 2021-06-28 RX ADMIN — DEXAMETHASONE SODIUM PHOSPHATE 4 MG: 4 INJECTION, SOLUTION INTRAMUSCULAR; INTRAVENOUS at 12:00

## 2021-06-28 RX ADMIN — FENTANYL CITRATE 25 MCG: 50 INJECTION, SOLUTION INTRAMUSCULAR; INTRAVENOUS at 11:53

## 2021-06-28 RX ADMIN — FENTANYL CITRATE 50 MCG: 50 INJECTION, SOLUTION INTRAMUSCULAR; INTRAVENOUS at 11:43

## 2021-06-28 RX ADMIN — SODIUM CHLORIDE, POTASSIUM CHLORIDE, SODIUM LACTATE AND CALCIUM CHLORIDE: 600; 310; 30; 20 INJECTION, SOLUTION INTRAVENOUS at 12:02

## 2021-06-28 RX ADMIN — ONDANSETRON 4 MG: 2 INJECTION INTRAMUSCULAR; INTRAVENOUS at 12:00

## 2021-06-28 ASSESSMENT — PULMONARY FUNCTION TESTS
PIF_VALUE: 11
PIF_VALUE: 12
PIF_VALUE: 11
PIF_VALUE: 22
PIF_VALUE: 11
PIF_VALUE: 13
PIF_VALUE: 11
PIF_VALUE: 0
PIF_VALUE: 3
PIF_VALUE: 11
PIF_VALUE: 0
PIF_VALUE: 9
PIF_VALUE: 10
PIF_VALUE: 1
PIF_VALUE: 21
PIF_VALUE: 0
PIF_VALUE: 12
PIF_VALUE: 11
PIF_VALUE: 5
PIF_VALUE: 11
PIF_VALUE: 4
PIF_VALUE: 11
PIF_VALUE: 12
PIF_VALUE: 0
PIF_VALUE: 5
PIF_VALUE: 12
PIF_VALUE: 5
PIF_VALUE: 11
PIF_VALUE: 5
PIF_VALUE: 4
PIF_VALUE: 11
PIF_VALUE: 12
PIF_VALUE: 0
PIF_VALUE: 11
PIF_VALUE: 11

## 2021-06-28 NOTE — PROGRESS NOTES
Iv dc'd, dc instructions given to pt with interpretor Jenny Trejo [id number], pt verbalized understanding, assisted pt getting dressed, spoke with dtr bennie and she is calling for ride to come, pt awaiting in room 19 till ride arrives

## 2021-06-28 NOTE — H&P
Department of Obstetrics and Gynecology   History & Physical    Pt Name: Aminata Alex  MRN: [id number] Gwen #: [de-identified]  YOB: 1955  Estimated Date of Delivery: None noted. HPI: The patient is a 61 y.o. No obstetric history on file. female  who presents for h/s d and c    Allergies: Allergies as of 05/25/2021 - Fully Reviewed 05/25/2021   Allergen Reaction Noted    Other  11/15/2017       Medications:    Current Facility-Administered Medications:     lactated ringers infusion, , Intravenous, Continuous, Meme Moss MD, Last Rate: 100 mL/hr at 06/28/21 1055, New Bag at 06/28/21 1055    meperidine (DEMEROL) injection 12.5 mg, 12.5 mg, Intravenous, Q5 Min PRN, Meme Moss MD    fentaNYL (SUBLIMAZE) injection 25 mcg, 25 mcg, Intravenous, Q5 Min PRN, Meme Moss MD    HYDROmorphone (DILAUDID) injection 0.5 mg, 0.5 mg, Intravenous, Q5 Min PRN, Meme Moss MD    HYDROcodone-acetaminophen (NORCO) 5-325 MG per tablet 1 tablet, 1 tablet, Oral, PRN **OR** HYDROcodone-acetaminophen (NORCO) 5-325 MG per tablet 2 tablet, 2 tablet, Oral, PRN, Meme Moss MD    ondansetron [facility]) injection 4 mg, 4 mg, Intravenous, Once PRN, Meme Moss MD    0.9 % sodium chloride bolus, 500 mL, Intravenous, Once PRN, Meme Moss MD    diphenhydrAMINE (BENADRYL) injection 12.5 mg, 12.5 mg, Intravenous, Once PRN, Meme Moss MD    labetalol (NORMODYNE;TRANDATE) injection 5 mg, 5 mg, Intravenous, Q10 Min PRN, Meme Moss MD    metoclopramide [facility]) injection 10 mg, 10 mg, Intravenous, Once PRN, Meme Moss MD    OB History: No obstetric history on file. Gyn History: Denies h/o abnormal pap smear, h/o STDs.      Past Medical History:   Past Medical History:   Diagnosis Date    Abnormal pelvic ultrasound 10/8/2020    Acid reflux     Adenomatous colon polyp - repeat scope 02/2026 2/11/2021    Arthritis     Asthma     Chronic back pain     COPD (chronic obstructive pulmonary disease) ([facility] [address]     Depression     Hyperlipidemia     Obesity     Venous insufficiency        Past Surgical History:   Past Surgical History:   Procedure Laterality Date    COLONOSCOPY      COLONOSCOPY N/A 2/3/2021    COLORECTAL CANCER SCREENING with polypectomies performed by Yasmin Figueroa MD at [address] [place] [address], COLON, DIAGNOSTIC      KIDNEY SURGERY      OVARIAN CYST REMOVAL Left 11/06/2004    MO ESOPHAGOGASTRODUODENOSCOPY TRANSORAL DIAGNOSTIC N/A 11/14/2018    EGD ESOPHAGOGASTRODUODENOSCOPY WITH DILATION performed by Richard Hernandez MD at [facility]       Social History:   Social History     Tobacco Use   Smoking Status Never Smoker   Smokeless Tobacco Never Used        Family History: Noncontributory; Denies h/o cancer. ROS:  Negative except as stated in HPI, denies nausea, vomiting, fever, chills, headache or dysuria. PE:  Vitals:    06/28/21 1030   BP: (!) 140/64   Pulse: 73   Resp: 18   Temp: 97 °F (36.1 °C)   SpO2: 97%       General: well nourished, well developed, in no acute distress  CV: Normal heart sounds  Resp: breathing unlabored  Abdomen: Nontender, no rebound, no guarding  FH:  Cx:    NST - Cat 1: FHR 140s, moderate variability, +accels, -decels    Labs:       Assessment:   61 y.o. No obstetric history on file.  female with PMB cervical stenosis    Plan: hysteroscopy D and [address], DO

## 2021-06-28 NOTE — ANESTHESIA POSTPROCEDURE EVALUATION
Department of Anesthesiology  Postprocedure Note    Patient: Bethel Monday  MRN: [id number]  YOB: 1955  Date of evaluation: 6/28/2021  Time:  12:23 PM     Procedure Summary     Date: 06/28/21 Room / Location: [address]    Anesthesia Start: 1138 Anesthesia Stop:     Procedure: HYSTEROSCOPY  FX  D/C (N/A Uterus) Diagnosis: (PMB, CERVICAL STENOSIS)    Surgeons: Alicja Hernandez DO Responsible Provider: Carroll Mandujano MD    Anesthesia Type: general ASA Status: 3          Anesthesia Type: No value filed. Fady Phase I: Fady Score: 6    Fady Phase II:      Last vitals: Reviewed and per EMR flowsheets.        Anesthesia Post Evaluation    Patient location during evaluation: PACU  Patient participation: complete - patient participated  Level of consciousness: awake and alert  Pain score: 0  Airway patency: patent  Nausea & Vomiting: no nausea and no vomiting  Complications: no  Cardiovascular status: blood pressure returned to baseline and hemodynamically stable  Respiratory status: acceptable  Hydration status: euvolemic

## 2021-06-28 NOTE — ANESTHESIA PRE PROCEDURE
Department of Anesthesiology  Preprocedure Note       Name:  Patricio Briones   Age:  61 y.o.  :  1955                                          MRN:  [id number]         Date:  2021      Surgeon: Kevin Fay):  Gladis Pena DO    Procedure: Procedure(s): HYSTEROSCOPY  FX  D/C, PAT ON ADMIT. Medications prior to admission:   Prior to Admission medications    Medication Sig Start Date End Date Taking? Authorizing Provider   naproxen (NAPROSYN) 500 MG tablet Take 1 tablet by mouth 2 times daily (with meals) 21  Yes Lorin Solo DO   acetaminophen (APAP EXTRA STRENGTH) 500 MG tablet Take 2 tablets by mouth every 6 hours as needed for Pain 21  Yes Karely oMses MD   Cholecalciferol (VITAMIN D3) 50 MCG (2000 UT) CAPS TAKE 1 CAPSULE BY MOUTH DAILY 20  Yes Karely Moses MD   traMADol-acetaminophen (ULTRACET) 37.5-325 MG per tablet  21   Historical Provider, MD   miSOPROStol (CYTOTEC) 200 MCG tablet Take 2 evening prior to procedure.  Take 2 morning of procedure with just a little bit of water 21   Gladis Pena DO   pantoprazole (PROTONIX) 40 MG tablet  3/17/21   Historical Provider, MD   estradiol (ESTRACE VAGINAL) 0.1 MG/GM vaginal cream 1/2 gm vaginally twice weekly  Patient not taking: Reported on 2021   Agnes Bruner DO   hydrOXYzine (ATARAX) 25 MG tablet NANCY 1 TABLETA POR BOCA CADA [address] [facility]  Patient not taking: Reported on 2021   Karely Moses MD   tiZANidine (ZANAFLEX) 4 MG tablet Take 1 tablet by mouth 3 times daily as needed (pain/spasm) 21   Ara Prabhakar PA-C   estradiol (ESTRACE) 1 MG tablet Take 1 tablet by mouth daily  Patient not taking: Reported on 2021 3/5/21   Agnes Bruner DO   omeprazole (PRILOSEC) 10 MG delayed release capsule Take 10 mg by mouth daily     Historical Provider, MD   nitrofurantoin (MACRODANTIN) 100 MG capsule 7/29/20   Historical Provider, MD   escitalopram (LEXAPRO) 10 MG tablet Take 1 tablet by mouth daily 7/14/20   Praveen Park MD   Artificial Tear Solution (GENTEAL TEARS) 0.1-0.2-0.3 % SOLN  1/30/20   Historical Provider, MD   nystatin (MYCOSTATIN) 257298 UNIT/GM cream Apply topically 2 times daily. Patient not taking: Reported on 5/25/2021 1/3/20   Praveen Park MD   selenium sulfide (SELSUN) 2.5 % lotion Apply for 10 minutes on skin daily for 1-4 weeks OR one overnight application. Patient not taking: Reported on 6/24/2021 1/3/20   Praveen Park MD   albuterol sulfate  (90 Base) MCG/ACT inhaler Inhale 2 puffs into the lungs every 6 hours as needed for Wheezing  Patient not taking: Reported on 5/25/2021 1/3/20   Praveen Park MD       Current medications:    Current Facility-Administered Medications   Medication Dose Route Frequency Provider Last Rate Last Admin    lactated ringers infusion   Intravenous Continuous Blair Solorzano MD           Allergies:     Allergies   Allergen Reactions    Other      Perfumes        Problem List:    Patient Active Problem List   Diagnosis Code    Chronic non-seasonal allergic rhinitis J30.89    Gastroesophageal reflux disease without esophagitis K21.9    Hemoptysis R04.2    Sensory hearing loss, bilateral H90.3    Temporomandibular joint disorder M26.609    Bilateral carpal tunnel syndrome G56.03    Cervicalgia M54.2    Chronic midline low back pain with bilateral sciatica M54.41, M54.42, G89.29    Atrophic vaginitis N95.2    Mood disorder (HCC) F39    Nausea R11.0    Pure hypercholesterolemia E78.00    Itching L29.9    Vitamin D deficiency E55.9    Mild intermittent asthma without complication J29.26    LUQ pain R10.12    Pruritic dermatitis L29.9    Abnormal glucose R73.09    Hiatal hernia K44.9    Tinea versicolor B36.0    Osteoarthritis of spine with radiculopathy, cervical region M47.22  Osteoarthritis of spine with radiculopathy, lumbar region M47.26    Lumbosacral radiculopathy at L5 M54.17    C6 radiculopathy M54.12    C7 radiculopathy M54.12    Abnormal pelvic ultrasound R93.89    Generalized body aches R52    Polyp of ascending colon K63.5    Polyp of transverse colon K63.5    Adenomatous colon polyp - repeat scope 02/2026 D12.6       Past Medical History:        Diagnosis Date    Abnormal pelvic ultrasound 10/8/2020    Acid reflux     Adenomatous colon polyp - repeat scope 02/2026 2/11/2021    Asthma     Chronic back pain     COPD (chronic obstructive pulmonary disease) (HCC)     Depression     Hyperlipidemia     Obesity     Venous insufficiency        Past Surgical History:        Procedure Laterality Date    COLONOSCOPY      COLONOSCOPY N/A 2/3/2021    COLORECTAL CANCER SCREENING with polypectomies performed by Johnnie Boyle MD at [address] [place] [address], [place], DIAGNOSTIC      KIDNEY SURGERY      OVARIAN CYST REMOVAL Left 11/06/2004    SC ESOPHAGOGASTRODUODENOSCOPY TRANSORAL DIAGNOSTIC N/A 11/14/2018    EGD ESOPHAGOGASTRODUODENOSCOPY WITH DILATION performed by Loreta Mcgregor MD at [address] [place] [address] History:    Social History     Tobacco Use    Smoking status: Never Smoker    Smokeless tobacco: Never Used   Substance Use Topics    Alcohol use: No                                Counseling given: Not Answered      Vital Signs (Current): There were no vitals filed for this visit.                                            BP Readings from Last 3 Encounters:   06/25/21 122/72   06/24/21 122/80   05/25/21 106/70       NPO Status:                                                                                 BMI:   Wt Readings from Last 3 Encounters:   06/25/21 187 lb (84.8 kg)   06/24/21 192 lb (87.1 kg)   05/25/21 192 lb (87.1 kg)     There is no height or weight on file to calculate BMI.    CBC:   Lab Results   Component Value Date    WBC 8.3 04/13/2021    RBC 4.62 04/13/2021    HGB 14.6 04/13/2021    HCT 43.3 04/13/2021    MCV 93.7 04/13/2021    RDW 13.1 04/13/2021     04/13/2021       CMP:   Lab Results   Component Value Date     04/13/2021    K 3.9 04/13/2021     04/13/2021    CO2 27 04/13/2021    BUN 16 04/13/2021    CREATININE 0.66 04/13/2021    GFRAA >60.0 04/13/2021    LABGLOM >60.0 04/13/2021    GLUCOSE 109 04/13/2021    PROT 6.9 04/13/2021    CALCIUM 9.9 04/13/2021    BILITOT 0.8 04/13/2021    ALKPHOS 82 04/13/2021    AST 27 04/13/2021    ALT 29 04/13/2021       POC Tests: No results for input(s): POCGLU, POCNA, POCK, POCCL, POCBUN, POCHEMO, POCHCT in the last 72 hours. Coags:   Lab Results   Component Value Date    PROTIME 9.8 01/30/2018    INR 0.9 01/30/2018    APTT 28.0 01/30/2018       HCG (If Applicable): No results found for: PREGTESTUR, PREGSERUM, HCG, HCGQUANT     ABGs: No results found for: PHART, PO2ART, XHR1OFV, URA5UXM, BEART, I7MWPJQD     Type & Screen (If Applicable):  No results found for: LABABO, LABRH    Drug/Infectious Status (If Applicable):  No results found for: HIV, HEPCAB    COVID-19 Screening (If Applicable):   Lab Results   Component Value Date    COVID19 Not Detected 01/27/2021           Anesthesia Evaluation  Patient summary reviewed and Nursing notes reviewed no history of anesthetic complications:   Airway: Mallampati: II  TM distance: >3 FB   Neck ROM: full  Mouth opening: > = 3 FB Dental: normal exam         Pulmonary:Negative Pulmonary ROS and normal exam    (+) COPD:  asthma:                            Cardiovascular:Negative CV ROS  Exercise tolerance: good (>4 METS),         ECG reviewed               Beta Blocker:  Not on Beta Blocker         Neuro/Psych:   Negative Neuro/Psych ROS              GI/Hepatic/Renal: Neg GI/Hepatic/Renal ROS  (+) hiatal hernia, GERD:,           Endo/Other: Negative Endo/Other ROS             Pt had PAT visit.        Abdominal:             Vascular: negative vascular ROS. Other Findings:             Anesthesia Plan      general     ASA 3       Induction: intravenous. MIPS: Postoperative opioids intended and Prophylactic antiemetics administered. Anesthetic plan and risks discussed with patient. Plan discussed with CRNA.     Attending anesthesiologist reviewed and agrees with Pre Eval content            Jhonathan Eaton MD   6/28/2021

## 2021-06-29 NOTE — OP NOTE
[address]                      [address], [address] [facility]                                OPERATIVE REPORT    PATIENT NAME: Robina Cole         :        1955  MED REC NO:   [id number]                            ROOM:  ACCOUNT NO:   [de-identified]                           ADMIT DATE: 2021  PROVIDER:     Jaison Ibnaez DO    DATE OF PROCEDURE:  2021    PREOPERATIVE DIAGNOSES:  Postmenopausal bleeding, thickened endometrium,  and a cervical stenosis. POSTOPERATIVE DIAGNOSES:  Postmenopausal bleeding, thickened  endometrium, and a cervical stenosis with an endometrial polyp. PROCEDURES PERFORMED:  Hysteroscopy, MyoSure resection of endometrial  polyps, and a fractional D&C. SURGEON:  Bernice Cao. DO Zohreh    ANESTHESIA:  General.    COMPLICATIONS:  None. BLOOD LOSS:  Minimal.    INDICATIONS:  A 72-year-old female with the above-mentioned complaints. We tried to do a biopsy in the office and was unsuccessful due to  cervical stenosis. The patient was prepped with Cytotec. She came in  today for a hysteroscopy and D&C. The potential complications of the  procedure, bleeding; infection; medication reaction; bowel, bladder,  ureter injury; vascular complications; if uterine perforation should  occur were explained to her in detail. Appropriate consent was obtained  and it was placed on the chart. DESCRIPTION OF PROCEDURE:  The patient was taken to the operating room  where general anesthetic was administered. She was then placed in the  dorsal lithotomy position, prepped and draped in the usual fashion. Bladder was drained. Time-out was called. Exam under anesthesia  reveals a small anteverted mobile uterus. Adnexa was negative for mass. Cervix exposed. The anterior lip was grasped with tenaculum. Endocervical canal was progressively dilated. It was a pinpoint  cervical opening. Uterus sounds to 7 cm. Hysteroscope was inserted and  fundal polyps were identified. They were resected with the MyoSure  instrument. An endocervical curettage followed by an endometrial  curettage performed. Tissue sent individually and separately for  pathologic evaluation. Instruments were removed. The patient was  allowed to emerge from anesthesia and was taken to the PACU in good  condition. She tolerated the surgery and the anesthesia well. Blood  loss minimal.  Complications none.         Lloyd Arredondo DO    D: 06/28/2021 12:37:41       T: 06/28/2021 12:40:57     [email or web address]  Job#: [id number]     Doc#: [id number]    CC:

## 2021-07-13 ENCOUNTER — OFFICE VISIT (OUTPATIENT)
Dept: OBGYN CLINIC | Age: 66
End: 2021-07-13
Payer: MEDICARE

## 2021-07-13 VITALS
BODY MASS INDEX: 31.78 KG/M2 | WEIGHT: 191 LBS | DIASTOLIC BLOOD PRESSURE: 68 MMHG | HEART RATE: 71 BPM | SYSTOLIC BLOOD PRESSURE: 110 MMHG

## 2021-07-13 DIAGNOSIS — N85.01 ENDOMETRIAL HYPERPLASIA WITHOUT ATYPIA, COMPLEX: ICD-10-CM

## 2021-07-13 DIAGNOSIS — Z09 POSTOPERATIVE EXAMINATION: Primary | ICD-10-CM

## 2021-07-13 PROCEDURE — 99213 OFFICE O/P EST LOW 20 MIN: CPT | Performed by: OBSTETRICS & GYNECOLOGY

## 2021-07-13 PROCEDURE — G8427 DOCREV CUR MEDS BY ELIG CLIN: HCPCS | Performed by: OBSTETRICS & GYNECOLOGY

## 2021-07-13 PROCEDURE — 1036F TOBACCO NON-USER: CPT | Performed by: OBSTETRICS & GYNECOLOGY

## 2021-07-13 PROCEDURE — 3017F COLORECTAL CA SCREEN DOC REV: CPT | Performed by: OBSTETRICS & GYNECOLOGY

## 2021-07-13 PROCEDURE — G8417 CALC BMI ABV UP PARAM F/U: HCPCS | Performed by: OBSTETRICS & GYNECOLOGY

## 2021-07-13 RX ORDER — MEGESTROL ACETATE 40 MG/1
40 TABLET ORAL 2 TIMES DAILY
Qty: 30 TABLET | Refills: 4 | Status: SHIPPED | OUTPATIENT
Start: 2021-07-13 | End: 2021-07-26

## 2021-07-13 ASSESSMENT — ENCOUNTER SYMPTOMS
BACK PAIN: 0
CONSTIPATION: 0
SORE THROAT: 0
VOICE CHANGE: 0
COUGH: 0
BLOOD IN STOOL: 0
ABDOMINAL PAIN: 0
NAUSEA: 0
VOMITING: 0
TROUBLE SWALLOWING: 0
SHORTNESS OF BREATH: 0
COLOR CHANGE: 0
WHEEZING: 0
ABDOMINAL DISTENTION: 0
CHEST TIGHTNESS: 0

## 2021-07-13 NOTE — PROGRESS NOTES
HPI:  Nancy Waite (: 1955) is a 61 y.o. female, Established patient, here for evaluation of the following chief complaint(s):  Post-Op Check    Had some spotting following the D&C. Biopsy showed polyps with hyperplasia as did the endometrial tissue but  there was no atypia    SUBJECTIVE/OBJECTIVE:    Past Surgical History:   Procedure Laterality Date    COLONOSCOPY      COLONOSCOPY N/A 2/3/2021    COLORECTAL CANCER SCREENING with polypectomies performed by Matt Story MD at [facility] N/A 2021    HYSTEROSCOPY  FX  D/C performed by Deni Ford DO at [facility]. [address], COLON, DIAGNOSTIC      KIDNEY SURGERY      OVARIAN CYST REMOVAL Left 2004    OR ESOPHAGOGASTRODUODENOSCOPY TRANSORAL DIAGNOSTIC N/A 2018    EGD ESOPHAGOGASTRODUODENOSCOPY WITH DILATION performed by Natalie Quigley MD at [facility]        Review of Systems   Constitutional: Negative for activity change, appetite change, fatigue and unexpected weight change. HENT: Negative for dental problem, ear pain, hearing loss, nosebleeds, sore throat, trouble swallowing and voice change. Eyes: Negative for visual disturbance. Respiratory: Negative for cough, chest tightness, shortness of breath and wheezing. Cardiovascular: Negative for chest pain and palpitations. Gastrointestinal: Negative for abdominal distention, abdominal pain, blood in stool, constipation, nausea and vomiting. Endocrine: Negative for cold intolerance, heat intolerance, polydipsia, polyphagia and polyuria. Genitourinary: Negative for difficulty urinating, dyspareunia, dysuria, flank pain, frequency, genital sores, hematuria, menstrual problem, pelvic pain, urgency, vaginal bleeding, vaginal discharge and vaginal pain. Musculoskeletal: Negative for arthralgias, back pain, joint swelling and myalgias. Skin: Negative for color change and rash.    Allergic/Immunologic: Negative for environmental allergies, food allergies and immunocompromised state. Neurological: Negative for dizziness, seizures, syncope, speech difficulty, weakness, numbness and headaches. Hematological: Negative for adenopathy. Does not bruise/bleed easily. Psychiatric/Behavioral: Negative for agitation, behavioral problems, confusion, decreased concentration, dysphoric mood and suicidal ideas. The patient is not nervous/anxious and is not hyperactive. Physical Exam  Vitals and nursing note reviewed. Constitutional:       Appearance: Normal appearance. Neurological:      Mental Status: She is alert. Vitals:    07/13/21 0954   BP: 110/68   Pulse: 71   Weight: 191 lb (86.6 kg)         ASSESSMENT/PLAN:    Endometrial hyperplasia without atypia    PLAN: megace 40 mg twice daily for 3 months then a repeat endometrial biopsy. No follow-ups on file. On this date 7/13/2021 I have spent 20 minutes reviewing previous notes, test results and face to face with the patient discussing the diagnosis and importance of compliance with the treatment plan as well as documenting on the day of the visit. An electronic signature was used to authenticate this note. Please note this report has been partially produced using speech recognition software and may cause contain errors related to that system including grammar, punctuation and spelling as well as words and phrases that may seem inappropriate. If there are questions or concerns please feel free to contact me to clarify.

## 2021-07-16 ENCOUNTER — OFFICE VISIT (OUTPATIENT)
Dept: PHYSICAL MEDICINE AND REHAB | Age: 66
End: 2021-07-16
Payer: MEDICARE

## 2021-07-16 VITALS
BODY MASS INDEX: 31.82 KG/M2 | DIASTOLIC BLOOD PRESSURE: 72 MMHG | SYSTOLIC BLOOD PRESSURE: 120 MMHG | HEIGHT: 65 IN | WEIGHT: 191 LBS

## 2021-07-16 DIAGNOSIS — R10.12 LUQ PAIN: Chronic | ICD-10-CM

## 2021-07-16 DIAGNOSIS — M47.26 OSTEOARTHRITIS OF SPINE WITH RADICULOPATHY, LUMBAR REGION: ICD-10-CM

## 2021-07-16 DIAGNOSIS — M54.42 CHRONIC MIDLINE LOW BACK PAIN WITH BILATERAL SCIATICA: Primary | Chronic | ICD-10-CM

## 2021-07-16 DIAGNOSIS — G56.03 BILATERAL CARPAL TUNNEL SYNDROME: Chronic | ICD-10-CM

## 2021-07-16 DIAGNOSIS — M54.2 CERVICALGIA: Chronic | ICD-10-CM

## 2021-07-16 DIAGNOSIS — G89.29 CHRONIC MIDLINE LOW BACK PAIN WITH BILATERAL SCIATICA: Primary | Chronic | ICD-10-CM

## 2021-07-16 DIAGNOSIS — M47.22 OSTEOARTHRITIS OF SPINE WITH RADICULOPATHY, CERVICAL REGION: ICD-10-CM

## 2021-07-16 DIAGNOSIS — M54.41 CHRONIC MIDLINE LOW BACK PAIN WITH BILATERAL SCIATICA: Primary | Chronic | ICD-10-CM

## 2021-07-16 DIAGNOSIS — M54.17 LUMBOSACRAL RADICULOPATHY AT L5: ICD-10-CM

## 2021-07-16 DIAGNOSIS — M54.12 C7 RADICULOPATHY: ICD-10-CM

## 2021-07-16 DIAGNOSIS — M54.12 C6 RADICULOPATHY: ICD-10-CM

## 2021-07-16 PROCEDURE — G8427 DOCREV CUR MEDS BY ELIG CLIN: HCPCS | Performed by: PHYSICAL MEDICINE & REHABILITATION

## 2021-07-16 PROCEDURE — G8417 CALC BMI ABV UP PARAM F/U: HCPCS | Performed by: PHYSICAL MEDICINE & REHABILITATION

## 2021-07-16 PROCEDURE — 3017F COLORECTAL CA SCREEN DOC REV: CPT | Performed by: PHYSICAL MEDICINE & REHABILITATION

## 2021-07-16 PROCEDURE — 1036F TOBACCO NON-USER: CPT | Performed by: PHYSICAL MEDICINE & REHABILITATION

## 2021-07-16 PROCEDURE — 99214 OFFICE O/P EST MOD 30 MIN: CPT | Performed by: PHYSICAL MEDICINE & REHABILITATION

## 2021-07-16 RX ORDER — GABAPENTIN 100 MG/1
100 CAPSULE ORAL NIGHTLY
Qty: 90 CAPSULE | Refills: 1 | Status: SHIPPED | OUTPATIENT
Start: 2021-07-16 | End: 2021-10-12

## 2021-07-16 ASSESSMENT — ENCOUNTER SYMPTOMS
ABDOMINAL PAIN: 0
BACK PAIN: 1
PHOTOPHOBIA: 0
EYE REDNESS: 0
WHEEZING: 0
EYE PAIN: 0
SORE THROAT: 0
DIARRHEA: 0
NAUSEA: 0
BLOOD IN STOOL: 0
VOMITING: 0
CONSTIPATION: 1
COUGH: 0
SHORTNESS OF BREATH: 1
STRIDOR: 0

## 2021-07-16 NOTE — PROGRESS NOTES
Subjective  [place], 61 y.o. female presents today with:    Back Pain  Neck Pain  Shoulder Pain (Left. Left SS injection 4/14/21 with 0% reduction in pain. Left shoulder injection 4/28/21 with 0% reduction in pain.)  Wrist Pain (Left)  Medication Refill (Ultracet, Vit D refill & pill count today)    Patient is here to follow-up for regarding the efficacy of her Ultracet trial as well as Naprosyn vitamin D and shoulder and trigger point injections. She states that none of them helped very much and she only received a small amount of the Ultracet and only trialed a few of them did not find it much help. Also reviewed the CT of the neck as above. Though the report says lumbar spondylosis I am assuming the mid cervical spondylosis as this was a CT of the cervical spine. We will ask for a reread        Moderate lumbar spondylosis of the mid to lower levels has progressed since the prior study.       There is mild anterolisthesis of C4 over C5 and mild left neural foraminal narrowing C4-5 and bilaterally at C5-6. Back Pain  This is a chronic problem. The current episode started more than 1 year ago. The problem occurs constantly. The problem has been waxing and waning since onset. The pain is present in the lumbar spine and sacro-iliac. The quality of the pain is described as aching. The pain is at a severity of 7/10. The pain is severe. The pain is worse during the day. The symptoms are aggravated by bending and position. Stiffness is present in the morning. Associated symptoms include weakness. Pertinent negatives include no abdominal pain, chest pain, dysuria, fever, headaches, paresis or tingling. Risk factors include sedentary lifestyle, menopause and history of osteoporosis. She has tried heat, home exercises, analgesics, bed rest, muscle relaxant, NSAIDs and walking for the symptoms. The treatment provided mild relief.    Shoulder Pain   The pain is present in the neck, back and left shoulder. This is a chronic problem. The current episode started more than 1 year ago. There has been no history of extremity trauma. The problem occurs constantly. The problem has been unchanged. The pain is at a severity of 7/10. The pain is moderate. Pertinent negatives include no fever or tingling. She has tried acetaminophen for the symptoms. The treatment provided mild relief. Her past medical history is significant for osteoarthritis. Neck Pain   This is a chronic problem. The current episode started more than 1 year ago. The problem occurs constantly. The problem has been gradually worsening. The pain is associated with nothing. The pain is present in the midline and left side. The pain is at a severity of 7/10. The pain is severe. The symptoms are aggravated by position and twisting. The pain is same all the time. Stiffness is present in the morning. Associated symptoms include weakness. Pertinent negatives include no chest pain, fever, headaches, pain with swallowing, paresis, photophobia, syncope or tingling. She has tried chiropractic manipulation, heat, home exercises, acetaminophen, oral narcotics, muscle relaxants and NSAIDs for the symptoms. The treatment provided mild relief.        Past Medical History:   Diagnosis Date    Abnormal pelvic ultrasound 10/8/2020    Acid reflux     Adenomatous colon polyp - repeat scope 02/2026 2/11/2021    Arthritis     Asthma     Chronic back pain     COPD (chronic obstructive pulmonary disease) (HCC)     Depression     Hyperlipidemia     Obesity     Venous insufficiency      Past Surgical History:   Procedure Laterality Date    COLONOSCOPY      COLONOSCOPY N/A 2/3/2021    COLORECTAL CANCER SCREENING with polypectomies performed by Glenn Tripathi MD at [facility] N/A 6/28/2021    HYSTEROSCOPY  FX  D/C performed by Oneil Welsh DO at  [address], COLON, DIAGNOSTIC      KIDNEY SURGERY      OVARIAN CYST REMOVAL Left 11/06/2004    NC ESOPHAGOGASTRODUODENOSCOPY TRANSORAL DIAGNOSTIC N/A 11/14/2018    EGD ESOPHAGOGASTRODUODENOSCOPY WITH DILATION performed by Fern Webb MD at [address] [place] [address] Marital status:      Spouse name: None    Number of children: 1    Years of education: 6    Highest education level: 6th grade   Occupational History    Occupation: homemaker   Tobacco Use    Smoking status: Never Smoker    Smokeless tobacco: Never Used   Vaping Use    Vaping Use: Never used   Substance and Sexual Activity    Alcohol use: No    Drug use: No    Sexual activity: Yes     Partners: Male   Other Topics Concern    None   Social History Narrative    Lives with her adaptive daughter Sondra Warren in a home in [place]--is from [facility] Strain: Medium Risk    Difficulty of Paying Living Expenses: Somewhat hard   Food Insecurity: No Food Insecurity    Worried About [address] in the Last Year: Never true    [address] in the Last Year: Never true   Transportation Needs: No Transportation Needs    Lack of Transportation (Medical): No    Lack of Transportation (Non-Medical): No   Physical Activity: Inactive    Days of Exercise per Week: 0 days    Minutes of Exercise per Session: 0 min   Stress: Stress Concern Present    Feeling of Stress : To some extent   Social Connections:  Moderately Isolated    Frequency of Communication with Friends and Family: More than three times a week    Frequency of Social Gatherings with Friends and Family: More than three times a week    Attends Judaism Services: 1 to 4 times per year    Active Member of [employer] or Organizations: No    Attends Club or Organization Meetings: Never    Marital Status:    Intimate Partner Violence: Not At Risk    Fear of Current or Ex-Partner: No    Emotionally Abused: No    Physically Abused: No    Sexually Abused: No     Family History   Problem Relation Age of Onset    Diabetes Mother     High Blood Pressure Mother     Diabetes Brother     Diabetes Maternal Grandmother     Diabetes Maternal Grandfather     Diabetes Paternal Grandmother     Diabetes Paternal Grandfather     Diabetes Other     High Blood Pressure Sister     Colon Cancer Paternal Uncle        Allergies   Allergen Reactions    Other      Perfumes     Seasonal        Review of Systems   Constitutional: Positive for activity change and fatigue. Negative for chills, diaphoresis and fever. HENT: Negative for congestion, ear discharge, ear pain, hearing loss, nosebleeds, sore throat and tinnitus. Eyes: Negative for photophobia, pain and redness. Respiratory: Positive for shortness of breath. Negative for cough, wheezing and stridor. Shortness of breath on exertion   Cardiovascular: Negative for chest pain, palpitations, leg swelling and syncope. Gastrointestinal: Positive for constipation. Negative for abdominal pain, blood in stool, diarrhea, nausea and vomiting. Endocrine: Negative for polydipsia. Genitourinary: Negative for dysuria, flank pain, frequency, hematuria and urgency. Musculoskeletal: Positive for back pain, gait problem, myalgias and neck pain. Skin: Negative for rash. Allergic/Immunologic: Positive for immunocompromised state. Negative for environmental allergies. Neurological: Positive for weakness. Negative for dizziness, tingling, tremors, seizures and headaches. Hematological: Does not bruise/bleed easily. Psychiatric/Behavioral: Negative for hallucinations and suicidal ideas. The patient is not nervous/anxious.         Objective    Vitals:    07/16/21 1008   BP: 120/72   Site: Right Upper Arm   Position: Sitting   Cuff Size: Large Adult   Weight: 191 lb (86.6 kg)   Height: 5' 5\" (1.651 m)     Pain Score: Zero (Without medication)       Physical Exam  Vitals reviewed. Constitutional:       General: She is not in acute distress. Appearance: She is well-developed. She is not ill-appearing, toxic-appearing or diaphoretic. Comments:         HENT:      Head: Normocephalic and atraumatic. Right Ear: Hearing normal.      Left Ear: Hearing normal.      Nose: Nose normal.      Mouth/Throat:      Mouth: No oral lesions. Dentition: Normal dentition. Pharynx: No oropharyngeal exudate. Eyes:      General: No scleral icterus. Right eye: No discharge. Left eye: No discharge. Conjunctiva/sclera: Conjunctivae normal.      Right eye: No chemosis or exudate. Left eye: No chemosis or exudate. Pupils: Pupils are equal, round, and reactive to light. Neck:      Thyroid: No thyromegaly. Vascular: No JVD. Trachea: No tracheal deviation. Cardiovascular:      Pulses: No decreased pulses. Pulmonary:      Effort: Pulmonary effort is normal. No tachypnea, bradypnea, accessory muscle usage or respiratory distress. Breath sounds: Decreased breath sounds present. No wheezing or rales. Chest:      Chest wall: No tenderness. Abdominal:      General: There is no distension. Musculoskeletal:         General: Tenderness present. Right shoulder: Normal.      Left shoulder: Normal.      Right upper arm: Normal.      Left upper arm: Normal.      Right elbow: Normal.      Left elbow: Normal.      Right forearm: Normal.      Left forearm: Normal.      Right wrist: Normal.      Left wrist: Normal.      Right hand: Normal.      Left hand: Normal.      Cervical back: Normal range of motion and neck supple. Tenderness and bony tenderness present. No edema or rigidity. Thoracic back: Tenderness and bony tenderness present. Decreased range of motion. Lumbar back: Tenderness and bony tenderness present. No swelling, edema, deformity or lacerations. Decreased range of motion.       Right hip: Normal.      Left hip: Normal. Right upper leg: Normal.      Left upper leg: Normal.      Right knee: Normal.      Left knee: Normal.      Right lower leg: Normal.      Left lower leg: Normal.      Right ankle: Normal.      Right Achilles Tendon: Normal.      Left ankle: Normal.      Left Achilles Tendon: Normal.      Right foot: Normal.      Left foot: Normal.      Comments: Tender areas are indicated by numbered spot         Skin:     General: Skin is warm and dry. Coloration: Skin is not pale. Findings: No abrasion, bruising, ecchymosis, erythema, laceration, petechiae or rash. Rash is not macular, pustular or urticarial.      Nails: There is no clubbing. Neurological:      Mental Status: She is alert and oriented to person, place, and time. Cranial Nerves: No cranial nerve deficit. Sensory: Sensory deficit present. Motor: No tremor, atrophy or abnormal muscle tone. Coordination: Coordination normal.      Gait: Gait abnormal.      Deep Tendon Reflexes: Reflexes abnormal. Babinski sign absent on the right side. Babinski sign absent on the left side. Psychiatric:         Attention and Perception: She is attentive. Mood and Affect: Mood is not anxious or depressed. Affect is not labile, blunt, angry or inappropriate. Speech: She is communicative. Speech is not rapid and pressured, delayed, slurred or tangential.         Behavior: Behavior normal. Behavior is not agitated, slowed, aggressive, withdrawn, hyperactive or combative. Thought Content: Thought content normal. Thought content is not paranoid or delusional. Thought content does not include homicidal or suicidal ideation. Thought content does not include homicidal or suicidal plan. Cognition and Memory: Memory is not impaired. She does not exhibit impaired recent memory or impaired remote memory. Judgment: Judgment normal. Judgment is not impulsive or inappropriate.        Ortho Exam  Neurologic Exam     Mental Status   Oriented to person, place, and time. Speech: not slurred     Cranial Nerves     CN III, IV, VI   Pupils are equal, round, and reactive to light. After a thorough review and discussion of the previous medical records, patient comprehensive medical, surgical, and family and social history, Review of Systems, their OARRS, their Screener and Opioid Assessment for Patients with Pain (SOAPP®-R), recent diagnostics, and symptomatic results to previous treatment, it is my impression that the patients is suffering with progressive and severe:     Diagnosis Orders   1. Chronic midline low back pain with bilateral sciatica  Ambulatory referral to Occupational Therapy   2. Cervicalgia  gabapentin (NEURONTIN) 100 MG capsule    Ambulatory referral to Occupational Therapy   3. Bilateral carpal tunnel syndrome  gabapentin (NEURONTIN) 100 MG capsule    Ambulatory referral to Occupational Therapy   4. LUQ pain  Ambulatory referral to Occupational Therapy   5. Osteoarthritis of spine with radiculopathy, cervical region     6. C7 radiculopathy  gabapentin (NEURONTIN) 100 MG capsule   7. C6 radiculopathy  EMG    gabapentin (NEURONTIN) 100 MG capsule   8. Lumbosacral radiculopathy at L5     9.  Osteoarthritis of spine with radiculopathy, lumbar region         I am also concerned by lifestyle and mood issues including:    Past Medical History:   Diagnosis Date    Abnormal pelvic ultrasound 10/8/2020    Acid reflux     Adenomatous colon polyp - repeat scope 02/2026 2/11/2021    Arthritis     Asthma     Chronic back pain     COPD (chronic obstructive pulmonary disease) (HCC)     Depression     Hyperlipidemia     Obesity     Venous insufficiency            Given their medication, chronic pain and lifestyle and medications they are at risk for :    Falls, constipation, addiction, toxicity on opiates  Loss of livelyhood due to severe pain, debility, weight gain and  vitamin D deficiency    The patient was educated regarding proper diet, fitness routine, and regulatory restrictions concerning pain medications. Previous notes, comprehensive past medical, surgical, family history, and diagnostics were reviewed. Patient education and councelling were provided regarding off label use,treatment options and medication and injection risks. Current and old OARRS ([place] Automated Prescription Reporting System) records reviewed, all refills reviewed since last visit,  Behavioral agreement/RIKY regulations   and Toxicology screen was reviewed with patient and is up to date. There are   no current red flags. high risk meds review re intensive monitoring for toxicity and addiction,  They are making good progress regarding pain relief, they are performing at a functional level regarding activities of daily living, family interactions and psychological functioning, they're not having any adverse effects or side effects from the current medications, and I see no findings of aberrant drug taking or addiction related behaviors. The patient is aware that they have a chronic pain condition and they may require opiates dosing for life. All efforts will be made to wean to the lowest effective dose. Other therapies for pain have not been effective including nonopiate medications. Injections and exercises are only partially effective. A Rx for Narcan was offered to help prevent accidental overdose. RX Monitoring 11/15/2017   Attestation The Prescription Monitoring Report for this patient was reviewed today. Patient is currently taking:       I am having [facility] start on gabapentin. I am also having her maintain her nystatin, selenium sulfide, albuterol sulfate HFA, GenTeal Tears, escitalopram, nitrofurantoin, Vitamin D3, acetaminophen, omeprazole, naproxen, tiZANidine, hydrOXYzine, estradiol, pantoprazole, miSOPROStol, and megestrol.               I also recommend the following Medications:    Orders Placed This Encounter   Medications    gabapentin (NEURONTIN) 100 MG capsule     Sig: Take 1 capsule by mouth nightly for 90 days. Dispense:  90 capsule     Refill:  1        -which helps with pain and function. Otherwise, continue the current pain medications that I have prescibed. Radiologic:   Old  unique films results reviewed   XR SHOULDER LEFT (MIN 2 VIEWS)                        CLINICAL HISTORY:   radicular left shoulder pain         COMPARISONS: None.       FINDINGS:       Four views of the left shoulder are submitted. No acute fractures. No dislocations. No focal bony abnormalities appreciated in the                                                                                        Impression   NO ACUTE FRACTURES      ,  CT CERVICAL SPINE WO CONTRAST: 4/13/2021       CLINICAL HISTORY:  radicular left shoulder/arm pain extending from neck .       COMPARISON: 10/23/2010.       TECHNIQUE: ROUTINE       All CT scans at this facility use dose modulation, iterative reconstruction, and/or weight based dosing when appropriate to reduce radiation dose to as low as reasonably achievable.           FINDINGS:       The spine is visualized from the craniovertebral junction through the T1-2 level.       There is no fracture, dislocation, or acute paraspinous soft tissue abnormalities identified.       Moderate lumbar spondylosis of the mid to lower levels has progressed since the prior study.       There is mild anterolisthesis of C4 over C5 and mild left neural foraminal narrowing C4-5 and bilaterally at C5-6.       There is no high-grade central spinal stenosis, other significant neural foraminal narrowing, or significant disc herniations identified.                   Impression       NO ACUTE FRACTURE OR EVIDENCE OF CERVICAL SPINE INJURY IDENTIFIED.       CERVICAL SPONDYLOSIS, AS NOTED.              I discussed results with patients.    see Follow up plans below  For any new studies. Unique source and Care Everywhere Updates:  prior external notes requested and reviewed. No new issues noted. Unique History obtained by caregiver/independent historian-and verified with SOAPPR. Electrodiagnostic:  Previous studies requested,     I discussed results with patient. See follow-up plans for new studies. Labs:  Previous labs reviewed     Lab Results   Component Value Date     06/28/2021    K 4.5 06/28/2021     06/28/2021    CO2 24 06/28/2021    BUN 13 06/28/2021    CREATININE 0.68 06/28/2021    CALCIUM 9.2 06/28/2021    LABALBU 4.3 04/13/2021    BILITOT 0.8 04/13/2021    ALKPHOS 82 04/13/2021    AST 27 04/13/2021    ALT 29 04/13/2021     Lab Results   Component Value Date    WBC 5.8 06/28/2021    RBC 4.43 06/28/2021    HGB 14.3 06/28/2021    HCT 41.6 06/28/2021    MCV 93.9 06/28/2021    MCH 32.4 06/28/2021    MCHC 34.5 06/28/2021    RDW 13.1 06/28/2021     06/28/2021    MPV 8.8 03/09/2015       Lab Results   Component Value Date    LABAMPH Neg 12/30/2020    BARBSCNU Neg 12/30/2020    LABBENZ Neg 12/30/2020    CANSU Neg 12/30/2020    COCAIMETSCRU Neg 12/30/2020    PHENCYCLIDINESCREENURINE Neg 12/30/2020    DSCOMMENT see below 12/30/2020       No results found for: CODEINE, MORPHINE, ACETYLMORPHI, OXYCODONE, NOROXYCODONE, NOROXYMU, HYDRCO, NORHYDU, HYDROMO, BUPREN, NORBUPRNOR, FENTA, NORFENT, MEPERIDINE, TAPENU, TAPOSULFUR, METHADONE, LABPROP, TRAM, AMPH, METHAMP, MDMA, ECMDA    No results found for: METPHEN, PHENTERMINE, BENZOYL, ALPRAZ, ALPHAOHALPRA, CLONAZEPAM, 7AMINOCLONAZ, DIAZEP, CHUCK, OXAZ, TEMAZ, LORAZEPAM, MIDAZOLAM, ZOLPIDEM, KIM, ETG, MARIJMET, PCP, PAINMGTDRUGP, EERPAINMGTPA, LABCREA      , I discussed results with patient. See follow-up plans for new studies. Therapies:  HEP-gentle stretching and relaxation techniques-demonstrated with patient-they are to do them twice a day.   They are also advised to make the following lifestyle changes:  Goals      SOAPP-R      12/30/20 score: 5- low risk  4/7/21 score: 19- high risk  7/16/21 score: 6- low risk            Injections or Epidurals:  Injection options were discussed. Patient gave verbal consent to ordered injections. See follow-up plans for planned injections. Supplements:  Vitamin D with increased dosing during the rainy months,   Education was given on:   Dietary and Fitness--daily stretches and low carb diet-in chair Yoga when possible             Follow up with Primary Care Physician regarding their general medical needs. Stressed the importance of following up with PCP and specialists for his/her chronic diseases, health, CV, and cancer screening and continued care. Will follow disease activity/progression and adjust therapeutic regimen to disease activity and severity. Discussed medication dosage, usage, goals of therapy, and side effects. Available test results were reviewed -Discussed findings, impression and plan with patient. An additional 6 minutes were spent outside of the patient visit to review records. Additional time spent with the patient to discuss their questions. Additional time spent with the patient devoted to discussing treatment strategy, planning, and implementation. Patient understands above plan; questions asked and answered. Patient agrees to plan as noted above. At least 50% of the visit was involved in the discussion of the options for treatment. We discussed exercises, medication, interventional therapies and surgery. Healthy life style is essential with patient hard work to achieve the wellness.  In addition; discussion with the patient and/or family about patient's functional status any of the diagnostic results, impressions and/or recommended diagnostic studies, prognosis, risks and benefits of treatment options, instructions for treatment and/or follow-up, importance of compliance with chosen treatment options, risk-factor reduction, and patient/family education. They are to follow up in 2-2 1/2 months to review medication, efficacy of injections, pill counts, OARRS check, high risk med review re intensive monitoring for toxicity and addiction, SOAPPR assessment, review diagnostics, to review previous and future treatment plans and assess appropriateness for continued therapy.         New Diagnostics  Orders Placed This Encounter   Procedures    Ambulatory referral to Occupational Therapy    ALLEN Nobles DO

## 2021-07-26 ENCOUNTER — OFFICE VISIT (OUTPATIENT)
Dept: FAMILY MEDICINE CLINIC | Age: 66
End: 2021-07-26
Payer: MEDICARE

## 2021-07-26 VITALS
HEART RATE: 98 BPM | HEIGHT: 65 IN | OXYGEN SATURATION: 99 % | BODY MASS INDEX: 32.15 KG/M2 | SYSTOLIC BLOOD PRESSURE: 120 MMHG | TEMPERATURE: 97.1 F | DIASTOLIC BLOOD PRESSURE: 60 MMHG | WEIGHT: 193 LBS | RESPIRATION RATE: 16 BRPM

## 2021-07-26 DIAGNOSIS — J45.20 MILD INTERMITTENT ASTHMA WITHOUT COMPLICATION: Chronic | ICD-10-CM

## 2021-07-26 DIAGNOSIS — R73.09 ABNORMAL GLUCOSE: ICD-10-CM

## 2021-07-26 DIAGNOSIS — N95.2 ATROPHIC VAGINITIS: Primary | ICD-10-CM

## 2021-07-26 DIAGNOSIS — K21.9 GASTROESOPHAGEAL REFLUX DISEASE WITHOUT ESOPHAGITIS: ICD-10-CM

## 2021-07-26 DIAGNOSIS — G89.29 CHRONIC MIDLINE LOW BACK PAIN WITH BILATERAL SCIATICA: Chronic | ICD-10-CM

## 2021-07-26 DIAGNOSIS — E55.9 VITAMIN D DEFICIENCY: Chronic | ICD-10-CM

## 2021-07-26 DIAGNOSIS — F39 MOOD DISORDER (HCC): ICD-10-CM

## 2021-07-26 DIAGNOSIS — M54.41 CHRONIC MIDLINE LOW BACK PAIN WITH BILATERAL SCIATICA: Chronic | ICD-10-CM

## 2021-07-26 DIAGNOSIS — M54.42 CHRONIC MIDLINE LOW BACK PAIN WITH BILATERAL SCIATICA: Chronic | ICD-10-CM

## 2021-07-26 LAB — HBA1C MFR BLD: 5.8 % (ref 4.8–5.9)

## 2021-07-26 PROCEDURE — G8417 CALC BMI ABV UP PARAM F/U: HCPCS | Performed by: FAMILY MEDICINE

## 2021-07-26 PROCEDURE — 1036F TOBACCO NON-USER: CPT | Performed by: FAMILY MEDICINE

## 2021-07-26 PROCEDURE — G8427 DOCREV CUR MEDS BY ELIG CLIN: HCPCS | Performed by: FAMILY MEDICINE

## 2021-07-26 PROCEDURE — 99214 OFFICE O/P EST MOD 30 MIN: CPT | Performed by: FAMILY MEDICINE

## 2021-07-26 PROCEDURE — 3017F COLORECTAL CA SCREEN DOC REV: CPT | Performed by: FAMILY MEDICINE

## 2021-07-26 RX ORDER — ACETAMINOPHEN 160 MG
TABLET,DISINTEGRATING ORAL
Qty: 90 CAPSULE | Refills: 4 | Status: SHIPPED | OUTPATIENT
Start: 2021-07-26 | End: 2021-12-02 | Stop reason: SDUPTHER

## 2021-07-26 RX ORDER — ESTRADIOL 1 MG/1
TABLET ORAL
COMMUNITY
Start: 2021-07-24 | End: 2021-07-26 | Stop reason: ALTCHOICE

## 2021-07-26 RX ORDER — ESTRADIOL 0.1 MG/G
CREAM VAGINAL
Qty: 1 TUBE | Refills: 0
Start: 2021-07-26 | End: 2021-12-02 | Stop reason: ALTCHOICE

## 2021-07-26 RX ORDER — NAPROXEN 500 MG/1
500 TABLET ORAL 2 TIMES DAILY PRN
COMMUNITY
End: 2022-01-26 | Stop reason: ALTCHOICE

## 2021-07-26 NOTE — PROGRESS NOTES
Subjective  Jc Beard, 61 y.o. female presents today with:  Chief Complaint   Patient presents with    Annual Exam     pt has some medications that dont want dont want refill; they need new rxs; no other concerns            HPI    Patient has chronic atrophic vaginitis with severe vaginal itching. She also has chronic left-sided pelvic pain. Recently, she underwent D&C - endometrial polyp, otherwise unremarkable. Pelvic pain persists. She takes naproxen and APAP occasionally for these symptoms. She was changed from estrace vaginal cream to Megace and oral estradiol and noted increased breast tenderness, weight gain and uterine bleeding. GERD. Well-controlled with prn PPII, caffeine restriction, diet restriction. No weight loss. No abdominal pain. No bloody or black tarry stools. Patient is being treated for depression and anxiety with Lexapro and has been compliant with med which does not cause side effects. Mood is improved. No suicidal ideation. Sleep is normal.    She notes she has arthritis all over her body and had a nerve issue in her left arm after COVID vaccine. She is being evaluated and treated for these concerns by Dr. Lydia Lugo. Asthma - no cough wheezing or shortness of breath. Not using albuterol HFA.     No other questions and or concerns for today's visit          Past Medical History:   Diagnosis Date    Abnormal pelvic ultrasound 10/8/2020    Acid reflux     Adenomatous colon polyp - repeat scope 02/2026 2/11/2021    Arthritis     Asthma     Chronic back pain     COPD (chronic obstructive pulmonary disease) (HCC)     Depression     Hyperlipidemia     Obesity     Venous insufficiency      Past Surgical History:   Procedure Laterality Date    COLONOSCOPY      COLONOSCOPY N/A 2/3/2021    COLORECTAL CANCER SCREENING with polypectomies performed by Eliseo Banegas MD at [facility] N/A 6/28/2021    HYSTEROSCOPY  FX  D/C performed by Corona Chance DO at [address] [facility] [address], DIAGNOSTIC      KIDNEY SURGERY      OVARIAN CYST REMOVAL Left 11/06/2004    MS ESOPHAGOGASTRODUODENOSCOPY TRANSORAL DIAGNOSTIC N/A 11/14/2018    EGD ESOPHAGOGASTRODUODENOSCOPY WITH DILATION performed by Raven Lua MD at [address] [place] Marital status:      Spouse name: Not on file    Number of children: 1    Years of education: 6    Highest education level: 6th grade   Occupational History    Occupation: homemaker   Tobacco Use    Smoking status: Never Smoker    Smokeless tobacco: Never Used   Vaping Use    Vaping Use: Never used   Substance and Sexual Activity    Alcohol use: No    Drug use: No    Sexual activity: Yes     Partners: Male   Other Topics Concern    Not on file   Social History Narrative    Lives with her adaptive daughter Aleksander Lopez in a home in [place]--is from [address] Resource Strain: Medium Risk    Difficulty of Paying Living Expenses: Somewhat hard   Food Insecurity: No Food Insecurity    Worried About [address] in the Last Year: Never true    [address] in the Last Year: Never true   Transportation Needs: No Transportation Needs    Lack of Transportation (Medical): No    Lack of Transportation (Non-Medical): No   Physical Activity: Inactive    Days of Exercise per Week: 0 days    Minutes of Exercise per Session: 0 min   Stress: Stress Concern Present    Feeling of Stress : To some extent   Social Connections:  Moderately Isolated    Frequency of Communication with Friends and Family: More than three times a week    Frequency of Social Gatherings with Friends and Family: More than three times a week    Attends [employer] Services: 1 to 4 times per year    Active Member of [employer] Group or Organizations: No    Attends Club or Organization Meetings: Never    Marital Status:    Intimate Partner Violence: Not At Risk    Fear of Current or Ex-Partner: No    Emotionally Abused: No    Physically Abused: No    Sexually Abused: No     Family History   Problem Relation Age of Onset    Diabetes Mother     High Blood Pressure Mother     Diabetes Brother     Diabetes Maternal Grandmother     Diabetes Maternal Grandfather     Diabetes Paternal Grandmother     Diabetes Paternal Grandfather     Diabetes Other     High Blood Pressure Sister     Colon Cancer Paternal Uncle      Allergies   Allergen Reactions    Other      Perfumes     Seasonal      Current Outpatient Medications   Medication Sig Dispense Refill    Cholecalciferol (VITAMIN D3) 50 MCG (2000 UT) CAPS TAKE 1 CAPSULE BY MOUTH DAILY 90 capsule 4    naproxen (NAPROSYN) 500 MG tablet Take 500 mg by mouth 2 times daily as needed      estradiol (ESTRACE VAGINAL) 0.1 MG/GM vaginal cream 1/2 gm vaginally twice weekly 1 Tube 0    gabapentin (NEURONTIN) 100 MG capsule Take 1 capsule by mouth nightly for 90 days. 90 capsule 1    hydrOXYzine (ATARAX) 25 MG tablet NANCY 1 TABLETA POR BOCA CADA 8 HORAS CUANDO SEA NECESARIO PARA EL PICOR 60 tablet 1    omeprazole (PRILOSEC) 10 MG delayed release capsule Take 10 mg by mouth daily       acetaminophen (APAP EXTRA STRENGTH) 500 MG tablet Take 2 tablets by mouth every 6 hours as needed for Pain 120 tablet 3    escitalopram (LEXAPRO) 10 MG tablet Take 1 tablet by mouth daily 30 tablet 5    Artificial Tear Solution (GENTEAL TEARS) 0.1-0.2-0.3 % SOLN       nystatin (MYCOSTATIN) 858827 UNIT/GM cream Apply topically 2 times daily. 60 g 5     No current facility-administered medications for this visit. PMH, Surgical Hx, Family Hx, and Social Hxreviewed and updated. Health Maintenance reviewed.     Objective    Vitals:    07/26/21 0910   BP: 120/60   Pulse: 98   Resp: 16   Temp: 97.1 °F (36.2 °C)   TempSrc: Temporal   SpO2: 99%   Weight: 193 lb (87.5 kg)   Height: 5' 5\" (1.651 m)        Physical Exam  Constitutional:       General: She is not in acute distress. Appearance: She is well-developed. HENT:      Head: Normocephalic and atraumatic. Eyes:      General: No scleral icterus. Conjunctiva/sclera: Conjunctivae normal.   Cardiovascular:      Rate and Rhythm: Normal rate and regular rhythm. Heart sounds: Normal heart sounds. Pulmonary:      Effort: No respiratory distress. Breath sounds: No wheezing or rales. Musculoskeletal:      Right lower leg: No edema. Left lower leg: No edema. Skin:     General: Skin is warm and dry. Neurological:      Mental Status: She is alert and oriented to person, place, and time. Psychiatric:         Mood and Affect: Mood normal.         Behavior: Behavior normal.         Thought Content: Thought content normal.         Judgment: Judgment normal.           Lab Results   Component Value Date    LABA1C 6.2 (H) 07/29/2020    LABA1C 6.0 (H) 12/26/2019    LABA1C 5.7 05/29/2019     Lab Results   Component Value Date    CREATININE 0.68 06/28/2021     Lab Results   Component Value Date    ALT 29 04/13/2021    AST 27 04/13/2021     Lab Results   Component Value Date    CHOL 219 (H) 04/01/2015    TRIG 105 04/01/2015    HDL 50 09/18/2018    LDLCALC 153 (H) 09/18/2018        Assessment & Plan   Visit Diagnoses and Associated Orders     Atrophic vaginitis    -  Primary    Due to SEs and need for improved adherence, change oral estrogen and Megace to vaginal estradiol only. estradiol (ESTRACE VAGINAL) 0.1 MG/GM vaginal cream [9012]           Vitamin D deficiency        Cholecalciferol (VITAMIN D3) 50 MCG (2000 UT) CAPS [69853]           Mood disorder (HCC)        Stable and well-controlled on LExapro. Mild intermittent asthma without complication        Stable and well-controlled off meds.          Gastroesophageal reflux disease without esophagitis        Stable on PRN PPI         Chronic midline low back pain with bilateral sciatica        continue with Dr. Gallo Kilgore. Reviewed proper administration of NSAIDs. Abnormal glucose        Hemoglobin A1C [24374 Custom]   - Future Order         ORDERS WITHOUT AN ASSOCIATED DIAGNOSIS    naproxen (NAPROSYN) 500 MG tablet [5393]            Reviewed with the patient: all disease processes, current clinical status, medications, activities and diet.      Side effects, adverse effects of the medication prescribed today, as well as treatment plan/ rationale and result expectations have been discussed with the patient who expresses understanding and desires to proceed.     Close follow up to evaluate treatment results and for coordination of care. I have reviewed the patient's medical history in detail and updated the computerized patient record. More than 50% of the appointment was spent in face-to-face counseling, education and care coordination.       Orders Placed This Encounter   Procedures    Hemoglobin A1C     Standing Status:   Future     Standing Expiration Date:   2021     Orders Placed This Encounter   Medications    Cholecalciferol (VITAMIN D3) 50 MCG (2000) CAPS     Sig: TAKE 1 CAPSULE BY MOUTH DAILY     Dispense:  90 capsule     Refill:  4    estradiol (ESTRACE VAGINAL) 0.1 MG/GM vaginal cream     Si/2 gm vaginally twice weekly     Dispense:  1 Tube     Refill:  0     Medications Discontinued During This Encounter   Medication Reason    albuterol sulfate  (90 Base) MCG/ACT inhaler Therapy completed    naproxen (NAPROSYN) 500 MG tablet DOSE ADJUSTMENT    pantoprazole (PROTONIX) 40 MG tablet LIST CLEANUP    selenium sulfide (SELSUN) 2.5 % lotion LIST CLEANUP    miSOPROStol (CYTOTEC) 200 MCG tablet Therapy completed    estradiol (ESTRACE VAGINAL) 0.1 MG/GM vaginal cream LIST CLEANUP    estradiol (ESTRACE) 1 MG tablet Therapy completed    megestrol (MEGACE) 40 MG tablet LIST CLEANUP    nitrofurantoin (MACRODANTIN) 100 MG capsule LIST CLEANUP    tiZANidine (ZANAFLEX) 4 MG tablet Therapy completed    Cholecalciferol (VITAMIN D3) 50 MCG (2000 UT) CAPS REORDER     Return in about 6 months (around 1/26/2022) for general f/u. Controlled Substance Monitoring:    Acute and Chronic Pain Monitoring:   RX Monitoring 11/15/2017   Attestation The Prescription Monitoring Report for this patient was reviewed today.            Karon Torres MD

## 2021-07-29 ENCOUNTER — HOSPITAL ENCOUNTER (OUTPATIENT)
Dept: OCCUPATIONAL THERAPY | Age: 66
Setting detail: THERAPIES SERIES
Discharge: HOME | End: 2021-07-29
Payer: MEDICARE

## 2021-07-29 NOTE — PROGRESS NOTES
Therapy                                      No-show Note    Date: 2021  Patient Name: Mary Estevez    : 1955  (89 y.o.)     MRN: [id number]    Account #: [de-identified]            Comments: For today's appointment patient:  []  Cancelled  []  Rescheduled appointment  [x]  No-show for evaluation      Reason given by patient:  []  Patient ill  []  Conflicting appointment  []  No transportation    []  Conflict with work  []  No reason given  []  Other:      [] Pt has future appointments scheduled, no follow up needed  [] Pt requests to be on hold.     Reason:   If > 2 weeks please discuss with therapist.  [] Therapist to call pt for follow up     Signature: PATEL Nicole 2021 11:33 AM

## 2021-08-18 ENCOUNTER — HOSPITAL ENCOUNTER (OUTPATIENT)
Dept: NEUROLOGY | Age: 66
Discharge: HOME | End: 2021-08-18
Payer: MEDICARE

## 2021-08-18 DIAGNOSIS — M54.12 C6 RADICULOPATHY: ICD-10-CM

## 2021-08-18 PROCEDURE — 95910 NRV CNDJ TEST 7-8 STUDIES: CPT

## 2021-08-18 PROCEDURE — 95886 MUSC TEST DONE W/N TEST COMP: CPT

## 2021-08-19 ENCOUNTER — HOSPITAL ENCOUNTER (OUTPATIENT)
Dept: OCCUPATIONAL THERAPY | Age: 66
Setting detail: THERAPIES SERIES
Discharge: HOME | End: 2021-08-19
Payer: MEDICARE

## 2021-08-19 PROCEDURE — 97166 OT EVAL MOD COMPLEX 45 MIN: CPT

## 2021-08-19 NOTE — PROCEDURES
[address]                      [facility], [address] [facility]                             ELECTROMYOGRAM REPORT    PATIENT NAME: Rachel Prieto         :        1955  MED REC NO:   [id number]                            ROOM:  ACCOUNT NO:   [de-identified]                           ADMIT DATE: 2021  PROVIDER:     Shaun Card MD    DATE OF EM2021    REFERRING PROVIDER:  Katina Rodriguez MD.    REASON FOR STUDY:  The patient was having pain in the left upper  extremity mostly. FINDINGS:  Motor nerve conduction velocities and F-wave latencies are  normal in all the nerves tested. Distal motor latency is borderline in the left median nerve and normal  in other nerves tested. Distal sensory latencies are normal in all the nerves tested. On concentric needle electrode examination, mild-to-moderate denervation  changes are present in the left C6 root distribution with mild  involvement of the right C6 and C7 roots. CLINICAL INTERPRETATION:  EMG studies are showing changes of  mild-to-moderate left C6 radiculopathy with mild involvement of the  right C6 and C7 root distribution. The distal sensory latencies in the median nerves have improved as  compared to the previous study. The patient is being tried on conservative management. If clinically  indicated, imaging of the cervical canal may be done to look for  compromise of the spinal canal and/or foramina. Thank you Dr. Ruma Pleitez for allowing us to see this patient. Please feel free to call me if I can be of any further assistance  regarding this patient's evaluation.         Prudence Taylor MD    D: 2021 15:53:21       T: 2021 15:56:57     [email or web address]  Job#: [id number]     Doc#: [id number]

## 2021-08-19 NOTE — PROGRESS NOTES
[facility] CHRONIC PAIN EVALUATION                                                                                                                   DATE: 2021         OT Individual Minutes  Time In: 0900  Time Out: 1000  Minutes: 60             OT Eval mod complexity 60 minutes for 1 unit, CPT 98271                                                         PHYSICIAN: General  Referring Practitioner: Dr. Hoffman Diss, DO  Diagnosis: Chronic midline low back pain with B sciatica, B carpal tunnel syndrome, Cervicalgia, LUQ pain    REFERRAL DATE: 2021                                                                                               PATIENT NAME: Feroz Cruz    GENDER:female  DIAGNOSIS: Diagnosis: Chronic midline low back pain with B sciatica, B carpal tunnel syndrome, Cervicalgia, LUQ pain    MRN: [id number]    ACCOUNT#: [de-identified]                                                     :1955  (64 y.o.)       VISITS ALLOWED/INSURANCE/CERTIFICATION INFORMATION:   OT Visit Information  OT Insurance Information: Medicare  Total # of Visits Approved:  (BMN)  Total # of Visits to Date: 1  Progress Note Counter: eval    PERTINENT MEDICAL HISTORY:  Patient Active Problem List   Diagnosis    Chronic non-seasonal allergic rhinitis    Gastroesophageal reflux disease without esophagitis    Hemoptysis    Sensory hearing loss, bilateral    Temporomandibular joint disorder    Bilateral carpal tunnel syndrome    Cervicalgia    Chronic midline low back pain with bilateral sciatica    Atrophic vaginitis    Mood disorder (HCC)    Nausea    Pure hypercholesterolemia    Itching    Vitamin D deficiency    Mild intermittent asthma without complication    LUQ pain    Pruritic dermatitis    Abnormal glucose    Hiatal hernia    Tinea versicolor    Osteoarthritis of spine with radiculopathy, cervical region    Osteoarthritis of spine with radiculopathy, lumbar region    Lumbosacral radiculopathy at L5    C6 radiculopathy    C7 radiculopathy    Abnormal pelvic ultrasound    Generalized body aches    Polyp of ascending colon    Polyp of transverse colon    Adenomatous colon polyp - repeat scope 02/2026    Postoperative pain     Past Medical History:   Diagnosis Date    Abnormal pelvic ultrasound 10/8/2020    Acid reflux     Adenomatous colon polyp - repeat scope 02/2026 2/11/2021    Arthritis     Asthma     Chronic back pain     COPD (chronic obstructive pulmonary disease) (HCC)     Depression     Hyperlipidemia     Obesity     Venous insufficiency         Past Surgical History:   Procedure Laterality Date    COLONOSCOPY      COLONOSCOPY N/A 2/3/2021    COLORECTAL CANCER SCREENING with polypectomies performed by Alicia Taveras MD at [facility] N/A 6/28/2021    HYSTEROSCOPY  FX  D/C performed by Molly Carver DO at [facility]. [address], COLON, DIAGNOSTIC      KIDNEY SURGERY      OVARIAN CYST REMOVAL Left 11/06/2004    OR ESOPHAGOGASTRODUODENOSCOPY TRANSORAL DIAGNOSTIC N/A 11/14/2018    EGD ESOPHAGOGASTRODUODENOSCOPY WITH DILATION performed by Luis Adler MD at [facility]     Allergies   Allergen Reactions    Other      Perfumes     Seasonal                DIAGNOSTIC IMAGING: Physician interpretation of imaging tests reviewed. MEDICATIONS:  Current Outpatient Medications:     Cholecalciferol (VITAMIN D3) 50 MCG (2000 UT) CAPS, TAKE 1 CAPSULE BY MOUTH DAILY, Disp: 90 capsule, Rfl: 4    naproxen (NAPROSYN) 500 MG tablet, Take 500 mg by mouth 2 times daily as needed, Disp: , Rfl:     estradiol (ESTRACE VAGINAL) 0.1 MG/GM vaginal cream, 1/2 gm vaginally twice weekly, Disp: 1 Tube, Rfl: 0    gabapentin (NEURONTIN) 100 MG capsule, Take 1 capsule by mouth nightly for 90 days. , Disp: 90 capsule, Rfl: 1    hydrOXYzine (ATARAX) 25 MG tablet, NANCY 1 TABLETA POR BOCA CADA 8 HORAS CUANDO SEA NECESARIO PARA EL PICOR, Disp: 60 tablet, Rfl: 1    omeprazole (PRILOSEC) 10 MG delayed release capsule, Take 10 mg by mouth daily , Disp: , Rfl:     acetaminophen (APAP EXTRA STRENGTH) 500 MG tablet, Take 2 tablets by mouth every 6 hours as needed for Pain, Disp: 120 tablet, Rfl: 3    escitalopram (LEXAPRO) 10 MG tablet, Take 1 tablet by mouth daily, Disp: 30 tablet, Rfl: 5    Artificial Tear Solution (GENTEAL TEARS) 0.1-0.2-0.3 % SOLN, , Disp: , Rfl:     nystatin (MYCOSTATIN) 778000 UNIT/GM cream, Apply topically 2 times daily. , Disp: 60 g, Rfl: 5    PRECAUTIONS: Pt reported physician restricted lifting to 10 lbs. ENGLISH PRIMARY LANGUAGE: No,  Crow Guzmán present during session     TRANSFER OF PT CARE REQUIRED: no                                                         HAND DOMINANCE: right    PRIOR LEVEL OF FUNCTION:  [x] Patient performing at independent level for ADL and IADL activities:    [] Patient receiving assistance for ADL and IADL activities:    [] Other:    Comments:                                        ONSET DATE OF SURGERY OR INJURY: 3/17/2021    PREVIOUS/CURRENT TREATMENT FOR SAME PROBLEM:    []   YES   [x] NO    SUBJECTIVE:    CHIEF COMPLAINT: Pt stated had Covid-19 vaccination at [facility] by volunteers. Pt stated L arm \"failing a lot\" since then. Pt started getting pain in arm, feeling \"like ants crawling\" presenting in lateral L neck, back, and radiating into arm and hand. Pt stated injection may have touched the nerve. Pt was hospitalized for 6 hours. Pt reported had 3 injections while in hospital in her back on L side. Pt stated \"It (the symptoms) turns on and activates\" randomly. PATIENT GOALS FOR THERAPY: \"To relieve my arm pain. \"     SOCIAL SUPPORT: Dtr, Charlotte Iqbal present during eval      Pt lives: dtr  Home:  two level with 9 stairs going up and handrail(s) right going up and 13 steps down to basement with handrail(s) left  going down. Pt stays on main floor. Entrance: 4 stairs into the house,  and hand rails left  going up  Bathroom: bath tub only , pt only bathes when dtr home to help, pt reported had one incident of difficulty getting out of tub when dtr not home. DME: reacher , cane  and wheel walker    ADL/HOBBIES/ROUTINES: Pt reported likes to go out side with chickens and rabbits, pt enjoys tending her farm/garden. Pt currently having difficulties dressing, bathing, grooming. Pt stated dtr has been providing Max A d/t weakness in LUE. Pt reporting having difficulty with meal prep and cutting up food. Pt has reading glasses, but had lost glasses. Pt currently drives. Pt used to work in [employer] as a nurse. PAIN SCALE:  0/10 current, pt stated pain when moving LUE      LOCATION: LUE    Comments: Pt reports lowest pain 0/10 during rest, and highest 7-8/10 during activities. Pt reported sometimes pain \"comes out of no where\" and can be sensitive to touch.      OBJECTIVE:    POSTURAL EXAM/COMPENSATION: rounded shoulders, forward flexed head, LUE slightly more abducted standing, sitting with posterior pelvic tilt     UPPER EXTREMITY STRENGTH AND RANGE OF MOTION: AROM BUE WFL    RUE: shoulder flex 5/5, abduction: 5/5,  Elbow: 5/5 Wrist 5/5    LUE: shoulder flex 3+/5, abduction: 3+/5,  Elbow: 4-/5 Wrist 4-/5     & Pinch Strength  Average of  Three tries Right   Eval Right              Norm Left   Eval     Left     Norm       Declan lb. 79 Female  age 63-62: 48 lbs 36 Female age 63-62: 45 lbs    PALMAR  Pinch  Lb. 15 Female age 63-62: 10.5 lbs  15 Female age 63-62: 9.5 lbs    Comments:     LUMBAR/SACROILIAC ASSESSMENT: posterior pelvic tile seated, WFL    LEG LENGTH COMPARISON: R knee a little higher in standing narrow MONICA,     NEUROLOGICAL ASSESSMENT: Pt reported tingling and burning in L arm \"running up and down arm\" from L side of neck and back into hand.     SOFT TISSUE ASSESSMENT/MOBILITY: pt with soft tissue restrictions in posterior neck and B shoulders    SCAR/LOCATION/MOBILITY: not observed during eval     EDUCATION/BARRIERS TO LEARNIN [facility]   Education on this date: OT role and POC     FALLS: see falls risk assessment form    COMMENTS/CLINICAL IMPRESSIONS:    Quick DASH  QuickDASH  Open a tight or new jar: Unable  Do heavy household chores (e.g., wash walls, floors): Unable  Tammy a shopping bag or briefcase: [employer] your back: Moderate Difficulty  Use a knife to cut food: Moderate Difficulty  Recreational activities in which you take some force or impact through your arm, shoulder, or hand (e.g., golf, hammering, tennis, etc.): Severe Difficulty  During the past week, to what extent has your arm, shoulder or hand problem interfered with your normal social activities with family, friends, neighbors or groups?: Moderately  During the past week, were you limited in your work or other regular daily activities as a result of your arm, shoulder or hand problem?: Very Limited  Arm, shoulder or hand pain: Moderate  Tingling (pins and needles) in your arm, shoulder or hand: Moderate  During the past week, how much difficulty have you had sleeping because of the pain in your arm, shoulder or hand?: Moderate Difficulty  QuickDASH Total Score: 41  QuickDASH Disability/Symptom Score : 68.18 %  QUICKDASH Disability Index: 60-79%  QUICKDASH CMS Modifier: CL      Rehabilitation Potential:    [] Excellent [x] Good  [] Fair  [] Poor      TX PLAN:  2 X WEEK X 6-8 WEEKS       Complexity:       []  Low Complexity  ¨ History: Brief history including review of medical records relating to the problem  ¨ Exam: 1-3 performance Deficits  ¨ Assistance/Modification: No assistance or modifications required to perform tasks.  No comorbities affecting occupational performance  [x]  Medium Complexity  ¨ History: Expanded review of medical records and additional review of physical, cognitive, or psychosocial history related to current functional performance  ¨ Exam: 3-5 performance deficits  ¨ Assistance/Modification: Min/mod assistance or modifications required to perform tasks. May have comorbidities that affect occupational performance. []  High Complexity  ¨ History: Extensive review of medical records and additional review of physical, cognitive, or psychosocial history related to current functional performance. ¨ Exam: 5 or more performance deficits  ¨ Assistance/Modification: Significant assistance or modifications required to perform tasks. Have comorbidities that affect occupational performance. LONG TERM GOALS:    LTG 1 : Patient will report pain 2/10 or less during functional activities. LTG 2 : Patient/caregiver will be IND with all recommended HEP, adaptive techniques, and/or adaptive strategies. LTG 3 : Patient will utilize proper body mechanics during functional transfers and I/ADL's to assist to decrease pain. LTG 4 : Patient will report less sleep disturbances from pain to increase quality of sleep. LTG 5 : Patient will decrease fascial restrictions in posterior/lateral neck, and upper back to decrease pain during functional activities. LTG 6 : Patient will report decreased frequency of numbness/tingling in LUE. LTG 7 : Pt will increase LUE strength by 1/2 MMT grade. Long term goals  Time Frame for Long term goals : 2 X WEEK X 6-8 WEEKS  Long term goal 1: Patient will report pain 2/10 or less during functional activities. Long term goal 2: Patient/caregiver will be IND with all recommended HEP, adaptive techniques, and/or adaptive strategies. Long term goal 3: Patient will utilize proper body mechanics during functional transfers and I/ADL's to assist to decrease pain. Long term goal 4: Patient will report less sleep disturbances from pain to increase quality of sleep.   Long term goal 5: Patient will decrease fascial restrictions in posterior/lateral neck, and upper back to decrease pain during functional activities. Long term goals 6: Patient will report decreased frequency of numbness/tingling in LUE. Long term goal 7: Pt will increase LUE strength by 1/2 MMT grade. PROBLEMS:  [x]  PAIN [x]   IMPAIRED SKIN/TISSUE MOBILITY              []  EDEMA [x]    IMPAIRED SENSATION   []  IMPAIRED ROM [x]   DECREASED STRENGTH   []  IMPAIRED COORDINATION /         DEXTERITY []  DECREASED USE ___UE FOR         ADL/WORK ROLE PERFORMANCE   [x]  DECREASED KNOWLEDGE HEP      []  IMPAIRED FLEXIBILITY                 []  OTHER:           TREATMENT PLAN:  [x]  Re-evaluation                        [x]  Pain Mgmt. Tech. [x]  PROM/stretching/AAROM/AROM []  Coordination/Dexterity retraining   [x]  Instruction written HEP          []  Scar Mgmt.                                []  Desensitization [x]  Modalities   [x]  MFR techniques                    []  ADL Retraining   []  Strengthening/Graded therapeutic activity []  Edema mgmt. Tech   [x]  Instruction/application of        Energy conservation, work        Simplification, joint protection,               Body mechanics      []  Work Simulation Activity    [x] SCANLON able to work with pt   [x] D/C plan: Will assess pt after established visits to determine need for continued therapy.               []  Other:                                                                                                                                                                                             OUTPATIENT FALLS RISK ASSESSMENT                          Age: 0-59 = 0          60-69= 1            > 70= 2 History of Falls:   0  Falls  last 6 mo = 0    1  fall  Last  6 mo = 1   1-3 falls last 6 mo = 2 Medical History:   Parkinsons, CVA,HTN, vertigo, >4 meds, use of assistive device (1pt.for each)  Mental Status:  A & O x 3 = 0  Disoriented to person, place, or time = 2     High Risk for Falls: >8  Intermediate Risk for Falls: 4-8   Low Risk for Falls: <4    [x]  INITIAL ASSESSMENT:                                                      Date: 8/19/2021                                                        Age: 3                                                         Falls: 0                                                          PMH: 1                                                          Mental: 0                                                       Total:  1                                                        *Patient 4 or younger []   Vestibular []    Signature: PATEL Zavala, 8/19/2021, 12:40 PM                                                        * All pediatric patients (age 3 or younger) and vestibular patients will be considered high risk for falls- scoring does not need to be completed - treat as high risk. Interventions     Low Risk: Monitor for changes, reassess every three months. Intermediate Risk: Supervision for most activities, direct contact with new activities or equipment, reassess monthly. High Risk: Stand by assitance at all times, reassess monthly. Electronically signed by:   PATEL Zavala                    Date: 8/19/2021, 12:40 PM

## 2021-08-20 ENCOUNTER — PROCEDURE VISIT (OUTPATIENT)
Dept: PHYSICAL MEDICINE AND REHAB | Age: 66
End: 2021-08-20

## 2021-08-20 DIAGNOSIS — E55.9 VITAMIN D DEFICIENCY: Chronic | ICD-10-CM

## 2021-08-20 DIAGNOSIS — M47.22 OSTEOARTHRITIS OF SPINE WITH RADICULOPATHY, CERVICAL REGION: Primary | ICD-10-CM

## 2021-08-20 DIAGNOSIS — M54.17 LUMBOSACRAL RADICULOPATHY AT L5: ICD-10-CM

## 2021-08-20 DIAGNOSIS — M47.26 OSTEOARTHRITIS OF SPINE WITH RADICULOPATHY, LUMBAR REGION: ICD-10-CM

## 2021-08-25 ENCOUNTER — HOSPITAL ENCOUNTER (OUTPATIENT)
Dept: OCCUPATIONAL THERAPY | Age: 66
Setting detail: THERAPIES SERIES
Discharge: HOME | End: 2021-08-25
Payer: MEDICARE

## 2021-08-25 PROCEDURE — 97140 MANUAL THERAPY 1/> REGIONS: CPT

## 2021-08-25 PROCEDURE — 97530 THERAPEUTIC ACTIVITIES: CPT

## 2021-08-25 NOTE — PROGRESS NOTES
Occupational Therapy  Daily Note     Name: Jyoti Myers  : 1955  MRN: [id number]  Diagnosis: Chronic midline low back pain with B sciatica, B carpal tunnel syndrome, Cervicalgia, LUQ pain    Visit Information:   OT Insurance Information: Medicare  Total # of Visits Approved:  (BMN)  Total # of Visits to Date: 2  Progress Note Counter: 1    Date: 2021  OT Manual therapy 40 minutes for 3 unit(s), CPT 80233  OT Therapeutic activities 18 minutes for 1 unit(s), CPT 30208       OT Individual Minutes  Time In: 1000  Time Out: 7998  Minutes: 62    Referring Practitioner: Dr. Cha Ingram, DO      Subjective:  Arlyne Eisenmenger #[id number] via . [employer]. Pt dtr present during OT session. Pain rating:   Pre-treatment pain:  Pt reported pain in R knee and 6/10, 7/10 in L shoulder     Action for pain:   Patient reports pain is at acceptable level for treatment. Pain after treatment:    Pt reported  0/10 L shoulder, 7/10 in R knee      Focus of treatment was on the following:   decreasing fascial restrictions, decreasing pain and education/training     Objective:    Treatment Activity:     MFR/Manual:   Pt treated seated on chair  Cervical: occipital condyle  and deep release bilateral  lateral cervical  and posterior cervical deep release    Thoracic: anterior chest deep release , bilateral  pectoralis major , bilateral  thoracic paraspinals , soft tissue mobilization strum and bear claw  to upper traps and scapular regions and transverse plane thoracic inlet   Upper extremity: transverse plane left  shoulder , left  arm , left  elbow  and left  forearm     Pt participated in table top AROM for L shoulder flexion/extension/horizontal ab/adduction with pillow case to stretch and decrease pain in L shoulder complex x 10 reps, holding 10 seconds. Education/HEP: Pt instructed to complete table top activities at home as HEP. Pt with no questions.       Discussed previous HEP: n/a    Assessment:   Pt tolerated treatment well. Pt reported decreased  pain after OT treatment. Pt reported increased tingling in L arm during transverse plane release of L arm. Pt stated feels \"needles\" in L arm from elbow to hand at times during ROM activity. Audible cracking heard in pt's neck. Plan:   Continue POC    Goals:     Long term goals  Time Frame for Long term goals : 2 X WEEK X 6-8 WEEKS  Long term goal 1: Patient will report pain 2/10 or less during functional activities. Long term goal 2: Patient/caregiver will be IND with all recommended HEP, adaptive techniques, and/or adaptive strategies. Long term goal 3: Patient will utilize proper body mechanics during functional transfers and I/ADL's to assist to decrease pain. Long term goal 4: Patient will report less sleep disturbances from pain to increase quality of sleep. Long term goal 5: Patient will decrease fascial restrictions in posterior/lateral neck, and upper back to decrease pain during functional activities. Long term goals 6: Patient will report decreased frequency of numbness/tingling in LUE. Long term goal 7: Pt will increase LUE strength by 1/2 MMT grade.     IVA Ma/L   8/25/2021  11:13 AM

## 2021-08-30 ENCOUNTER — HOSPITAL ENCOUNTER (OUTPATIENT)
Dept: OCCUPATIONAL THERAPY | Age: 66
Setting detail: THERAPIES SERIES
Discharge: HOME | End: 2021-08-30
Payer: MEDICARE

## 2021-08-30 PROCEDURE — 97140 MANUAL THERAPY 1/> REGIONS: CPT

## 2021-08-30 PROCEDURE — 97530 THERAPEUTIC ACTIVITIES: CPT

## 2021-08-30 NOTE — PROGRESS NOTES
Occupational Therapy  Daily Note     Name: Manohar Hinton  : 1955  MRN: [id number]  Diagnosis: Chronic midline low back pain with B sciatica, B carpal tunnel syndrome, Cervicalgia, LUQ pain    Visit Information:   OT Insurance Information: Medicare  Total # of Visits Approved:  (BMN)  Total # of Visits to Date: 3  Progress Note Counter: 2    Date: 2021  Time:   OT Manual therapy 45 minutes for 3 unit(s), CPT 64084  OT Therapeutic activities 10 minutes for 1 unit(s), CPT 85305       OT Individual Minutes  Time In: 1300  Time Out: 2798  Minutes: 54    Referring Practitioner: Dr. Kwabena Nobles, DO              Subjective: iPad  Ashleymayra López #[id number] used for this session. Patient's daughter present during session. Patient reports the table top exercises make her arm more sore. Pain rating:   Pre-treatment pain:    6/10, left arm when she moves it. Action for pain:   Patient reports pain is at acceptable level for treatment. Pain after treatment:      3/10         Focus of treatment was on the following:   decreasing fascial restrictions, decreasing pain and education/training     Objective:    Treatment Activity:    MFR/Manual:   Patient treated with direct myofascial release techniques to decrease pain and fascial restrictions in left UE. Pt treated seated on chair  Thoracic: anterior chest deep release , left  pectoralis major  and left  thoracic paraspinals   Upper extremity: left arm pull, soft tissue mobility (identify area): left biceps and triceps, deep releases left biceps and left triceps, and transverse plane left shoulder, left arm, left elbow and left forearm     Patient educated on chair stretch for left obliques and paraspinals stretch while seated in chair. Patient educated to hold on to seat of chair with one arm and then lean towards opposite side, holding for 15 to 30 seconds. Patient tolerated well.      Discussed previous HEP: yes, Patient reports that the table top activities were hurting her arm at home. Patient reported she was completing them for 20 minutes. Patient instructed to try less time (5-10 minutes) and see if this helps with the pain. Patient instructed to not complete exercises if they continue to increase pain. Comments:     Assessment:   Pt tolerated treatment well. Patient reported decreased pain following treatment. Patient reported feeling increased tingling with deep releases to left tricep area. Patient would benefit from continued occupational therapy services to decrease pain and fascial restrictions in left UE to maximize performance in ADL and IADL tasks. Plan:   Continue POC    Goals:     Long term goals  Time Frame for Long term goals : 2 X WEEK X 6-8 WEEKS  Long term goal 1: Patient will report pain 2/10 or less during functional activities. Long term goal 2: Patient/caregiver will be IND with all recommended HEP, adaptive techniques, and/or adaptive strategies. Long term goal 3: Patient will utilize proper body mechanics during functional transfers and I/ADL's to assist to decrease pain. Long term goal 4: Patient will report less sleep disturbances from pain to increase quality of sleep. Long term goal 5: Patient will decrease fascial restrictions in posterior/lateral neck, and upper back to decrease pain during functional activities. Long term goals 6: Patient will report decreased frequency of numbness/tingling in LUE. Long term goal 7: Pt will increase LUE strength by 1/2 MMT grade.     Signature: Electronically signed by Jojo Jeffries OT on 8/30/2021 at 4:02 PM

## 2021-09-01 ENCOUNTER — HOSPITAL ENCOUNTER (OUTPATIENT)
Dept: OCCUPATIONAL THERAPY | Age: 66
Setting detail: THERAPIES SERIES
Discharge: HOME | End: 2021-09-01
Payer: MEDICARE

## 2021-09-01 PROCEDURE — 97140 MANUAL THERAPY 1/> REGIONS: CPT

## 2021-09-01 NOTE — PROGRESS NOTES
Occupational Therapy  Daily Note     Name: Karlene Mendiola  : 1955  MRN: [id number]  Diagnosis: Chronic midline low back pain with B sciatica, B carpal tunnel syndrome, Cervicalgia, LUQ pain    Visit Information:   OT Insurance Information: Medicare  Total # of Visits Approved:  (BMN)  Total # of Visits to Date: 4  Progress Note Counter: 3    Date: 2021  Time:   OT Manual therapy 57 minutes for 4 unit(s), CPT 20207       OT Individual Minutes  Time In: 1000  Time Out: 9213  Minutes: 62    Referring Practitioner: Dr. Álvarez Reasons, DO              Subjective:  iPad  Lupe Coombs #[id number] used for this session. Patient's daughter present during session. Patient reports that starting yesterday she was not able to bend her left at the elbow very well by itself. Patient reports she has to use her right to help bend her arm. Patient reports that this has been happening off and on since March. Pain rating:   Pre-treatment pain:    6/10, left arm, elbow, forearm. Action for pain:   Patient reports pain is at acceptable level for treatment. Pain after treatment:      5/10         Focus of treatment was on the following:   decreasing fascial restrictions, decreasing pain and education/training     Objective:    Treatment Activity:   MFR/Manual:   Patient treated with direct myofascial release techniques to decrease pain and fascial restrictions in left UE. Pt treated seated on chair  Thoracic: anterior chest deep release, left  pectoralis major, and left  thoracic paraspinals   Upper extremity: soft tissue mobility (identify area): left biceps and triceps, deep releases left biceps and left triceps, deep releases to left forearm flexors, and transverse plane left shoulder, left arm, left elbow and left forearm   Patient reported feeling increased tingling throughout left arm during some of the techniques.  Patient reported most noticeable increase in tingling is during deep release of left biceps and forearm flexors. Patient tolerated well. Discussed previous HEP: yes, patient reported difficutly being able to complete exercises due to increased pain. Patient instructed to take a few days off from exercises and try them again on Saturday. Patient instructed to not complete exercises that cause sharp or shooting pain. Comments:     Assessment:   Pt tolerated treatment well. Patient able to lift left UE and bend left elbow at end of session. Patient reports she noticed she has been sleeping better with less pain in her neck. Patient would benefit from continued occupational therapy services to decrease pain and fascial restrictions in left UE to maximize performance in ADL and IADL tasks. Plan:   Continue POC    Goals:     Long term goals  Time Frame for Long term goals : 2 X WEEK X 6-8 WEEKS  Long term goal 1: Patient will report pain 2/10 or less during functional activities. Long term goal 2: Patient/caregiver will be IND with all recommended HEP, adaptive techniques, and/or adaptive strategies. Long term goal 3: Patient will utilize proper body mechanics during functional transfers and I/ADL's to assist to decrease pain. Long term goal 4: Patient will report less sleep disturbances from pain to increase quality of sleep. Long term goal 5: Patient will decrease fascial restrictions in posterior/lateral neck, and upper back to decrease pain during functional activities. Long term goals 6: Patient will report decreased frequency of numbness/tingling in LUE. Long term goal 7: Pt will increase LUE strength by 1/2 MMT grade.     Signature: Electronically signed by Taryn Koo OT on 9/1/2021 at 1:38 PM

## 2021-09-03 ENCOUNTER — OFFICE VISIT (OUTPATIENT)
Dept: OBGYN CLINIC | Age: 66
End: 2021-09-03
Payer: MEDICARE

## 2021-09-03 VITALS
SYSTOLIC BLOOD PRESSURE: 118 MMHG | DIASTOLIC BLOOD PRESSURE: 66 MMHG | BODY MASS INDEX: 31.78 KG/M2 | WEIGHT: 191 LBS | HEART RATE: 70 BPM

## 2021-09-03 DIAGNOSIS — N95.0 POST-MENOPAUSAL BLEEDING: ICD-10-CM

## 2021-09-03 DIAGNOSIS — N85.01 ENDOMETRIAL HYPERPLASIA WITHOUT ATYPIA, COMPLEX: Primary | ICD-10-CM

## 2021-09-03 DIAGNOSIS — N88.2 CERVICAL STENOSIS (UTERINE CERVIX): ICD-10-CM

## 2021-09-03 PROCEDURE — 3017F COLORECTAL CA SCREEN DOC REV: CPT | Performed by: OBSTETRICS & GYNECOLOGY

## 2021-09-03 PROCEDURE — G8417 CALC BMI ABV UP PARAM F/U: HCPCS | Performed by: OBSTETRICS & GYNECOLOGY

## 2021-09-03 PROCEDURE — 99213 OFFICE O/P EST LOW 20 MIN: CPT | Performed by: OBSTETRICS & GYNECOLOGY

## 2021-09-03 PROCEDURE — 1036F TOBACCO NON-USER: CPT | Performed by: OBSTETRICS & GYNECOLOGY

## 2021-09-03 PROCEDURE — G8427 DOCREV CUR MEDS BY ELIG CLIN: HCPCS | Performed by: OBSTETRICS & GYNECOLOGY

## 2021-09-03 RX ORDER — MISOPROSTOL 200 UG/1
TABLET ORAL
Qty: 4 TABLET | Refills: 0 | Status: SHIPPED | OUTPATIENT
Start: 2021-09-03 | End: 2021-12-02 | Stop reason: ALTCHOICE

## 2021-09-03 ASSESSMENT — ENCOUNTER SYMPTOMS
SORE THROAT: 0
COUGH: 0
NAUSEA: 0
TROUBLE SWALLOWING: 0
CHEST TIGHTNESS: 0
CONSTIPATION: 0
COLOR CHANGE: 0
VOICE CHANGE: 0
ABDOMINAL PAIN: 0
ABDOMINAL DISTENTION: 0
WHEEZING: 0
BLOOD IN STOOL: 0
BACK PAIN: 0
SHORTNESS OF BREATH: 0
VOMITING: 0

## 2021-09-03 NOTE — PROGRESS NOTES
HPI:  Thomas Woodard (: 1955) is a 61 y.o. female, Established patient, here for evaluation of the following chief complaint(s):  Menstrual Problem (bleeding started up again 21 and is almost gone today. Stopped estradiol and megace d/t breast tenderness)  Had some breakthrough bleeding on the Megace. She had been using vaginal estrogen for dyspareunia and she is on the Megace for hyperplasia and a polyp there was no there was no atypia. She was advised to stay on it for 3 months and then to have another biopsy. She saw her PCP who advised her to stop the medic the Megace. SUBJECTIVE/OBJECTIVE:    Past Surgical History:   Procedure Laterality Date    COLONOSCOPY      COLONOSCOPY N/A 2/3/2021    COLORECTAL CANCER SCREENING with polypectomies performed by Raysa Wagner MD at [facility] N/A 2021    HYSTEROSCOPY  FX  D/C performed by Geneva Acosta DO at [facility]. [address], COLON, DIAGNOSTIC      KIDNEY SURGERY      OVARIAN CYST REMOVAL Left 2004    TN ESOPHAGOGASTRODUODENOSCOPY TRANSORAL DIAGNOSTIC N/A 2018    EGD ESOPHAGOGASTRODUODENOSCOPY WITH DILATION performed by Bigg Edwards MD at [facility]        Review of Systems   Constitutional: Negative for activity change, appetite change, fatigue and unexpected weight change. HENT: Negative for dental problem, ear pain, hearing loss, nosebleeds, sore throat, trouble swallowing and voice change. Eyes: Negative for visual disturbance. Respiratory: Negative for cough, chest tightness, shortness of breath and wheezing. Cardiovascular: Negative for chest pain and palpitations. Gastrointestinal: Negative for abdominal distention, abdominal pain, blood in stool, constipation, nausea and vomiting. Endocrine: Negative for cold intolerance, heat intolerance, polydipsia, polyphagia and polyuria. Genitourinary: Positive for vaginal bleeding.  Negative Uterus: Not deviated, not enlarged, not fixed and not tender. Adnexa:         Right: No mass, tenderness or fullness. Left: No mass, tenderness or fullness. Rectum: Normal. No mass, tenderness, anal fissure, external hemorrhoid or internal hemorrhoid. Normal anal tone. Musculoskeletal:         General: Normal range of motion. Cervical back: Normal range of motion. Lymphadenopathy:      Cervical: No cervical adenopathy. Neurological:      Mental Status: She is alert and oriented to person, place, and time. Vitals:    09/03/21 0921   BP: 118/66   Pulse: 70   Weight: 191 lb (86.6 kg)         ASSESSMENT/PLAN:    Postmenopausal bleeding  Endometrial hyperplasia without atypia    PLAN: Patient to come in next week for an endometrial biopsy after 24 hours of Cytotec cervical prep      No follow-ups on file. On this date 9/3/2021 I have spent 30 minutes reviewing previous notes, test results and face to face with the patient discussing the diagnosis and importance of compliance with the treatment plan as well as documenting on the day of the visit. An electronic signature was used to authenticate this note. Please note this report has been partially produced using speech recognition software and may cause contain errors related to that system including grammar, punctuation and spelling as well as words and phrases that may seem inappropriate. If there are questions or concerns please feel free to contact me to clarify.

## 2021-09-07 ENCOUNTER — HOSPITAL ENCOUNTER (OUTPATIENT)
Dept: OCCUPATIONAL THERAPY | Age: 66
Setting detail: THERAPIES SERIES
Discharge: HOME | End: 2021-09-07
Payer: MEDICARE

## 2021-09-07 PROCEDURE — 97140 MANUAL THERAPY 1/> REGIONS: CPT

## 2021-09-07 NOTE — PROGRESS NOTES
Occupational Therapy  Daily Note     Name: Manohar Hinton  : 1955  MRN: [id number]  Diagnosis: Chronic midline low back pain with B sciatica, B carpal tunnel syndrome, Cervicalgia, LUQ pain    Visit Information:   OT Insurance Information: Medicare  Total # of Visits Approved:  (BMN)  Total # of Visits to Date: 54  Progress Note Counter: 4    Date: 2021  Time:   OT Manual therapy 54 minutes for 4 unit(s), CPT 07915       OT Individual Minutes  Time In: 1100  Time Out: 9653  Minutes: 47    Referring Practitioner: Dr. Kwabena Nobles, DO              Subjective: iPad  Dionte Tin # [id number] used for this session. Patient reports that since middle of last week, patient has had pain on right mid to low back. Patient reports that she had an accident in  that affected her spine on right side and they pain comes and goes. Patient reports that the pain in her left arm has improved and she's able to move her left arm better. Pain rating:   Pre-treatment pain:  Patient reports 2-3/10 left arm, 8-9/10 right mid to low back    Action for pain:   Patient reports pain is at acceptable level for treatment. Pain after treatment:      Patient reports 1/10 left arm, 8/10 right mid to low back         Focus of treatment was on the following:   decreasing fascial restrictions, decreasing pain and education/training     Objective:    Treatment Activity:   MFR/Manual:   Patient treated with direct myofascial release techniques to decrease pain and fascial restrictions in left UE and right back.   Pt treated seated on chair  Thoracic: right and left thoracic paraspinals, horizontal thoracic, and right lateral obliques   Upper extremity: deep releases left biceps and left triceps, deep releases to left forearm flexors, and transverse plane left shoulder, left arm, left elbow and left forearm   Patient reported feeling increased tingling throughout left arm during some of the techniques.  Patient reported this decreased following treatment. Patient tolerated well. Discussed previous HEP: yes, Patient reports she is \"doing some movement\" exercises at home. Comments:     Assessment:   Pt tolerated treatment well. Patient reported decreased pain in left arm following treatment. Patient would benefit from continued occupational therapy services to decrease pain and fascial restrictions in left UE to maximize performance in ADL and IADL tasks.     Plan:   Continue POC    Goals:     Long term goals  Time Frame for Long term goals : 2 X WEEK X 6-8 WEEKS  Long term goal 1: Patient will report pain 2/10 or less during functional activities. Long term goal 2: Patient/caregiver will be IND with all recommended HEP, adaptive techniques, and/or adaptive strategies. Long term goal 3: Patient will utilize proper body mechanics during functional transfers and I/ADL's to assist to decrease pain. Long term goal 4: Patient will report less sleep disturbances from pain to increase quality of sleep. Long term goal 5: Patient will decrease fascial restrictions in posterior/lateral neck, and upper back to decrease pain during functional activities. Long term goals 6: Patient will report decreased frequency of numbness/tingling in LUE. Long term goal 7: Pt will increase LUE strength by 1/2 MMT grade.     Signature: Electronically signed by Noreen Finney OT on 9/7/2021 at 12:59 PM

## 2021-09-10 ENCOUNTER — HOSPITAL ENCOUNTER (OUTPATIENT)
Dept: OCCUPATIONAL THERAPY | Age: 66
Setting detail: THERAPIES SERIES
Discharge: HOME | End: 2021-09-10
Payer: MEDICARE

## 2021-09-10 PROCEDURE — 97140 MANUAL THERAPY 1/> REGIONS: CPT

## 2021-09-10 NOTE — PROGRESS NOTES
Occupational Therapy  Daily Note     Name: Tess Faustin  : 1955  MRN: [id number]  Diagnosis: Chronic midline low back pain with B sciatica, B carpal tunnel syndrome, Cervicalgia, LUQ pain    Visit Information:   OT Insurance Information: Medicare  Total # of Visits Approved:  (BMN)  Total # of Visits to Date: 6  Certification Period Expiration Date: 21  Progress Note Counter: 5    Date: 9/10/2021  OT Manual therapy 60 minutes for 4 unit(s), CPT 56155       OT Individual Minutes  Time In: 1400  Time Out: 1500  Minutes: 61    Referring Practitioner: Dr. Patricio Allen, DO      Subjective:  Pt granddtr present during session.  Nathaniel Cordoba #[id number] via ipad       Pain rating:   Pre-treatment pain:    6/10 L shoulder and elbow     Action for pain:   Patient reports pain is at acceptable level for treatment. Pain after treatment:      5/10 L shoulder, 0/10 L elbow        Focus of treatment was on the following:   decreasing fascial restrictions and decreasing pain     Objective:    Treatment Activity:     MFR/Manual:   Pt treated seated on chair  Craniosacral release: deep release right and left  scalene   Cervical: cervical release for extension, cervical release for right and left  side bending  and cervical release for right and left  rotation    Thoracic: anterior chest deep release , left  pectoralis major  and transverse plane thoracic inlet   Upper extremity: deep releases left  biceps , left  triceps , left  forearm flexors  and left  forearm extensors  and transverse plane left  shoulder , left  arm , left  elbow  and left  forearm     Recommended to complete shoulder flexion while prone on bed and slowly swing arm in pendulum like manner. Provided visual demo. Pt receptive. Assessment:   Pt tolerated treatment fair. Pt reported no pain in elbow and slight decrease in pain in shoulder. Pt with limited L lateral flexion of neck, in guarded like position.  Pt reported pain with R lateral flexion after therapist completed. Instructed pt to inform of sharp pain or pain more than 5/10 vs \"working through it\". Pt verbalized understanding. Plan:   Continue POC    Goals:     Long term goals  Time Frame for Long term goals : 2 X WEEK X 6-8 WEEKS  Long term goal 1: Patient will report pain 2/10 or less during functional activities. Long term goal 2: Patient/caregiver will be IND with all recommended HEP, adaptive techniques, and/or adaptive strategies. Long term goal 3: Patient will utilize proper body mechanics during functional transfers and I/ADL's to assist to decrease pain. Long term goal 4: Patient will report less sleep disturbances from pain to increase quality of sleep. Long term goal 5: Patient will decrease fascial restrictions in posterior/lateral neck, and upper back to decrease pain during functional activities. Long term goals 6: Patient will report decreased frequency of numbness/tingling in LUE. Long term goal 7: Pt will increase LUE strength by 1/2 MMT grade.     IVA Aranda/L   9/10/2021  4:17 PM

## 2021-09-13 ENCOUNTER — HOSPITAL ENCOUNTER (OUTPATIENT)
Dept: OCCUPATIONAL THERAPY | Age: 66
Setting detail: THERAPIES SERIES
Discharge: HOME | End: 2021-09-13
Payer: MEDICARE

## 2021-09-13 PROCEDURE — 97140 MANUAL THERAPY 1/> REGIONS: CPT

## 2021-09-13 NOTE — PROGRESS NOTES
Occupational Therapy  Daily Note     Name: Rios Velasquez  : 1955  MRN: [id number]  Diagnosis: Chronic midline low back pain with B sciatica, B carpal tunnel syndrome, Cervicalgia, LUQ pain    Visit Information:   OT Insurance Information: Medicare  Total # of Visits Approved:  (BMN)  Total # of Visits to Date: 7  Certification Period Expiration Date: 21  Progress Note Counter: 6    Date: 2021  Time:   OT Manual therapy 38 minutes for 3 unit(s), CPT 82122       OT Individual Minutes  Time In: 1320  Time Out: 5503  Minutes: 38    Referring Practitioner: Dr. Nilsa Capps, DO              Subjective: Session started 20 minutes late when patient arrived (patient called stated she would be late). iPad  [employer] #[id number] using for this session. Patient reports that her left arm from her elbow to her shoulder hurt more over the weekend. Pain rating:   Pre-treatment pain:    5/10, left arm    Action for pain:   Patient reports pain is at acceptable level for treatment. Pain after treatment:      2/10, left arm          Focus of treatment was on the following:   decreasing fascial restrictions, decreasing pain and education/training     Objective:    Treatment Activity:   MFR/Manual:   Patient treated with direct myofascial release techniques to decrease pain and fascial restrictions in left UE and right back.   Pt treated seated on chair. Thoracic: anterior chest deep release, left  pectoralis major, and left  thoracic paraspinals   Upper extremity: deep releases left biceps and left triceps, and transverse plane left shoulder, left arm  Patient reported feeling increased tingling throughout left arm during some of the techniques, but reports tolerable. Patient reported this decreased following treatment. Patient tolerated well.     Discussed previous HEP: yes, Patient reports that the new pendulum exercise caused increased pain.  Patient instructed to complete at slower pace (due to during demonstration, patient completed at fast pace). Patient instructed to not complete if sharp or shooting pain occurs. Patient verbalized understanding. Comments:     Assessment:   Pt tolerated treatment fair. Patient reported decreased pain following treatment. Patient reported overall, she has noticed minor improvements. Patient would benefit from continued occupational therapy services to decrease pain and fascial restrictions in left UE to maximize performance in ADL and IADL tasks.     Plan:   Continue POC    Goals:     Long term goals  Time Frame for Long term goals : 2 X WEEK X 6-8 WEEKS  Long term goal 1: Patient will report pain 2/10 or less during functional activities. Long term goal 2: Patient/caregiver will be IND with all recommended HEP, adaptive techniques, and/or adaptive strategies. Long term goal 3: Patient will utilize proper body mechanics during functional transfers and I/ADL's to assist to decrease pain. Long term goal 4: Patient will report less sleep disturbances from pain to increase quality of sleep. Long term goal 5: Patient will decrease fascial restrictions in posterior/lateral neck, and upper back to decrease pain during functional activities. Long term goals 6: Patient will report decreased frequency of numbness/tingling in LUE. Long term goal 7: Pt will increase LUE strength by 1/2 MMT grade.     Signature: Electronically signed by Chilango Aquino OT on 9/13/2021 at 3:23 PM

## 2021-09-17 ENCOUNTER — HOSPITAL ENCOUNTER (OUTPATIENT)
Dept: OCCUPATIONAL THERAPY | Age: 66
Setting detail: THERAPIES SERIES
Discharge: HOME | End: 2021-09-17
Payer: MEDICARE

## 2021-09-17 PROCEDURE — 97140 MANUAL THERAPY 1/> REGIONS: CPT

## 2021-09-17 PROCEDURE — 97530 THERAPEUTIC ACTIVITIES: CPT

## 2021-09-17 NOTE — PROGRESS NOTES
OCCUPATIONAL THERAPY PROGRESS NOTE  [x]  [address] [facility] [address]        Ph: [phone]         Fax: [phone]          []  [facility] [address]         Ph: [phone]         Fax: [phone]        [] Certification     [] Recertification     [] Plan of Care    [x] Progress Note        Date: 2021    To:Referring Practitioner: Dr. Dasha Madison, DO          From: Anselmo Esquivel, OTR/L  Patient: Mary Estevez       : 1955  MRN: [id number]  Diagnosis:Diagnosis: Chronic midline low back pain with B sciatica, B carpal tunnel syndrome, Cervicalgia, LUQ pain   Date of eval: 2021    Visit Information:   OT Insurance Information: Medicare  Total # of Visits Approved:  (BMN)  Certification Period Expiration Date: 21  Canceled Appointment: 0  Progress Note Counter: 7    Last POC date: n/a   Reporting period: 2021 to 2021                            Assessment:    Goals Current/Discharge status  Met   Long term goal 1: Patient will report pain 2/10 or less during functional activities. Pt reporting decreased pain during treatment sessions and reported less pain in L elbow, but no significant change in pain in L shoulder and arm during activities. [] Met  [x] Partially Met  [] Not Met   Long term goal 2: Patient/caregiver will be IND with all recommended HEP, adaptive techniques, and/or adaptive strategies. Ongoing  [] Met  [x] Partially Met  [] Not Met   Long term goal 3: Patient will utilize proper body mechanics during functional transfers and I/ADL's to assist to decrease pain. Pt requires VC's to decrease holding patterns and to be more aware of posture. [] Met  [x] Partially Met  [] Not Met   Long term goal 4: Patient will report less sleep disturbances from pain to increase quality of sleep.  Pt reported sleeping a little

## 2021-09-17 NOTE — PROGRESS NOTES
Therapy                            Cancellation/No-show Note    Date: 2021  Patient Name: Aden Brink    : 1955  (82 y.o.)     MRN: [id number]    Account #: [de-identified]       Canceled Appointment: 1    Comments: For today's appointment patient:  [x]  Cancelled  []  Rescheduled appointment  []  No-show   []  Called pt to remind of next appointment     Reason given by patient:  []  Patient ill  []  Conflicting appointment  []  No transportation    []  Conflict with work  []  No reason given  [x]  Other: note reporting unable to understand message for reason      [x] Pt has future appointments scheduled, no follow up needed  [] Pt requests to be on hold.     Reason:   If > 2 weeks please discuss with therapist.  [] Therapist to call pt for follow up     Signature: PATEL Cabrera 2021 9:31 AM

## 2021-09-17 NOTE — PROGRESS NOTES
Occupational Therapy  Daily Note     Name: Tyree Khalil  : 1955  MRN: [id number]  Diagnosis: Chronic midline low back pain with B sciatica, B carpal tunnel syndrome, Cervicalgia, LUQ pain    Visit Information:   OT Insurance Information: Medicare  Total # of Visits Approved:  (BMN)  Certification Period Expiration Date: 21  Canceled Appointment: 0  Progress Note Counter: 7    Date: 2021  OT Manual therapy 24 minutes for 2 unit(s), CPT 05017  OT Therapeutic activities 30 minutes for 2 unit(s), CPT 90896       OT Individual Minutes  Time In: 1426    Referring Practitioner: Dr. Alanda Saint, DO    Miscommunication about pt cancelling appointment for today. Pt arrived 5 min late. Pt able to be seen when therapist notified. Will delete cancellation note for this date. Subjective: \"If I touch my arm where I was vaccinated, it starts to burn like it is on fire. \"       Nguyen Melo #[id number] via ipad used for first half of session. Services interrupted when another therapist signed in on different device. Pain rating:   Pre-treatment pain:    5/10 L shoulder and lateral side of neck    Action for pain:   Patient reports pain is at acceptable level for treatment.     Pain after treatment:      1/10         Focus of treatment was on the following:   assess for progress toward goals, decreasing fascial restrictions and decreasing pain     Objective:    Treatment Activity:      MFR/Manual:   Pt treated seated on chair  Cervical: deep release posterior cervical deep release    Thoracic: left  pectoralis major , left  thoracic paraspinals  and soft tissue mobilization strum to L lateral neck and upper traps  Upper extremity: deep releases left  biceps , left  triceps , left  forearm flexors  and left  forearm extensors  and transverse plane left  shoulder , left  arm  and left  elbow     Quick DASH  QuickDASH  Open a tight or new jar: No Difficulty  Do heavy household chores (e.g., wash edmondson, floors): Rashad Milling a shopping bag or briefcase: [employer] your back: Unable  Use a knife to cut food: No Difficulty  Recreational activities in which you take some force or impact through your arm, shoulder, or hand (e.g., golf, hammering, tennis, etc.): No Difficulty  During the past week, to what extent has your arm, shoulder or hand problem interfered with your normal social activities with family, friends, neighbors or groups?: Not At All  During the past week, were you limited in your work or other regular daily activities as a result of your arm, shoulder or hand problem?: Moderately Limited  Arm, shoulder or hand pain: Moderate  Tingling (pins and needles) in your arm, shoulder or hand: Moderate  During the past week, how much difficulty have you had sleeping because of the pain in your arm, shoulder or hand?: Moderate Difficulty  QuickDASH Total Score: 31  QuickDASH Disability/Symptom Score : 45.45 %  QUICKDASH Disability Index: 40-59%  QUICKDASH CMS Modifier: CK    Comments: Pt reported the tingling \"does not go away. \" The tingling comes and goes. Pt reported pain in elbow has gone down, but still having pain from L shoulder to elbow. Assessment:   Pt tolerated treatment well. Pt reported decreased  pain after OT treatment. Plan:   Continue POC    Goals:     Long term goals  Time Frame for Long term goals : 2 X WEEK X 6-8 WEEKS  Long term goal 1: Patient will report pain 2/10 or less during functional activities. Long term goal 2: Patient/caregiver will be IND with all recommended HEP, adaptive techniques, and/or adaptive strategies. Long term goal 3: Patient will utilize proper body mechanics during functional transfers and I/ADL's to assist to decrease pain. Long term goal 4: Patient will report less sleep disturbances from pain to increase quality of sleep.   Long term goal 5: Patient will decrease fascial restrictions in posterior/lateral neck, and upper back to decrease pain during functional activities. Long term goals 6: Patient will report decreased frequency of numbness/tingling in LUE. Long term goal 7: Pt will increase LUE strength by 1/2 MMT grade.     Demarco Umaña OTR/L   9/17/2021  12:13 PM

## 2021-09-20 RX ORDER — MEGESTROL ACETATE 40 MG/1
40 TABLET ORAL 2 TIMES DAILY
Qty: 30 TABLET | Refills: 4 | OUTPATIENT
Start: 2021-09-20

## 2021-09-21 ENCOUNTER — PROCEDURE VISIT (OUTPATIENT)
Dept: OBGYN CLINIC | Age: 66
End: 2021-09-21
Payer: MEDICARE

## 2021-09-21 VITALS
WEIGHT: 190 LBS | SYSTOLIC BLOOD PRESSURE: 120 MMHG | HEART RATE: 69 BPM | DIASTOLIC BLOOD PRESSURE: 82 MMHG | BODY MASS INDEX: 31.62 KG/M2

## 2021-09-21 DIAGNOSIS — N85.01 ENDOMETRIAL HYPERPLASIA WITHOUT ATYPIA, COMPLEX: Primary | ICD-10-CM

## 2021-09-21 PROCEDURE — G8417 CALC BMI ABV UP PARAM F/U: HCPCS | Performed by: OBSTETRICS & GYNECOLOGY

## 2021-09-21 PROCEDURE — G8427 DOCREV CUR MEDS BY ELIG CLIN: HCPCS | Performed by: OBSTETRICS & GYNECOLOGY

## 2021-09-21 PROCEDURE — 1036F TOBACCO NON-USER: CPT | Performed by: OBSTETRICS & GYNECOLOGY

## 2021-09-21 PROCEDURE — 58100 BIOPSY OF UTERUS LINING: CPT | Performed by: OBSTETRICS & GYNECOLOGY

## 2021-09-21 PROCEDURE — 3017F COLORECTAL CA SCREEN DOC REV: CPT | Performed by: OBSTETRICS & GYNECOLOGY

## 2021-09-21 RX ORDER — ESTRADIOL 1 MG/1
TABLET ORAL
COMMUNITY
Start: 2021-09-18 | End: 2021-12-02 | Stop reason: ALTCHOICE

## 2021-09-21 RX ORDER — MEGESTROL ACETATE 40 MG/1
40 TABLET ORAL 2 TIMES DAILY
Qty: 30 TABLET | Refills: 4 | Status: SHIPPED | OUTPATIENT
Start: 2021-09-21 | End: 2021-11-29

## 2021-09-21 ASSESSMENT — ENCOUNTER SYMPTOMS
ABDOMINAL PAIN: 0
TROUBLE SWALLOWING: 0
CHEST TIGHTNESS: 0
SHORTNESS OF BREATH: 0
CONSTIPATION: 0
WHEEZING: 0
SORE THROAT: 0
VOICE CHANGE: 0
ABDOMINAL DISTENTION: 0
COUGH: 0
BACK PAIN: 0
BLOOD IN STOOL: 0
COLOR CHANGE: 0
VOMITING: 0
NAUSEA: 0

## 2021-09-21 NOTE — PROGRESS NOTES
HPI:  Eva Torres (: 1955) is a 61 y.o. female, Established patient, here for evaluation of the following chief complaint(s):  Procedure (EMB)  Patient is here for an endometrial biopsy. She had endometrial hyperplasia without atypia. She has been on Megace. She took the Cytotec yesterday      SUBJECTIVE/OBJECTIVE:    Past Surgical History:   Procedure Laterality Date    COLONOSCOPY      COLONOSCOPY N/A 2/3/2021    COLORECTAL CANCER SCREENING with polypectomies performed by Glenn Tripathi MD at [facility] N/A 2021    HYSTEROSCOPY  FX  D/C performed by Oneil Welsh DO at [facility]. [address], COLON, DIAGNOSTIC      KIDNEY SURGERY      OVARIAN CYST REMOVAL Left 2004    [place] ESOPHAGOGASTRODUODENOSCOPY TRANSORAL DIAGNOSTIC N/A 2018    EGD ESOPHAGOGASTRODUODENOSCOPY WITH DILATION performed by Rafael Amaya MD at [facility]        Review of Systems   Constitutional: Negative for activity change, appetite change, fatigue and unexpected weight change. HENT: Negative for dental problem, ear pain, hearing loss, nosebleeds, sore throat, trouble swallowing and voice change. Eyes: Negative for visual disturbance. Respiratory: Negative for cough, chest tightness, shortness of breath and wheezing. Cardiovascular: Negative for chest pain and palpitations. Gastrointestinal: Negative for abdominal distention, abdominal pain, blood in stool, constipation, nausea and vomiting. Endocrine: Negative for cold intolerance, heat intolerance, polydipsia, polyphagia and polyuria. Genitourinary: Negative for difficulty urinating, dyspareunia, dysuria, flank pain, frequency, genital sores, hematuria, menstrual problem, pelvic pain, urgency, vaginal bleeding, vaginal discharge and vaginal pain. Musculoskeletal: Negative for arthralgias, back pain, joint swelling and myalgias. Skin: Negative for color change and rash. Allergic/Immunologic: Negative for environmental allergies, food allergies and immunocompromised state. Neurological: Negative for dizziness, seizures, syncope, speech difficulty, weakness, numbness and headaches. Hematological: Negative for adenopathy. Does not bruise/bleed easily. Psychiatric/Behavioral: Negative for agitation, behavioral problems, confusion, decreased concentration, dysphoric mood and suicidal ideas. The patient is not nervous/anxious and is not hyperactive. Physical Exam  Vitals and nursing note reviewed. Exam conducted with a chaperone present. Genitourinary:                  Vitals:    09/21/21 1013   BP: 120/82   Pulse: 69   Weight: 190 lb (86.2 kg)         ASSESSMENT/PLAN:     Diagnosis Orders   1. Endometrial hyperplasia without atypia, complex  [place] BIOPSY OF UTERUS LINING    Surgical Pathology       PLAN: Endometrial biopsy performed. Patient to follow-up in 1 week for results regarding further treatment if necessary      No follow-ups on file. On this date 9/21/2021 I have spent 30 minutes reviewing previous notes, test results and face to face with the patient discussing the diagnosis and importance of compliance with the treatment plan as well as documenting on the day of the visit. An electronic signature was used to authenticate this note. Please note this report has been partially produced using speech recognition software and may cause contain errors related to that system including grammar, punctuation and spelling as well as words and phrases that may seem inappropriate. If there are questions or concerns please feel free to contact me to clarify.

## 2021-09-23 PROBLEM — Z79.899 HIGH RISK MEDICATION USE: Status: ACTIVE | Noted: 2021-09-23

## 2021-09-30 ENCOUNTER — APPOINTMENT (OUTPATIENT)
Dept: OCCUPATIONAL THERAPY | Age: 66
End: 2021-09-30
Payer: MEDICARE

## 2021-10-01 ENCOUNTER — HOSPITAL ENCOUNTER (OUTPATIENT)
Dept: OCCUPATIONAL THERAPY | Age: 66
Setting detail: THERAPIES SERIES
Discharge: HOME | End: 2021-10-01
Payer: MEDICAID

## 2021-10-01 ENCOUNTER — OFFICE VISIT (OUTPATIENT)
Dept: OBGYN CLINIC | Age: 66
End: 2021-10-01
Payer: MEDICAID

## 2021-10-01 VITALS
SYSTOLIC BLOOD PRESSURE: 116 MMHG | WEIGHT: 191 LBS | HEART RATE: 86 BPM | DIASTOLIC BLOOD PRESSURE: 72 MMHG | BODY MASS INDEX: 31.78 KG/M2

## 2021-10-01 DIAGNOSIS — N95.0 POST-MENOPAUSAL BLEEDING: ICD-10-CM

## 2021-10-01 DIAGNOSIS — N85.01 ENDOMETRIAL HYPERPLASIA WITHOUT ATYPIA, COMPLEX: Primary | ICD-10-CM

## 2021-10-01 PROCEDURE — 3017F COLORECTAL CA SCREEN DOC REV: CPT | Performed by: OBSTETRICS & GYNECOLOGY

## 2021-10-01 PROCEDURE — G8417 CALC BMI ABV UP PARAM F/U: HCPCS | Performed by: OBSTETRICS & GYNECOLOGY

## 2021-10-01 PROCEDURE — G8428 CUR MEDS NOT DOCUMENT: HCPCS | Performed by: OBSTETRICS & GYNECOLOGY

## 2021-10-01 PROCEDURE — G8484 FLU IMMUNIZE NO ADMIN: HCPCS | Performed by: OBSTETRICS & GYNECOLOGY

## 2021-10-01 PROCEDURE — 99213 OFFICE O/P EST LOW 20 MIN: CPT | Performed by: OBSTETRICS & GYNECOLOGY

## 2021-10-01 PROCEDURE — 1036F TOBACCO NON-USER: CPT | Performed by: OBSTETRICS & GYNECOLOGY

## 2021-10-01 NOTE — PROGRESS NOTES
HPI:  Marshal Benoit (: 1955) is a 61 y.o. female, Established patient, here for evaluation of the following chief complaint(s):  Follow-up (bx results)  Patient's repeat biopsy showed persistent complex endometrial hyperplasia without atypia. Despite the treatment with Megace      SUBJECTIVE/OBJECTIVE:    Past Surgical History:   Procedure Laterality Date    COLONOSCOPY      COLONOSCOPY N/A 2/3/2021    COLORECTAL CANCER SCREENING with polypectomies performed by Chuyita Rhodes MD at [facility] N/A 2021    HYSTEROSCOPY  FX  D/C performed by Chris Fraire DO at [facility]. [address], COLON, DIAGNOSTIC      KIDNEY SURGERY      OVARIAN CYST REMOVAL Left 2004    [place] ESOPHAGOGASTRODUODENOSCOPY TRANSORAL DIAGNOSTIC N/A 2018    EGD ESOPHAGOGASTRODUODENOSCOPY WITH DILATION performed by Francy Mena MD at [facility]        Review of Systems    Physical Exam    Vitals:    10/01/21 0920   BP: 116/72   Pulse: 86   Weight: 191 lb (86.6 kg)         ASSESSMENT/PLAN:     Diagnosis Orders   1. Endometrial hyperplasia without atypia, complex     2. Post-menopausal bleeding         PLAN: LAVH BSO. Risk benefits alternatives procedure were explained to her in detail. No follow-ups on file. On this date 10/1/2021 I have spent 30 minutes reviewing previous notes, test results and face to face with the patient discussing the diagnosis and importance of compliance with the treatment plan as well as documenting on the day of the visit. An electronic signature was used to authenticate this note. Please note this report has been partially produced using speech recognition software and may cause contain errors related to that system including grammar, punctuation and spelling as well as words and phrases that may seem inappropriate. If there are questions or concerns please feel free to contact me to clarify.

## 2021-10-01 NOTE — PROGRESS NOTES
OCCUPATIONAL THERAPY DISCHARGE SUMMARY     [x] [address]:     [facility].  Michelet Leyva  Ph: [phone]   Fax: [phone] [] [address] [facility]:  [facility] [address] [facility], [address]   Ph: [phone]   Fax: [phone]       Date: 10/1/2021    To:Referring Practitioner: Dr. Fernanda Moreno, DO            From: Ivis Lieberman, OTR/L  Patient: Prasad Gaines   : 1955  MRN: [id number]  Diagnosis:Diagnosis: Chronic midline low back pain with B sciatica, B carpal tunnel syndrome, Cervicalgia, LUQ pain   Date of eval: 2021     Visit Information:   OT Insurance Information: Medicare  Total # of Visits Approved:  ([address] [place] [address])  Certification Period Expiration Date: 10/16/21  No Show: 2  Progress Note Counter: 7                              Pt being unexpectedly d/c from OT. Pt called and asked to d/c. Last OT treatment on 2021. Assessment from pt last visit and progress note:    Goals Current/Discharge status  Met   Long term goal 1: Patient will report pain 2/10 or less during functional activities. Pt reporting decreased pain during treatment sessions and reported less pain in L elbow, but no significant change in pain in L shoulder and arm during activities. [] Met  [x] Partially Met  [] Not Met   Long term goal 2: Patient/caregiver will be IND with all recommended HEP, adaptive techniques, and/or adaptive strategies. Pt reported com [x] Met  [] Partially Met  [] Not Met   Long term goal 3: Patient will utilize proper body mechanics during functional transfers and I/ADL's to assist to decrease pain. Pt was requires VC's to decrease holding patterns and to correct posture. [] Met  [x] Partially Met  [] Not Met   Long term goal 4: Patient will report less sleep disturbances from pain to increase quality of sleep. No significant change per QDASH score, however pt stated sleeping a little better. [] Met  [x] Partially Met  [] Not Met   Long term goal 5: Patient will decrease fascial restrictions in posterior/lateral neck, and upper back to decrease pain during functional activities. Pt last session still with soft tissue restrictions to L shoulder and posterior neck. [] Met  [x] Partially Met  [] Not Met   Long term goals 6: Patient will report decreased frequency of numbness/tingling in LUE. No significant change reported [] Met  [] Partially Met  [] Not Met   Long term goal 7: Pt will increase LUE strength by 1/2 MMT grade. Pt reported difficulty d/t pain in L shoulder when trying to lift 10 lbs. [] Met  [] Partially Met  [x] Not Met       Functional assessment used: Quick Disabilities of the Arm, Shoulder and Hand (DASH)  Score on eval:   QuickDASH Total Score: 41  QuickDASH Disability/Symptom Score : 68.18 %  QUICKDASH Disability Index: 60-79%  QUICKDASH CMS Modifier: CL    Score at d/c (taken 9/17/2021): QuickDASH Total Score: 31  QuickDASH Disability/Symptom Score : 45.45 %  QUICKDASH Disability Index: 40-59%  QUICKDASH CMS Modifier: CK    Plan: D/C from OT    Thank you for referral of this patient. Electronically signed by:   Claudetta Canter, OTR/L 10/1/2021 11:02 AM

## 2021-10-12 DIAGNOSIS — G56.03 BILATERAL CARPAL TUNNEL SYNDROME: Chronic | ICD-10-CM

## 2021-10-12 DIAGNOSIS — M54.2 CERVICALGIA: Chronic | ICD-10-CM

## 2021-10-12 DIAGNOSIS — M54.12 C7 RADICULOPATHY: ICD-10-CM

## 2021-10-12 DIAGNOSIS — M54.12 C6 RADICULOPATHY: ICD-10-CM

## 2021-10-12 RX ORDER — GABAPENTIN 100 MG/1
100 CAPSULE ORAL NIGHTLY
Qty: 90 CAPSULE | Refills: 1 | Status: SHIPPED | OUTPATIENT
Start: 2021-10-12 | End: 2022-07-01

## 2021-10-18 ENCOUNTER — TELEPHONE (OUTPATIENT)
Dept: OBGYN CLINIC | Age: 66
End: 2021-10-18

## 2021-10-25 ENCOUNTER — TELEPHONE (OUTPATIENT)
Dept: FAMILY MEDICINE CLINIC | Age: 66
End: 2021-10-25

## 2021-10-25 DIAGNOSIS — Z12.31 ENCOUNTER FOR SCREENING MAMMOGRAM FOR BREAST CANCER: Primary | ICD-10-CM

## 2021-10-25 DIAGNOSIS — Z12.31 SCREENING MAMMOGRAM FOR BREAST CANCER: Primary | ICD-10-CM

## 2021-10-25 NOTE — TELEPHONE ENCOUNTER
----- Message from Balbirbrenda Sadlerh sent at 10/18/2021  1:35 PM EDT -----  Subject: Referral Request    QUESTIONS   Reason for referral request? wants a referral for a Mamogram   Has the physician seen you for this condition before? No   Preferred Specialist (if applicable)? Do you already have an appointment scheduled? No  Additional Information for Provider?   ---------------------------------------------------------------------------  --------------  CALL BACK INFO  What is the best way for the office to contact you? OK to leave message on   voicemail  Preferred Call Back Phone Number?  [phone]

## 2021-11-18 ENCOUNTER — HOSPITAL ENCOUNTER (OUTPATIENT)
Dept: WOMENS IMAGING | Age: 66
Discharge: HOME | End: 2021-11-20
Payer: MEDICAID

## 2021-11-18 VITALS — HEIGHT: 65 IN | BODY MASS INDEX: 31.78 KG/M2

## 2021-11-18 DIAGNOSIS — Z12.31 SCREENING MAMMOGRAM FOR BREAST CANCER: ICD-10-CM

## 2021-11-18 PROCEDURE — 77067 SCR MAMMO BI INCL CAD: CPT

## 2021-11-29 RX ORDER — MEGESTROL ACETATE 40 MG/1
40 TABLET ORAL 2 TIMES DAILY
Qty: 30 TABLET | Refills: 4 | Status: SHIPPED | OUTPATIENT
Start: 2021-11-29 | End: 2021-12-02 | Stop reason: ALTCHOICE

## 2021-12-02 ENCOUNTER — NURSE ONLY (OUTPATIENT)
Dept: FAMILY MEDICINE CLINIC | Age: 66
End: 2021-12-02
Payer: MEDICAID

## 2021-12-02 ENCOUNTER — OFFICE VISIT (OUTPATIENT)
Dept: FAMILY MEDICINE CLINIC | Age: 66
End: 2021-12-02
Payer: MEDICAID

## 2021-12-02 VITALS
SYSTOLIC BLOOD PRESSURE: 128 MMHG | DIASTOLIC BLOOD PRESSURE: 87 MMHG | BODY MASS INDEX: 31.65 KG/M2 | WEIGHT: 190 LBS | HEART RATE: 78 BPM | RESPIRATION RATE: 18 BRPM | OXYGEN SATURATION: 99 % | HEIGHT: 65 IN | TEMPERATURE: 98 F

## 2021-12-02 DIAGNOSIS — R73.03 PRE-DIABETES: ICD-10-CM

## 2021-12-02 DIAGNOSIS — J45.40 MODERATE PERSISTENT ASTHMA WITHOUT COMPLICATION: ICD-10-CM

## 2021-12-02 DIAGNOSIS — F39 MOOD DISORDER (HCC): Chronic | ICD-10-CM

## 2021-12-02 DIAGNOSIS — J30.89 CHRONIC NON-SEASONAL ALLERGIC RHINITIS: ICD-10-CM

## 2021-12-02 DIAGNOSIS — Z00.00 ROUTINE GENERAL MEDICAL EXAMINATION AT A HEALTH CARE FACILITY: Primary | ICD-10-CM

## 2021-12-02 DIAGNOSIS — E55.9 VITAMIN D DEFICIENCY: Chronic | ICD-10-CM

## 2021-12-02 DIAGNOSIS — J45.21 MILD INTERMITTENT ASTHMA WITH ACUTE EXACERBATION: Chronic | ICD-10-CM

## 2021-12-02 DIAGNOSIS — L29.9 ITCHING: Chronic | ICD-10-CM

## 2021-12-02 DIAGNOSIS — B36.0 TINEA VERSICOLOR: Chronic | ICD-10-CM

## 2021-12-02 DIAGNOSIS — Z23 NEEDS FLU SHOT: Primary | ICD-10-CM

## 2021-12-02 PROCEDURE — G8417 CALC BMI ABV UP PARAM F/U: HCPCS | Performed by: FAMILY MEDICINE

## 2021-12-02 PROCEDURE — 99214 OFFICE O/P EST MOD 30 MIN: CPT | Performed by: FAMILY MEDICINE

## 2021-12-02 PROCEDURE — G8482 FLU IMMUNIZE ORDER/ADMIN: HCPCS | Performed by: FAMILY MEDICINE

## 2021-12-02 PROCEDURE — 1036F TOBACCO NON-USER: CPT | Performed by: FAMILY MEDICINE

## 2021-12-02 PROCEDURE — G0439 PPPS, SUBSEQ VISIT: HCPCS | Performed by: FAMILY MEDICINE

## 2021-12-02 PROCEDURE — 3017F COLORECTAL CA SCREEN DOC REV: CPT | Performed by: FAMILY MEDICINE

## 2021-12-02 PROCEDURE — G0008 ADMIN INFLUENZA VIRUS VAC: HCPCS | Performed by: FAMILY MEDICINE

## 2021-12-02 PROCEDURE — G8427 DOCREV CUR MEDS BY ELIG CLIN: HCPCS | Performed by: FAMILY MEDICINE

## 2021-12-02 PROCEDURE — 90674 CCIIV4 VAC NO PRSV 0.5 ML IM: CPT | Performed by: FAMILY MEDICINE

## 2021-12-02 RX ORDER — NYSTATIN 100000 U/G
CREAM TOPICAL
Qty: 60 G | Refills: 5 | Status: SHIPPED | OUTPATIENT
Start: 2021-12-02

## 2021-12-02 RX ORDER — LANCETS 30 GAUGE
1 EACH MISCELLANEOUS DAILY
Qty: 100 EACH | Refills: 5 | Status: SHIPPED | OUTPATIENT
Start: 2021-12-02

## 2021-12-02 RX ORDER — GLUCOSAMINE HCL/CHONDROITIN SU 500-400 MG
CAPSULE ORAL
Qty: 100 STRIP | Refills: 5 | Status: SHIPPED | OUTPATIENT
Start: 2021-12-02

## 2021-12-02 RX ORDER — ACETAMINOPHEN 160 MG
TABLET,DISINTEGRATING ORAL
Qty: 90 CAPSULE | Refills: 4 | Status: SHIPPED | OUTPATIENT
Start: 2021-12-02

## 2021-12-02 RX ORDER — HYDROXYZINE HYDROCHLORIDE 25 MG/1
TABLET, FILM COATED ORAL
Qty: 60 TABLET | Refills: 1 | Status: SHIPPED | OUTPATIENT
Start: 2021-12-02 | End: 2022-01-24

## 2021-12-02 RX ORDER — ESCITALOPRAM OXALATE 10 MG/1
10 TABLET ORAL DAILY
Qty: 30 TABLET | Refills: 5 | Status: SHIPPED | OUTPATIENT
Start: 2021-12-02 | End: 2022-06-13

## 2021-12-02 RX ORDER — PANTOPRAZOLE SODIUM 40 MG/1
40 TABLET, DELAYED RELEASE ORAL DAILY
Qty: 30 TABLET | Refills: 1 | Status: SHIPPED | OUTPATIENT
Start: 2021-12-02 | End: 2022-01-05

## 2021-12-02 RX ORDER — ALBUTEROL SULFATE 90 UG/1
2 AEROSOL, METERED RESPIRATORY (INHALATION) EVERY 6 HOURS PRN
Qty: 1 EACH | Refills: 0 | Status: SHIPPED | OUTPATIENT
Start: 2021-12-02 | End: 2022-10-03 | Stop reason: SDUPTHER

## 2021-12-02 SDOH — ECONOMIC STABILITY: FOOD INSECURITY: WITHIN THE PAST 12 MONTHS, YOU WORRIED THAT YOUR FOOD WOULD RUN OUT BEFORE YOU GOT MONEY TO BUY MORE.: NEVER TRUE

## 2021-12-02 SDOH — ECONOMIC STABILITY: FOOD INSECURITY: WITHIN THE PAST 12 MONTHS, THE FOOD YOU BOUGHT JUST DIDN'T LAST AND YOU DIDN'T HAVE MONEY TO GET MORE.: NEVER TRUE

## 2021-12-02 ASSESSMENT — LIFESTYLE VARIABLES: HOW OFTEN DO YOU HAVE A DRINK CONTAINING ALCOHOL: 0

## 2021-12-02 ASSESSMENT — PATIENT HEALTH QUESTIONNAIRE - PHQ9
1. LITTLE INTEREST OR PLEASURE IN DOING THINGS: 0
SUM OF ALL RESPONSES TO PHQ QUESTIONS 1-9: 0
SUM OF ALL RESPONSES TO PHQ9 QUESTIONS 1 & 2: 0
SUM OF ALL RESPONSES TO PHQ QUESTIONS 1-9: 0
SUM OF ALL RESPONSES TO PHQ QUESTIONS 1-9: 0
2. FEELING DOWN, DEPRESSED OR HOPELESS: 0

## 2021-12-02 ASSESSMENT — SOCIAL DETERMINANTS OF HEALTH (SDOH): HOW HARD IS IT FOR YOU TO PAY FOR THE VERY BASICS LIKE FOOD, HOUSING, MEDICAL CARE, AND HEATING?: NOT HARD AT ALL

## 2021-12-02 NOTE — PROGRESS NOTES
Medicare Annual Wellness Visit  Name: En Castillo Date: 2021   MRN: [id number] Sex: Female   Age: 61 y.o. Ethnicity:  / Shaye Cisneros   : 1955 Race:  / Pauletten Pump is here for [employer], Other (needs pro air refill ), and Diabetes (needs new diabeteic meter with supplies)    Screenings for behavioral, psychosocial and functional/safety risks, and cognitive dysfunction are all negative except as indicated below. These results, as well as other patient data from the [address] form, are documented in Flowsheets linked to this Encounter. Allergies   Allergen Reactions    Other      Perfumes     Seasonal          Prior to Visit Medications    Medication Sig Taking? Authorizing Provider   albuterol sulfate  (90 Base) MCG/ACT inhaler Inhale 2 puffs into the lungs every 6 hours as needed for Wheezing Yes Bridger Arauz MD   Cholecalciferol (VITAMIN D3) 50 MCG ( UT) CAPS TAKE 1 CAPSULE BY MOUTH DAILY Yes Bridger Arauz MD   pantoprazole (PROTONIX) 40 MG tablet Take 1 tablet by mouth daily Yes Bridger Arauz MD   hydrOXYzine (ATARAX) 25 MG tablet [facility] [address] Yes Bridger Arauz MD   escitalopram (LEXAPRO) 10 MG tablet Take 1 tablet by mouth daily Yes Bridger Arauz MD   nystatin (MYCOSTATIN) 379971 UNIT/GM cream Apply topically 2 times daily.  Yes Bridger Arauz MD   blood glucose monitor strips Test once a day prn Yes Bridger Arauz MD   blood glucose monitor kit and supplies Use as directed up to TID Yes Bridger Arauz MD   Lancets MISC 1 each by Does not apply route daily Yes Bridger Arauz MD   beclomethasone (QVAR) 80 MCG/ACT inhaler Inhale 1 puff into the lungs 2 times daily Yes Bridger Arauz MD   gabapentin (NEURONTIN) 100 MG capsule TAKE 1 CAPSULE BY MOUTH NIGHTLY FOR 90 DAYS.  Yes Lorin Solo DO   naproxen (NAPROSYN) 500 MG tablet Take 500 mg by mouth 2 times daily as needed Yes Historical Provider, MD   omeprazole (PRILOSEC) 10 MG delayed release capsule Take 10 mg by mouth daily  Yes Historical Provider, MD   acetaminophen (APAP EXTRA STRENGTH) 500 MG tablet Take 2 tablets by mouth every 6 hours as needed for Pain Yes Barbera Pallas, MD   Artificial Tear Solution (Mavis Darrell) 0.1-0.2-0.3 % SOLN  Yes Historical Provider, MD         Past Medical History:   Diagnosis Date    Abnormal pelvic ultrasound 10/8/2020    Acid reflux     Adenomatous colon polyp - repeat scope 02/2026 2/11/2021    Arthritis     Asthma     Chronic back pain     COPD (chronic obstructive pulmonary disease) ([facility] [address]     Depression     Hyperlipidemia     Obesity     Venous insufficiency        Past Surgical History:   Procedure Laterality Date    COLONOSCOPY      COLONOSCOPY N/A 2/3/2021    COLORECTAL CANCER SCREENING with polypectomies performed by Cher Rayo MD at [facility] N/A 6/28/2021    HYSTEROSCOPY  FX  D/C performed by Mark Wall DO at [facility]. [address], COLON, DIAGNOSTIC      KIDNEY SURGERY      OVARIAN CYST REMOVAL Left 11/06/2004    [place] ESOPHAGOGASTRODUODENOSCOPY TRANSORAL DIAGNOSTIC N/A 11/14/2018    EGD ESOPHAGOGASTRODUODENOSCOPY WITH DILATION performed by Shana Ambriz MD at [facility]         Family History   Problem Relation Age of Onset    Diabetes Mother     High Blood Pressure Mother     Diabetes Brother     Diabetes Maternal Grandmother     Diabetes Maternal Grandfather     Diabetes Paternal Grandmother     Diabetes Paternal Grandfather     Diabetes Other     High Blood Pressure Sister     Colon Cancer Paternal Uncle        CareTeam (Including outside providers/suppliers regularly involved in providing care):   Patient Care Team:  Abel Isaac Sophie Ellsworth MD as PCP - General (Family Medicine)  Nataly Edmondson MD as PCP - [facility]    Wt Readings from Last 3 Encounters:   12/02/21 190 lb (86.2 kg)   10/01/21 191 lb (86.6 kg)   09/21/21 190 lb (86.2 kg)     Vitals:    12/02/21 1136   BP: 128/87   Site: Left Upper Arm   Position: Sitting   Cuff Size: Large Adult   Pulse: 78   Resp: 18   Temp: 98 °F (36.7 °C)   TempSrc: Temporal   SpO2: 99%   Weight: 190 lb (86.2 kg)   Height: 5' 5\" (1.651 m)     Body mass index is 31.62 kg/m². Based upon direct observation of the patient, evaluation of cognition reveals recent and remote memory intact. Patient's complete Health Risk Assessment and screening values have been reviewed and are found in Flowsheets. The following problems were reviewed today and where indicated follow up appointments were made and/or referrals ordered. Positive Risk Factor Screenings with Interventions:            General Health and ACP:  General  In general, how would you say your health is?: Good  In the past 7 days, have you experienced any of the following?  New or Increased Pain, New or Increased Fatigue, Loneliness, Social Isolation, Stress or Anger?: (!) New or Increased Fatigue  Do you get the social and emotional support that you need?: Yes  Do you have a Living Will?: (!) No  Advance Directives     Power of [address] [place] [address] Will ACP-Advance Directive ACP-Power of     Not on File Not on File Not on File Not on File      General Health Risk Interventions:  · Fatigue: she states she feels well  · No Living Will: Patient declines ACP discussion/assistance    Health Habits/Nutrition:  Health Habits/Nutrition  Do you exercise for at least 20 minutes 2-3 times per week?: (!) No  Have you lost any weight without trying in the past 3 months?: No  Do you eat only one meal per day?: No  Have you seen the dentist within the past year?: (!) No  Body mass index: (!) 31.61  Health Habits/Nutrition Interventions:  · Inadequate physical activity:  educational materials provided to promote increased physical activity  · Nutritional issues:  educational materials for healthy, well-balanced diet provided  · Dental exam overdue:  patient declines dental evaluation    Hearing/Vision:  No exam data present  Hearing/Vision  Do you or your family notice any trouble with your hearing that hasn't been managed with hearing aids?: No  Do you have difficulty driving, watching TV, or doing any of your daily activities because of your eyesight?: No  Have you had an eye exam within the past year?: (!) No  Hearing/Vision Interventions:  · Vision concerns:  patient encouraged to make appointment with his/her eye specialist      Personalized Preventive Plan   Current Health Maintenance Status  Immunization History   Administered Date(s) Administered    Hepatitis A/Hepatitis B (Twinrix) 09/09/2016    Influenza A (B8X7-55) Vaccine PF IM 11/13/2009    Influenza Vaccine, unspecified formulation 11/07/2013, 10/10/2017    Influenza Virus Vaccine 10/10/2017, 10/19/2018, 10/29/2019    Influenza, MDCK Quadv, IM, PF (Flucelvax 2 yrs and older) 12/02/2021    Influenza, Rudean Heman, IM, (6 mo and older Fluzone, Flulaval, Fluarix and 3 yrs and older Afluria) 10/19/2018, 10/29/2019, 10/08/2020    Influenza, Rudean Heman, IM, PF (6 mo and older Fluzone, Flulaval, Fluarix, and 3 yrs and older Afluria) 09/23/2014    MMR 09/09/2016    Pneumococcal Polysaccharide ([id number]) 11/07/2013    Tdap (Boostrix, Adacel) 09/09/2016    Varicella (Varivax) 09/09/2016        Health Maintenance   Topic Date Due    COVID-19 Vaccine (1) Never done    Shingles Vaccine (2 of 2) 01/08/2021    Annual Wellness Visit (AWV)  10/09/2021    Pneumococcal 0-64 years Vaccine (2 of 2 - PPSV23) 03/09/2022    A1C test (Diabetic or Prediabetic)  07/26/2022    Lipid screen  09/18/2023    Breast cancer screen  11/18/2023    Cervical cancer screen  08/27/2025    Colon cancer screen colonoscopy  02/03/2026    DTaP/Tdap/Td vaccine (2 - Td or Tdap) 09/09/2026    Flu vaccine  Completed    Hepatitis C screen  Completed    HIV screen  Completed    Hepatitis A vaccine  Aged Out    Hepatitis B vaccine  Aged Out    Hib vaccine  Aged Out    Meningococcal (ACWY) vaccine  Aged Out     Recommendations for [employer] Due: see orders and patient instructions/AVS.  . Recommended screening schedule for the next 5-10 years is provided to the patient in written form: see Patient Jenny Leggett was seen today for medicare awv, other and diabetes. Diagnoses and all orders for this visit:    Routine general medical examination at a health care facility    Mild intermittent asthma with acute exacerbation  Comments: Add Flovent and reassess in 1 month  Orders:  -     albuterol sulfate  (90 Base) MCG/ACT inhaler; Inhale 2 puffs into the lungs every 6 hours as needed for Wheezing  -     blood glucose monitor strips; Test once a day prn  -     blood glucose monitor kit and supplies; Use as directed up to TID  -     Lancets MISC; 1 each by Does not apply route daily    Vitamin D deficiency  -     Cholecalciferol (VITAMIN D3) 50 MCG (2000 UT) CAPS; TAKE 1 CAPSULE BY MOUTH DAILY    Itching  Comments:  Suspect anxiety related. Cont Atarax prn. Orders:  -     hydrOXYzine (ATARAX) 25 MG tablet; NANCY 1 TABLETA POR BOCA CADA 8 [address] N[place]ARIO PARA EL PICOR    Chronic non-seasonal allergic rhinitis  Comments:  flonase. No singuair  Orders:  -     hydrOXYzine (ATARAX) 25 MG tablet; NANCY 1 TABLETA POR BOCA CADA 8 [address] N[place]ESARIO PARA EL PICOR    Mood disorder ([address]  Comments:  improving, well-controlled with SEs. Does not desire change in meds. Orders:  -     escitalopram (LEXAPRO) 10 MG tablet; Take 1 tablet by mouth daily    Tinea versicolor  Comments:  Myconazole.  Selenium sulfide  Orders:  -     nystatin (MYCOSTATIN) 371951 UNIT/GM cream; Apply topically 2 times daily. Moderate persistent asthma without complication  Comments:  Stable and well-controlled on current medications    Pre-diabetes  Comments:  reviewed diet; follow labs    Other orders  -     pantoprazole (PROTONIX) 40 MG tablet; Take 1 tablet by mouth daily  -     beclomethasone (QVAR) 80 MCG/ACT inhaler;  Inhale 1 puff into the lungs 2 times daily

## 2021-12-02 NOTE — PATIENT INSTRUCTIONS
Personalized Preventive Plan for Bridger Acosta - 12/2/2021  Medicare offers a range of preventive health benefits. Some of the tests and screenings are paid in full while other may be subject to a deductible, co-insurance, and/or copay. Some of these benefits include a comprehensive review of your medical history including lifestyle, illnesses that may run in your family, and various assessments and screenings as appropriate. After reviewing your medical record and screening and assessments performed today your provider may have ordered immunizations, labs, imaging, and/or referrals for you. A list of these orders (if applicable) as well as your Preventive Care list are included within your After Visit Summary for your review. Other Preventive Recommendations:    · A preventive eye exam performed by an eye specialist is recommended every 1-2 years to screen for glaucoma; cataracts, macular degeneration, and other eye disorders. · A preventive dental visit is recommended every 6 months. · Try to get at least 150 minutes of exercise per week or 10,000 steps per day on a pedometer . · Order or download the FREE \"Exercise & Physical Activity: Your Everyday Guide\" from [employer]. Call 5-551.960.8782 or search [employer] online. · You need 8218-0907 mg of calcium and 9618-2460 IU of vitamin D per day. It is possible to meet your calcium requirement with diet alone, but a vitamin D supplement is usually necessary to meet this goal.  · When exposed to the sun, use a sunscreen that protects against both UVA and UVB radiation with an SPF of 30 or greater. Reapply every 2 to 3 hours or after sweating, drying off with a towel, or swimming. · Always wear a seat belt when traveling in a car. Always wear a helmet when riding a bicycle or motorcycle. Heart-Healthy Diet   Sodium, Fat, and Cholesterol Controlled Diet       What Is a Heart Healthy Diet?    A heart-healthy diet is one that limits sodium , certain types of fat , and cholesterol . This type of diet is recommended for:   People with any form of cardiovascular disease (eg, coronary heart disease , peripheral vascular disease , previous heart attack , previous stroke )   People with risk factors for cardiovascular disease, such as high blood pressure , high cholesterol , or diabetes   Anyone who wants to lower their risk of developing cardiovascular disease   Sodium    Sodium is a mineral found in many foods. In general, most people consume much more sodium than they need. Diets high in sodium can increase blood pressure and lead to edema (water retention). On a heart-healthy diet, you should consume no more than 2,300 mg (milligrams) of sodium per dayabout the amount in one teaspoon of table salt. The foods highest in sodium include table salt (about 50% sodium), processed foods, convenience foods, and preserved foods. Cholesterol    Cholesterol is a fat-like, waxy substance in your blood. Our bodies make some cholesterol. It is also found in animal products, with the highest amounts in fatty meat, egg yolks, whole milk, cheese, shellfish, and organ meats. On a heart-healthy diet, you should limit your cholesterol intake to less than 200 mg per day. It is normal and important to have some cholesterol in your bloodstream. But too much cholesterol can cause plaque to build up within your arteries, which can eventually lead to a heart attack or stroke. The two types of cholesterol that are most commonly referred to are:   Low-density lipoprotein (LDL) cholesterol  Also known as bad cholesterol, this is the cholesterol that tends to build up along your arteries. Bad cholesterol levels are increased by eating fats that are saturated or hydrogenated. Optimal level of this cholesterol is less than 100. Over 130 starts to get risky for heart disease.    High-density lipoprotein (HDL) cholesterol  Also known as good cholesterol, this type of cholesterol actually carries cholesterol away from your arteries and may, therefore, help lower your risk of having a heart attack. You want this level to be high (ideally greater than 60). It is a risk to have a level less than 40. You can raise this good cholesterol by eating olive oil, canola oil, avocados, or nuts. Exercise raises this level, too. Fat    Fat is calorie dense and packs a lot of calories into a small amount of food. Even though fats should be limited due to their high calorie content, not all fats are bad. In fact, some fats are quite healthful. Fat can be broken down into four main types. The good-for-you fats are:   Monounsaturated fat  found in oils such as olive and canola, avocados, and nuts and natural nut butters; can decrease cholesterol levels, while keeping levels of HDL cholesterol high   Polyunsaturated fat  found in oils such as safflower, sunflower, soybean, corn, and sesame; can decrease total cholesterol and LDL cholesterol   Omega-3 fatty acids  particularly those found in fatty fish (such as salmon, trout, tuna, mackerel, herring, and sardines); can decrease risk of arrhythmias, decrease triglyceride levels, and slightly lower blood pressure   The fats that you want to limit are:   Saturated fat  found in animal products, many fast foods, and a few vegetables; increases total blood cholesterol, including LDL levels   Animal fats that are saturated include: butter, lard, whole-milk dairy products, meat fat, and poultry skin   Vegetable fats that are saturated include: hydrogenated shortening, palm oil, coconut oil, cocoa butter   Hydrogenated or trans fat  found in margarine and vegetable shortening, most shelf stable snack foods, and fried foods; increases LDL and decreases HDL     It is generally recommended that you limit your total fat for the day to less than 30% of your total calories.  If you follow an 1800-calorie heart healthy diet, for example, this would mean 60 grams of fat or less per day. Saturated fat and trans fat in your diet raises your blood cholesterol the most, much more than dietary cholesterol does. For this reason, on a heart-healthy diet, less than 7% of your calories should come from saturated fat and ideally 0% from trans fat. On an 1800-calorie diet, this translates into less than 14 grams of saturated fat per day, leaving 46 grams of fat to come from mono- and polyunsaturated fats.    Food Choices on a Heart Healthy Diet   Food Category   Foods Recommended   Foods to Avoid   Grains   Breads and rolls without salted tops Most dry and cooked cereals Unsalted crackers and breadsticks Low-sodium or homemade breadcrumbs or stuffing All rice and pastas   Breads, rolls, and crackers with salted tops High-fat baked goods (eg, muffins, donuts, pastries) Quick breads, self-rising flour, and biscuit mixes Regular bread crumbs Instant hot cereals Commercially prepared rice, pasta, or stuffing mixes   Vegetables   Most fresh, frozen, and low-sodium canned vegetables Low-sodium and salt-free vegetable juices Canned vegetables if unsalted or rinsed   Regular canned vegetables and juices, including sauerkraut and pickled vegetables Frozen vegetables with sauces Commercially prepared potato and vegetable mixes   Fruits   Most fresh, frozen, and canned fruits All fruit juices   Fruits processed with salt or sodium   Milk   Nonfat or low-fat (1%) milk Nonfat or low-fat yogurt Cottage cheese, low-fat ricotta, cheeses labeled as low-fat and low-sodium   Whole milk Reduced-fat (2%) milk Malted and chocolate milk Full fat yogurt Most cheeses (unless low-fat and low salt) Buttermilk (no more than 1 cup per week)   Meats and Beans   Lean cuts of fresh or frozen beef, veal, lamb, or pork (look for the word loin) Fresh or frozen poultry without the skin Fresh or frozen fish and some shellfish Egg whites and egg substitutes (Limit whole eggs to three per week) Tofu Nuts or seeds (unsalted, dry-roasted), low-sodium peanut butter Dried peas, beans, and lentils   Any smoked, cured, salted, or canned meat, fish, or poultry (including moon, chipped beef, cold cuts, hot dogs, sausages, sardines, and anchovies) Poultry skins Breaded and/or fried fish or meats Canned peas, beans, and lentils Salted nuts   Fats and Oils   Olive oil and canola oil Low-sodium, low-fat salad dressings and mayonnaise   Butter, margarine, coconut and palm oils, moon fat   Snacks, Sweets, and Condiments   Low-sodium or unsalted versions of broths, soups, soy sauce, and condiments Pepper, herbs, and spices; vinegar, lemon, or lime juice Low-fat frozen desserts (yogurt, sherbet, fruit bars) Sugar, cocoa powder, honey, syrup, jam, and preserves Low-fat, trans-fat free cookies, cakes, and pies Chano and animal crackers, fig bars, suki snaps   High-fat desserts Broth, soups, gravies, and sauces, made from instant mixes or other high-sodium ingredients Salted snack foods Canned olives Meat tenderizers, seasoning salt, and most flavored vinegars   Beverages   Low-sodium carbonated beverages Tea and coffee in moderation Soy milk   Commercially softened water   Suggestions   Make whole grains, fruits, and vegetables the base of your diet. Choose heart-healthy fats such as canola, olive, and flaxseed oil, and foods high in heart-healthy fats, such as nuts, seeds, soybeans, tofu, and fish. Eat fish at least twice per week; the fish highest in omega-3 fatty acids and lowest in mercury include salmon, herring, mackerel, sardines, and canned chunk light tuna. If you eat fish less than twice per week or have high triglycerides, talk to your doctor about taking fish oil supplements. Read food labels.    For products low in fat and cholesterol, look for fat free, low-fat, cholesterol free, saturated fat free, and trans fat freeAlso scan the Nutrition Facts Label, which lists saturated fat, trans fat, and cholesterol amounts. For products low in sodium, look for sodium free, very low sodium, low sodium, no added salt, and unsalted   Skip the salt when cooking or at the table; if food needs more flavor, get creative and try out different herbs and spices. Garlic and onion also add substantial flavor to foods. Trim any visible fat off meat and poultry before cooking, and drain the fat off after sampson. Use cooking methods that require little or no added fat, such as grilling, boiling, baking, poaching, broiling, roasting, steaming, stir-frying, and sauting. Avoid fast food and convenience food. They tend to be high in saturated and trans fat and have a lot of added salt. Talk to a registered dietitian for individualized diet advice. Last Reviewed: March 2011 Maggie Montgomery MS, MPH, RD   Updated: 3/29/2011   ·     High-Fiber Diet     What Is Fiber? Dietary fiber is a form of carbohydrate found in plants that cannot be digested by humans. All plants contain fiber, including fruits, vegetables, grains, and legumes. Fiber is often classified into two categories: soluble and insoluble. Soluble fiber draws water into the bowel and can help slow digestion. Examples of foods that are high in soluble fiber include oatmeal, oat bran, barley, legumes (eg, beans and peas), apples, and strawberries. Insoluble fiber speeds digestion and can add bulk to the stool. Examples of foods that are high in insoluble fiber include whole-wheat products, wheat bran, cauliflower, green beans, and potatoes. Why Follow a High-Fiber Diet? A high-fiber diet is often recommended to prevent and treat constipation , hemorrhoids , diverticulitis , and irritable bowel syndrome . Eating a high-fiber diet can also help improve your cholesterol levels, lower your risk of coronary heart disease , reduce your risk of type 2 diabetes , and lower your weight.  For people with type 1 or 2 diabetes, a high-fiber diet can also help stabilize blood sugar levels. How Much Fiber Should I Eat? A high-fiber diet should contain  20-35 grams  of fiber a day. This is actually the amount recommended for the general adult population; however, most Americans eat only 15 grams of fiber per day. Digestion of Fiber   Eating a higher fiber diet than usual can take some getting used to by your body's digestive system. To avoid the side effects of sudden increases in dietary fiber (eg, gas, cramping, bloating, and diarrhea), increase fiber gradually and be sure to drink plenty of fluids every day. Tips for Increasing Fiber Intake   Whenever possible, choose whole grains over refined grains (eg, brown rice instead of white rice, whole-wheat bread instead of white bread). Include a variety of grains in your diet, such as wheat, rye, barley, oats, quinoa, and bulgur. Eat more vegetarian-based meals. Here are some ideas: black bean burgers, eggplant lasagna, and veggie tofu stir-callaway. Choose high-fiber snacks, such as fruits, popcorn, whole-grain crackers, and nuts. Make whole-grain cereal or whole-grain toast part of your daily breakfast regime. When eating out, whether ordering a sandwich or dinner, ask for extra vegetables. When baking, replace part of the white flour with whole-wheat flour. Whole-wheat flour is particularly easy to incorporate into a recipe. High-Fiber Diet Eating Guide   Food Category   Foods Recommended   Notes   Grains   Whole-grain breads, muffins, bagels, or charline bread Rye bread Whole-wheat crackers or crisp breads Whole-grain or bran cereals Oatmeal, oat bran, or grits Wheat germ Whole-wheat pasta and brown rice   Read the ingredients list on food labels. Look for products that list \"whole\" as the first ingredient (eg, whole-wheat, whole oats). Choose cereals with at least 2 grams of fiber per serving.    Vegetables   All vegetables, especially asparagus, bean sprouts, broccoli, [place] sprouts, cabbage, carrots, cauliflower, celery, corn, greens, green beans, green pepper, onions, peas, potatoes (with skin), snow peas, spinach, squash, sweet potatoes, tomatoes, zucchini   For maximum fiber intake, eat the peels of fruits and vegetablesjust be sure to wash them well first.   Fruits   All fruits, especially apples, berries, grapefruits, mangoes, nectarines, oranges, peaches, pears, dried fruits (figs, dates, prunes, raisins)   Choose raw fruits and vegetables over juice, cooked, or cannedraw fruit has more fiber. Dried fruit is also a good source of fiber. Milk   With the exception of yogurt containing inulin (a type of fiber), dairy foods provide little fiber. Add more fiber by topping your yogurt or cottage cheese with fresh fruit, whole grain or bran cereals, nuts, or seeds. Meats and Beans   All beans and peas, especially Garbanzo beans, kidney beans, lentils, lima beans, split peas, and calabrese beans All nuts and seeds, especially almonds, peanuts, [place] nuts, cashews, peanut butter, walnuts, sesame and sunflower seeds All meat, poultry, fish, and eggs   Increase fiber in meat dishes by adding calabrese beans, kidney beans, black-eyed peas, bran, or oatmeal. If you are following a low-fat diet, use nuts and seeds only in moderation. Fats and Oils   All in moderation   Fats and oils do not provide fiber   Snacks, Sweets, and Condiments   Fruit Nuts Popcorn, whole-wheat pretzels, or trail mix made with dried fruits, nuts, and seeds Cakes, breads, and cookies made with oatmeal or whole-wheat flour   Most snack foods do not provide much fiber. Choose snacks with at least 2 grams of fiber per serving. Last Reviewed: March 2011 Martha Solorzano MS, MPH, RD   Updated: 3/29/2011   ·     Keep Your Memory Alina Cooney       Many factors can affect your ability to remembera hectic lifestyle, aging, stress, chronic disease, and certain medicines.  But, there are steps you can take to sharpen your mind and help preserve your memory. Challenge Your Brain   Regularly challenging your mind may help keeps it in top shape. Good mental exercises include:   Crossword puzzlesUse a dictionary if you need it; you will learn more that way. Brainteasers Try some! Crafts, such as wood working and sewing   Hobbies, such as gardening and building model airplanes   SocializingVisit old friends or join groups to meet new ones. Reading   Learning a new language   Taking a class, whether it be art history or ulises chi   TravelingExperience the food, history, and culture of your destination   Learning to use a computer   Going to museums, the theater, or thought-provoking movies   Changing things in your daily life, such as reversing your pattern in the grocery store or brushing your teeth using your nondominant hand   Use Memory Aids   There is no need to remember every detail on your own. These memory aids can help:   Calendars and day planners   Electronic organizers to store all sorts of helpful informationThese devices can \"beep\" to remind you of appointments. A book of days to record birthdays, anniversaries, and other occasions that occur on the same date every year   Detailed \"to-do\" lists and strategically placed sticky notes   Quick \"study\" sessionsBefore a gathering, review who will be there so their names will be fresh in your mind. Establish routinesFor example, keep your keys, wallet, and umbrella in the same place all the time or take medicine with your 8:00 AM glass of juice   Live a Healthy Life   Many actions that will keep your body strong will do the same for your mind. For example:   Talk to Your Doctor About Herbs and Supplements    Malnutrition and vitamin deficiencies can impair your mental function. For example, vitamin B12 deficiency can cause a range of symptoms, including confusion. But, what if your nutritional needs are being met? Can herbs and supplements still offer a benefit?  Researchers have investigated a range of natural remedies, such as ginkgo , ginseng , and the supplement phosphatidylserine (PS). So far, though, the evidence is inconsistent as to whether these products can improve memory or thinking. If you are interested in taking herbs and supplements, talk to your doctor first because they may interact with other medicines that you are taking. Exercise Regularly    Among the many benefits of regular exercise are increased blood flow to the brain and decreased risk of certain diseases that can interfere with memory function. One study found that even moderate exercise has a beneficial effect. Examples of \"moderate\" exercise include:   Playing 18 holes of golf once a week, without a cart   Playing tennis twice a week   Walking one mile per day   Manage Stress    It can be tough to remember what is important when your mind is cluttered. Make time for relaxation. Choose activities that calm you down, and make it routine. Manage Chronic Conditions    Side effects of high blood pressure , diabetes, and heart disease can interfere with mental function. Many of the lifestyle steps discussed here can help manage these conditions. Strive to eat a healthy diet, exercise regularly, get stress under control, and follow your doctor's advice for your condition. Minimize Medications    Talk to your doctor about the medicines that you take. Some may be unnecessary. Also, healthy lifestyle habits may lower the need for certain drugs. Last Reviewed: April 2010 Dangelo Ruiz MD   Updated: 4/13/2010   ·     [address] [place] [address]       As we get older, changes in balance, gait, strength, vision, hearing, and cognition make even the most youthful senior more prone to accidents. Falls are one of the leading health risks for older people.  This increased risk of falling is related to:   Aging process (eg, decreased muscle strength, slowed reflexes)   Higher incidence of chronic health problems (eg, arthritis, diabetes) that may limit mobility, agility or sensory awareness   Side effects of medicine (eg, dizziness, blurred vision)especially medicines like prescription pain medicines and drugs used to treat mental health conditions   Depending on the brittleness of your bones, the consequences of a fall can be serious and long lasting. Home Life   Research by the Association of [employer]ging [facility]) shows that some home accidents among older adults can be prevented by making simple lifestyle changes and basic modifications and repairs to the home environment. Here are some lifestyle changes that experts recommend:   Have your hearing and vision checked regularly. Be sure to wear prescription glasses that are right for you. Speak to your doctor or pharmacist about the possible side effects of your medicines. A number of medicines can cause dizziness. If you have problems with sleep, talk to your doctor. Limit your intake of alcohol. If necessary, use a cane or walker to help maintain your balance. Wear supportive, rubber-soled shoes, even at home. If you live in a region that gets wintry weather, you may want to put special cleats on your shoes to prevent you from slipping on the snow and ice. Exercise regularly to help maintain muscle tone, agility, and balance. Always hold the banister when going up or down stairs. Also, use  bars when getting in or out of the bath or shower, or using the toilet. To avoid dizziness, get up slowly from a lying down position. Sit up first, dangling your legs for a minute or two before rising to a standing position. Overall Home Safety Check   According to the Consumer [employer] Safety Comm[employer]'s \"Older Consumer Home Safety Checklist,\" it is important to check for potential hazards in each room. And remember, proper lighting is an essential factor in home safety. If you cannot see clearly, you are more likely to fall.    Important questions to ask yourself include:   Are lamp, electric, extension, and telephone cords placed out of the flow of traffic and maintained in good condition? Have frayed cords been replaced? Are all small rugs and runners slip resistant? If not, you can secure them to the floor with a special double-sided carpet tape. Are smoke detectors properly locatedone on every floor of your home and one outside of every sleeping area? Are they in good working order? Are batteries replaced at least once a year? Do you have a well-maintained carbon monoxide detector outside every sleeping are in your home? Does your furniture layout leave plenty of space to maneuver between and around chairs, tables, beds, and sofas? Are hallways, stairs and passages between rooms well lit? Can you reach a lamp without getting out of bed? Are floor surfaces well maintained? Shag rugs, high-pile carpeting, tile floors, and polished wood floors can be particularly slippery. Stairs should always have handrails and be carpeted or fitted with a non-skid tread. Is your telephone easily reachable. Is the cord safely tucked away? Room by Room   According to the Association [employer]ging, bathrooms and juan miguel are the two most potentially hazardous rooms in your home. In the Kitchen    Be sure your stove is in proper working order and always make sure burners and the oven are off before you go out or go to sleep. Keep pots on the back burners, turn handles away from the front of the stove, and keep stove clean and free of grease build-up. Kitchen ventilation systems and range exhausts should be working properly. Keep flammable objects such as towels and pot holders away from the cooking area except when in use. Make sure kitchen curtains are tied back. Move cords and appliances away from the sink and hot surfaces. If extension cords are needed, install wiring guides so they do not hang over the sink, range, or working areas.     Look for coffee pots, kettles and toaster ovens with automatic shut-offs. Keep a mop handy in the kitchen so you can wipe up spills instantly. You should also have a small fire extinguisher. Arrange your kitchen with frequently used items on lower shelves to avoid the need to stand on a stepstool to reach them. Make sure countertops are well-lit to avoid injuries while cutting and preparing food. In the Bathroom    Use a non-slip mat or decals in the tub and shower, since wet, soapy tile or porcelain surfaces are extremely slippery. Make sure bathroom rugs are non-skid or tape them firmly to the floor. Bathtubs should have at least one, preferably two, grab bars, firmly attached to structural supports in the wall. (Do not use built-in soap holders or glass shower doors as grab bars.)    Tub seats fitted with non-slip material on the legs allow you to wash sitting down. For people with limited mobility, bathtub transfer benches allow you to slide safely into the tub. Raised toilet seats and toilet safety rails are helpful for those with knee or hip problems. In the [facility]    Make sure you use a nightlight and that the area around your bed is clear of potential obstacles. Be careful with electric blankets and never go to sleep with a heating pad, which can cause serious burns even if on a low setting. Use fire-resistant mattress covers and pillows, and NEVER smoke in bed. Keep a phone next to the bed that is programmed to dial 911 at the push of a button. If you have a chronic condition, you may want to sign on with an automatic call-in service. Typically the system includes a small pendant that connects directly to an emergency medical voice-response system. You should also make arrangements to stay in contact with someonefriend, neighbor, family memberon a regular schedule.    Fire Prevention   According to the [employer]. (Smoke Alarms for Every) [address] [place] [address], senior citizens are one of the two highest risk groups for death and serious injuries due to residential fires. When cooking, wear short-sleeved items, never a bulky long-sleeved robe. The [facility]'s Safety Checklist for Older Consumers emphasizes the importance of checking basements, garages, workshops and storage areas for fire hazards, such as volatile liquids, piles of old rags or clothing and overloaded circuits. Never smoke in bed or when lying down on a couch or recliner chair. Small portable electric or kerosene heaters are responsible for many home fires and should be used cautiously if at all. If you do use one, be sure to keep them away from flammable materials. In case of fire, make sure you have a pre-established emergency exit plan. Have a professional check your fireplace and other fuel-burning appliances yearly. [employer]   Baby boomers entering the fraser years will continue to see the development of new products to help older adults live safely and independently in spite of age-related changes. Making Life More Livable  , by [employer], lists over 1,000 products for \"living well in the mature years,\" such as bathing and mobility aids, household security devices, ergonomically designed knives and peelers, and faucet valves and knobs for temperature control. Medical supply stores and organizations are good sources of information about products that improve your quality of life and insure your safety. Last Reviewed: November 2009 Kallie Rey MD   Updated: 3/7/2011     ·        Advance Care Planning: Care Instructions  Your Care Instructions     It can be hard to live with an illness that cannot be cured. But if your health is getting worse, you may want to make decisions about end-of-life care. Planning for the end of your life does not mean that you are giving up. It is a way to make sure that your wishes are met. Clearly stating your wishes can make it easier for your loved ones.  Making plans while you are still able may also ease your mind and make your final days less stressful and more meaningful. Follow-up care is a key part of your treatment and safety. Be sure to make and go to all appointments, and call your doctor if you are having problems. It's also a good idea to know your test results and keep a list of the medicines you take. What can you do to plan for the end of life? You can bring these issues up with your doctor. You do not need to wait until your doctor starts the conversation. You might start with, \"What makes life worth living for me is. Rickleen Eppssaskia Kenney Epps \" And then follow it with, \"I would not be willing to live with . Erleen Epps Erleen Epps Erleen Epps \" When you complete this sentence it helps your doctor understand your wishes. Talk openly and honestly with your doctor. This is the best way to understand the decisions you will need to make as your health changes. Know that you can always change your mind. Ask your doctor about commonly used life-support measures. These include tube feedings, breathing machines, and fluids given through a vein (IV). Understanding these treatments will help you decide whether you want them. You may choose to have these life-supporting treatments for a limited time. This allows a trial period to see whether they will help you. You may also decide that you want your doctor to take only certain measures to keep you alive. It may help to think about the big picture, like what makes life worth living for you or what your values and goals are. Talk to your doctor about how long you are likely to live. Your doctor may be able to give you an idea of what usually happens with your specific illness. Think about preparing papers that state your wishes. These papers are called advance directives. If you do this early and review them often, there will not be any confusion about what you want. You can change your instructions at any time. Which papers should you prepare?   Advance directives are legal papers that tell doctors how you want to be cared for at the end of your life. You do not need a  to write these papers. Ask your doctor or your state health department for information on how to write your advance directives. They may have the forms for each of these types of papers. Make sure your doctor has a copy of these on file, and give a copy to a family member or close friend. Consider a do-not-resuscitate order (DNR). This order asks that no extra treatments be done if your heart stops or you stop breathing. Extra treatments may include cardiopulmonary resuscitation (CPR), electrical shock to restart your heart, or a machine to breathe for you. If you decide to have a DNR order, ask your doctor to explain and write it. Place the order in your home where everyone can easily see it. Consider a living will. A living will explains your wishes about life support and other treatments at the end of your life if you become unable to speak for yourself. Living moore tell doctors to use or not use treatments that would keep you alive. You must have one or two witnesses or a notary present when you sign this form. A living will may be called something else in your state. Consider a medical power of . This form allows you to name a person to make decisions about your care if you are not able to. Most people ask a close friend or family member. Talk to this person about the kinds of treatments you want and those that you do not want. Make sure this person understands your wishes. A medical power of  may be called something else in your state. These legal papers are simple to change. Tell your doctor what you want to change, and ask him or her to make a note in your medical file. Give your family updated copies of the papers. Where can you learn more? Go to https://[email or web address] org and sign in to your [employer] account.  Enter P184 in the [facility] box to learn more about \"Advance Care Planning: Care Instructions. \"     If you do not have an account, please click on the \"Sign Up Now\" link. Current as of: March 17, 2021               Content Version: 13.0  © 2006-2021 Healthwise, Incorporated. Care instructions adapted under license by [facility] ([facility]). If you have questions about a medical condition or this instruction, always ask your healthcare professional. [address] any warranty or liability for your use of this information. ·        Learning About Living Perroy  What is a living will? A living will, also called a declaration, is a legal form. It tells your family and your doctor your wishes when you can't speak for yourself. It's used by the health professionals who will treat you as you near the end of your life or if you get seriously hurt or ill. If you put your wishes in writing, your loved ones and others will know what kind of care you want. They won't need to guess. This can ease your mind and be helpful to others. And you can change or cancel your living will at any time. A living will is not the same as an estate or property will. An estate will explains what you want to happen with your money and property after you die. How do you use it? A living will is used to describe the kinds of treatment or life support you want as you near the end of your life or if you get seriously hurt or ill. Keep these facts in mind about living moore. Your living will is used only if you can't speak or make decisions for yourself. Most often, one or more doctors must certify that you can't speak or decide for yourself before your living will takes effect. If you get better and can speak for yourself again, you can accept or refuse any treatment. It doesn't matter what you said in your living will. Some states may limit your right to refuse treatment in certain cases.  For example, you may need to clearly state in your living will that you don't want artificial hydration and nutrition, such as being fed through a tube. Is a living will a legal document? A living will is a legal document. Each state has its own laws about living moore. And a living will may be called something else in your state. Here are some things to know about living moore. You don't need an  to complete a living will. But legal advice can be helpful if your state's laws are unclear. It can also help if your health history is complicated or your family can't agree on what should be in your living will. You can change your living will at any time. Some people find that their wishes about end-of-life care change as their health changes. If you make big changes to your living will, complete a new form. If you move to another state, make sure that your living will is legal in the state where you now live. In most cases, doctors will respect your wishes even if you have a form from a different state. You might use a universal form that has been approved by many states. This kind of form can sometimes be filled out and stored online. Your digital copy will then be available wherever you have a connection to the internet. The doctors and nurses who need to treat you can find it right away. Your state may offer an online registry. This is another place where you can store your living will online. It's a good idea to get your living will notarized. This means using a person called a  to watch two people sign, or witness, your living will. What should you know when you create a living will? Here are some questions to ask yourself as you make your living will:  Do you know enough about life support methods that might be used? If not, talk to your doctor so you know what might be done if you can't breathe on your own, your heart stops, or you can't swallow. What things would you still want to be able to do after you receive life-support methods? Would you want to be able to walk? To speak?  To eat It lets you name the person you want to make treatment decisions for you if you can't speak or decide for yourself. The person you choose is called your health care agent. This person is also called a health care proxy or health care surrogate. A medical power of  may be called something else in your state. How do you choose a health care agent? Choose your health care agent carefully. This person may or may not be a family member. Talk to the person before you make your final decision. Make sure he or she is comfortable with this responsibility. It's a good idea to choose someone who:  Is at least 25years old. Knows you well and understands what makes life meaningful for you. Understands your Jainism and moral values. Will do what you want, not what he or she wants. Will be able to make difficult choices at a stressful time. Will be able to refuse or stop treatment, if that is what you would want, even if you could die. Will be firm and confident with health professionals if needed. Will ask questions to get needed information. Lives near you or agrees to travel to you if needed. Your family may help you make medical decisions while you can still be part of that process. But it's important to choose one person to be your health care agent in case you aren't able to make decisions for yourself. If you don't fill out the legal form and name a health care agent, the decisions your family can make may be limited. A health care agent may be called something else in your state. Who will make decisions for you if you don't have a health care agent? If you don't have a health care agent or a living will, you may not get the care you want. Decisions may be made by family members who disagree about your medical care. Or decisions may be made by a medical professional who doesn't know you well. In some cases, a  makes the decisions.   When you name a health care agent, it is very clear who has the power to make health decisions for you. How do you name a health care agent? You name your health care agent on a legal form. This form is usually called a medical power of . Ask your hospital, state bar association, or office on aging where to find these forms. You must sign the form to make it legal. Some states require you to get the form notarized. This means that a person called a  watches you sign the form and then he or she signs the form. Some states also require that two or more witnesses sign the form. Be sure to tell your family members and doctors who your health care agent is. Where can you learn more? Go to [email or web address] and sign in to your [employer] account. Enter [id number] in the [employer] box to learn more about \"Learning About [address]. \"     If you do not have an account, please click on the \"Sign Up Now\" link. Current as of: March 17, 2021               Content Version: 13.0  © 6393-4832 Healthwise, Incorporated. Care instructions adapted under license by [facility] ([facility]). If you have questions about a medical condition or this instruction, always ask your healthcare professional. [address]  any warranty or liability for your use of this information.     ·

## 2022-01-22 DIAGNOSIS — L29.9 ITCHING: Chronic | ICD-10-CM

## 2022-01-22 DIAGNOSIS — J30.89 CHRONIC NON-SEASONAL ALLERGIC RHINITIS: ICD-10-CM

## 2022-01-24 RX ORDER — HYDROXYZINE HYDROCHLORIDE 25 MG/1
TABLET, FILM COATED ORAL
Qty: 60 TABLET | Refills: 1 | Status: SHIPPED | OUTPATIENT
Start: 2022-01-24 | End: 2022-05-16

## 2022-01-24 RX ORDER — PANTOPRAZOLE SODIUM 40 MG/1
40 TABLET, DELAYED RELEASE ORAL DAILY
Qty: 30 TABLET | Refills: 1 | Status: SHIPPED | OUTPATIENT
Start: 2022-01-24 | End: 2022-05-16

## 2022-01-24 NOTE — TELEPHONE ENCOUNTER
Pharmacy is requesting medication refill.  Please approve or deny this request.    Rx requested:  Requested Prescriptions     Pending Prescriptions Disp Refills    hydrOXYzine (ATARAX) 25 MG tablet [Pharmacy Med Name: HYDROXYZINE HCL 25 MG TABS 25 Tablet] 60 tablet 1     Sig: NANCY 1 TABLETA POR BOCA CADA 8 HORAS CUANDO SEA NECESARIO PARA EL PICOR    pantoprazole (PROTONIX) 40 MG tablet [Pharmacy Med Name: PANTOPRAZOLE SOD DR 40 MG T 40 Tablet] 30 tablet 1     Sig: TAKE 1 TABLET BY MOUTH DAILY         Last Office Visit:   12/2/2021      Next Visit Date:  Future Appointments   Date Time Provider Ellie Dunbar   1/26/2022 11:00 AM Karen Hagan MD [address] [place] [address]  Updated patients wife this shift. All questions answered at this time.

## 2022-01-26 ENCOUNTER — OFFICE VISIT (OUTPATIENT)
Dept: FAMILY MEDICINE CLINIC | Age: 67
End: 2022-01-26
Payer: MEDICAID

## 2022-01-26 VITALS
RESPIRATION RATE: 18 BRPM | BODY MASS INDEX: 32.99 KG/M2 | OXYGEN SATURATION: 98 % | HEIGHT: 65 IN | HEART RATE: 76 BPM | WEIGHT: 198 LBS | SYSTOLIC BLOOD PRESSURE: 128 MMHG | DIASTOLIC BLOOD PRESSURE: 80 MMHG

## 2022-01-26 DIAGNOSIS — J45.21 MILD INTERMITTENT ASTHMA WITH ACUTE EXACERBATION: Primary | ICD-10-CM

## 2022-01-26 DIAGNOSIS — R73.03 PRE-DIABETES: ICD-10-CM

## 2022-01-26 DIAGNOSIS — J30.89 CHRONIC NON-SEASONAL ALLERGIC RHINITIS: ICD-10-CM

## 2022-01-26 LAB — HBA1C MFR BLD: 6.4 % (ref 4.8–5.9)

## 2022-01-26 PROCEDURE — 1036F TOBACCO NON-USER: CPT | Performed by: FAMILY MEDICINE

## 2022-01-26 PROCEDURE — G8417 CALC BMI ABV UP PARAM F/U: HCPCS | Performed by: FAMILY MEDICINE

## 2022-01-26 PROCEDURE — 3017F COLORECTAL CA SCREEN DOC REV: CPT | Performed by: FAMILY MEDICINE

## 2022-01-26 PROCEDURE — G8427 DOCREV CUR MEDS BY ELIG CLIN: HCPCS | Performed by: FAMILY MEDICINE

## 2022-01-26 PROCEDURE — G8482 FLU IMMUNIZE ORDER/ADMIN: HCPCS | Performed by: FAMILY MEDICINE

## 2022-01-26 PROCEDURE — 99214 OFFICE O/P EST MOD 30 MIN: CPT | Performed by: FAMILY MEDICINE

## 2022-01-26 RX ORDER — MEGESTROL ACETATE 40 MG/1
40 TABLET ORAL 2 TIMES DAILY
Qty: 30 TABLET | Refills: 4 | Status: SHIPPED | OUTPATIENT
Start: 2022-01-26 | End: 2022-07-01

## 2022-01-26 RX ORDER — DEXTRAN 70, GLYCERIN, HYPROMELLOSE 1; 2; 3 MG/ML; MG/ML; MG/ML
SOLUTION/ DROPS OPHTHALMIC
Qty: 15 ML | Refills: 12 | Status: SHIPPED | OUTPATIENT
Start: 2022-01-26

## 2022-01-26 NOTE — PROGRESS NOTES
Subjective  Damian Baugh, 61 y.o. female presents today with:  Chief Complaint   Patient presents with    6 Month Follow-Up    Nausea           HPI    12/2/2021:   Mild intermittent asthma with acute exacerbation  Comments: Add Flovent and reassess in 1 month  Orders:  -     albuterol sulfate  (90 Base) MCG/ACT inhaler; Inhale 2 puffs into the lungs every 6 hours as needed for Wheezing  -     blood glucose monitor strips; Test once a day prn  -     blood glucose monitor kit and supplies; Use as directed up to TID  -     Lancets MISC; 1 each by Does not apply route daily     Vitamin D deficiency  -     Cholecalciferol (VITAMIN D3) 50 MCG (2000 UT) CAPS; TAKE 1 CAPSULE BY MOUTH DAILY     Itching  Comments:  Suspect anxiety related. Cont Atarax prn. Orders:  -     hydrOXYzine (ATARAX) 25 MG tablet; NANCY 1 TABLETA POR BOCA CADA 8 HORAS CUANDO SEA NECESARIO PARA EL PICOR     Chronic non-seasonal allergic rhinitis  Comments:  flonase. No singuair  Orders:  -     hydrOXYzine (ATARAX) 25 MG tablet; NANCY 1 TABLETA POR BOCA CADA 8 HORAS CUANDO SEA NECESARIO PARA EL PICOR     Mood disorder ([address]  Comments:  improving, well-controlled with SEs. Does not desire change in meds. Orders:  -     escitalopram (LEXAPRO) 10 MG tablet; Take 1 tablet by mouth daily     Tinea versicolor  Comments:  Myconazole. Selenium sulfide  Orders:  -     nystatin (MYCOSTATIN) 914006 UNIT/GM cream; Apply topically 2 times daily.     Moderate persistent asthma without complication  Comments:  Stable and well-controlled on current medications     Pre-diabetes  Comments:  reviewed diet; follow labs     Other orders  -     pantoprazole (PROTONIX) 40 MG tablet; Take 1 tablet by mouth daily  -     beclomethasone (QVAR) 80 MCG/ACT inhaler; Inhale 1 puff into the lungs 2 times daily    01/26/2022:     Asthma - much better with ICS. Atrophic vaginitis. Symptoms resolved. Pruritis. Resolved.       No other questions and or concerns for today's visit      Review of Systems      Past Medical History:   Diagnosis Date    Abnormal pelvic ultrasound 10/8/2020    Acid reflux     Adenomatous colon polyp - repeat scope 02/2026 2/11/2021    Arthritis     Asthma     Chronic back pain     COPD (chronic obstructive pulmonary disease) (HCC)     Depression     Hyperlipidemia     Obesity     Venous insufficiency      Past Surgical History:   Procedure Laterality Date    COLONOSCOPY      COLONOSCOPY N/A 2/3/2021    COLORECTAL CANCER SCREENING with polypectomies performed by Alex Farfan MD at [address] 6/28/2021    HYSTEROSCOPY  FX  D/C performed by Patsy Lizarraga DO at [address] [facility] [address], DIAGNOSTIC      KIDNEY SURGERY      OVARIAN CYST REMOVAL Left 11/06/2004    CT ESOPHAGOGASTRODUODENOSCOPY TRANSORAL DIAGNOSTIC N/A 11/14/2018    EGD ESOPHAGOGASTRODUODENOSCOPY WITH DILATION performed by Debbi Zepeda MD at [address] [place] [address] Marital status:      Spouse name: Not on file    Number of children: 1    Years of education: 6    Highest education level: 6th grade   Occupational History    Occupation: homemaker   Tobacco Use    Smoking status: Never Smoker    Smokeless tobacco: Never Used   Vaping Use    Vaping Use: Never used   Substance and Sexual Activity    Alcohol use: No    Drug use: No    Sexual activity: Yes     Partners: Male   Other Topics Concern    Not on file   Social History Narrative    Lives with her adaptive daughter Stephie Salomon in a home in [place]--is from [address] Resource Strain: Low Risk     Difficulty of Paying Living Expenses: Not hard at all   Food Insecurity: No Food Insecurity    Worried About Running Out of Food in the Last Year: Never true    Dawit of Food in the Last Year: Never true   Transportation Needs:     Lack (VITAMIN D3) 50 MCG (2000 UT) CAPS TAKE 1 CAPSULE BY MOUTH DAILY 90 capsule 4    escitalopram (LEXAPRO) 10 MG tablet Take 1 tablet by mouth daily 30 tablet 5    nystatin (MYCOSTATIN) 263208 UNIT/GM cream Apply topically 2 times daily. 60 g 5    blood glucose monitor strips Test once a day prn 100 strip 5    blood glucose monitor kit and supplies Use as directed up to TID 1 kit 0    Lancets MISC 1 each by Does not apply route daily 100 each 5    beclomethasone (QVAR) 80 MCG/ACT inhaler Inhale 1 puff into the lungs 2 times daily 3 each 4    gabapentin (NEURONTIN) 100 MG capsule TAKE 1 CAPSULE BY MOUTH NIGHTLY FOR 90 DAYS. 90 capsule 1     No current facility-administered medications for this visit. PMH, Surgical Hx, Family Hx, and Social Hxreviewed and updated. Health Maintenance reviewed. Objective    Vitals:    01/26/22 1121   BP: 128/80   Pulse: 76   Resp: 18   SpO2: 98%   Weight: 198 lb (89.8 kg)   Height: 5' 5\" (1.651 m)        Physical Exam      Lab Results   Component Value Date    LABA1C 5.8 07/26/2021    LABA1C 6.2 (H) 07/29/2020    LABA1C 6.0 (H) 12/26/2019     Lab Results   Component Value Date    CREATININE 0.68 06/28/2021     Lab Results   Component Value Date    ALT 29 04/13/2021    AST 27 04/13/2021     Lab Results   Component Value Date    CHOL 219 (H) 04/01/2015    TRIG 105 04/01/2015    HDL 50 09/18/2018    LDLCALC 153 (H) 09/18/2018        Assessment & Plan   Visit Diagnoses and Associated Orders     Mild intermittent asthma with acute exacerbation    -  Primary    Improved and well controlled with routine use of ICS. Chronic non-seasonal allergic rhinitis        Continue ICS add back artificial tears per patient request.    Artificial Tear Solution (GENTEAL TEARS) 0.1-0.2-0.3 % SOLN [125561]           Pre-diabetes        A1C in normal range at this time. Follow labs. Reviewed diet.     Hemoglobin A1C [[id number] Custom]   - Future Order                 Reviewed with the patient: all disease processes, current clinical status, medications, activities and diet.      Side effects, adverse effects of the medication prescribed today, as well as treatment plan/ rationale and result expectations have been discussed with the patient who expresses understanding and desires to proceed.     Close follow up to evaluate treatment results and for coordination of care. I have reviewed the patient's medical history in detail and updated the computerized patient record. More than 50% of the appointment was spent in face-to-face counseling, education and care coordination. Please note this report has been partially produced using speech recognition software and may contain mistakes related to that system including errors in grammar, punctuation and spelling as well as words and phrases that may seem inappropriate. If there are questions or concerns, please feel free to contact me to clarify. Orders Placed This Encounter   Procedures    Hemoglobin A1C     Standing Status:   Future     Number of Occurrences:   1     Standing Expiration Date:   3/26/2022     Orders Placed This Encounter   Medications    Artificial Tear Solution (GENTEAL TEARS) 0.1-0.2-0.3 % SOLN     Si drops OU PRN dry, irritated, itchy eyes     Dispense:  15 mL     Refill:  12     Medications Discontinued During This Encounter   Medication Reason    acetaminophen (APAP EXTRA STRENGTH) 500 MG tablet Therapy completed    omeprazole (PRILOSEC) 10 MG delayed release capsule Therapy completed    naproxen (NAPROSYN) 500 MG tablet Therapy completed    Artificial Tear Solution (GENTEAL TEARS) 0.1-0.2-0.3 % SOLN REORDER     Return in about 6 months (around 2022) for prediabetes, obesity, asthma - OV.         Controlled Substance Monitoring:    Acute and Chronic Pain Monitoring:   RX Monitoring 8/15/2021   Attestation -   Periodic Controlled Substance Monitoring Possible medication side effects, risk of tolerance/dependence & alternative treatments discussed. ;No signs of potential drug abuse or diversion identified. ;Assessed functional status. ;Obtaining appropriate analgesic effect of treatment. Chronic Pain > 50 MEDD Re-evaluated the status of the patient's underlying condition causing pain. ;Considered consultation with a specialist.;Obtained or confirmed \"Consent for Opioid Use\" on file.            Fariba Arndt MD

## 2022-02-08 ENCOUNTER — VIRTUAL VISIT (OUTPATIENT)
Dept: FAMILY MEDICINE CLINIC | Age: 67
End: 2022-02-08
Payer: MEDICAID

## 2022-02-08 DIAGNOSIS — Z20.822 ENCOUNTER BY TELEHEALTH FOR SUSPECTED COVID-19: Primary | ICD-10-CM

## 2022-02-08 LAB
Lab: NORMAL
PERFORMING INSTRUMENT: NORMAL
QC PASS/FAIL: NORMAL
SARS-COV-2, POC: NORMAL

## 2022-02-08 PROCEDURE — 3017F COLORECTAL CA SCREEN DOC REV: CPT | Performed by: PHYSICIAN ASSISTANT

## 2022-02-08 PROCEDURE — 99213 OFFICE O/P EST LOW 20 MIN: CPT | Performed by: PHYSICIAN ASSISTANT

## 2022-02-08 PROCEDURE — 87426 SARSCOV CORONAVIRUS AG IA: CPT | Performed by: PHYSICIAN ASSISTANT

## 2022-02-08 PROCEDURE — G8427 DOCREV CUR MEDS BY ELIG CLIN: HCPCS | Performed by: PHYSICIAN ASSISTANT

## 2022-02-08 ASSESSMENT — ENCOUNTER SYMPTOMS
SHORTNESS OF BREATH: 0
VOMITING: 0
SORE THROAT: 0
CHEST TIGHTNESS: 0
SINUS PAIN: 0
SINUS PRESSURE: 0
NAUSEA: 1
COUGH: 0
DIARRHEA: 0
ABDOMINAL PAIN: 0
BACK PAIN: 0

## 2022-02-08 NOTE — PROGRESS NOTES
2022    TELEHEALTH EVALUATION -- Audio/Visual (During UPBTU-26 public health emergency)    Due to COVID 19 outbreak, patient's office visit was converted to a virtual visit. Patient was contacted and agreed to proceed with a virtual visit via Doxy. me  The risks and benefits of converting to a virtual visit were discussed in light of the current infectious disease epidemic. Patient also understood that insurance coverage and co-pays are up to their individual insurance plans. HPI:    Derek Ortiz (:  1955) has requested an audio/video evaluation for the following concern(s):    Started 4 days ago. No sick contacts. HA taking tylenol  Nausea but still eating and drinking. Patient has slight cough. Non-productive. No SOB or chest pain. Wants to know if she is okay to travel to [facility] in two days. Patient tested positive in 2020. Patient reports that she has received the shots. Review of Systems   Constitutional: Negative for activity change, appetite change, chills and fever. HENT: Negative for congestion, drooling, sinus pressure, sinus pain and sore throat. Eyes: Negative for visual disturbance. Respiratory: Negative for cough, chest tightness and shortness of breath. Cardiovascular: Negative for chest pain. Gastrointestinal: Positive for nausea. Negative for abdominal pain, diarrhea and vomiting. Endocrine: Negative for cold intolerance. Genitourinary: Negative for dysuria, flank pain, frequency and hematuria. Musculoskeletal: Negative for arthralgias and back pain. Skin: Negative for rash. Allergic/Immunologic: Negative for food allergies. Neurological: Positive for headaches. Negative for weakness, light-headedness and numbness. Hematological: Does not bruise/bleed easily. Prior to Visit Medications    Medication Sig Taking?  Authorizing Provider   megestrol (MEGACE) 40 MG tablet TAKE 1 TABLET BY MOUTH 2 TIMES DAILY  Steve Nielsen DO Zohreh   Artificial Tear Solution (GENTEAL TEARS) 0.1-0.2-0.3 % SOLN 2 drops OU PRN dry, irritated, itchy eyes  Uriel Ambrocio MD   hydrOXYzine (ATARAX) 25 MG tablet NANCY 1 TABLETA POR BOCA CADA [address] PARA EL PICOR  Uriel Ambrocio MD   pantoprazole (PROTONIX) 40 MG tablet TAKE 1 TABLET BY MOUTH DAILY  Uriel Ambrocio MD   albuterol sulfate  (90 Base) MCG/ACT inhaler Inhale 2 puffs into the lungs every 6 hours as needed for Wheezing  Uriel Ambrocio MD   Cholecalciferol (VITAMIN D3) 50 MCG (2000 UT) CAPS TAKE 1 CAPSULE BY MOUTH DAILY  Uriel Ambrocio MD   escitalopram (LEXAPRO) 10 MG tablet Take 1 tablet by mouth daily  Uriel Ambrocio MD   nystatin (MYCOSTATIN) 980528 UNIT/GM cream Apply topically 2 times daily. Uriel Ambrocio MD   blood glucose monitor strips Test once a day prn  Uriel Ambrocio MD   blood glucose monitor kit and supplies Use as directed up to TID  Uriel Ambrocio MD   Lancets MISC 1 each by Does not apply route daily  Uriel Ambrocio MD   beclomethasone (QVAR) 80 MCG/ACT inhaler Inhale 1 puff into the lungs 2 times daily  Uriel Ambrocio MD   gabapentin (NEURONTIN) 100 MG capsule TAKE 1 CAPSULE BY MOUTH NIGHTLY FOR 90 DAYS. Dilshad Camargo DO       Social History     Tobacco Use    Smoking status: Never Smoker    Smokeless tobacco: Never Used   Vaping Use    Vaping Use: Never used   Substance Use Topics    Alcohol use: No    Drug use: No            PHYSICAL EXAMINATION:  [ INSTRUCTIONS:  \"[x]\" Indicates a positive item  \"[]\" Indicates a negative item  -- DELETE ALL ITEMS NOT EXAMINED]  Patient is A&O x3. The patient is able to speak in clear and complete sentences without dyspnea. Patient did not cough during our interaction. Patient did not sound congested. No wheezing or adventitious breath sounds.  Thoughts, perception, and judgement are intact. Due to this being a TeleHealth encounter, evaluation of the following organ systems is limited: Vitals/Constitutional/EENT/Resp/CV/GI//MS/Neuro/Skin/Heme-Lymph-Imm. ASSESSMENT/PLAN:  1. Encounter by telehealth for suspected COVID-19  - Supportive care-motrin, sudafed, humidifer, antihistamine, flonase, warm salt water gargles, honey with tea  - Discussed signs and symptoms which require immediate follow-up in ED/call to 911. Patient verbalized understanding.  - POCT COVID-19, Antigen      Return if symptoms worsen or fail to improve. An  electronic signature was used to authenticate this note. --KRISTAL Moore on 2/8/2022 at 1:54 PM        Pursuant to the emergency declaration under the [address] [place] [address], [facility] waiver authority and the [employer] Act, this Virtual  Visit was conducted, with patient's consent, to reduce the patient's risk of exposure to COVID-19 and provide continuity of care for an established patient. Services were provided through a video synchronous discussion virtually to substitute for in-person clinic visit.

## 2022-05-14 DIAGNOSIS — L29.9 ITCHING: Chronic | ICD-10-CM

## 2022-05-14 DIAGNOSIS — J30.89 CHRONIC NON-SEASONAL ALLERGIC RHINITIS: ICD-10-CM

## 2022-05-16 RX ORDER — PANTOPRAZOLE SODIUM 40 MG/1
40 TABLET, DELAYED RELEASE ORAL DAILY
Qty: 30 TABLET | Refills: 1 | Status: SHIPPED | OUTPATIENT
Start: 2022-05-16 | End: 2022-08-01

## 2022-05-16 RX ORDER — HYDROXYZINE HYDROCHLORIDE 25 MG/1
TABLET, FILM COATED ORAL
Qty: 60 TABLET | Refills: 1 | Status: SHIPPED | OUTPATIENT
Start: 2022-05-16 | End: 2022-07-01

## 2022-06-11 DIAGNOSIS — F39 MOOD DISORDER (HCC): Chronic | ICD-10-CM

## 2022-06-13 RX ORDER — PANTOPRAZOLE SODIUM 40 MG/1
40 TABLET, DELAYED RELEASE ORAL DAILY
Qty: 30 TABLET | Refills: 1 | OUTPATIENT
Start: 2022-06-13

## 2022-06-13 RX ORDER — ESCITALOPRAM OXALATE 10 MG/1
10 TABLET ORAL DAILY
Qty: 30 TABLET | Refills: 0 | Status: SHIPPED | OUTPATIENT
Start: 2022-06-13 | End: 2022-07-01

## 2022-06-13 NOTE — TELEPHONE ENCOUNTER
Rx request   Requested Prescriptions     Pending Prescriptions Disp Refills    escitalopram (LEXAPRO) 10 MG tablet [Pharmacy Med Name: ESCITALOPRAM 10 MG TABLET 10 Tablet] 30 tablet 5     Sig: TAKE 1 TABLET BY MOUTH DAILY    pantoprazole (PROTONIX) 40 MG tablet [Pharmacy Med Name: PANTOPRAZOLE SOD DR 40 MG T 40 Tablet] 30 tablet 1     Sig: TAKE 1 TABLET BY MOUTH DAILY     LOV 1/26/2022  Next Visit Date:  Future Appointments   Date Time Provider Ellie Fisheri   7/1/2022  2:00 PM Janina Guevara  [facility]   7/26/2022  2:00 PM Gi Lopez  [facility]

## 2022-06-14 RX ORDER — PANTOPRAZOLE SODIUM 40 MG/1
40 TABLET, DELAYED RELEASE ORAL DAILY
Qty: 30 TABLET | Refills: 1 | OUTPATIENT
Start: 2022-06-14

## 2022-06-25 DIAGNOSIS — J30.89 CHRONIC NON-SEASONAL ALLERGIC RHINITIS: ICD-10-CM

## 2022-06-25 DIAGNOSIS — L29.9 ITCHING: Chronic | ICD-10-CM

## 2022-06-27 RX ORDER — HYDROXYZINE HYDROCHLORIDE 25 MG/1
TABLET, FILM COATED ORAL
Qty: 60 TABLET | Refills: 1 | OUTPATIENT
Start: 2022-06-27

## 2022-07-01 ENCOUNTER — OFFICE VISIT (OUTPATIENT)
Dept: FAMILY MEDICINE CLINIC | Age: 67
End: 2022-07-01
Payer: MEDICAID

## 2022-07-01 VITALS
OXYGEN SATURATION: 98 % | HEIGHT: 65 IN | DIASTOLIC BLOOD PRESSURE: 64 MMHG | TEMPERATURE: 97.8 F | HEART RATE: 85 BPM | BODY MASS INDEX: 33.62 KG/M2 | WEIGHT: 201.8 LBS | SYSTOLIC BLOOD PRESSURE: 124 MMHG

## 2022-07-01 DIAGNOSIS — J30.2 SEASONAL ALLERGIES: ICD-10-CM

## 2022-07-01 DIAGNOSIS — K21.9 GASTROESOPHAGEAL REFLUX DISEASE WITHOUT ESOPHAGITIS: ICD-10-CM

## 2022-07-01 DIAGNOSIS — F39 MOOD DISORDER (HCC): Chronic | ICD-10-CM

## 2022-07-01 DIAGNOSIS — R06.83 SNORING: ICD-10-CM

## 2022-07-01 DIAGNOSIS — R73.9 HYPERGLYCEMIA: ICD-10-CM

## 2022-07-01 DIAGNOSIS — R10.9 ABDOMINAL PAIN, UNSPECIFIED ABDOMINAL LOCATION: Primary | ICD-10-CM

## 2022-07-01 DIAGNOSIS — N93.9 VAGINAL BLEEDING: ICD-10-CM

## 2022-07-01 DIAGNOSIS — K25.9 GASTRIC ULCER, UNSPECIFIED CHRONICITY, UNSPECIFIED WHETHER GASTRIC ULCER HEMORRHAGE OR PERFORATION PRESENT: ICD-10-CM

## 2022-07-01 DIAGNOSIS — R40.0 SOMNOLENCE: ICD-10-CM

## 2022-07-01 DIAGNOSIS — L29.9 SKIN PRURITUS: ICD-10-CM

## 2022-07-01 DIAGNOSIS — J45.20 MILD INTERMITTENT ASTHMA WITHOUT COMPLICATION: Chronic | ICD-10-CM

## 2022-07-01 DIAGNOSIS — G47.13 RECURRENT HYPERSOMNIA: ICD-10-CM

## 2022-07-01 PROCEDURE — G8417 CALC BMI ABV UP PARAM F/U: HCPCS | Performed by: INTERNAL MEDICINE

## 2022-07-01 PROCEDURE — 1123F ACP DISCUSS/DSCN MKR DOCD: CPT | Performed by: INTERNAL MEDICINE

## 2022-07-01 PROCEDURE — G8427 DOCREV CUR MEDS BY ELIG CLIN: HCPCS | Performed by: INTERNAL MEDICINE

## 2022-07-01 PROCEDURE — 99214 OFFICE O/P EST MOD 30 MIN: CPT | Performed by: INTERNAL MEDICINE

## 2022-07-01 PROCEDURE — 3017F COLORECTAL CA SCREEN DOC REV: CPT | Performed by: INTERNAL MEDICINE

## 2022-07-01 PROCEDURE — 1090F PRES/ABSN URINE INCON ASSESS: CPT | Performed by: INTERNAL MEDICINE

## 2022-07-01 PROCEDURE — G8400 PT W/DXA NO RESULTS DOC: HCPCS | Performed by: INTERNAL MEDICINE

## 2022-07-01 PROCEDURE — 1036F TOBACCO NON-USER: CPT | Performed by: INTERNAL MEDICINE

## 2022-07-01 RX ORDER — ESCITALOPRAM OXALATE 10 MG/1
10 TABLET ORAL DAILY
Qty: 30 TABLET | Refills: 3 | Status: SHIPPED | OUTPATIENT
Start: 2022-07-01 | End: 2022-08-01

## 2022-07-01 RX ORDER — VITAMIN E 268 MG
400 CAPSULE ORAL DAILY
COMMUNITY

## 2022-07-01 RX ORDER — LANOLIN ALCOHOL/MO/W.PET/CERES
CREAM (GRAM) TOPICAL
Qty: 480 ML | Refills: 5 | Status: SHIPPED | OUTPATIENT
Start: 2022-07-01

## 2022-07-01 RX ORDER — FEXOFENADINE HCL 180 MG/1
180 TABLET ORAL DAILY
Qty: 90 TABLET | Refills: 1 | Status: SHIPPED | OUTPATIENT
Start: 2022-07-01

## 2022-07-01 RX ORDER — ESCITALOPRAM OXALATE 10 MG/1
10 TABLET ORAL DAILY
Qty: 30 TABLET | Refills: 0 | Status: SHIPPED | OUTPATIENT
Start: 2022-07-01 | End: 2022-07-01

## 2022-07-01 ASSESSMENT — PATIENT HEALTH QUESTIONNAIRE - PHQ9
SUM OF ALL RESPONSES TO PHQ QUESTIONS 1-9: 1
SUM OF ALL RESPONSES TO PHQ QUESTIONS 1-9: 1
2. FEELING DOWN, DEPRESSED OR HOPELESS: 1
SUM OF ALL RESPONSES TO PHQ QUESTIONS 1-9: 1
SUM OF ALL RESPONSES TO PHQ QUESTIONS 1-9: 1
1. LITTLE INTEREST OR PLEASURE IN DOING THINGS: 0
SUM OF ALL RESPONSES TO PHQ9 QUESTIONS 1 & 2: 1

## 2022-07-01 NOTE — PROGRESS NOTES
Subjective:      Patient ID: Srikanth Caldwell is a 74 y.o. female Afanian patient, here for evaluation of the following chief complaint(s):  Chief Complaint   Patient presents with    New Patient     establishing care    Weight Management    Colon Polyps     inflamation of colon. gets pain on LLQ.  Menstrual Problem     medication made her get a period. had fybroids and bleeds whenever she lifts heavy things. would like to get a Pap due to fear of cancer.  Diabetes     levels normal       HPI    Mild intermittent asthma:         Colonic polyps 2021 left colonic polys        Uterus  2021        Hypersomnia: Pt has been experiencing hypersomnia with associated snoring. Depression: Compliant with Lexapro      Abnormal thyroid studies      GERD: Compliant with pantoprazole. At present he denies polyuria,  Polydipsia, constitutional, sinus, visual, cardiopulmonary, urologic, gastrointestinal, immunologic/hematologic, musculoskeletal, neurologic,dermatologic, or psychiatric complaints.     Current Outpatient Medications on File Prior to Visit   Medication Sig Dispense Refill    polyethyl glycol-propyl glycol 0.4-0.3 % (SYSTANE) 0.4-0.3 % ophthalmic solution 1 drop as needed for Dry Eyes      Cyanocobalamin (B-12 SL) Place under the tongue      vitamin E 180 MG (400 UNIT) CAPS capsule Take 400 Units by mouth daily      pantoprazole (PROTONIX) 40 MG tablet TAKE 1 TABLET BY MOUTH DAILY 30 tablet 1    albuterol sulfate  (90 Base) MCG/ACT inhaler Inhale 2 puffs into the lungs every 6 hours as needed for Wheezing 1 each 0    Cholecalciferol (VITAMIN D3) 50 MCG (2000 UT) CAPS TAKE 1 CAPSULE BY MOUTH DAILY 90 capsule 4    blood glucose monitor strips Test once a day prn 100 strip 5    blood glucose monitor kit and supplies Use as directed up to TID 1 kit 0    Lancets MISC 1 each by Does not apply route daily 100 each 5    beclomethasone (QVAR) 80 MCG/ACT inhaler Inhale 1 puff into the lungs 2 times daily 3 each 4    Artificial Tear Solution (GENTEAL TEARS) 0.1-0.2-0.3 % SOLN 2 drops OU PRN dry, irritated, itchy eyes (Patient not taking: Reported on 7/1/2022) 15 mL 12    nystatin (MYCOSTATIN) 062349 UNIT/GM cream Apply topically 2 times daily. (Patient not taking: Reported on 7/1/2022) 60 g 5     No current facility-administered medications on file prior to visit. Other and Seasonal    Review of Systems      Objective:   /64   Pulse 85   Temp 97.8 °F (36.6 °C) (Temporal)   Ht 5' 5\" (1.651 m)   Wt 201 lb 12.8 oz (91.5 kg)   LMP  (LMP Unknown)   SpO2 98%   BMI 33.58 kg/m²     Physical Exam  Constitutional:       General: She is not in acute distress. Appearance: She is well-developed. HENT:      Head: Normocephalic. Right Ear: External ear normal.      Left Ear: External ear normal.   Eyes:      Conjunctiva/sclera: Conjunctivae normal.   Neck:      Vascular: No JVD. Trachea: No tracheal deviation. Cardiovascular:      Rate and Rhythm: Normal rate and regular rhythm. Heart sounds: Normal heart sounds. Pulmonary:      Effort: Pulmonary effort is normal. No respiratory distress. Breath sounds: Normal breath sounds. No wheezing or rales. Chest:      Chest wall: No tenderness. Abdominal:      General: Bowel sounds are normal. There is no distension. Palpations: Abdomen is soft. There is no mass. Tenderness: There is no abdominal tenderness. There is no guarding or rebound. Musculoskeletal:         General: No tenderness or deformity. Cervical back: Neck supple. Skin:     General: Skin is warm and dry. Coloration: Skin is not pale. Findings: No erythema or rash. Neurological:      Mental Status: She is alert and oriented to person, place, and time. Motor: No abnormal muscle tone. Psychiatric:         Thought Content:  Thought content normal.         Judgment: Judgment normal.         Assessment: Diagnosis Orders   1. Abdominal pain, unspecified abdominal location  US PELVIS COMPLETE NON-OB TRANSABDOMINAL AND TRANSVAGINAL    Urinalysis with Reflex to Culture    CBC with Auto Differential    Comprehensive Metabolic Panel    Sol Worrell, [place], , [place], [place]   2. Mild intermittent asthma without complication     3. Hyperglycemia  Hemoglobin A1C   4. Vaginal bleeding  [address], [place], , [place], [place]   5. Snoring  [facility] Sleep Study   6. Gastroesophageal reflux disease without esophagitis     7. Mood disorder (HCC)  escitalopram (LEXAPRO) 10 MG tablet    DISCONTINUED: escitalopram (LEXAPRO) 10 MG tablet    improving, well-controlled with SEs. Does not desire change in meds. 8. Somnolence  [facility] Sleep Study   9. Recurrent hypersomnia   [facility] Sleep Study   10. Gastric ulcer, unspecified chronicity, unspecified whether gastric ulcer hemorrhage or perforation present     11. Seasonal allergies     12. Skin pruritus  TSH        No results found for: LIPIDPAN, BMP, CMP, CBC, CBCAUTODIF  Plan:      Yon Thorne was seen today for new patient, weight management, colon polyps, menstrual problem and diabetes. Diagnoses and all orders for this visit:    Abdominal pain, unspecified abdominal location  -     US PELVIS COMPLETE NON-OB TRANSABDOMINAL AND TRANSVAGINAL; Future  -     Urinalysis with Reflex to Culture; Future  -     CBC with Auto Differential; Future  -     Comprehensive Metabolic Panel; Future  -     [address], [place], DO, OB-GYN, [place]    Mild intermittent asthma without complication    Hyperglycemia  -     Hemoglobin A1C; Future    Vaginal bleeding  -     Holly Cuadra, , OB-GYN, [place]    [facility] Sleep Study; Future    Gastroesophageal reflux disease without esophagitis    [facility]  Comments:  improving, well-controlled with SEs.  Does not desire change in meds. Orders:  -     Discontinue: escitalopram (LEXAPRO) 10 MG tablet; Take 1 tablet by mouth daily  -     escitalopram (LEXAPRO) 10 MG tablet; Take 1 tablet by mouth daily    Somnolence  -     [facility] Sleep Study; Future    Recurrent hypersomnia   -     [facility] Sleep Study; Future    Gastric ulcer, unspecified chronicity, unspecified whether gastric ulcer hemorrhage or perforation present    Seasonal allergies    Skin pruritus  -     TSH; Future    Other orders  -     fexofenadine (ALLEGRA) 180 MG tablet; Take 1 tablet by mouth daily  -     Emollient (EUCERIN) lotion; Apply topically as needed. Check cholesterol at next office visit. Return in about 6 weeks (around 8/12/2022). On this date 07/01/22 I have spent 30 minutes reviewing previous notes, test results and face to face with the patient discussing the diagnosis and importance of compliance with the treatment plan. Cindy Alvarez MD    Please note, this report has been partially produced using speech recognition software  and may cause  and /or contain errors related to that system including grammar, punctuation and spelling as well as words and phrases that may seem inappropriate. If there are questions or concerns please feel free to contact me to clarify.

## 2022-07-02 DIAGNOSIS — L29.9 ITCHING: Chronic | ICD-10-CM

## 2022-07-02 DIAGNOSIS — J30.89 CHRONIC NON-SEASONAL ALLERGIC RHINITIS: ICD-10-CM

## 2022-07-05 DIAGNOSIS — F39 MOOD DISORDER (HCC): Chronic | ICD-10-CM

## 2022-07-05 RX ORDER — HYDROXYZINE HYDROCHLORIDE 25 MG/1
TABLET, FILM COATED ORAL
Qty: 60 TABLET | Refills: 1 | Status: SHIPPED | OUTPATIENT
Start: 2022-07-05 | End: 2022-08-03

## 2022-07-05 RX ORDER — ESCITALOPRAM OXALATE 10 MG/1
10 TABLET ORAL DAILY
Qty: 30 TABLET | Refills: 0 | OUTPATIENT
Start: 2022-07-05

## 2022-07-07 RX ORDER — PANTOPRAZOLE SODIUM 40 MG/1
40 TABLET, DELAYED RELEASE ORAL DAILY
Qty: 30 TABLET | Refills: 1 | OUTPATIENT
Start: 2022-07-07

## 2022-07-08 ENCOUNTER — HOSPITAL ENCOUNTER (OUTPATIENT)
Dept: ULTRASOUND IMAGING | Age: 67
Discharge: HOME | End: 2022-07-10
Payer: MEDICAID

## 2022-07-08 DIAGNOSIS — R10.9 ABDOMINAL PAIN, UNSPECIFIED ABDOMINAL LOCATION: ICD-10-CM

## 2022-07-08 PROCEDURE — 76856 US EXAM PELVIC COMPLETE: CPT

## 2022-07-09 DIAGNOSIS — F39 MOOD DISORDER (HCC): Chronic | ICD-10-CM

## 2022-07-11 DIAGNOSIS — F39 MOOD DISORDER (HCC): Chronic | ICD-10-CM

## 2022-07-11 RX ORDER — ESCITALOPRAM OXALATE 10 MG/1
10 TABLET ORAL DAILY
Qty: 30 TABLET | Refills: 5 | OUTPATIENT
Start: 2022-07-11

## 2022-07-11 NOTE — TELEPHONE ENCOUNTER
This rx was filled by her new physician last week.  Please verify the need for rx and that the correct prescriber is receiving the request.

## 2022-07-12 RX ORDER — ESCITALOPRAM OXALATE 10 MG/1
10 TABLET ORAL DAILY
Qty: 30 TABLET | Refills: 5 | OUTPATIENT
Start: 2022-07-12

## 2022-07-12 NOTE — TELEPHONE ENCOUNTER
3rd response to same request: This rx was filled by her new physician last week.  Please verify the need for rx and that the correct prescriber is receiving the request.

## 2022-07-30 DIAGNOSIS — F39 MOOD DISORDER (HCC): Chronic | ICD-10-CM

## 2022-07-30 NOTE — TELEPHONE ENCOUNTER
requesting medication refill.  Please approve or deny this request.    Rx requested:  Requested Prescriptions     Pending Prescriptions Disp Refills    escitalopram (LEXAPRO) 10 MG tablet [Pharmacy Med Name: ESCITALOPRAM 10 MG TABLET 10 Tablet] 30 tablet 0     Sig: TAKE 1 TABLET BY MOUTH DAILY         Last Office Visit:   7/1/2022      Next Visit Date:  Future Appointments   Date Time Provider Ellie Dunbar   8/9/2022 10:00 AM [facility] [address]   8/12/2022 10:00 AM Consuelo Santiago  [place], [place] [address]

## 2022-08-01 RX ORDER — ESCITALOPRAM OXALATE 10 MG/1
10 TABLET ORAL DAILY
Qty: 30 TABLET | Refills: 0 | Status: SHIPPED | OUTPATIENT
Start: 2022-08-01 | End: 2022-09-26

## 2022-08-01 RX ORDER — PANTOPRAZOLE SODIUM 40 MG/1
40 TABLET, DELAYED RELEASE ORAL DAILY
Qty: 30 TABLET | Refills: 1 | Status: SHIPPED | OUTPATIENT
Start: 2022-08-01 | End: 2022-08-30

## 2022-08-02 DIAGNOSIS — L29.9 ITCHING: Chronic | ICD-10-CM

## 2022-08-02 DIAGNOSIS — J30.89 CHRONIC NON-SEASONAL ALLERGIC RHINITIS: ICD-10-CM

## 2022-08-02 NOTE — TELEPHONE ENCOUNTER
Rx requested:  Requested Prescriptions     Pending Prescriptions Disp Refills    hydrOXYzine HCl (ATARAX) 25 MG tablet [Pharmacy Med Name: HYDROXYZINE HCL 25 MG TABS 25 Tablet] 60 tablet 1     Sig: NANCY 1 TABLETA POR PATTIE REYES [facility] [facility]         Last Office Visit:   7/1/2022      Next Visit Date:  Future Appointments   Date Time Provider Ellie Dunbar   8/9/2022 10:00 AM Osiris Rosen DO [address]   8/12/2022 10:00 AM Marline Villagomez  [place] [address]

## 2022-08-03 ENCOUNTER — HOSPITAL ENCOUNTER (EMERGENCY)
Age: 67
Discharge: HOME | End: 2022-08-03
Payer: MEDICAID

## 2022-08-03 VITALS
BODY MASS INDEX: 31.62 KG/M2 | OXYGEN SATURATION: 99 % | RESPIRATION RATE: 20 BRPM | TEMPERATURE: 97.5 F | SYSTOLIC BLOOD PRESSURE: 144 MMHG | HEART RATE: 77 BPM | WEIGHT: 190 LBS | DIASTOLIC BLOOD PRESSURE: 84 MMHG

## 2022-08-03 DIAGNOSIS — R21 RASH AND OTHER NONSPECIFIC SKIN ERUPTION: ICD-10-CM

## 2022-08-03 DIAGNOSIS — L23.7 CONTACT DERMATITIS DUE TO POISON IVY: Primary | ICD-10-CM

## 2022-08-03 PROCEDURE — 6370000000 HC RX 637 (ALT 250 FOR IP): Performed by: PHYSICIAN ASSISTANT

## 2022-08-03 PROCEDURE — 99283 EMERGENCY DEPT VISIT LOW MDM: CPT

## 2022-08-03 RX ORDER — LORATADINE 10 MG/1
10 TABLET ORAL DAILY
Qty: 14 TABLET | Refills: 0 | Status: SHIPPED | OUTPATIENT
Start: 2022-08-03 | End: 2022-08-17

## 2022-08-03 RX ORDER — HYDROXYZINE HYDROCHLORIDE 25 MG/1
25 TABLET, FILM COATED ORAL ONCE
Status: COMPLETED | OUTPATIENT
Start: 2022-08-03 | End: 2022-08-03

## 2022-08-03 RX ORDER — FAMOTIDINE 20 MG/1
20 TABLET, FILM COATED ORAL 2 TIMES DAILY
Qty: 28 TABLET | Refills: 0 | Status: SHIPPED | OUTPATIENT
Start: 2022-08-03 | End: 2022-08-17

## 2022-08-03 RX ORDER — PREDNISONE 20 MG/1
40 TABLET ORAL DAILY
Qty: 8 TABLET | Refills: 0 | Status: SHIPPED | OUTPATIENT
Start: 2022-08-03 | End: 2022-08-07

## 2022-08-03 RX ORDER — HYDROXYZINE HYDROCHLORIDE 25 MG/1
TABLET, FILM COATED ORAL
Qty: 60 TABLET | Refills: 1 | Status: SHIPPED | OUTPATIENT
Start: 2022-08-03 | End: 2022-09-06

## 2022-08-03 RX ORDER — PREDNISONE 20 MG/1
60 TABLET ORAL ONCE
Status: COMPLETED | OUTPATIENT
Start: 2022-08-03 | End: 2022-08-03

## 2022-08-03 RX ORDER — FAMOTIDINE 20 MG/1
20 TABLET, FILM COATED ORAL ONCE
Status: COMPLETED | OUTPATIENT
Start: 2022-08-03 | End: 2022-08-03

## 2022-08-03 RX ORDER — CETIRIZINE HYDROCHLORIDE 10 MG/1
10 TABLET ORAL ONCE
Status: COMPLETED | OUTPATIENT
Start: 2022-08-03 | End: 2022-08-03

## 2022-08-03 RX ADMIN — CETIRIZINE HYDROCHLORIDE 10 MG: 10 TABLET, FILM COATED ORAL at 17:48

## 2022-08-03 RX ADMIN — FAMOTIDINE 20 MG: 20 TABLET ORAL at 17:48

## 2022-08-03 RX ADMIN — PREDNISONE 60 MG: 20 TABLET ORAL at 17:47

## 2022-08-03 RX ADMIN — HYDROXYZINE HYDROCHLORIDE 25 MG: 25 TABLET ORAL at 17:48

## 2022-08-03 ASSESSMENT — PAIN - FUNCTIONAL ASSESSMENT: PAIN_FUNCTIONAL_ASSESSMENT: NONE - DENIES PAIN

## 2022-08-03 ASSESSMENT — LIFESTYLE VARIABLES
HOW OFTEN DO YOU HAVE A DRINK CONTAINING ALCOHOL: NEVER
HOW MANY STANDARD DRINKS CONTAINING ALCOHOL DO YOU HAVE ON A TYPICAL DAY: PATIENT DOES NOT DRINK

## 2022-08-03 NOTE — ED PROVIDER NOTES
[address] [facility] ED  eMERGENCY dEPARTMENTeNCOUnter      Pt Name: Ruiz Spencer  MRN: [id number]  Armsмарияgfjoseluis 1955  Date ofevaluation: 8/3/2022  Provider: Emmanuelle Seaman PA-C    CHIEF COMPLAINT       Chief Complaint   Patient presents with    Rash     Possible poison ivy         HISTORY OF PRESENT ILLNESS   (Location/Symptom, Timing/Onset,Context/Setting, Quality, Duration, Modifying Factors, Severity)  Note limiting factors. This is a 79-year-old female with past medical history of asthma, COPD, depression, hyperlipidemia who presents to the emergency department for evaluation of a pruritic rash x2 days. The patient states that she was exposed to poison ivy 2 days ago and shortly after has had this intensely pruritic rash to her right hand, right forearm and chin. She states that the rash is not painful but just intensely pruritic. She has not taken any medications for the symptoms prior to arrival.  The patient denies any facial swelling, lip swelling, tongue swelling, difficulty breathing or swallowing, shortness of breath or any rashes within the mouth or genitalia. NursingNotes were reviewed. REVIEW OF SYSTEMS    (2-9 systems for level 4, 10 or more for level 5)     Review of Systems   Constitutional:  Negative for chills, fatigue and fever. Skin:  Positive for rash. Allergic/Immunologic: Negative for immunocompromised state. Neurological:  Negative for headaches. Hematological:  Does not bruise/bleed easily. All other systems reviewed and are negative. Except as noted above the remainder of the review of systems was reviewed and negative.        PAST MEDICAL HISTORY     Past Medical History:   Diagnosis Date    Abnormal pelvic ultrasound 10/8/2020    Acid reflux     Adenomatous colon polyp - repeat scope 02/2026 2/11/2021    Arthritis     Asthma     Chronic back pain     COPD (chronic obstructive pulmonary disease) (HCC)     Depression     Hyperlipidemia Obesity     Venous insufficiency          SURGICALHISTORY       Past Surgical History:   Procedure Laterality Date    COLONOSCOPY      COLONOSCOPY N/A 2/3/2021    COLORECTAL CANCER SCREENING with polypectomies performed by Bethel Stewart MD at [facility] 6/28/2021    HYSTEROSCOPY  FX  D/C performed by Nayeli Limon DO at [address] [facility], COLON, DIAGNOSTIC      KIDNEY SURGERY      OVARIAN CYST REMOVAL Left 11/06/2004    AK ESOPHAGOGASTRODUODENOSCOPY TRANSORAL DIAGNOSTIC N/A 11/14/2018    EGD ESOPHAGOGASTRODUODENOSCOPY WITH DILATION performed by Roro Mohan MD at [address]       Previous Medications    ALBUTEROL SULFATE  (90 BASE) MCG/ACT INHALER    Inhale 2 puffs into the lungs every 6 hours as needed for Wheezing    ARTIFICIAL TEAR SOLUTION (GENTEAL TEARS) 0.1-0.2-0.3 % SOLN    2 drops OU PRN dry, irritated, itchy eyes    BECLOMETHASONE (QVAR) 80 MCG/ACT INHALER    Inhale 1 puff into the lungs 2 times daily    BLOOD GLUCOSE MONITOR KIT AND SUPPLIES    Use as directed up to TID    BLOOD GLUCOSE MONITOR STRIPS    Test once a day prn    CHOLECALCIFEROL (VITAMIN D3) 50 MCG (2000 UT) CAPS    TAKE 1 CAPSULE BY MOUTH DAILY    CYANOCOBALAMIN (B-12 SL)    Place under the tongue    EMOLLIENT (EUCERIN) LOTION    Apply topically as needed. ESCITALOPRAM (LEXAPRO) 10 MG TABLET    TAKE 1 TABLET BY MOUTH DAILY    FEXOFENADINE (ALLEGRA) 180 MG TABLET    Take 1 tablet by mouth daily    HYDROXYZINE HCL (ATARAX) 25 MG TABLET    NANCY 1 TABLETA POR BOCA CADA [address] PARA EL PICOR    LANCETS MISC    1 each by Does not apply route daily    NYSTATIN (MYCOSTATIN) 390288 UNIT/GM CREAM    Apply topically 2 times daily.     PANTOPRAZOLE (PROTONIX) 40 MG TABLET    TAKE 1 TABLET BY MOUTH DAILY    POLYETHYL GLYCOL-PROPYL GLYCOL 0.4-0.3 % (SYSTANE) 0.4-0.3 % OPHTHALMIC SOLUTION    1 drop as needed for Dry Eyes VITAMIN E 180 MG (400 UNIT) CAPS CAPSULE    Take 400 Units by mouth daily            Other and Seasonal    FAMILY HISTORY       Family History   Problem Relation Age of Onset    Diabetes Mother     High Blood Pressure Mother     Diabetes Brother     Diabetes Maternal Grandmother     Diabetes Maternal Grandfather     Diabetes Paternal Grandmother     Diabetes Paternal Grandfather     Diabetes Other     High Blood Pressure Sister     Colon Cancer Paternal Uncle           SOCIAL HISTORY       Social History     Socioeconomic History    Marital status:      Spouse name: None    Number of children: 1    Years of education: 6    Highest education level: 6th grade   Occupational History    Occupation: homemaker   Tobacco Use    Smoking status: Never    Smokeless tobacco: Never   Vaping Use    Vaping Use: Never used   Substance and Sexual Activity    Alcohol use: No    Drug use: No    Sexual activity: Yes     Partners: Male   Social History Narrative    Lives with her adaptive daughter Isabella Benz in a home in [place]--is from [address] [facility] Determinants of Health     Financial Resource Strain: Low Risk     Difficulty of Paying Living Expenses: Not hard at all   Food Insecurity: No Food Insecurity    Worried About Running Out of Food in the Last Year: Never true    [address] in the Last Year: Never true       SCREENINGS    [place] Coma Scale  Eye Opening: Spontaneous  Best Verbal Response: Oriented  Best Motor Response: Obeys commands  [place] Coma Scale Score: 15        PHYSICAL EXAM    (up to 7 for level 4, 8 or more for level 5)     ED Triage Vitals [08/03/22 1727]   BP Temp Temp Source Heart Rate Resp SpO2 Height Weight   (!) 144/84 97.5 °F (36.4 °C) Oral 77 20 99 % -- 190 lb (86.2 kg)       Physical Exam  Vitals and nursing note reviewed. Constitutional:       General: She is not in acute distress. Appearance: Normal appearance. She is normal weight.  She is not ill-appearing, toxic-appearing or diaphoretic. HENT:      Head: Normocephalic and atraumatic. Comments: Pruritic and erythematous rash on the underside of the chin and just at the mandibular prominence. No lesions within the mouth or throat. Nose: Nose normal.      Mouth/Throat:      Mouth: Mucous membranes are moist.      Pharynx: Oropharynx is clear. Eyes:      Extraocular Movements: Extraocular movements intact. Conjunctiva/sclera: Conjunctivae normal.   Cardiovascular:      Rate and Rhythm: Normal rate and regular rhythm. Heart sounds: Normal heart sounds. Pulmonary:      Effort: Pulmonary effort is normal. No respiratory distress. Breath sounds: Normal breath sounds. Musculoskeletal:      Cervical back: Normal range of motion and neck supple. Skin:     General: Skin is warm and dry. Capillary Refill: Capillary refill takes less than 2 seconds. Findings: Erythema, lesion and rash present. Neurological:      Mental Status: She is alert and oriented to person, place, and time. Psychiatric:         Mood and Affect: Mood normal.         Behavior: Behavior normal.       RESULTS          RADIOLOGY:   Non-plain filmimages such as CT, Ultrasound and MRI are read by the radiologist. Plain radiographic images are visualized and preliminarily interpreted by the emergency physician with the below findings:        Interpretation per the Radiologist below, if available at the time ofthis note:    No orders to display           LABS:  Labs Reviewed - No data to display    All other labs were within normal range or not returned as of this dictation.     EMERGENCY DEPARTMENT COURSE and DIFFERENTIAL DIAGNOSIS/MDM:   Vitals:    Vitals:    08/03/22 1727   BP: (!) 144/84   Pulse: 77   Resp: 20   Temp: 97.5 °F (36.4 °C)   TempSrc: Oral   SpO2: 99%   Weight: 190 lb (86.2 kg)           MDM  Number of Diagnoses or Management Options  Contact dermatitis due to poison ivy: minor  Rash and other nonspecific skin eruption: minor  Diagnosis management comments: 71-year-old female presents to the emergency department for evaluation of a pruritic rash to the face and right hand and forearm. She was exposed to poison ivy and has had similar rash in the past.  On exam she has a rash that is consistent with a contact dermatitis due to poison ivy. I considered but have low suspicion for SJS, TEN, meningococcemia, fixed drug eruption, abscess, anaphylaxis or sepsis. The patient was discharged home with a course of prednisone, topical hydrocortisone, antihistamines and instructions to return to the ED if any new or worsening symptoms develop. Risk of Complications, Morbidity, and/or Mortality  Presenting problems: minimal  Diagnostic procedures: minimal  Management options: low    Patient Progress  Patient progress: improved        REASSESSMENT              PROCEDURES:  Unless otherwise noted below, none     Procedures    FINAL IMPRESSION      1. Contact dermatitis due to poison ivy    2. Rash and other nonspecific skin eruption          DISPOSITION/PLAN   DISPOSITION Discharge - Pending Orders Complete 08/03/2022 05:40:08 PM      PATIENT REFERREDTO:  Osbaldo Hairston MD  [address] [facility] [address]  [phone]    Schedule an appointment as soon as possible for a visit   As needed, for re-evaluation    DISCHARGEMEDICATIONS:  New Prescriptions    FAMOTIDINE (PEPCID) 20 MG TABLET    Take 1 tablet by mouth in the morning and 1 tablet before bedtime. Do all this for 14 days. HYDROCORTISONE 2.5 % OINTMENT    Apply topically 2 times daily. LORATADINE (CLARITIN) 10 MG TABLET    Take 1 tablet by mouth in the morning for 14 days. PREDNISONE (DELTASONE) 20 MG TABLET    Take 2 tablets by mouth in the morning for 4 days.           (Please note that portions of this note were completed with a voice recognition program.  Efforts were made to edit the dictations but occasionally words are mis-transcribed.)    Emmanuelle Seaman PA-C (electronically signed)  Attending Emergency Physician       Emmanuelle Seaman PA-C  08/03/22

## 2022-08-12 ENCOUNTER — OFFICE VISIT (OUTPATIENT)
Dept: FAMILY MEDICINE CLINIC | Age: 67
End: 2022-08-12
Payer: MEDICAID

## 2022-08-12 VITALS
OXYGEN SATURATION: 97 % | BODY MASS INDEX: 33.32 KG/M2 | SYSTOLIC BLOOD PRESSURE: 116 MMHG | WEIGHT: 200 LBS | HEIGHT: 65 IN | RESPIRATION RATE: 14 BRPM | DIASTOLIC BLOOD PRESSURE: 80 MMHG | HEART RATE: 73 BPM

## 2022-08-12 DIAGNOSIS — J45.20 MILD INTERMITTENT ASTHMA WITHOUT COMPLICATION: ICD-10-CM

## 2022-08-12 DIAGNOSIS — K21.9 GASTROESOPHAGEAL REFLUX DISEASE WITHOUT ESOPHAGITIS: ICD-10-CM

## 2022-08-12 DIAGNOSIS — L29.9 SKIN PRURITUS: ICD-10-CM

## 2022-08-12 DIAGNOSIS — Z78.0 POST-MENOPAUSAL: ICD-10-CM

## 2022-08-12 DIAGNOSIS — R10.9 ABDOMINAL PAIN, UNSPECIFIED ABDOMINAL LOCATION: ICD-10-CM

## 2022-08-12 DIAGNOSIS — Z23 NEED FOR PNEUMOCOCCAL VACCINATION: Primary | ICD-10-CM

## 2022-08-12 DIAGNOSIS — R73.9 HYPERGLYCEMIA: ICD-10-CM

## 2022-08-12 LAB
ALBUMIN SERPL-MCNC: 4.3 G/DL (ref 3.5–4.6)
ALP BLD-CCNC: 100 U/L (ref 40–130)
ALT SERPL-CCNC: 27 U/L (ref 0–33)
ANION GAP SERPL CALCULATED.3IONS-SCNC: 14 MEQ/L (ref 9–15)
AST SERPL-CCNC: 26 U/L (ref 0–35)
BASOPHILS ABSOLUTE: 0 K/UL (ref 0–0.2)
BASOPHILS RELATIVE PERCENT: 0.6 %
BILIRUB SERPL-MCNC: 1.2 MG/DL (ref 0.2–0.7)
BILIRUBIN URINE: NEGATIVE
BLOOD, URINE: NEGATIVE
BUN BLDV-MCNC: 13 MG/DL (ref 8–23)
CALCIUM SERPL-MCNC: 9.4 MG/DL (ref 8.5–9.9)
CHLORIDE BLD-SCNC: 106 MEQ/L (ref 95–107)
CLARITY: ABNORMAL
CO2: 23 MEQ/L (ref 20–31)
COLOR: YELLOW
CREAT SERPL-MCNC: 0.77 MG/DL (ref 0.5–0.9)
EOSINOPHILS ABSOLUTE: 0.1 K/UL (ref 0–0.7)
EOSINOPHILS RELATIVE PERCENT: 2.3 %
GFR AFRICAN AMERICAN: >60
GFR NON-AFRICAN AMERICAN: >60
GLOBULIN: 2.4 G/DL (ref 2.3–3.5)
GLUCOSE BLD-MCNC: 114 MG/DL (ref 70–99)
GLUCOSE URINE: NEGATIVE MG/DL
HBA1C MFR BLD: 5.6 % (ref 4.8–5.9)
HCT VFR BLD CALC: 44 % (ref 37–47)
HEMOGLOBIN: 14.5 G/DL (ref 12–16)
KETONES, URINE: NEGATIVE MG/DL
LEUKOCYTE ESTERASE, URINE: NEGATIVE
LYMPHOCYTES ABSOLUTE: 2.7 K/UL (ref 1–4.8)
LYMPHOCYTES RELATIVE PERCENT: 47.2 %
MCH RBC QN AUTO: 31.8 PG (ref 27–31.3)
MCHC RBC AUTO-ENTMCNC: 33 % (ref 33–37)
MCV RBC AUTO: 96.5 FL (ref 82–100)
MONOCYTES ABSOLUTE: 0.6 K/UL (ref 0.2–0.8)
MONOCYTES RELATIVE PERCENT: 10.1 %
NEUTROPHILS ABSOLUTE: 2.3 K/UL (ref 1.4–6.5)
NEUTROPHILS RELATIVE PERCENT: 39.8 %
NITRITE, URINE: NEGATIVE
PDW BLD-RTO: 12.7 % (ref 11.5–14.5)
PH UA: 6.5 (ref 5–9)
PLATELET # BLD: 267 K/UL (ref 130–400)
POTASSIUM SERPL-SCNC: 5.2 MEQ/L (ref 3.4–4.9)
PROTEIN UA: NEGATIVE MG/DL
RBC # BLD: 4.56 M/UL (ref 4.2–5.4)
SODIUM BLD-SCNC: 143 MEQ/L (ref 135–144)
SPECIFIC GRAVITY UA: 1.01 (ref 1–1.03)
TOTAL PROTEIN: 6.7 G/DL (ref 6.3–8)
TSH SERPL DL<=0.05 MIU/L-ACNC: 2.1 UIU/ML (ref 0.44–3.86)
URINE REFLEX TO CULTURE: ABNORMAL
UROBILINOGEN, URINE: 0.2 E.U./DL
WBC # BLD: 5.7 K/UL (ref 4.8–10.8)

## 2022-08-12 PROCEDURE — 90670 PCV13 VACCINE IM: CPT | Performed by: INTERNAL MEDICINE

## 2022-08-12 PROCEDURE — 1123F ACP DISCUSS/DSCN MKR DOCD: CPT | Performed by: INTERNAL MEDICINE

## 2022-08-12 PROCEDURE — 3017F COLORECTAL CA SCREEN DOC REV: CPT | Performed by: INTERNAL MEDICINE

## 2022-08-12 PROCEDURE — G8417 CALC BMI ABV UP PARAM F/U: HCPCS | Performed by: INTERNAL MEDICINE

## 2022-08-12 PROCEDURE — G8400 PT W/DXA NO RESULTS DOC: HCPCS | Performed by: INTERNAL MEDICINE

## 2022-08-12 PROCEDURE — 1036F TOBACCO NON-USER: CPT | Performed by: INTERNAL MEDICINE

## 2022-08-12 PROCEDURE — 99214 OFFICE O/P EST MOD 30 MIN: CPT | Performed by: INTERNAL MEDICINE

## 2022-08-12 PROCEDURE — 1090F PRES/ABSN URINE INCON ASSESS: CPT | Performed by: INTERNAL MEDICINE

## 2022-08-12 PROCEDURE — G8427 DOCREV CUR MEDS BY ELIG CLIN: HCPCS | Performed by: INTERNAL MEDICINE

## 2022-08-12 PROCEDURE — G0009 ADMIN PNEUMOCOCCAL VACCINE: HCPCS | Performed by: INTERNAL MEDICINE

## 2022-08-12 RX ORDER — BLOOD-GLUCOSE METER
EACH MISCELLANEOUS
COMMUNITY
Start: 2022-05-14 | End: 2022-08-12

## 2022-08-12 RX ORDER — BLOOD-GLUCOSE METER
EACH MISCELLANEOUS
COMMUNITY
Start: 2022-05-14

## 2022-08-12 NOTE — PROGRESS NOTES
Subjective:      Patient ID: Darwin Jacques is a 74 y.[facility]. [facility], here for evaluation of the following chief complaint(s):  Chief Complaint   Patient presents with    Follow-up       HPI  Poison ivy: recently started treatment after being diagnosed. Mild intermittent asthma: well controlled no symptoms. Colonic polyps 2021 left colonic polys        Uterus  2021        Hypersomnia: Pt has been experiencing hypersomnia with associated snoring. Depression: Compliant with Lexapro      Abnormal thyroid studies      GERD: Compliant with pantoprazole. HM due for osteoporosis screening      At present he denies polyuria,  Polydipsia, constitutional, sinus, visual, cardiopulmonary, urologic, gastrointestinal, immunologic/hematologic, musculoskeletal, neurologic,dermatologic, or psychiatric complaints. Current Outpatient Medications on File Prior to Visit   Medication Sig Dispense Refill    Easy Comfort Lancets MISC       hydrOXYzine HCl (ATARAX) 25 MG tablet NANCY 1 TABLETA POR BOCA CADA 8 HORAS CUANDO SEA NECESARIO PARA EL PICOR 60 tablet 1    famotidine (PEPCID) 20 MG tablet Take 1 tablet by mouth in the morning and 1 tablet before bedtime. Do all this for 14 days. 28 tablet 0    loratadine (CLARITIN) 10 MG tablet Take 1 tablet by mouth in the morning for 14 days. 14 tablet 0    escitalopram (LEXAPRO) 10 MG tablet TAKE 1 TABLET BY MOUTH DAILY 30 tablet 0    pantoprazole (PROTONIX) 40 MG tablet TAKE 1 TABLET BY MOUTH DAILY 30 tablet 1    polyethyl glycol-propyl glycol 0.4-0.3 % (SYSTANE) 0.4-0.3 % ophthalmic solution 1 drop as needed for Dry Eyes      Cyanocobalamin (B-12 SL) Place under the tongue      vitamin E 180 MG (400 UNIT) CAPS capsule Take 400 Units by mouth daily      fexofenadine (ALLEGRA) 180 MG tablet Take 1 tablet by mouth daily 90 tablet 1    Emollient (EUCERIN) lotion Apply topically as needed.  480 mL 5    Artificial Tear Solution (GENTEAL TEARS) 0.1-0.2-0.3 % SOLN 2 drops OU PRN dry, irritated, itchy eyes (Patient not taking: Reported on 7/1/2022) 15 mL 12    albuterol sulfate  (90 Base) MCG/ACT inhaler Inhale 2 puffs into the lungs every 6 hours as needed for Wheezing 1 each 0    Cholecalciferol (VITAMIN D3) 50 MCG (2000 UT) CAPS TAKE 1 CAPSULE BY MOUTH DAILY 90 capsule 4    nystatin (MYCOSTATIN) 101649 UNIT/GM cream Apply topically 2 times daily. (Patient not taking: Reported on 7/1/2022) 60 g 5    blood glucose monitor strips Test once a day prn 100 strip 5    blood glucose monitor kit and supplies Use as directed up to TID 1 kit 0    Lancets MISC 1 each by Does not apply route daily 100 each 5    beclomethasone (QVAR) 80 MCG/ACT inhaler Inhale 1 puff into the lungs 2 times daily 3 each 4     No current facility-administered medications on file prior to visit. Other and Seasonal    Review of Systems      Objective:   /80   Pulse 73   Resp 14   Ht 5' 5\" (1.651 m)   Wt 200 lb (90.7 kg)   LMP  (LMP Unknown)   SpO2 97%   BMI 33.28 kg/m²     Physical Exam  Constitutional:       General: She is not in acute distress. Appearance: She is well-developed. HENT:      Head: Normocephalic. Right Ear: External ear normal.      Left Ear: External ear normal.   Eyes:      Conjunctiva/sclera: Conjunctivae normal.   Neck:      Vascular: No JVD. Trachea: No tracheal deviation. Cardiovascular:      Rate and Rhythm: Normal rate and regular rhythm. Heart sounds: Normal heart sounds. Pulmonary:      Effort: Pulmonary effort is normal. No respiratory distress. Breath sounds: Normal breath sounds. No wheezing or rales. Chest:      Chest wall: No tenderness. Abdominal:      General: Bowel sounds are normal. There is no distension. Palpations: Abdomen is soft. There is no mass. Tenderness: There is no abdominal tenderness. There is no guarding or rebound.    Musculoskeletal:         General: No tenderness or deformity. Cervical back: Neck supple. Skin:     General: Skin is warm and dry. Coloration: Skin is not pale. Findings: No erythema or rash. Neurological:      Mental Status: She is alert and oriented to person, place, and time. Motor: No abnormal muscle tone. Psychiatric:         Thought Content: Thought content normal.         Judgment: Judgment normal.       Assessment:       Diagnosis Orders   1. Need for pneumococcal vaccination  Pneumococcal, PCV-13, PREVNAR 15, (age 10 wks+), IM      2. Mild intermittent asthma without complication        3. Gastroesophageal reflux disease without esophagitis        4. Post-menopausal  DEXA BONE DENSITY AXIAL SKELETON           No results found for: LIPIDPAN, BMP, CMP, CBC, CBCAUTODIF  Plan:      Mahendra Ferguson was seen today for follow-up. Diagnoses and all orders for this visit:    Need for pneumococcal vaccination  -     Pneumococcal, PCV-13, PREVNAR 15, (age 10 wks+), IM    Mild intermittent asthma without complication    Gastroesophageal reflux disease without esophagitis    Post-menopausal  -     DEXA BONE DENSITY AXIAL SKELETON; Future       Check cholesterol at next office visit. No follow-ups on file. On this date 08/12/22 I have spent 30 minutes reviewing previous notes, test results and face to face with the patient discussing the diagnosis and importance of compliance with the treatment plan. Toñito Germain MD    Please note, this report has been partially produced using speech recognition software  and may cause  and /or contain errors related to that system including grammar, punctuation and spelling as well as words and phrases that may seem inappropriate. If there are questions or concerns please feel free to contact me to clarify.

## 2022-08-16 ENCOUNTER — OFFICE VISIT (OUTPATIENT)
Dept: OBGYN CLINIC | Age: 67
End: 2022-08-16
Payer: MEDICAID

## 2022-08-16 VITALS
BODY MASS INDEX: 33.99 KG/M2 | DIASTOLIC BLOOD PRESSURE: 82 MMHG | HEIGHT: 65 IN | WEIGHT: 204 LBS | SYSTOLIC BLOOD PRESSURE: 128 MMHG

## 2022-08-16 DIAGNOSIS — N95.0 PMB (POSTMENOPAUSAL BLEEDING): ICD-10-CM

## 2022-08-16 DIAGNOSIS — N95.0 PMB (POSTMENOPAUSAL BLEEDING): Primary | ICD-10-CM

## 2022-08-16 DIAGNOSIS — R93.89 THICKENED ENDOMETRIUM: ICD-10-CM

## 2022-08-16 DIAGNOSIS — R10.2 PELVIC PAIN: ICD-10-CM

## 2022-08-16 PROCEDURE — 58100 BIOPSY OF UTERUS LINING: CPT | Performed by: OBSTETRICS & GYNECOLOGY

## 2022-08-18 NOTE — PROGRESS NOTES
Endometrial BiopsyProcedure Note    Meri Stoner is a 74 y.o. female present here today for an endometrial biopsy. Patient was referred from dr Sabi Lopez for pmb and follow US showing endometrial thickening     Vitals:  /82   Ht 5' 5\" (1.651 m)   Wt 204 lb (92.5 kg)   LMP  (LMP Unknown)   BMI 33.95 kg/m²   Past Medical History:   Diagnosis Date    Abnormal pelvic ultrasound 10/8/2020    Acid reflux     Adenomatous colon polyp - repeat scope 02/2026 2/11/2021    Arthritis     Asthma     Chronic back pain     COPD (chronic obstructive pulmonary disease) (HCC)     Depression     Hyperlipidemia     Obesity     Venous insufficiency        This procedure has been fully reviewed with the patient and verbal informed consenthas been obtained. Review of Systems  Review of Systems    All other systems reviewed and are negative. Patient's medications, allergies, pastmedical, surgical, social and family histories were reviewed and updated as appropriate. Indications: postmenopausal bleeding    Procedure Details   Urine pregnancy test was not done. The risks (including infection, bleeding, pain, and uterine perforation)and benefits of the procedure were explained to the patient and Writteninformed consent was obtained. Antibiotic prophylaxis against endocarditis was notindicated. The patient was placed in the dorsal lithotomy position. Bimanual exam showed the uterus to be in the retroflexed position. A Graves' speculum inserted in the vagina, and the cervix prepped with betadine. A sharp tenaculum was applied to the anterior lip of thecervix for stabilization. A sterile uterine sound was used to sound the uterusto a depth of 8cm. A Pipelle endometrial aspiratorwas used to sample the endometrium. Sample was sent for pathologic examination. Condition:  Stable    Complications:  None    Plan:       The patient was advised to call for any fever or for prolongedor severe pain or bleeding.  She was advised to use NSAIDas needed for mild to moderate pain. She was advised to avoid vaginal intercoursefor 48 hours or until the bleeding has completely stopped. Schedule follow up in 2 weeks to review the results and discuss plan of care. Follow up:  Return in about 1 week (around 8/23/2022).      Conner Beltre, DO

## 2022-08-30 RX ORDER — PANTOPRAZOLE SODIUM 40 MG/1
40 TABLET, DELAYED RELEASE ORAL DAILY
Qty: 30 TABLET | Refills: 1 | Status: SHIPPED | OUTPATIENT
Start: 2022-08-30 | End: 2022-09-26

## 2022-09-05 DIAGNOSIS — J30.89 CHRONIC NON-SEASONAL ALLERGIC RHINITIS: ICD-10-CM

## 2022-09-05 DIAGNOSIS — L29.9 ITCHING: Chronic | ICD-10-CM

## 2022-09-06 RX ORDER — HYDROXYZINE HYDROCHLORIDE 25 MG/1
TABLET, FILM COATED ORAL
Qty: 60 TABLET | Refills: 1 | Status: SHIPPED | OUTPATIENT
Start: 2022-09-06 | End: 2022-09-19 | Stop reason: SDUPTHER

## 2022-09-06 NOTE — TELEPHONE ENCOUNTER
Rx request   Requested Prescriptions     Pending Prescriptions Disp Refills    hydrOXYzine HCl (ATARAX) 25 MG tablet [Pharmacy Med Name: HYDROXYZINE HCL 25 MG TABS 25 Tablet] 60 tablet 1     Sig: NANCY 1 TABLETA POR BOCA CADA 8 HORAS CUANDO  [address] [facility] 8/12/2022  Next Visit Date:  Future Appointments   Date Time Provider Ellie Dunbar   9/30/2022  1:15 PM Nya Bhatt MD [address] [place] [address]

## 2022-09-17 DIAGNOSIS — L29.9 ITCHING: Chronic | ICD-10-CM

## 2022-09-17 DIAGNOSIS — J30.89 CHRONIC NON-SEASONAL ALLERGIC RHINITIS: ICD-10-CM

## 2022-09-17 RX ORDER — HYDROXYZINE HYDROCHLORIDE 25 MG/1
TABLET, FILM COATED ORAL
Qty: 60 TABLET | Refills: 1 | OUTPATIENT
Start: 2022-09-17

## 2022-09-19 RX ORDER — HYDROXYZINE HYDROCHLORIDE 25 MG/1
TABLET, FILM COATED ORAL
Qty: 60 TABLET | Refills: 1 | Status: SHIPPED | OUTPATIENT
Start: 2022-09-19 | End: 2022-09-26

## 2022-09-24 DIAGNOSIS — L29.9 ITCHING: Chronic | ICD-10-CM

## 2022-09-24 DIAGNOSIS — F39 MOOD DISORDER (HCC): Chronic | ICD-10-CM

## 2022-09-24 DIAGNOSIS — J30.89 CHRONIC NON-SEASONAL ALLERGIC RHINITIS: ICD-10-CM

## 2022-09-26 DIAGNOSIS — F39 MOOD DISORDER (HCC): Chronic | ICD-10-CM

## 2022-09-26 RX ORDER — HYDROXYZINE HYDROCHLORIDE 25 MG/1
TABLET, FILM COATED ORAL
Qty: 60 TABLET | Refills: 1 | Status: SHIPPED | OUTPATIENT
Start: 2022-09-26

## 2022-09-26 RX ORDER — PANTOPRAZOLE SODIUM 40 MG/1
40 TABLET, DELAYED RELEASE ORAL DAILY
Qty: 30 TABLET | Refills: 1 | Status: SHIPPED | OUTPATIENT
Start: 2022-09-26 | End: 2022-10-03 | Stop reason: SDUPTHER

## 2022-09-26 RX ORDER — ESCITALOPRAM OXALATE 10 MG/1
10 TABLET ORAL DAILY
Qty: 30 TABLET | Refills: 0 | Status: SHIPPED | OUTPATIENT
Start: 2022-09-26 | End: 2022-10-03 | Stop reason: SDUPTHER

## 2022-09-26 RX ORDER — ESCITALOPRAM OXALATE 10 MG/1
10 TABLET ORAL DAILY
Qty: 30 TABLET | Refills: 0 | OUTPATIENT
Start: 2022-09-26

## 2022-10-03 ENCOUNTER — OFFICE VISIT (OUTPATIENT)
Dept: FAMILY MEDICINE CLINIC | Age: 67
End: 2022-10-03
Payer: MEDICAID

## 2022-10-03 VITALS
WEIGHT: 198 LBS | RESPIRATION RATE: 14 BRPM | BODY MASS INDEX: 32.99 KG/M2 | OXYGEN SATURATION: 98 % | DIASTOLIC BLOOD PRESSURE: 80 MMHG | HEART RATE: 68 BPM | SYSTOLIC BLOOD PRESSURE: 124 MMHG | HEIGHT: 65 IN

## 2022-10-03 DIAGNOSIS — J45.20 MILD INTERMITTENT ASTHMA WITHOUT COMPLICATION: Primary | ICD-10-CM

## 2022-10-03 DIAGNOSIS — J30.9 ALLERGIC RHINITIS, UNSPECIFIED SEASONALITY, UNSPECIFIED TRIGGER: ICD-10-CM

## 2022-10-03 DIAGNOSIS — K21.9 GASTROESOPHAGEAL REFLUX DISEASE WITHOUT ESOPHAGITIS: ICD-10-CM

## 2022-10-03 DIAGNOSIS — F39 MOOD DISORDER (HCC): Chronic | ICD-10-CM

## 2022-10-03 DIAGNOSIS — Z23 NEED FOR INFLUENZA VACCINATION: ICD-10-CM

## 2022-10-03 PROCEDURE — G0008 ADMIN INFLUENZA VIRUS VAC: HCPCS | Performed by: INTERNAL MEDICINE

## 2022-10-03 PROCEDURE — 99214 OFFICE O/P EST MOD 30 MIN: CPT | Performed by: INTERNAL MEDICINE

## 2022-10-03 PROCEDURE — 3017F COLORECTAL CA SCREEN DOC REV: CPT | Performed by: INTERNAL MEDICINE

## 2022-10-03 PROCEDURE — 1090F PRES/ABSN URINE INCON ASSESS: CPT | Performed by: INTERNAL MEDICINE

## 2022-10-03 PROCEDURE — 1123F ACP DISCUSS/DSCN MKR DOCD: CPT | Performed by: INTERNAL MEDICINE

## 2022-10-03 PROCEDURE — G8427 DOCREV CUR MEDS BY ELIG CLIN: HCPCS | Performed by: INTERNAL MEDICINE

## 2022-10-03 PROCEDURE — G8484 FLU IMMUNIZE NO ADMIN: HCPCS | Performed by: INTERNAL MEDICINE

## 2022-10-03 PROCEDURE — G8400 PT W/DXA NO RESULTS DOC: HCPCS | Performed by: INTERNAL MEDICINE

## 2022-10-03 PROCEDURE — 90694 VACC AIIV4 NO PRSRV 0.5ML IM: CPT | Performed by: INTERNAL MEDICINE

## 2022-10-03 PROCEDURE — 1036F TOBACCO NON-USER: CPT | Performed by: INTERNAL MEDICINE

## 2022-10-03 PROCEDURE — G8417 CALC BMI ABV UP PARAM F/U: HCPCS | Performed by: INTERNAL MEDICINE

## 2022-10-03 RX ORDER — PANTOPRAZOLE SODIUM 40 MG/1
40 TABLET, DELAYED RELEASE ORAL DAILY
Qty: 30 TABLET | Refills: 1 | Status: SHIPPED | OUTPATIENT
Start: 2022-10-03

## 2022-10-03 RX ORDER — ESCITALOPRAM OXALATE 10 MG/1
10 TABLET ORAL DAILY
Qty: 30 TABLET | Refills: 0 | Status: SHIPPED | OUTPATIENT
Start: 2022-10-03 | End: 2022-10-19

## 2022-10-03 RX ORDER — ALBUTEROL SULFATE 90 UG/1
2 AEROSOL, METERED RESPIRATORY (INHALATION) EVERY 6 HOURS PRN
Qty: 1 EACH | Refills: 0 | Status: SHIPPED | OUTPATIENT
Start: 2022-10-03 | End: 2022-10-16 | Stop reason: ALTCHOICE

## 2022-10-03 RX ORDER — FEXOFENADINE HCL 180 MG/1
180 TABLET ORAL DAILY
Qty: 90 TABLET | Refills: 1 | Status: SHIPPED | OUTPATIENT
Start: 2022-10-03

## 2022-10-03 NOTE — PROGRESS NOTES
Subjective:      Patient ID: Kendell Matt is a 74 y.o. female [place] patient, here for evaluation of the following chief complaint(s):  Chief Complaint   Patient presents with    Diabetes    Asthma     Patient needs a refill of qvar it would not let me pend that one       HPI        Mild intermittent asthma: well controlled no symptoms. Hypersomnia: Pt has been experiencing hypersomnia with associated snoring. Frequent throat clearing: pt has experienced this symptom for several weeks. Abnormal thyroid studies: monitoring        Depression: Compliant with Lexapro      GERD: Compliant with pantoprazole. Colonic polyps 2021 left colonic polys        Uterus  2021          HM due for osteoporosis screening      At present he denies polyuria,  Polydipsia, constitutional, sinus, visual, additional cardiopulmonary, urologic, gastrointestinal, immunologic/hematologic, musculoskeletal, neurologic,dermatologic, or psychiatric complaints. Current Outpatient Medications on File Prior to Visit   Medication Sig Dispense Refill    hydrOXYzine HCl (ATARAX) 25 MG tablet NANCY 1 TABLETA POR BOCA CADA 8 HORAS CUANDO SEA NECESARIO PARA EL PICOR 60 tablet 1    Easy Comfort Lancets MISC       famotidine (PEPCID) 20 MG tablet Take 1 tablet by mouth in the morning and 1 tablet before bedtime. Do all this for 14 days. 28 tablet 0    polyethyl glycol-propyl glycol 0.4-0.3 % (SYSTANE) 0.4-0.3 % ophthalmic solution 1 drop as needed for Dry Eyes      Cyanocobalamin (B-12 SL) Place under the tongue      vitamin E 180 MG (400 UNIT) CAPS capsule Take 400 Units by mouth daily      fexofenadine (ALLEGRA) 180 MG tablet Take 1 tablet by mouth daily 90 tablet 1    Emollient (EUCERIN) lotion Apply topically as needed.  480 mL 5    Artificial Tear Solution (GENTEAL TEARS) 0.1-0.2-0.3 % SOLN 2 drops OU PRN dry, irritated, itchy eyes (Patient not taking: No sig reported) 15 mL 12    Cholecalciferol (VITAMIN D3) 50 MCG (2000 UT) CAPS TAKE 1 CAPSULE BY MOUTH DAILY 90 capsule 4    nystatin (MYCOSTATIN) 985221 UNIT/GM cream Apply topically 2 times daily. (Patient not taking: No sig reported) 60 g 5    blood glucose monitor strips Test once a day prn 100 strip 5    blood glucose monitor kit and supplies Use as directed up to TID 1 kit 0    Lancets MISC 1 each by Does not apply route daily 100 each 5    beclomethasone (QVAR) 80 MCG/ACT inhaler Inhale 1 puff into the lungs 2 times daily 3 each 4     No current facility-administered medications on file prior to visit. Other and Seasonal    Review of Systems      Objective:   /80   Pulse 68   Resp 14   Ht 5' 5\" (1.651 m)   Wt 198 lb (89.8 kg)   LMP  (LMP Unknown)   SpO2 98%   BMI 32.95 kg/m²     Physical Exam  Constitutional:       General: She is not in acute distress. Appearance: She is well-developed. HENT:      Head: Normocephalic. Right Ear: External ear normal.      Left Ear: External ear normal.   Eyes:      Conjunctiva/sclera: Conjunctivae normal.   Neck:      Vascular: No JVD. Trachea: No tracheal deviation. Cardiovascular:      Rate and Rhythm: Normal rate and regular rhythm. Heart sounds: Normal heart sounds. Pulmonary:      Effort: Pulmonary effort is normal. No respiratory distress. Breath sounds: Normal breath sounds. No wheezing or rales. Chest:      Chest wall: No tenderness. Abdominal:      General: Bowel sounds are normal. There is no distension. Palpations: Abdomen is soft. There is no mass. Tenderness: There is no abdominal tenderness. There is no guarding or rebound. Musculoskeletal:         General: No tenderness or deformity. Cervical back: Neck supple. Skin:     General: Skin is warm and dry. Coloration: Skin is not pale. Findings: No erythema or rash. Neurological:      Mental Status: She is alert and oriented to person, place, and time.       Motor: No abnormal muscle tone.   Psychiatric:         Thought Content: Thought content normal.         Judgment: Judgment normal.       Assessment:       Diagnosis Orders   1. Mild intermittent asthma without complication  albuterol sulfate HFA (PROVENTIL;VENTOLIN;PROAIR) 108 (90 Base) MCG/ACT inhaler    beclomethasone (QVAR REDIHALER) 80 MCG/ACT AERB inhaler      2. Mood disorder (HCC)  escitalopram (LEXAPRO) 10 MG tablet    improving, well-controlled with SEs. Does not desire change in meds. 3. Gastroesophageal reflux disease without esophagitis  pantoprazole (PROTONIX) 40 MG tablet      4. Need for influenza vaccination  Influenza, FLUAD, (age 72 y+), IM, Preservative Free, 0.5 mL      5. Allergic rhinitis, unspecified seasonality, unspecified trigger             No results found for: LIPIDPAN, BMP, CMP, CBC, CBCAUTODIF  Plan:      Severiano Human was seen today for diabetes and asthma. Diagnoses and all orders for this visit:    Mild intermittent asthma without complication  -     albuterol sulfate HFA (PROVENTIL;VENTOLIN;PROAIR) 108 (90 Base) MCG/ACT inhaler; Inhale 2 puffs into the lungs every 6 hours as needed for Wheezing  -     beclomethasone (QVAR REDIHALER) 80 MCG/ACT AERB inhaler; Inhale 1 puff into the lungs in the morning and 1 puff in the evening. Mood disorder ([address]  Comments:  improving, well-controlled with SEs. Does not desire change in meds. Orders:  -     escitalopram (LEXAPRO) 10 MG tablet; Take 1 tablet by mouth daily    Gastroesophageal reflux disease without esophagitis  -     pantoprazole (PROTONIX) 40 MG tablet; Take 1 tablet by mouth daily    Need for influenza vaccination  -     Influenza, FLUAD, (age 72 y+), IM, Preservative Free, 0.5 mL    Allergic rhinitis, unspecified seasonality, unspecified trigger    Other orders  -     fexofenadine (ALLEGRA) 180 MG tablet; Take 1 tablet by mouth daily       Check cholesterol at next office visit. Return in about 3 months (around 1/3/2023).       On this date 10/03/22 I have spent 30 minutes reviewing previous notes, test results and face to face with the patient discussing the diagnosis and importance of compliance with the treatment plan. Da Joaquin MD    Please note, this report has been partially produced using speech recognition software  and may cause  and /or contain errors related to that system including grammar, punctuation and spelling as well as words and phrases that may seem inappropriate. If there are questions or concerns please feel free to contact me to clarify.

## 2022-10-15 DIAGNOSIS — J45.20 MILD INTERMITTENT ASTHMA WITHOUT COMPLICATION: ICD-10-CM

## 2022-10-16 ENCOUNTER — HOSPITAL ENCOUNTER (EMERGENCY)
Age: 67
Discharge: HOME | End: 2022-10-16
Attending: FAMILY MEDICINE
Payer: MEDICAID

## 2022-10-16 VITALS
SYSTOLIC BLOOD PRESSURE: 147 MMHG | HEART RATE: 89 BPM | HEIGHT: 62 IN | DIASTOLIC BLOOD PRESSURE: 84 MMHG | WEIGHT: 190 LBS | TEMPERATURE: 98.3 F | RESPIRATION RATE: 18 BRPM | OXYGEN SATURATION: 99 % | BODY MASS INDEX: 34.96 KG/M2

## 2022-10-16 DIAGNOSIS — J45.41 MODERATE PERSISTENT ASTHMA WITH ACUTE EXACERBATION: Primary | ICD-10-CM

## 2022-10-16 DIAGNOSIS — J10.1 INFLUENZA B: ICD-10-CM

## 2022-10-16 LAB
INFLUENZA A BY PCR: NEGATIVE
INFLUENZA B BY PCR: POSITIVE
SARS-COV-2, NAAT: NOT DETECTED

## 2022-10-16 PROCEDURE — 99284 EMERGENCY DEPT VISIT MOD MDM: CPT

## 2022-10-16 PROCEDURE — 6370000000 HC RX 637 (ALT 250 FOR IP): Performed by: FAMILY MEDICINE

## 2022-10-16 PROCEDURE — 96372 THER/PROPH/DIAG INJ SC/IM: CPT

## 2022-10-16 PROCEDURE — 87502 INFLUENZA DNA AMP PROBE: CPT

## 2022-10-16 PROCEDURE — 6360000002 HC RX W HCPCS: Performed by: FAMILY MEDICINE

## 2022-10-16 PROCEDURE — 94640 AIRWAY INHALATION TREATMENT: CPT

## 2022-10-16 PROCEDURE — 94761 N-INVAS EAR/PLS OXIMETRY MLT: CPT

## 2022-10-16 PROCEDURE — 87635 SARS-COV-2 COVID-19 AMP PRB: CPT

## 2022-10-16 RX ORDER — OSELTAMIVIR PHOSPHATE 75 MG/1
75 CAPSULE ORAL 2 TIMES DAILY
Qty: 10 CAPSULE | Refills: 0 | Status: SHIPPED | OUTPATIENT
Start: 2022-10-16 | End: 2022-10-21

## 2022-10-16 RX ORDER — METHYLPREDNISOLONE SODIUM SUCCINATE 40 MG/ML
40 INJECTION, POWDER, LYOPHILIZED, FOR SOLUTION INTRAMUSCULAR; INTRAVENOUS ONCE
Status: DISCONTINUED | OUTPATIENT
Start: 2022-10-16 | End: 2022-10-16

## 2022-10-16 RX ORDER — PREDNISONE 20 MG/1
20 TABLET ORAL 2 TIMES DAILY
Qty: 10 TABLET | Refills: 0 | Status: SHIPPED | OUTPATIENT
Start: 2022-10-16 | End: 2022-10-21

## 2022-10-16 RX ORDER — ALBUTEROL SULFATE 90 UG/1
2 AEROSOL, METERED RESPIRATORY (INHALATION) 4 TIMES DAILY PRN
Qty: 18 G | Refills: 0 | Status: SHIPPED | OUTPATIENT
Start: 2022-10-16 | End: 2022-10-17 | Stop reason: SDUPTHER

## 2022-10-16 RX ORDER — METHYLPREDNISOLONE SODIUM SUCCINATE 125 MG/2ML
125 INJECTION, POWDER, LYOPHILIZED, FOR SOLUTION INTRAMUSCULAR; INTRAVENOUS ONCE
Status: COMPLETED | OUTPATIENT
Start: 2022-10-16 | End: 2022-10-16

## 2022-10-16 RX ORDER — OSELTAMIVIR PHOSPHATE 75 MG/1
75 CAPSULE ORAL ONCE
Status: COMPLETED | OUTPATIENT
Start: 2022-10-16 | End: 2022-10-16

## 2022-10-16 RX ORDER — IPRATROPIUM BROMIDE AND ALBUTEROL SULFATE 2.5; .5 MG/3ML; MG/3ML
1 SOLUTION RESPIRATORY (INHALATION)
Status: DISCONTINUED | OUTPATIENT
Start: 2022-10-16 | End: 2022-10-16 | Stop reason: HOSPADM

## 2022-10-16 RX ADMIN — IPRATROPIUM BROMIDE AND ALBUTEROL SULFATE 1 AMPULE: .5; 2.5 SOLUTION RESPIRATORY (INHALATION) at 16:30

## 2022-10-16 RX ADMIN — METHYLPREDNISOLONE SODIUM SUCCINATE 125 MG: 125 INJECTION, POWDER, FOR SOLUTION INTRAMUSCULAR; INTRAVENOUS at 15:49

## 2022-10-16 RX ADMIN — OSELTAMIVIR PHOSPHATE 75 MG: 75 CAPSULE ORAL at 17:24

## 2022-10-16 ASSESSMENT — ENCOUNTER SYMPTOMS
EYES NEGATIVE: 1
CHEST TIGHTNESS: 1
ALLERGIC/IMMUNOLOGIC NEGATIVE: 1
COUGH: 1
SHORTNESS OF BREATH: 1
GASTROINTESTINAL NEGATIVE: 1
WHEEZING: 1

## 2022-10-16 ASSESSMENT — PAIN - FUNCTIONAL ASSESSMENT: PAIN_FUNCTIONAL_ASSESSMENT: NONE - DENIES PAIN

## 2022-10-16 NOTE — ED NOTES
Resting in bed, warm blanket given. Medicated per order and swabs sent to lab. No SOB or audible wheeze at this time. Denies needs.       Cm Joy, RN  10/16/22 [id number]

## 2022-10-16 NOTE — ED PROVIDER NOTES
[address] [facility] ED  eMERGENCY dEPARTMENT eNCOUnter      Pt Name: Shanti Pal  MRN: [id number]  [place] 1955  Date of evaluation: 10/16/2022  Provider: Doretha Doshi MD    CHIEF COMPLAINT       Chief Complaint   Patient presents with    Asthma         HISTORY OF PRESENT ILLNESS   (Location/Symptom, Timing/Onset,Context/Setting, Quality, Duration, Modifying Factors, Severity)  Note limiting factors. Shanti Pal is a 74 y.o. female who presents to the emergency department SOB       74years old presented to the ED with cough congestion runny nose shortness of breath and wheezing started couple of days ago today had increased wheezing and shortness of breath and came to the ED for evaluation    The history is provided by the patient and a relative. NursingNotes were reviewed. REVIEW OF SYSTEMS    (2-9 systems for level 4, 10 or more for level 5)     Review of Systems   Constitutional:  Positive for chills. HENT: Negative. Eyes: Negative. Respiratory:  Positive for cough, chest tightness, shortness of breath and wheezing. Cardiovascular: Negative. Gastrointestinal: Negative. Endocrine: Negative. Genitourinary: Negative. Musculoskeletal: Negative. Skin: Negative. Allergic/Immunologic: Negative. Neurological: Negative. Psychiatric/Behavioral: Negative. Except as noted above the remainder of the review of systems was reviewed and negative.        PAST MEDICAL HISTORY     Past Medical History:   Diagnosis Date    Abnormal pelvic ultrasound 10/8/2020    Acid reflux     Adenomatous colon polyp - repeat scope 02/2026 2/11/2021    Arthritis     Asthma     Chronic back pain     COPD (chronic obstructive pulmonary disease) ([facility] [address]     Depression     Hyperlipidemia     Obesity     Venous insufficiency          SURGICALHISTORY       Past Surgical History:   Procedure Laterality Date    COLONOSCOPY      COLONOSCOPY N/A 2/3/2021    COLORECTAL CANCER SCREENING with polypectomies performed by Tim Felix MD at [address] N/A 6/28/2021    HYSTEROSCOPY  FX  D/C performed by Marnia Simental DO at [address] [facility], COLON, DIAGNOSTIC      KIDNEY SURGERY      OVARIAN CYST REMOVAL Left 11/06/2004    KS ESOPHAGOGASTRODUODENOSCOPY TRANSORAL DIAGNOSTIC N/A 11/14/2018    EGD ESOPHAGOGASTRODUODENOSCOPY WITH DILATION performed by Judson Atkins MD at [address]       Previous Medications    ARTIFICIAL TEAR SOLUTION (GENTEAL TEARS) 0.1-0.2-0.3 % SOLN    2 drops OU PRN dry, irritated, itchy eyes    BECLOMETHASONE (QVAR REDIHALER) 80 MCG/ACT AERB INHALER    Inhale 1 puff into the lungs in the morning and 1 puff in the evening. BECLOMETHASONE (QVAR) 80 MCG/ACT INHALER    Inhale 1 puff into the lungs 2 times daily    BLOOD GLUCOSE MONITOR KIT AND SUPPLIES    Use as directed up to TID    BLOOD GLUCOSE MONITOR STRIPS    Test once a day prn    CHOLECALCIFEROL (VITAMIN D3) 50 MCG (2000 UT) CAPS    TAKE 1 CAPSULE BY MOUTH DAILY    CYANOCOBALAMIN (B-12 SL)    Place under the tongue    EASY COMFORT LANCETS MISC        EMOLLIENT (EUCERIN) LOTION    Apply topically as needed. ESCITALOPRAM (LEXAPRO) 10 MG TABLET    Take 1 tablet by mouth daily    FAMOTIDINE (PEPCID) 20 MG TABLET    Take 1 tablet by mouth in the morning and 1 tablet before bedtime. Do all this for 14 days. FEXOFENADINE (ALLEGRA) 180 MG TABLET    Take 1 tablet by mouth daily    FEXOFENADINE (ALLEGRA) 180 MG TABLET    Take 1 tablet by mouth daily    HYDROXYZINE HCL (ATARAX) 25 MG TABLET    NANCY 1 TABLETA POR BOCA CADA 8 HORAS CUANDO SEA NECESARIO PARA EL PICOR    LANCETS MISC    1 each by Does not apply route daily    NYSTATIN (MYCOSTATIN) 191790 UNIT/GM CREAM    Apply topically 2 times daily.     PANTOPRAZOLE (PROTONIX) 40 MG TABLET    Take 1 tablet by mouth daily    POLYETHYL GLYCOL-PROPYL GLYCOL 0.4-0.3 % (SYSTANE) 0.4-0.3 % OPHTHALMIC SOLUTION    1 drop as needed for Dry Eyes    VITAMIN E 180 MG (400 UNIT) CAPS CAPSULE    Take 400 Units by mouth daily       ALLERGIES     Other and Seasonal    FAMILY HISTORY       Family History   Problem Relation Age of Onset    Diabetes Mother     High Blood Pressure Mother     Diabetes Brother     Diabetes Maternal Grandmother     Diabetes Maternal Grandfather     Diabetes Paternal Grandmother     Diabetes Paternal Grandfather     Diabetes Other     High Blood Pressure Sister     Colon Cancer Paternal Uncle           SOCIAL HISTORY       Social History     Socioeconomic History    Marital status:      Spouse name: None    Number of children: 1    Years of education: 6    Highest education level: 6th grade   Occupational History    Occupation: homemaker   Tobacco Use    Smoking status: Never    Smokeless tobacco: Never   Vaping Use    Vaping Use: Never used   Substance and Sexual Activity    Alcohol use: No    Drug use: No    Sexual activity: Yes     Partners: Male   Social History Narrative    Lives with her adaptive daughter Mateo Sharif in a home in [place]--is from [address] [facility] Determinants of Health     Financial Resource Strain: Low Risk     Difficulty of Paying Living Expenses: Not hard at all   Food Insecurity: No Food Insecurity    Worried About Running Out of Food in the Last Year: Never true    Ran Out of Food in the Last Year: Never true       SCREENINGS    Gali Coma Scale  Eye Opening: Spontaneous  Best Verbal Response: Oriented  Best Motor Response: Obeys commands  Gali Coma Scale Score: 15 @FLOW([id number])@      PHYSICAL EXAM    (up to 7 for level 4, 8 or more for level 5)     ED Triage Vitals [10/16/22 1457]   BP Temp Temp Source Heart Rate Resp SpO2 Height Weight   (!) 143/88 98.3 °F (36.8 °C) Temporal 96 18 100 % 5' 2\" (1.575 m) 190 lb (86.2 kg)       Physical Exam  Vitals and nursing note reviewed.    Constitutional:       Appearance: Normal appearance. She is well-developed. HENT:      Head: Normocephalic and atraumatic. Right Ear: External ear normal.      Left Ear: External ear normal.      Nose: Nose normal.   Eyes:      Pupils: Pupils are equal, round, and reactive to light. Cardiovascular:      Rate and Rhythm: Normal rate and regular rhythm. Heart sounds: Normal heart sounds. Pulmonary:      Effort: Pulmonary effort is normal. No respiratory distress. Breath sounds: Normal breath sounds. No wheezing or rales. Chest:      Chest wall: No tenderness. Abdominal:      General: Bowel sounds are normal.      Palpations: Abdomen is soft. Musculoskeletal:         General: Normal range of motion. Cervical back: Normal range of motion and neck supple. Skin:     General: Skin is warm and dry. Neurological:      Mental Status: She is alert and oriented to person, place, and time. Cranial Nerves: No cranial nerve deficit. Sensory: No sensory deficit. Motor: No abnormal muscle tone. Coordination: Coordination normal.      Deep Tendon Reflexes: Reflexes normal.   Psychiatric:         Behavior: Behavior normal.         Thought Content:  Thought content normal.         Judgment: Judgment normal.       DIAGNOSTIC RESULTS     EKG: All EKG's are interpreted by the Emergency Department Physician who either signs or Co-signsthis chart in the absence of a cardiologist.        RADIOLOGY:   [facility] such as CT, Ultrasound and MRI are read by the radiologist. Plain radiographic images are visualized and preliminarily interpreted by the emergency physician with the below findings:        Interpretation per the Radiologist below, if available at the time ofthis note:    No orders to display         ED BEDSIDE ULTRASOUND:   Performed by ED Physician - none    LABS:  Labs Reviewed   RAPID INFLUENZA A/B ANTIGENS - Abnormal; Notable for the following components:       Result Value    Influenza B by PCR POSITIVE (*)     All other components within normal limits   COVID-19, RAPID       All other labs were within normal range or not returned as of this dictation. EMERGENCY DEPARTMENT COURSE and DIFFERENTIAL DIAGNOSIS/MDM:   Vitals:    Vitals:    10/16/22 1457 10/16/22 1600 10/16/22 1632 10/16/22 1715   BP: (!) 143/88   (!) 147/84   Pulse: 96 85  89   Resp: 18 18  18   Temp: 98.3 °F (36.8 °C)      TempSrc: Temporal      SpO2: 100% 99% 99% 99%   Weight: 190 lb (86.2 kg)      Height: 5' 2\" (1.575 m)                   MDM  Number of Diagnoses or Management Options  Influenza B  Moderate persistent asthma with acute exacerbation  Diagnosis management comments: Years old with known history of asthma presented to the ED with cough congestion for the last 3 days had mild wheezing on exam on arrival given DuoNeb and Solu-Medrol felt significantly better patient was found to be positive for influenza B was started on Tamiflu in the ER and given a prescription to start tomorrow. Patient was discharged home in stable condition        Amount and/or Complexity of Data Reviewed  Tests in the radiology section of CPT®: ordered and reviewed             CONSULTS:  None    PROCEDURES:  Unless otherwise noted below, none     Procedures    FINAL IMPRESSION      1. Moderate persistent asthma with acute exacerbation    2. Influenza B          DISPOSITION/PLAN   DISPOSITION Decision To Discharge 10/16/2022 05:16:20 PM      PATIENT REFERRED TO:  No follow-up provider specified.     DISCHARGE MEDICATIONS:  New Prescriptions    ALBUTEROL SULFATE HFA (VENTOLIN HFA) 108 (90 BASE) MCG/ACT INHALER    Inhale 2 puffs into the lungs 4 times daily as needed for Wheezing    OSELTAMIVIR (TAMIFLU) 75 MG CAPSULE    Take 1 capsule by mouth 2 times daily for 5 days    PREDNISONE (DELTASONE) 20 MG TABLET    Take 1 tablet by mouth 2 times daily for 5 days          (Please note thatportions of this note were completed with a voice recognition program.  Efforts were made to edit the dictations but occasionally words are mis-transcribed.)    Martina Castle MD (electronically signed)  Attending Emergency Physician          Sammie Ying MD  10/16/22 [phone]

## 2022-10-17 ENCOUNTER — TELEPHONE (OUTPATIENT)
Dept: FAMILY MEDICINE CLINIC | Age: 67
End: 2022-10-17

## 2022-10-17 DIAGNOSIS — J45.20 MILD INTERMITTENT ASTHMA WITHOUT COMPLICATION: ICD-10-CM

## 2022-10-17 RX ORDER — ALBUTEROL SULFATE 90 UG/1
2 AEROSOL, METERED RESPIRATORY (INHALATION) EVERY 6 HOURS PRN
Qty: 18 G | Refills: 0 | Status: SHIPPED | OUTPATIENT
Start: 2022-10-17

## 2022-10-17 RX ORDER — ALBUTEROL SULFATE 90 UG/1
2 AEROSOL, METERED RESPIRATORY (INHALATION) 4 TIMES DAILY PRN
Qty: 18 G | Refills: 0 | Status: SHIPPED | OUTPATIENT
Start: 2022-10-17

## 2022-10-17 NOTE — TELEPHONE ENCOUNTER
Patients brother called in patient had to go into ER due to new inhalers not working for her.  They are asking if patient can please be put on previous inhalers that her previous provider had her on they were Qvar Inhaler and Albuterol Sulfate HFA     Please Advise Thank You

## 2022-10-18 ENCOUNTER — TELEPHONE (OUTPATIENT)
Dept: FAMILY MEDICINE CLINIC | Age: 67
End: 2022-10-18

## 2022-10-18 NOTE — TELEPHONE ENCOUNTER
Andres  from [facility] called back again needs her inhalers to have a [address] so she can get back on the Rxs that work for her accordingly they were sent to [address]  Please call pharmacy [phone] to give prior Auth for these Rxs    Please Advise Thank You

## 2022-10-19 ENCOUNTER — TELEPHONE (OUTPATIENT)
Dept: FAMILY MEDICINE CLINIC | Age: 67
End: 2022-10-19

## 2022-10-19 DIAGNOSIS — F39 MOOD DISORDER (HCC): Chronic | ICD-10-CM

## 2022-10-19 RX ORDER — ESCITALOPRAM OXALATE 10 MG/1
10 TABLET ORAL DAILY
Qty: 30 TABLET | Refills: 0 | Status: SHIPPED | OUTPATIENT
Start: 2022-10-19

## 2022-10-19 NOTE — TELEPHONE ENCOUNTER
Rx requested:  Requested Prescriptions     Pending Prescriptions Disp Refills    escitalopram (LEXAPRO) 10 MG tablet [Pharmacy Med Name: ESCITALOPRAM 10 MG TABLET 10 Tablet] 30 tablet 0     Sig: TAKE 1 TABLET BY MOUTH DAILY         Last Office Visit:   10/3/2022      Next Visit Date:  Future Appointments   Date Time Provider Ellie Dunbar   11/29/2022  1:30 PM Adrianna Powers MD [address] [place], [place] [address] [address]   12/2/2022 10:00 AM Elsie Turcios MD [address] [place] [address] [place]

## 2022-10-28 ENCOUNTER — TELEPHONE (OUTPATIENT)
Dept: FAMILY MEDICINE CLINIC | Age: 67
End: 2022-10-28

## 2022-10-28 NOTE — TELEPHONE ENCOUNTER
Patient's insurance states that they will not cover the qvar.  The patient has to try some of the following first; pulmacort flexhaler,dulera or symbicort

## 2022-10-30 RX ORDER — BUDESONIDE 0.5 MG/2ML
500 INHALANT ORAL 2 TIMES DAILY
Qty: 60 EACH | Refills: 3 | Status: SHIPPED | OUTPATIENT
Start: 2022-10-30 | End: 2022-11-17

## 2022-11-12 DIAGNOSIS — F39 MOOD DISORDER (HCC): Chronic | ICD-10-CM

## 2022-11-13 RX ORDER — ESCITALOPRAM OXALATE 10 MG/1
10 TABLET ORAL DAILY
Qty: 30 TABLET | Refills: 0 | Status: SHIPPED | OUTPATIENT
Start: 2022-11-13 | End: 2022-11-30 | Stop reason: SDUPTHER

## 2022-11-13 NOTE — TELEPHONE ENCOUNTER
requesting medication refill.  Please approve or deny this request.    Rx requested:  Requested Prescriptions     Pending Prescriptions Disp Refills    escitalopram (LEXAPRO) 10 MG tablet [Pharmacy Med Name: ESCITALOPRAM 10 MG TABLET 10 Tablet] 30 tablet 0     Sig: TAKE 1 TABLET BY MOUTH DAILY         Last Office Visit:   10/3/2022      Next Visit Date:  Future Appointments   Date Time Provider Ellie Dunbar   11/17/2022 12:30 PM Amrit Servin

## 2022-11-17 ENCOUNTER — OFFICE VISIT (OUTPATIENT)
Dept: FAMILY MEDICINE CLINIC | Age: 67
End: 2022-11-17
Payer: COMMERCIAL

## 2022-11-17 ENCOUNTER — TELEPHONE (OUTPATIENT)
Dept: FAMILY MEDICINE CLINIC | Age: 67
End: 2022-11-17

## 2022-11-17 VITALS
OXYGEN SATURATION: 98 % | WEIGHT: 198 LBS | HEIGHT: 62 IN | HEART RATE: 70 BPM | BODY MASS INDEX: 36.44 KG/M2 | SYSTOLIC BLOOD PRESSURE: 138 MMHG | TEMPERATURE: 96.1 F | DIASTOLIC BLOOD PRESSURE: 88 MMHG

## 2022-11-17 DIAGNOSIS — K21.9 GASTROESOPHAGEAL REFLUX DISEASE WITHOUT ESOPHAGITIS: ICD-10-CM

## 2022-11-17 DIAGNOSIS — F39 MOOD DISORDER (HCC): Chronic | ICD-10-CM

## 2022-11-17 DIAGNOSIS — J45.20 MILD INTERMITTENT ASTHMA WITHOUT COMPLICATION: ICD-10-CM

## 2022-11-17 DIAGNOSIS — Z76.89 ENCOUNTER TO ESTABLISH CARE: Primary | ICD-10-CM

## 2022-11-17 DIAGNOSIS — J45.20 MILD INTERMITTENT ASTHMA WITHOUT COMPLICATION: Chronic | ICD-10-CM

## 2022-11-17 DIAGNOSIS — N93.9 ABNORMAL UTERINE BLEEDING (AUB): ICD-10-CM

## 2022-11-17 PROCEDURE — G8484 FLU IMMUNIZE NO ADMIN: HCPCS | Performed by: PHYSICIAN ASSISTANT

## 2022-11-17 PROCEDURE — 1123F ACP DISCUSS/DSCN MKR DOCD: CPT | Performed by: PHYSICIAN ASSISTANT

## 2022-11-17 PROCEDURE — G8427 DOCREV CUR MEDS BY ELIG CLIN: HCPCS | Performed by: PHYSICIAN ASSISTANT

## 2022-11-17 PROCEDURE — 99214 OFFICE O/P EST MOD 30 MIN: CPT | Performed by: PHYSICIAN ASSISTANT

## 2022-11-17 PROCEDURE — G8400 PT W/DXA NO RESULTS DOC: HCPCS | Performed by: PHYSICIAN ASSISTANT

## 2022-11-17 PROCEDURE — 3017F COLORECTAL CA SCREEN DOC REV: CPT | Performed by: PHYSICIAN ASSISTANT

## 2022-11-17 PROCEDURE — 1090F PRES/ABSN URINE INCON ASSESS: CPT | Performed by: PHYSICIAN ASSISTANT

## 2022-11-17 PROCEDURE — G8417 CALC BMI ABV UP PARAM F/U: HCPCS | Performed by: PHYSICIAN ASSISTANT

## 2022-11-17 PROCEDURE — 1036F TOBACCO NON-USER: CPT | Performed by: PHYSICIAN ASSISTANT

## 2022-11-17 RX ORDER — ESCITALOPRAM OXALATE 10 MG/1
10 TABLET ORAL DAILY
Qty: 30 TABLET | Refills: 0 | Status: CANCELLED | OUTPATIENT
Start: 2022-11-17

## 2022-11-17 RX ORDER — ALBUTEROL SULFATE 90 UG/1
2 AEROSOL, METERED RESPIRATORY (INHALATION) EVERY 6 HOURS PRN
Qty: 18 G | Refills: 0 | Status: SHIPPED | OUTPATIENT
Start: 2022-11-17 | End: 2022-11-17 | Stop reason: ALTCHOICE

## 2022-11-17 RX ORDER — FLUTICASONE PROPIONATE 110 UG/1
2 AEROSOL, METERED RESPIRATORY (INHALATION) 2 TIMES DAILY
Qty: 3 EACH | Refills: 4 | Status: SHIPPED | OUTPATIENT
Start: 2022-11-17

## 2022-11-17 RX ORDER — HYDROXYZINE HYDROCHLORIDE 25 MG/1
TABLET, FILM COATED ORAL
Qty: 60 TABLET | Refills: 1 | Status: CANCELLED | OUTPATIENT
Start: 2022-11-17

## 2022-11-17 RX ORDER — PANTOPRAZOLE SODIUM 40 MG/1
40 TABLET, DELAYED RELEASE ORAL DAILY
Qty: 30 TABLET | Refills: 1 | Status: CANCELLED | OUTPATIENT
Start: 2022-11-17

## 2022-11-17 ASSESSMENT — PATIENT HEALTH QUESTIONNAIRE - PHQ9
SUM OF ALL RESPONSES TO PHQ QUESTIONS 1-9: 12
9. THOUGHTS THAT YOU WOULD BE BETTER OFF DEAD, OR OF HURTING YOURSELF: 0
7. TROUBLE CONCENTRATING ON THINGS, SUCH AS READING THE NEWSPAPER OR WATCHING TELEVISION: 0
3. TROUBLE FALLING OR STAYING ASLEEP: 2
SUM OF ALL RESPONSES TO PHQ QUESTIONS 1-9: 12
SUM OF ALL RESPONSES TO PHQ9 QUESTIONS 1 & 2: 4
6. FEELING BAD ABOUT YOURSELF - OR THAT YOU ARE A FAILURE OR HAVE LET YOURSELF OR YOUR FAMILY DOWN: 2
1. LITTLE INTEREST OR PLEASURE IN DOING THINGS: 2
SUM OF ALL RESPONSES TO PHQ QUESTIONS 1-9: 12
4. FEELING TIRED OR HAVING LITTLE ENERGY: 2
8. MOVING OR SPEAKING SO SLOWLY THAT OTHER PEOPLE COULD HAVE NOTICED. OR THE OPPOSITE, BEING SO FIGETY OR RESTLESS THAT YOU HAVE BEEN MOVING AROUND A LOT MORE THAN USUAL: 0
2. FEELING DOWN, DEPRESSED OR HOPELESS: 2
5. POOR APPETITE OR OVEREATING: 2
SUM OF ALL RESPONSES TO PHQ QUESTIONS 1-9: 12
10. IF YOU CHECKED OFF ANY PROBLEMS, HOW DIFFICULT HAVE THESE PROBLEMS MADE IT FOR YOU TO DO YOUR WORK, TAKE CARE OF THINGS AT HOME, OR GET ALONG WITH OTHER PEOPLE: 2

## 2022-11-17 ASSESSMENT — ENCOUNTER SYMPTOMS
BACK PAIN: 0
DIARRHEA: 0
COUGH: 1
CHEST TIGHTNESS: 0
ABDOMINAL PAIN: 0
WHEEZING: 1
SINUS PAIN: 0
SORE THROAT: 0
SINUS PRESSURE: 0
SHORTNESS OF BREATH: 0
VOMITING: 0
NAUSEA: 0

## 2022-11-17 ASSESSMENT — VISUAL ACUITY: OU: 1

## 2022-11-17 NOTE — TELEPHONE ENCOUNTER
Marj Viramontes called to tell us she talked to patient about the RX PULMICORT Patient says that her insurance doesn't cover it fully and this RX doesn't even work for her so she stopped picking it up.     Please Advise Thank You

## 2022-11-17 NOTE — PROGRESS NOTES
Subjective  Isidro Abdalla 1955 is a 74 y.o. female who presents today with:  Chief Complaint   Patient presents with    Establish Care    Discuss Medications     Albuterol is not working, wants on qvar again     Vaginal Bleeding     8 days ago ; some burning \"down there\"      Establishing Care. Was previously seen by Dr. Tracey Smith. Stu [id number]  HPI  Asthma  Patient is only taking albuterol inhaler. Patient reports she is having night time symptoms every night for the last 2 weeks. Patient was prescribed Qvar in the past, but insurance will not approve of the medication. She was started on budesonide nebulizer, but did not pick it up. She is complaining of wheezing. No fevers, chills, nausea, or vomiting. AUB  Patient was first seen about this by Dr. Enmanuel Yuan on 05/25/21. US sound endometrial thickening. D&C completed on 06/28/21 with biopsy showing polyps w/ hyperplasia but there was no atypia. Plan was to have laparoscopic LAVH BSO. Patient did not follow through with surgery. Was seen again for similar issue on 08/16/22 with Dr. Davide Swann and repeat biopsy showed hyperplasia but no atypia. Patient was instructed for same procedure. Still having bleeding again. Mood Disorder/Anxiety  Stable. In good mood. Taking lexapro. Complaint with medication. Anxiety managed with hydroxyzine. Patient does not participate in any relaxation/calming techniques. Denies hallucinations, SI/HI, or sleep disturbance. GERD  Well-controlled on prn Protonix, with some caffeine restriction, diet restriction. No weight loss. No abdominal pain. No bloody or black tarry stools. Review of Systems   Constitutional:  Negative for activity change, appetite change, chills and fever. HENT:  Negative for congestion, drooling, sinus pressure, sinus pain and sore throat. Eyes:  Negative for visual disturbance. Respiratory:  Positive for cough and wheezing.  Negative for chest tightness and shortness of breath. Cardiovascular:  Negative for chest pain. Gastrointestinal:  Negative for abdominal pain, diarrhea, nausea and vomiting. Endocrine: Negative for cold intolerance. Genitourinary:  Positive for vaginal bleeding. Negative for dysuria, flank pain, frequency and hematuria. Musculoskeletal:  Negative for arthralgias and back pain. Skin:  Negative for rash. Allergic/Immunologic: Negative for food allergies. Neurological:  Negative for weakness, light-headedness, numbness and headaches. Hematological:  Does not bruise/bleed easily.      Past Medical History:   Diagnosis Date    Abnormal pelvic ultrasound 10/8/2020    Acid reflux     Adenomatous colon polyp - repeat scope 02/2026 2/11/2021    Arthritis     Asthma     Chronic back pain     COPD (chronic obstructive pulmonary disease) ([facility] [address]     Depression     Hyperlipidemia     Obesity     Venous insufficiency      Past Surgical History:   Procedure Laterality Date    COLONOSCOPY      COLONOSCOPY N/A 2/3/2021    COLORECTAL CANCER SCREENING with polypectomies performed by Luis Manuel Roldan MD at [address] 6/28/2021    HYSTEROSCOPY  FX  D/C performed by Татьяна Gutierrez DO at [address] [facility], COLON, DIAGNOSTIC      KIDNEY SURGERY      OVARIAN CYST REMOVAL Left 11/06/2004    [place] ESOPHAGOGASTRODUODENOSCOPY TRANSORAL DIAGNOSTIC N/A 11/14/2018    EGD ESOPHAGOGASTRODUODENOSCOPY WITH DILATION performed by Jesica Hebert MD at [address] [facility] History    Marital status:      Spouse name: Not on file    Number of children: 1    Years of education: 6    Highest education level: 6th grade   Occupational History    Occupation: homemaker   Tobacco Use    Smoking status: Never    Smokeless tobacco: Never   Vaping Use    Vaping Use: Never used   Substance and Sexual Activity    Alcohol use: No    Drug use: No    Sexual activity: Yes     Partners: Male   Other Topics Concern    Not on file   Social History Narrative    Lives with her adaptive daughter Ar Bradshaw in a home in [address] [place] [address] [place]--is from [address] [place] [address] Strain: Low Risk     Difficulty of Paying Living Expenses: Not hard at all   Food Insecurity: No Food Insecurity    Worried About Running Out of Food in the Last Year: Never true    Ran Out of Food in the Last Year: Never true   Transportation Needs: Not on file   Physical Activity: Not on file   Stress: Not on file   Social Connections: Not on file   Intimate Partner Violence: Not on file   Housing Stability: Not on file     Family History   Problem Relation Age of Onset    Diabetes Mother     High Blood Pressure Mother     Diabetes Brother     Diabetes Maternal Grandmother     Diabetes Maternal Grandfather     Diabetes Paternal Grandmother     Diabetes Paternal Grandfather     Diabetes Other     High Blood Pressure Sister     Colon Cancer Paternal Uncle      Allergies   Allergen Reactions    Other      Perfumes     Seasonal      Current Outpatient Medications   Medication Sig Dispense Refill    fluticasone (FLOVENT HFA) 110 MCG/ACT inhaler Inhale 2 puffs into the lungs 2 times daily 3 each 4    escitalopram (LEXAPRO) 10 MG tablet TAKE 1 TABLET BY MOUTH DAILY 30 tablet 0    pantoprazole (PROTONIX) 40 MG tablet Take 1 tablet by mouth daily 30 tablet 1    hydrOXYzine HCl (ATARAX) 25 MG tablet NANCY 1 TABLETA POR BOCA CADA 8 HORAS CUANDO SEA NECESARIO PARA EL PICOR 60 tablet 1    Easy Comfort Lancets MISC       vitamin E 180 MG (400 UNIT) CAPS capsule Take 400 Units by mouth daily      Cholecalciferol (VITAMIN D3) 50 MCG (2000 UT) CAPS TAKE 1 CAPSULE BY MOUTH DAILY 90 capsule 4    blood glucose monitor strips Test once a day prn 100 strip 5    blood glucose monitor kit and supplies Use as directed up to TID 1 kit 0    Lancets MISC 1 each by Does not apply route daily 100 each 5    albuterol sulfate HFA (VENTOLIN HFA) 108 (90 Base) MCG/ACT inhaler Inhale 2 puffs into the lungs 4 times daily as needed for Wheezing 18 g 0    fexofenadine (ALLEGRA) 180 MG tablet Take 1 tablet by mouth daily (Patient not taking: Reported on 11/17/2022) 90 tablet 1    famotidine (PEPCID) 20 MG tablet Take 1 tablet by mouth in the morning and 1 tablet before bedtime. Do all this for 14 days. 28 tablet 0    polyethyl glycol-propyl glycol 0.4-0.3 % (SYSTANE) 0.4-0.3 % ophthalmic solution 1 drop as needed for Dry Eyes      Cyanocobalamin (B-12 SL) Place under the tongue (Patient not taking: Reported on 11/17/2022)      Artificial Tear Solution (GENTEAL TEARS) 0.1-0.2-0.3 % SOLN 2 drops OU PRN dry, irritated, itchy eyes (Patient not taking: No sig reported) 15 mL 12     No current facility-administered medications for this visit. PMH, Surgical Hx, Family Hx, and Social Hx reviewed and updated. Health Maintenance reviewed. Objective  Vitals:    11/17/22 1258   BP: 138/88   Site: Right Upper Arm   Position: Sitting   Cuff Size: Large Adult   Pulse: 70   Temp: (!) 96.1 °F (35.6 °C)   TempSrc: Temporal   SpO2: 98%   Weight: 198 lb (89.8 kg)   Height: 5' 2\" (1.575 m)     BP Readings from Last 3 Encounters:   11/17/22 138/88   10/16/22 (!) 147/84   10/03/22 124/80     Wt Readings from Last 3 Encounters:   11/17/22 198 lb (89.8 kg)   10/16/22 190 lb (86.2 kg)   10/03/22 198 lb (89.8 kg)       Lab Results   Component Value Date    LABA1C 5.6 08/12/2022    LABA1C 6.4 (H) 01/26/2022    LABA1C 5.8 07/26/2021     Lab Results   Component Value Date    CREATININE 0.77 08/12/2022     Lab Results   Component Value Date    ALT 27 08/12/2022    AST 26 08/12/2022     Lab Results   Component Value Date    CHOL 219 (H) 04/01/2015    TRIG 105 04/01/2015    HDL 50 09/18/2018    LDLCALC 153 (H) 09/18/2018          Physical Exam  Vitals and nursing note reviewed. Constitutional:       General: She is awake. She is not in acute distress. Appearance: Normal appearance. She is well-developed. She is not ill-appearing, toxic-appearing or diaphoretic. HENT:      Head: Normocephalic and atraumatic. Right Ear: Hearing and external ear normal.      Left Ear: Hearing and external ear normal.      Nose: Nose normal.   Eyes:      General: Lids are normal. Vision grossly intact. Gaze aligned appropriately. Conjunctiva/sclera: Conjunctivae normal.      Pupils: Pupils are equal, round, and reactive to light. Cardiovascular:      Rate and Rhythm: Normal rate and regular rhythm. Pulses: Normal pulses. Heart sounds: Normal heart sounds, S1 normal and S2 normal.   Pulmonary:      Effort: Pulmonary effort is normal.      Breath sounds: Normal breath sounds and air entry. Musculoskeletal:      Cervical back: Normal range of motion. Skin:     General: Skin is warm. Capillary Refill: Capillary refill takes less than 2 seconds. Neurological:      Mental Status: She is alert and oriented to person, place, and time. Gait: Gait is intact. Psychiatric:         Attention and Perception: Attention normal.         Mood and Affect: Mood normal.         Speech: Speech normal.         Behavior: Behavior normal. Behavior is cooperative. Grady N Renetta was seen today for establish care, discuss medications and vaginal bleeding. Diagnoses and all orders for this visit:    Encounter to establish care  - Patient was seen by Dr. Nabeel Pelayo and then later in the year by Dr. Andria Joe. Patient would like to establish with me. She cites that her reasoning to leaving Dr. Nabeel Pelayo was due to second opinion when told she was pre-diabetic and that she would benefit from seeing Dr. Andria Joe. Her reasoning in leaving Dr. Andria Joe was due to inhaler issues, which was due to insurance not covering the type of inhaler the patient was previously on. Mood disorder ([address]  Comments:  improving, well-controlled with SEs. Does not desire change in meds.     Gastroesophageal reflux disease without esophagitis  - stable well-controlled with medication. Continue current dose. Mild intermittent asthma without complication  Comments:  - Patient was not understanding that a PA was done for QVAR and that the insurance will not cover that medication. She believed that previous provider only sent albuterol, but after calling the pharmacy, staff members stated patient refused budesonide due to not having nebulizer. I re-explained with assistance of  that we cannot send for QVAR and that an alternative must be used. Patient reports she understands. I explained that I will send an inhaler similar to QVAR. Patient reports that she will  the medication and use it as directed. Will follow up in one month. Orders:  -     fluticasone (FLOVENT HFA) 110 MCG/ACT inhaler; Inhale 2 puffs into the lungs 2 times daily    Abnormal uterine bleeding (AUB)  - patient has hyperplasia of the endometrial lining w/o atypia. Patient still complaining of vaginal bleeding. Again, explained with the patient what both gynecologist have recommended in past appointments. At this time, I recommend her to f/u with gynecology in regards to uterine bleeding. No orders of the defined types were placed in this encounter. Orders Placed This Encounter   Medications    fluticasone (FLOVENT HFA) 110 MCG/ACT inhaler     Sig: Inhale 2 puffs into the lungs 2 times daily     Dispense:  3 each     Refill:  4       Medications Discontinued During This Encounter   Medication Reason    albuterol sulfate HFA (PROVENTIL;VENTOLIN;PROAIR) 108 (90 Base) MCG/ACT inhaler Therapy completed    beclomethasone (QVAR) 80 MCG/ACT inhaler Therapy completed    fexofenadine (ALLEGRA) 180 MG tablet LIST CLEANUP    Emollient (EUCERIN) lotion LIST CLEANUP    nystatin (MYCOSTATIN) 116851 UNIT/GM cream LIST CLEANUP    budesonide (PULMICORT) 0.5 MG/2ML nebulizer suspension LIST CLEANUP     No follow-ups on file.       Reviewed with the patient: current clinical status, medications, activities and diet. Side effects, adverse effects of the medication prescribed today, as well as treatment plan/ rationale and result expectations have been discussed with the patient who expresses understanding and desires to proceed. Close follow up to evaluate treatment results and for coordination of care. I have reviewed the patient's medical history in detail and updated the computerized patient record.     Nita Zuniga [place]

## 2022-11-18 NOTE — TELEPHONE ENCOUNTER
I have talked to Varun Agrawal from [facility]. She has told the patient she needs to use the flovent, but the patient has refused it so it is on hold. Patient has told Varun Agrawal that the flovent doesn't work but according to Varun Agrawal she has never used it. Meliton Hernandez has talked to pharmacy before I did and has talked to patient.

## 2022-11-18 NOTE — TELEPHONE ENCOUNTER
Interpretor: called patient back to urge patient to  the inhaler. Patient states,\"that she has inhaler filled October and she is taking another inhaler also and doesn't need the one that is currently at the pharmacy. \" Patient also states, \"that she has drops and needs a machine for it. \"   When looking at medication list only saw the albuterol & flovent inhaler. Not sure were she is getting the third inhaler at. I don't see anything that would need a machine and informed patient that the drops are for her dry eyes. However, patient was insisted that she needed a machine and that was not eye drops. Patient seems very confused as to medications she is taking.

## 2022-11-18 NOTE — TELEPHONE ENCOUNTER
Called pharmacy spoke with Gabi Payan who also spoke with PCP & patient yesterday in a three way conversation. Pharmacist's states that insurance will not cover any other inhaler's and must try Flovent first and if it doesn't work then they can try something else. Interpretor :translated that inhaler is ready for  at pharmacy.

## 2022-11-22 NOTE — TELEPHONE ENCOUNTER
With   [id number], I was able to get patient in than her December appointment to go over medications. I have stated multiple times to bring in all medications with the name and directions on them and all inhalers with name and directions on them. I was trying to get patient in tomorrow but she had piror obligations. She is scheduled for next week the 23rd.

## 2022-11-30 ENCOUNTER — OFFICE VISIT (OUTPATIENT)
Dept: FAMILY MEDICINE CLINIC | Age: 67
End: 2022-11-30
Payer: COMMERCIAL

## 2022-11-30 VITALS
RESPIRATION RATE: 17 BRPM | DIASTOLIC BLOOD PRESSURE: 78 MMHG | HEART RATE: 78 BPM | HEIGHT: 62 IN | OXYGEN SATURATION: 96 % | WEIGHT: 198 LBS | TEMPERATURE: 97.1 F | BODY MASS INDEX: 36.44 KG/M2 | SYSTOLIC BLOOD PRESSURE: 132 MMHG

## 2022-11-30 DIAGNOSIS — Z87.11 H/O GASTRIC ULCER: ICD-10-CM

## 2022-11-30 DIAGNOSIS — L29.9 ITCHING: Chronic | ICD-10-CM

## 2022-11-30 DIAGNOSIS — F39 MOOD DISORDER (HCC): Chronic | ICD-10-CM

## 2022-11-30 DIAGNOSIS — R51.9 ACUTE NONINTRACTABLE HEADACHE, UNSPECIFIED HEADACHE TYPE: ICD-10-CM

## 2022-11-30 DIAGNOSIS — R40.0 DAYTIME SLEEPINESS: ICD-10-CM

## 2022-11-30 DIAGNOSIS — K21.9 GASTROESOPHAGEAL REFLUX DISEASE WITHOUT ESOPHAGITIS: ICD-10-CM

## 2022-11-30 DIAGNOSIS — R06.83 SNORING: Primary | ICD-10-CM

## 2022-11-30 DIAGNOSIS — J30.89 CHRONIC NON-SEASONAL ALLERGIC RHINITIS: ICD-10-CM

## 2022-11-30 DIAGNOSIS — E55.9 VITAMIN D DEFICIENCY: Chronic | ICD-10-CM

## 2022-11-30 PROCEDURE — G8484 FLU IMMUNIZE NO ADMIN: HCPCS | Performed by: PHYSICIAN ASSISTANT

## 2022-11-30 PROCEDURE — 1090F PRES/ABSN URINE INCON ASSESS: CPT | Performed by: PHYSICIAN ASSISTANT

## 2022-11-30 PROCEDURE — 99214 OFFICE O/P EST MOD 30 MIN: CPT | Performed by: PHYSICIAN ASSISTANT

## 2022-11-30 PROCEDURE — 1123F ACP DISCUSS/DSCN MKR DOCD: CPT | Performed by: PHYSICIAN ASSISTANT

## 2022-11-30 PROCEDURE — G8427 DOCREV CUR MEDS BY ELIG CLIN: HCPCS | Performed by: PHYSICIAN ASSISTANT

## 2022-11-30 PROCEDURE — G8400 PT W/DXA NO RESULTS DOC: HCPCS | Performed by: PHYSICIAN ASSISTANT

## 2022-11-30 PROCEDURE — 3017F COLORECTAL CA SCREEN DOC REV: CPT | Performed by: PHYSICIAN ASSISTANT

## 2022-11-30 PROCEDURE — G8417 CALC BMI ABV UP PARAM F/U: HCPCS | Performed by: PHYSICIAN ASSISTANT

## 2022-11-30 PROCEDURE — 1036F TOBACCO NON-USER: CPT | Performed by: PHYSICIAN ASSISTANT

## 2022-11-30 RX ORDER — ACETAMINOPHEN 160 MG
TABLET,DISINTEGRATING ORAL
Qty: 90 CAPSULE | Refills: 4 | Status: SHIPPED | OUTPATIENT
Start: 2022-11-30

## 2022-11-30 RX ORDER — ESCITALOPRAM OXALATE 10 MG/1
10 TABLET ORAL DAILY
Qty: 30 TABLET | Refills: 5 | Status: SHIPPED | OUTPATIENT
Start: 2022-11-30

## 2022-11-30 RX ORDER — ACETAMINOPHEN 500 MG
500 TABLET ORAL EVERY 6 HOURS PRN
COMMUNITY

## 2022-11-30 RX ORDER — PANTOPRAZOLE SODIUM 40 MG/1
40 TABLET, DELAYED RELEASE ORAL DAILY
Qty: 30 TABLET | Refills: 6 | Status: SHIPPED | OUTPATIENT
Start: 2022-11-30

## 2022-11-30 RX ORDER — ALBUTEROL SULFATE 90 UG/1
2 AEROSOL, METERED RESPIRATORY (INHALATION) 4 TIMES DAILY PRN
Qty: 18 G | Refills: 3 | Status: SHIPPED | OUTPATIENT
Start: 2022-11-30

## 2022-11-30 ASSESSMENT — PATIENT HEALTH QUESTIONNAIRE - PHQ9
9. THOUGHTS THAT YOU WOULD BE BETTER OFF DEAD, OR OF HURTING YOURSELF: 0
8. MOVING OR SPEAKING SO SLOWLY THAT OTHER PEOPLE COULD HAVE NOTICED. OR THE OPPOSITE, BEING SO FIGETY OR RESTLESS THAT YOU HAVE BEEN MOVING AROUND A LOT MORE THAN USUAL: 0
SUM OF ALL RESPONSES TO PHQ QUESTIONS 1-9: 10
5. POOR APPETITE OR OVEREATING: 2
SUM OF ALL RESPONSES TO PHQ QUESTIONS 1-9: 10
1. LITTLE INTEREST OR PLEASURE IN DOING THINGS: 2
SUM OF ALL RESPONSES TO PHQ9 QUESTIONS 1 & 2: 4
6. FEELING BAD ABOUT YOURSELF - OR THAT YOU ARE A FAILURE OR HAVE LET YOURSELF OR YOUR FAMILY DOWN: 0
7. TROUBLE CONCENTRATING ON THINGS, SUCH AS READING THE NEWSPAPER OR WATCHING TELEVISION: 0
10. IF YOU CHECKED OFF ANY PROBLEMS, HOW DIFFICULT HAVE THESE PROBLEMS MADE IT FOR YOU TO DO YOUR WORK, TAKE CARE OF THINGS AT HOME, OR GET ALONG WITH OTHER PEOPLE: 2
2. FEELING DOWN, DEPRESSED OR HOPELESS: 2
SUM OF ALL RESPONSES TO PHQ QUESTIONS 1-9: 10
3. TROUBLE FALLING OR STAYING ASLEEP: 2
SUM OF ALL RESPONSES TO PHQ QUESTIONS 1-9: 10
4. FEELING TIRED OR HAVING LITTLE ENERGY: 2

## 2022-11-30 ASSESSMENT — ENCOUNTER SYMPTOMS
BLOOD IN STOOL: 0
CHEST TIGHTNESS: 0
VOMITING: 0
DIARRHEA: 0
COUGH: 0
ABDOMINAL DISTENTION: 1
SINUS PRESSURE: 0
BACK PAIN: 0
SHORTNESS OF BREATH: 0
SORE THROAT: 0
SINUS PAIN: 0
NAUSEA: 1
ABDOMINAL PAIN: 1

## 2022-11-30 ASSESSMENT — VISUAL ACUITY: OU: 1

## 2022-11-30 NOTE — PROGRESS NOTES
Sanna Pal 1955 is a 74 y.o. female who presents today with:  Chief Complaint   Patient presents with    Discuss Medications       HPI  Here to discuss confusion with Flovent HFA. Patient is complaint with medication. Picked up medication. Taking it as prescribed. Noticed improvement. No adverse effects from medication. Sleep Disturbance  Patient states she does not feel rested when waking. She states she still wants to sleep and has difficulty getting out of bed. Occasionally, patient wakes with headache. Patient's daughter confirms snoring but is unsure of apneic episode. Patient also complains of daytime sleepiness. Has not been evaluated for sleep apnea in the past.     GERD/Gastritis  Patient is complianing of bloating and indegestion. Has history of ulcer in the past. Patient reports she is complaint with her PPI, which seems to help with her symptoms significantly, however, patient is feel more nauseas and noticing nausea occurring when she is not eating. Patient denies fevers, hematemesis, melena. Patient would like to see GI doctor to get piece of mind. No previous history of H. Pylori. Review of Systems   Constitutional:  Negative for activity change, appetite change, chills and fever. HENT:  Negative for congestion, drooling, sinus pressure, sinus pain and sore throat. Eyes:  Negative for visual disturbance. Respiratory:  Negative for cough, chest tightness and shortness of breath. Cardiovascular:  Negative for chest pain. Gastrointestinal:  Positive for abdominal distention, abdominal pain and nausea. Negative for blood in stool, diarrhea and vomiting. Endocrine: Negative for cold intolerance. Genitourinary:  Negative for dysuria, flank pain, frequency and hematuria. Musculoskeletal:  Negative for arthralgias and back pain. Skin:  Negative for rash. Allergic/Immunologic: Negative for food allergies.    Neurological:  Negative for weakness, light-headedness, numbness and headaches. Hematological:  Does not bruise/bleed easily.      Past Medical History:   Diagnosis Date    Abnormal pelvic ultrasound 10/8/2020    Acid reflux     Adenomatous colon polyp - repeat scope 02/2026 2/11/2021    Arthritis     Asthma     Chronic back pain     COPD (chronic obstructive pulmonary disease) ([facility] [address]     Depression     Hyperlipidemia     Obesity     Venous insufficiency      Past Surgical History:   Procedure Laterality Date    COLONOSCOPY      COLONOSCOPY N/A 2/3/2021    COLORECTAL CANCER SCREENING with polypectomies performed by Ida Menard MD at [facility] 6/28/2021    HYSTEROSCOPY  FX  D/C performed by Sagar Stewart DO at [address] [facility], COLON, DIAGNOSTIC      KIDNEY SURGERY      OVARIAN CYST REMOVAL Left 11/06/2004    [place] ESOPHAGOGASTRODUODENOSCOPY TRANSORAL DIAGNOSTIC N/A 11/14/2018    EGD ESOPHAGOGASTRODUODENOSCOPY WITH DILATION performed by Mimi Kam MD at [address] History    Marital status:      Spouse name: Not on file    Number of children: 1    Years of education: 6    Highest education level: 6th grade   Occupational History    Occupation: homemaker   Tobacco Use    Smoking status: Never    Smokeless tobacco: Never   Vaping Use    Vaping Use: Never used   Substance and Sexual Activity    Alcohol use: No    Drug use: No    Sexual activity: Yes     Partners: Male   Other Topics Concern    Not on file   Social History Narrative    Lives with her adaptive daughter Josh Cohen in a home in [place]--is from [address] Resource Strain: Low Risk     Difficulty of Paying Living Expenses: Not hard at all   Food Insecurity: No Food Insecurity    Worried About Running Out of Food in the Last Year: Never true    [address] in the Last Year: Never true   Transportation Needs: Not on file Physical Activity: Not on file   Stress: Not on file   Social Connections: Not on file   Intimate Partner Violence: Not on file   Housing Stability: Not on file     Family History   Problem Relation Age of Onset    Diabetes Mother     High Blood Pressure Mother     Diabetes Brother     Diabetes Maternal Grandmother     Diabetes Maternal Grandfather     Diabetes Paternal Grandmother     Diabetes Paternal Grandfather     Diabetes Other     High Blood Pressure Sister     Colon Cancer Paternal Uncle      Allergies   Allergen Reactions    Other      Perfumes     Seasonal      Current Outpatient Medications   Medication Sig Dispense Refill    acetaminophen (TYLENOL) 500 MG tablet Take 500 mg by mouth every 6 hours as needed for Pain      escitalopram (LEXAPRO) 10 MG tablet Take 1 tablet by mouth daily 30 tablet 5    albuterol sulfate HFA (VENTOLIN HFA) 108 (90 Base) MCG/ACT inhaler Inhale 2 puffs into the lungs 4 times daily as needed for Wheezing 18 g 3    pantoprazole (PROTONIX) 40 MG tablet Take 1 tablet by mouth daily 30 tablet 6    Cholecalciferol (VITAMIN D3) 50 MCG (2000 UT) CAPS TAKE 1 CAPSULE BY MOUTH DAILY 90 capsule 4    fluticasone (FLOVENT HFA) 110 MCG/ACT inhaler Inhale 2 puffs into the lungs 2 times daily 3 each 4    hydrOXYzine HCl (ATARAX) 25 MG tablet NANCY 1 TABLETA POR BOCA CADA 8 HORAS CUANDO SEA NECESARIO PARA EL PICOR 60 tablet 1    Easy Comfort Lancets MISC       vitamin E 180 MG (400 UNIT) CAPS capsule Take 400 Units by mouth daily      blood glucose monitor strips Test once a day prn 100 strip 5    blood glucose monitor kit and supplies Use as directed up to TID 1 kit 0    Lancets MISC 1 each by Does not apply route daily 100 each 5     No current facility-administered medications for this visit. PMH, Surgical Hx, Family Hx, and Social Hx reviewed and updated. Health Maintenance reviewed.     Objective  Vitals:    11/30/22 1052   BP: 132/78   Pulse: 78   Resp: 17   Temp: 97.1 °F (36.2 °C) TempSrc: Temporal   SpO2: 96%   Weight: 198 lb (89.8 kg)   Height: 5' 2\" (1.575 m)     BP Readings from Last 3 Encounters:   11/30/22 132/78   11/17/22 138/88   10/16/22 (!) 147/84     Wt Readings from Last 3 Encounters:   11/30/22 198 lb (89.8 kg)   11/17/22 198 lb (89.8 kg)   10/16/22 190 lb (86.2 kg)       Lab Results   Component Value Date    LABA1C 5.6 08/12/2022    LABA1C 6.4 (H) 01/26/2022    LABA1C 5.8 07/26/2021     Lab Results   Component Value Date    CREATININE 0.77 08/12/2022     Lab Results   Component Value Date    ALT 27 08/12/2022    AST 26 08/12/2022     Lab Results   Component Value Date    CHOL 219 (H) 04/01/2015    TRIG 105 04/01/2015    HDL 50 09/18/2018    LDLCALC 153 (H) 09/18/2018          Physical Exam  Vitals and nursing note reviewed. Constitutional:       General: She is awake. She is not in acute distress. Appearance: Normal appearance. She is well-developed. She is not ill-appearing, toxic-appearing or diaphoretic. HENT:      Head: Normocephalic and atraumatic. Right Ear: Hearing and external ear normal.      Left Ear: Hearing and external ear normal.      Nose: Nose normal.   Eyes:      General: Lids are normal. Vision grossly intact. Gaze aligned appropriately. Conjunctiva/sclera: Conjunctivae normal.      Pupils: Pupils are equal, round, and reactive to light. Cardiovascular:      Rate and Rhythm: Normal rate and regular rhythm. Pulses: Normal pulses. Heart sounds: Normal heart sounds, S1 normal and S2 normal.   Pulmonary:      Effort: Pulmonary effort is normal.      Breath sounds: Normal breath sounds and air entry. Abdominal:      Tenderness: There is abdominal tenderness in the epigastric area. Musculoskeletal:      Cervical back: Normal range of motion. Skin:     General: Skin is warm. Capillary Refill: Capillary refill takes less than 2 seconds. Neurological:      Mental Status: She is alert and oriented to person, place, and time.  as needed.      Standing Status:   Future     Standing Expiration Date:   5/30/2023     Scheduling Instructions:      Scheduling and Pre-Certification: [phone]      The following must be completed and FAXED to [phone]      1) Sleep Evaluation Orders Form      2) Office note justifying study      3) Demographic info / insurance card     Order Specific Question:   Adult or Pediatric     Answer:   Adult Study (>7 Years)     Order Specific Question:   Location For Sleep Study     Answer:   [place]     Order Specific Question:   Select Sleep Lab Location     Answer:   [facility]     Comments:   Charles Mix       Orders Placed This Encounter   Medications    escitalopram (LEXAPRO) 10 MG tablet     Sig: Take 1 tablet by mouth daily     Dispense:  30 tablet     Refill:  5    albuterol sulfate HFA (VENTOLIN HFA) 108 (90 Base) MCG/ACT inhaler     Sig: Inhale 2 puffs into the lungs 4 times daily as needed for Wheezing     Dispense:  18 g     Refill:  3    pantoprazole (PROTONIX) 40 MG tablet     Sig: Take 1 tablet by mouth daily     Dispense:  30 tablet     Refill:  6    Cholecalciferol (VITAMIN D3) 50 MCG (2000 UT) CAPS     Sig: TAKE 1 CAPSULE BY MOUTH DAILY     Dispense:  90 capsule     Refill:  4       Medications Discontinued During This Encounter   Medication Reason    Artificial Tear Solution (GENTEAL TEARS) 0.1-0.2-0.3 % SOLN LIST CLEANUP    Cyanocobalamin (B-12 SL) LIST CLEANUP    polyethyl glycol-propyl glycol 0.4-0.3 % (SYSTANE) 0.4-0.3 % ophthalmic solution LIST CLEANUP    famotidine (PEPCID) 20 MG tablet LIST CLEANUP    fexofenadine (ALLEGRA) 180 MG tablet LIST CLEANUP    Cholecalciferol (VITAMIN D3) 50 MCG (2000 UT) CAPS REORDER    pantoprazole (PROTONIX) 40 MG tablet REORDER    albuterol sulfate HFA (VENTOLIN HFA) 108 (90 Base) MCG/ACT inhaler REORDER    escitalopram (LEXAPRO) 10 MG tablet REORDER     Return in about 6 months (around 5/30/2023), or if symptoms worsen or fail to improve. Reviewed with the patient: current clinical status, medications, activities and diet. Side effects, adverse effects of the medication prescribed today, as well as treatment plan/ rationale and result expectations have been discussed with the patient who expresses understanding and desires to proceed. Close follow up to evaluate treatment results and for coordination of care. I have reviewed the patient's medical history in detail and updated the computerized patient record.     Jorgito Vásquez

## 2022-11-30 NOTE — Clinical Note
Please send Sleep Study. Note is completed. Please make sure fax shows that patient is Luxembourgish speaking patient.

## 2022-12-05 DIAGNOSIS — K21.9 GASTROESOPHAGEAL REFLUX DISEASE WITHOUT ESOPHAGITIS: ICD-10-CM

## 2022-12-06 RX ORDER — PANTOPRAZOLE SODIUM 40 MG/1
40 TABLET, DELAYED RELEASE ORAL DAILY
Qty: 30 TABLET | Refills: 1 | OUTPATIENT
Start: 2022-12-06

## 2022-12-28 DIAGNOSIS — J30.89 CHRONIC NON-SEASONAL ALLERGIC RHINITIS: ICD-10-CM

## 2022-12-28 DIAGNOSIS — L29.9 ITCHING: Chronic | ICD-10-CM

## 2022-12-28 DIAGNOSIS — F39 MOOD DISORDER (HCC): Chronic | ICD-10-CM

## 2022-12-28 RX ORDER — HYDROXYZINE HYDROCHLORIDE 25 MG/1
TABLET, FILM COATED ORAL
Qty: 60 TABLET | Refills: 1 | Status: SHIPPED | OUTPATIENT
Start: 2022-12-28

## 2022-12-28 RX ORDER — ESCITALOPRAM OXALATE 10 MG/1
10 TABLET ORAL DAILY
Qty: 30 TABLET | Refills: 3 | Status: SHIPPED | OUTPATIENT
Start: 2022-12-28

## 2023-01-16 ENCOUNTER — TRANSCRIBE ORDERS (OUTPATIENT)
Dept: ADMINISTRATIVE | Age: 68
End: 2023-01-16

## 2023-01-16 DIAGNOSIS — Z12.31 ENCOUNTER FOR SCREENING MAMMOGRAM FOR MALIGNANT NEOPLASM OF BREAST: Primary | ICD-10-CM

## 2023-01-23 ENCOUNTER — OFFICE VISIT (OUTPATIENT)
Dept: GASTROENTEROLOGY | Age: 68
End: 2023-01-23
Payer: COMMERCIAL

## 2023-01-23 VITALS — HEIGHT: 62 IN | OXYGEN SATURATION: 99 % | BODY MASS INDEX: 36.62 KG/M2 | WEIGHT: 199 LBS | HEART RATE: 82 BPM

## 2023-01-23 DIAGNOSIS — R14.0 ABDOMINAL BLOATING: Primary | ICD-10-CM

## 2023-01-23 DIAGNOSIS — R14.2 BELCHING: ICD-10-CM

## 2023-01-23 DIAGNOSIS — E66.09 CLASS 2 OBESITY DUE TO EXCESS CALORIES WITHOUT SERIOUS COMORBIDITY WITH BODY MASS INDEX (BMI) OF 36.0 TO 36.9 IN ADULT: ICD-10-CM

## 2023-01-23 DIAGNOSIS — R10.13 EPIGASTRIC BURNING SENSATION: ICD-10-CM

## 2023-01-23 DIAGNOSIS — R14.3 FLATULENCE: ICD-10-CM

## 2023-01-23 DIAGNOSIS — Z86.010 HISTORY OF COLON POLYPS: ICD-10-CM

## 2023-01-23 PROCEDURE — G8427 DOCREV CUR MEDS BY ELIG CLIN: HCPCS | Performed by: INTERNAL MEDICINE

## 2023-01-23 PROCEDURE — 1123F ACP DISCUSS/DSCN MKR DOCD: CPT | Performed by: INTERNAL MEDICINE

## 2023-01-23 PROCEDURE — 99204 OFFICE O/P NEW MOD 45 MIN: CPT | Performed by: INTERNAL MEDICINE

## 2023-01-23 PROCEDURE — 1036F TOBACCO NON-USER: CPT | Performed by: INTERNAL MEDICINE

## 2023-01-23 PROCEDURE — 3017F COLORECTAL CA SCREEN DOC REV: CPT | Performed by: INTERNAL MEDICINE

## 2023-01-23 PROCEDURE — G8417 CALC BMI ABV UP PARAM F/U: HCPCS | Performed by: INTERNAL MEDICINE

## 2023-01-23 PROCEDURE — G8400 PT W/DXA NO RESULTS DOC: HCPCS | Performed by: INTERNAL MEDICINE

## 2023-01-23 PROCEDURE — 1090F PRES/ABSN URINE INCON ASSESS: CPT | Performed by: INTERNAL MEDICINE

## 2023-01-23 PROCEDURE — G8484 FLU IMMUNIZE NO ADMIN: HCPCS | Performed by: INTERNAL MEDICINE

## 2023-01-23 RX ORDER — OMEPRAZOLE 20 MG/1
20 CAPSULE, DELAYED RELEASE ORAL DAILY
COMMUNITY
End: 2023-01-23

## 2023-01-23 RX ORDER — PEPPERMINT OIL 90 MG
CAPSULE, DELAYED, AND EXTENDED RELEASE ORAL
Qty: 60 CAPSULE | Refills: 3 | Status: SHIPPED | OUTPATIENT
Start: 2023-01-23

## 2023-01-23 RX ORDER — OMEPRAZOLE 40 MG/1
40 CAPSULE, DELAYED RELEASE ORAL
Qty: 30 CAPSULE | Refills: 3 | Status: SHIPPED | OUTPATIENT
Start: 2023-01-23

## 2023-01-23 NOTE — PROGRESS NOTES
Gastroenterology Clinic Visit    Jess Berrios  [id number]  Chief Complaint   Patient presents with    New Patient     HPI: 74 y.o. female presents to the clinic with constellation of GI symptoms including nausea, abdominal bloating, belching, epigastric pain/burning sensation, increased flatulence. Patient reports symptoms for more than 3 years, has been progressively worsening. Was initially started on pantoprazole this did not help with symptoms hence was changed to omeprazole. Patient currently on omeprazole 20 mg once daily. Reports having 50% improvement in symptoms. She reports significant bloating throughout the day and worse with meals. She has stomach discomfort with epigastric burning sensation, nausea with food, gastroesophageal reflux symptoms. She denies any hematemesis or dysphagia. She does endorse significant abdominal bloating, increased flatulence. She reports having regular bowel movements. Previous GI work up/Endoscopic investigations:   Colonoscopy: 2/3/2021: Diminutive ascending and transverse colon polyp (smaller than 5 mm)  EGD 11/14/2018: Gastric polyp removed with snare, biopsy of the stomach negative for Helicobacter pylori, small hiatal hernia, dilated to 19 mm savory    Review of Systems   All other systems reviewed and are negative.      Past Medical History:   Diagnosis Date    Abnormal pelvic ultrasound 10/8/2020    Acid reflux     Adenomatous colon polyp - repeat scope 02/2026 2/11/2021    Arthritis     Asthma     Chronic back pain     COPD (chronic obstructive pulmonary disease) ([facility] [address]     Depression     Hyperlipidemia     Obesity     Venous insufficiency      Past Surgical History:   Procedure Laterality Date    COLONOSCOPY      COLONOSCOPY N/A 2/3/2021    COLORECTAL CANCER SCREENING with polypectomies performed by Priscilla Spangler MD at [address] N/A 6/28/2021    HYSTEROSCOPY  FX  D/C performed by Sue Mccartney DO at [address] [facility], COLON, DIAGNOSTIC      KIDNEY SURGERY      OVARIAN CYST REMOVAL Left 11/06/2004    [place] ESOPHAGOGASTRODUODENOSCOPY TRANSORAL DIAGNOSTIC N/A 11/14/2018    EGD ESOPHAGOGASTRODUODENOSCOPY WITH DILATION performed by Lauren Gallegos MD at [facility]     Current Outpatient Medications on File Prior to Visit   Medication Sig Dispense Refill    escitalopram (LEXAPRO) 10 MG tablet TAKE 1 TABLET BY MOUTH DAILY 30 tablet 3    hydrOXYzine HCl (ATARAX) 25 MG tablet NANCY 1 TABLETA POR BOCA CADA 8 HORAS CUANDO SEA NECESARIO PARA EL PICOR 60 tablet 1    acetaminophen (TYLENOL) 500 MG tablet Take 500 mg by mouth every 6 hours as needed for Pain      albuterol sulfate HFA (VENTOLIN HFA) 108 (90 Base) MCG/ACT inhaler Inhale 2 puffs into the lungs 4 times daily as needed for Wheezing 18 g 3    Cholecalciferol (VITAMIN D3) 50 MCG (2000 UT) CAPS TAKE 1 CAPSULE BY MOUTH DAILY 90 capsule 4    fluticasone (FLOVENT HFA) 110 MCG/ACT inhaler Inhale 2 puffs into the lungs 2 times daily 3 each 4    Easy Comfort Lancets MISC       vitamin E 180 MG (400 UNIT) CAPS capsule Take 400 Units by mouth daily      blood glucose monitor strips Test once a day prn 100 strip 5    blood glucose monitor kit and supplies Use as directed up to TID 1 kit 0    Lancets MISC 1 each by Does not apply route daily 100 each 5     No current facility-administered medications on file prior to visit.      Family History   Problem Relation Age of Onset    Diabetes Mother     High Blood Pressure Mother     Diabetes Brother     Diabetes Maternal Grandmother     Diabetes Maternal Grandfather     Diabetes Paternal Grandmother     Diabetes Paternal Grandfather     Diabetes Other     High Blood Pressure Sister     Colon Cancer Paternal Uncle      Social History     Socioeconomic History    Marital status:     Number of children: 1    Years of education: 6    Highest education level: 6th grade   Occupational History    Occupation: homemaker   Tobacco Use    Smoking status: Never    Smokeless tobacco: Never   Vaping Use    Vaping Use: Never used   Substance and Sexual Activity    Alcohol use: No    Drug use: No    Sexual activity: Yes     Partners: Male   Social History Narrative    Lives with her adaptive daughter Lonnie Mcconnell in a home in [address] [place] [address] [address] [place]--is from [place]     Pulse 82, height 5' 2\" (1.575 m), weight 199 lb (90.3 kg), SpO2 99 %, not currently breastfeeding. Physical Exam  Constitutional:       General: She is not in acute distress. Appearance: Normal appearance. She is well-developed. Comments: Central and generalized obesity   Eyes:      General: No scleral icterus. Cardiovascular:      Rate and Rhythm: Normal rate and regular rhythm. Pulmonary:      Effort: Pulmonary effort is normal.      Breath sounds: Normal breath sounds. Abdominal:      General: Bowel sounds are normal. There is no distension. Palpations: Abdomen is soft. There is no mass. Tenderness: There is no abdominal tenderness. There is no guarding or rebound. Musculoskeletal:         General: Normal range of motion. Lymphadenopathy:      Cervical: No cervical adenopathy. Neurological:      Mental Status: She is alert and oriented to person, place, and time. Psychiatric:         Behavior: Behavior normal.         Thought Content:  Thought content normal.         Judgment: Judgment normal.     Laboratory, Pathology, Radiology reviewed indetail with relevant important investigations summarized below:  Lab Results   Component Value Date    WBC 5.7 08/12/2022    HGB 14.5 08/12/2022    HCT 44.0 08/12/2022    MCV 96.5 08/12/2022     08/12/2022     Lab Results   Component Value Date    IRON 109 03/09/2015    TIBC 373 03/09/2015    FERRITIN 105.5 03/09/2015     No results found for: Boyd Mancilla   No results found for: FOLATE  Lab Results   Component Value Date    LABALBU 4.3 08/12/2022      Lab Results   Component Value Date    ALT 27 08/12/2022    AST 26 08/12/2022    ALKPHOS 100 08/12/2022    BILITOT 1.2 (H) 08/12/2022     CT Result (most recent):  CT CERVICAL SPINE WO CONTRAST 04/13/2021    Narrative  ADDENDED REPORT: DISREGARD PREVIOUS REPORTS. CT CERVICAL SPINE WO CONTRAST: 4/13/2021    CLINICAL HISTORY:  radicular left shoulder/arm pain extending from neck . COMPARISON: 10/23/2010. TECHNIQUE: ROUTINE    All CT scans at this facility use dose modulation, iterative reconstruction, and/or weight based dosing when appropriate to reduce radiation dose to as low as reasonably achievable. FINDINGS:    The spine is visualized from the craniovertebral junction through the T1-2 level. There is no fracture, dislocation, or acute paraspinous soft tissue abnormalities identified. Moderate CERVICAL spondylosis of the mid to lower levels has progressed since the prior study. There is mild anterolisthesis of C4 over C5 and mild left neural foraminal narrowing C4-5 and bilaterally at C5-6. There is no high-grade central spinal stenosis, other significant neural foraminal narrowing, or significant disc herniations identified. Impression  NO ACUTE FRACTURE OR EVIDENCE OF CERVICAL SPINE INJURY IDENTIFIED. CERVICAL SPONDYLOSIS, AS NOTED. Assessment and Plan:  74 y.o. female with constellation of upper GI symptoms suggestive of dyspepsia, known gastroesophageal reflux disease, abdominal bloating with gaseous distention further exacerbating symptoms  1. Abdominal bloating  -Detailed discussion regarding diet, avoid foods that may cause excess flatulence, gaseous distention  - Ancelmo Oil-Levomenthol (FDGARD) 25-20.75 MG CAPS; Take 2 capsule one to two times daily  Dispense: 60 capsule; Refill: 3    2. Belching  3. Epigastric burning sensation  -Antireflux measures  - omeprazole (PRILOSEC) 40 MG delayed release capsule; Take 1 capsule by mouth every morning (before breakfast)  Dispense: 30 capsule;  Refill: 3  - Ancelmo Oil-Levomenthol (FDGARD) 25-20.75 MG CAPS; Take 2 capsule one to two times daily  Dispense: 60 capsule; Refill: 3    4. Flatulence  - Avoid constipation    5. History of colon polyps  -Surveillance colonoscopy next due 2026    6. Class 2 obesity due to excess calories without serious comorbidity with body mass index (BMI) of 36.0 to 36.9 in adult  -Detailed discussion regarding importance of weight reduction, strict calorie count, relation of weight gain to reflux  -Many of the patient's symptoms are related to weight gain of 25 pounds over the last 3 to 4 years and excess flatulence/gaseous distention    Return in about 6 weeks (around 3/6/2023).    Fidel Banuelos MD   Staff Gastroenterologist  [facility]    Please note this report has been partially produced using speech recognition software and may cause or contain errors related to thatsystem including grammar, punctuation and spelling as well as words and phrases that may seem inappropriate. If there are questions or concerns please feel free to contact me to clarify.

## 2023-02-02 ENCOUNTER — HOSPITAL ENCOUNTER (OUTPATIENT)
Dept: WOMENS IMAGING | Age: 68
Discharge: HOME | End: 2023-02-04
Payer: COMMERCIAL

## 2023-02-02 DIAGNOSIS — Z12.31 ENCOUNTER FOR SCREENING MAMMOGRAM FOR MALIGNANT NEOPLASM OF BREAST: ICD-10-CM

## 2023-02-02 DIAGNOSIS — Z78.0 POST-MENOPAUSAL: ICD-10-CM

## 2023-02-02 PROCEDURE — 77063 BREAST TOMOSYNTHESIS BI: CPT

## 2023-02-02 PROCEDURE — 77080 DXA BONE DENSITY AXIAL: CPT

## 2023-02-15 DIAGNOSIS — K21.9 GASTROESOPHAGEAL REFLUX DISEASE WITHOUT ESOPHAGITIS: ICD-10-CM

## 2023-02-15 RX ORDER — PANTOPRAZOLE SODIUM 40 MG/1
40 TABLET, DELAYED RELEASE ORAL DAILY
Qty: 30 TABLET | Refills: 1 | OUTPATIENT
Start: 2023-02-15

## 2023-02-25 DIAGNOSIS — J30.89 CHRONIC NON-SEASONAL ALLERGIC RHINITIS: ICD-10-CM

## 2023-02-25 DIAGNOSIS — L29.9 ITCHING: Chronic | ICD-10-CM

## 2023-02-27 DIAGNOSIS — L29.9 ITCHING: Chronic | ICD-10-CM

## 2023-02-27 DIAGNOSIS — J30.89 CHRONIC NON-SEASONAL ALLERGIC RHINITIS: ICD-10-CM

## 2023-02-27 RX ORDER — HYDROXYZINE HYDROCHLORIDE 25 MG/1
TABLET, FILM COATED ORAL
Qty: 60 TABLET | Refills: 1 | OUTPATIENT
Start: 2023-02-27

## 2023-05-17 ENCOUNTER — OFFICE VISIT (OUTPATIENT)
Dept: FAMILY MEDICINE CLINIC | Age: 68
End: 2023-05-17
Payer: COMMERCIAL

## 2023-05-17 VITALS
DIASTOLIC BLOOD PRESSURE: 66 MMHG | SYSTOLIC BLOOD PRESSURE: 120 MMHG | BODY MASS INDEX: 32.15 KG/M2 | OXYGEN SATURATION: 99 % | TEMPERATURE: 97.8 F | WEIGHT: 193 LBS | HEART RATE: 68 BPM | HEIGHT: 65 IN

## 2023-05-17 DIAGNOSIS — E78.00 PURE HYPERCHOLESTEROLEMIA: Chronic | ICD-10-CM

## 2023-05-17 DIAGNOSIS — R73.03 PRE-DIABETES: ICD-10-CM

## 2023-05-17 DIAGNOSIS — R14.2 BELCHING: ICD-10-CM

## 2023-05-17 DIAGNOSIS — J45.20 MILD INTERMITTENT ASTHMA WITHOUT COMPLICATION: Primary | Chronic | ICD-10-CM

## 2023-05-17 DIAGNOSIS — E55.9 VITAMIN D DEFICIENCY: ICD-10-CM

## 2023-05-17 DIAGNOSIS — J45.21 MILD INTERMITTENT ASTHMA WITH ACUTE EXACERBATION: Chronic | ICD-10-CM

## 2023-05-17 DIAGNOSIS — J30.89 CHRONIC NON-SEASONAL ALLERGIC RHINITIS: ICD-10-CM

## 2023-05-17 DIAGNOSIS — L29.9 ITCHING: Chronic | ICD-10-CM

## 2023-05-17 DIAGNOSIS — R14.3 FLATULENCE: ICD-10-CM

## 2023-05-17 DIAGNOSIS — K21.9 GASTROESOPHAGEAL REFLUX DISEASE WITHOUT ESOPHAGITIS: ICD-10-CM

## 2023-05-17 DIAGNOSIS — R14.0 ABDOMINAL BLOATING: ICD-10-CM

## 2023-05-17 DIAGNOSIS — F39 MOOD DISORDER (HCC): Chronic | ICD-10-CM

## 2023-05-17 DIAGNOSIS — R10.13 EPIGASTRIC BURNING SENSATION: ICD-10-CM

## 2023-05-17 LAB
ANION GAP SERPL CALCULATED.3IONS-SCNC: 11 MEQ/L (ref 9–15)
BUN SERPL-MCNC: 14 MG/DL (ref 8–23)
CALCIUM SERPL-MCNC: 9.7 MG/DL (ref 8.5–9.9)
CHLORIDE SERPL-SCNC: 105 MEQ/L (ref 95–107)
CHOLEST SERPL-MCNC: 235 MG/DL (ref 0–199)
CO2 SERPL-SCNC: 26 MEQ/L (ref 20–31)
CREAT SERPL-MCNC: 0.77 MG/DL (ref 0.5–0.9)
GLUCOSE SERPL-MCNC: 96 MG/DL (ref 70–99)
HBA1C MFR BLD: 6.3 % (ref 4.8–5.9)
HDLC SERPL-MCNC: 45 MG/DL (ref 40–59)
LDLC SERPL CALC-MCNC: 152 MG/DL (ref 0–129)
POTASSIUM SERPL-SCNC: 4.8 MEQ/L (ref 3.4–4.9)
SODIUM SERPL-SCNC: 142 MEQ/L (ref 135–144)
TRIGL SERPL-MCNC: 190 MG/DL (ref 0–150)

## 2023-05-17 PROCEDURE — 1036F TOBACCO NON-USER: CPT | Performed by: PHYSICIAN ASSISTANT

## 2023-05-17 PROCEDURE — G8427 DOCREV CUR MEDS BY ELIG CLIN: HCPCS | Performed by: PHYSICIAN ASSISTANT

## 2023-05-17 PROCEDURE — 1090F PRES/ABSN URINE INCON ASSESS: CPT | Performed by: PHYSICIAN ASSISTANT

## 2023-05-17 PROCEDURE — 3017F COLORECTAL CA SCREEN DOC REV: CPT | Performed by: PHYSICIAN ASSISTANT

## 2023-05-17 PROCEDURE — G8399 PT W/DXA RESULTS DOCUMENT: HCPCS | Performed by: PHYSICIAN ASSISTANT

## 2023-05-17 PROCEDURE — 99214 OFFICE O/P EST MOD 30 MIN: CPT | Performed by: PHYSICIAN ASSISTANT

## 2023-05-17 PROCEDURE — G8417 CALC BMI ABV UP PARAM F/U: HCPCS | Performed by: PHYSICIAN ASSISTANT

## 2023-05-17 PROCEDURE — 1123F ACP DISCUSS/DSCN MKR DOCD: CPT | Performed by: PHYSICIAN ASSISTANT

## 2023-05-17 RX ORDER — OMEPRAZOLE 40 MG/1
40 CAPSULE, DELAYED RELEASE ORAL
Qty: 90 CAPSULE | Refills: 3 | Status: SHIPPED | OUTPATIENT
Start: 2023-05-17

## 2023-05-17 RX ORDER — GLUCOSAMINE HCL/CHONDROITIN SU 500-400 MG
CAPSULE ORAL
Qty: 100 STRIP | Refills: 5 | Status: SHIPPED | OUTPATIENT
Start: 2023-05-17

## 2023-05-17 RX ORDER — VITAMIN E 268 MG
400 CAPSULE ORAL DAILY
Qty: 90 CAPSULE | Refills: 3 | Status: SHIPPED | OUTPATIENT
Start: 2023-05-17

## 2023-05-17 RX ORDER — PEPPERMINT OIL 90 MG
CAPSULE, DELAYED, AND EXTENDED RELEASE ORAL
Qty: 90 CAPSULE | Refills: 3 | Status: SHIPPED | OUTPATIENT
Start: 2023-05-17

## 2023-05-17 RX ORDER — FLUTICASONE PROPIONATE 110 UG/1
2 AEROSOL, METERED RESPIRATORY (INHALATION) 2 TIMES DAILY
Qty: 3 EACH | Refills: 4 | Status: SHIPPED | OUTPATIENT
Start: 2023-05-17

## 2023-05-17 RX ORDER — ACETAMINOPHEN 160 MG
TABLET,DISINTEGRATING ORAL
Qty: 90 CAPSULE | Refills: 3 | Status: SHIPPED | OUTPATIENT
Start: 2023-05-17

## 2023-05-17 RX ORDER — ALBUTEROL SULFATE 90 UG/1
2 AEROSOL, METERED RESPIRATORY (INHALATION) 4 TIMES DAILY PRN
Qty: 3 EACH | Refills: 3 | Status: SHIPPED | OUTPATIENT
Start: 2023-05-17

## 2023-05-17 RX ORDER — LANCETS 30 GAUGE
1 EACH MISCELLANEOUS DAILY
Qty: 100 EACH | Refills: 5 | Status: SHIPPED | OUTPATIENT
Start: 2023-05-17

## 2023-05-17 RX ORDER — ESCITALOPRAM OXALATE 10 MG/1
10 TABLET ORAL DAILY
Qty: 90 TABLET | Refills: 3 | Status: SHIPPED | OUTPATIENT
Start: 2023-05-17

## 2023-05-17 RX ORDER — PANTOPRAZOLE SODIUM 40 MG/1
TABLET, DELAYED RELEASE ORAL
COMMUNITY
Start: 2023-02-16 | End: 2023-05-17

## 2023-05-17 SDOH — ECONOMIC STABILITY: FOOD INSECURITY: WITHIN THE PAST 12 MONTHS, THE FOOD YOU BOUGHT JUST DIDN'T LAST AND YOU DIDN'T HAVE MONEY TO GET MORE.: NEVER TRUE

## 2023-05-17 SDOH — ECONOMIC STABILITY: INCOME INSECURITY: HOW HARD IS IT FOR YOU TO PAY FOR THE VERY BASICS LIKE FOOD, HOUSING, MEDICAL CARE, AND HEATING?: NOT HARD AT ALL

## 2023-05-17 SDOH — ECONOMIC STABILITY: FOOD INSECURITY: WITHIN THE PAST 12 MONTHS, YOU WORRIED THAT YOUR FOOD WOULD RUN OUT BEFORE YOU GOT MONEY TO BUY MORE.: NEVER TRUE

## 2023-05-17 SDOH — ECONOMIC STABILITY: HOUSING INSECURITY
IN THE LAST 12 MONTHS, WAS THERE A TIME WHEN YOU DID NOT HAVE A STEADY PLACE TO SLEEP OR SLEPT IN A SHELTER (INCLUDING NOW)?: NO

## 2023-05-17 ASSESSMENT — ENCOUNTER SYMPTOMS
SHORTNESS OF BREATH: 0
CHEST TIGHTNESS: 0
ABDOMINAL PAIN: 0
VOMITING: 0
SINUS PRESSURE: 0
BACK PAIN: 0
SINUS PAIN: 0
NAUSEA: 0
COUGH: 0
SORE THROAT: 0
DIARRHEA: 0

## 2023-05-17 ASSESSMENT — PATIENT HEALTH QUESTIONNAIRE - PHQ9
SUM OF ALL RESPONSES TO PHQ9 QUESTIONS 1 & 2: 0
SUM OF ALL RESPONSES TO PHQ QUESTIONS 1-9: 0
1. LITTLE INTEREST OR PLEASURE IN DOING THINGS: 0
SUM OF ALL RESPONSES TO PHQ QUESTIONS 1-9: 0
SUM OF ALL RESPONSES TO PHQ QUESTIONS 1-9: 0
2. FEELING DOWN, DEPRESSED OR HOPELESS: 0
SUM OF ALL RESPONSES TO PHQ QUESTIONS 1-9: 0

## 2023-05-17 ASSESSMENT — VISUAL ACUITY: OU: 1

## 2023-05-17 NOTE — PATIENT INSTRUCTIONS
[facility], [facility]    On a 9 inch plate half should be fresh vegetables. If not fresh then frozen. One quarter of the plate should be protein like pork, chicken, fish, eggs. In one quarter of food plates should be healthy carbohydrates like fresh blueberries, blackberries, raspberries, strawberries. If not berries, then whole grains like brown rice or quinoa. Avoid foods that are white or light brown in color other than cauliflower, eggs, cheese. Avoid sweetened drinks. Avoid all juices. Avoid highly processed and fried foods.

## 2023-05-17 NOTE — PROGRESS NOTES
Sanna Martinez 1955 is a 79 y.o. female who presents today with:  Chief Complaint   Patient presents with    6 Month Follow-Up     : [id number]  Pt states at night time its getting hard for her to breathe, pt states she feels like she having palpations and she hurts in her neck     Asthma     Pt states her asthma been good, pt states she left her inhaler in [place].     Anxiety     Pt states her anxiety been better then before    Health Maintenance     N/A       HPI  Here for 6 month f/u    Fatigue/Sleep Disturbance  - did not complete sleep study. - patient is having  hard time breathing at night. - will schedule sleep study for patient. - sleep disturbance due to palpitations and neck pain. Previous hx of back and neck injury. Asthma  - On LABA/ICS and prn albuterol - less than 3 times a week. No significant cough, mild baseline shortness of breath, occasional wheezing, mild, occasional chest tightness. - Flovent working well. Left in [place]. Needs new one. Prediabetic  - last a1c on 08/12/22 5.6.  - trying to limit sweets. Not adherent to low-carb diet. - patient is to try and swim to help with her weight. Mood disorder  - non-compliant with medication. Patient cites pharmacy being a problem with regular changes in dosage, quantities, etc. Write believes patient is simply not taking the medication.   -states she is not having anxiety but notes trouble falling sleep at night, having palpitations. Patient reports atarax has helped in the past.     11/30/23  Here to discuss confusion with Flovent HFA. Patient is complaint with medication. Picked up medication. Taking it as prescribed. Noticed improvement. No adverse effects from medication. Sleep Disturbance  Patient states she does not feel rested when waking. She states she still wants to sleep and has difficulty getting out of bed. Occasionally, patient wakes with headache. Patient's daughter confirms

## 2023-05-17 NOTE — TELEPHONE ENCOUNTER
Per Bho, attempted to schedule sleep study. Had to lvm with my phone and patient phone. Refaxed order that was in [place]. Spoke to sleep lab and they state they tried 5x to call patient to schedule but no response from patient. I advised that I refaxed order and they state they will call her within 48 hrs to schedule. They are aware that she is Algerian speaking.

## 2023-05-17 NOTE — CONSULTS
Session ID: [id number]  Request LF:[id number]  Language:Swazi  Status:Fulfilled  Agent UR:#[id number]  Agent Name:Kelton

## 2023-05-18 LAB — VITAMIN D 25-HYDROXY: 52.9 NG/ML

## 2023-06-07 ENCOUNTER — TELEPHONE (OUTPATIENT)
Dept: FAMILY MEDICINE CLINIC | Age: 68
End: 2023-06-07

## 2023-06-07 NOTE — TELEPHONE ENCOUNTER
----- Message from Mirella Tuttle sent at 6/7/2023 11:13 AM EDT -----  Subject: Message to Provider    QUESTIONS  Information for Provider? Pt speaks Tajik only and will need and   . She has [employer] dual insurance, beginning July 1, will call   with information or can call insurance rep Nikita Cervantes - [phone] Daughter,   Eleanor Peterson will be attending appt, and she does speak English - Nelida's   number [phone]  ---------------------------------------------------------------------------  --------------  Savannah PRETTY  [phone]; OK to leave message on voicemail  ---------------------------------------------------------------------------  --------------  SCRIPT ANSWERS  Relationship to Patient? Other/Third Party  Representative Name? Shamika  Is the representative on the Communication Release of Information (MYCHAL)   form in Epic?  Yes

## 2023-06-07 NOTE — TELEPHONE ENCOUNTER
Patient needed to contact Gastro. I spoke with daughter, Marissa Vidales and transferred her to [place].

## 2023-08-07 ENCOUNTER — OFFICE VISIT (OUTPATIENT)
Dept: FAMILY MEDICINE CLINIC | Age: 68
End: 2023-08-07
Payer: COMMERCIAL

## 2023-08-07 VITALS
BODY MASS INDEX: 31.69 KG/M2 | DIASTOLIC BLOOD PRESSURE: 85 MMHG | WEIGHT: 190.2 LBS | OXYGEN SATURATION: 99 % | SYSTOLIC BLOOD PRESSURE: 137 MMHG | HEART RATE: 62 BPM | HEIGHT: 65 IN | TEMPERATURE: 97.8 F

## 2023-08-07 DIAGNOSIS — J45.20 MILD INTERMITTENT ASTHMA WITHOUT COMPLICATION: Chronic | ICD-10-CM

## 2023-08-07 DIAGNOSIS — F32.0 CURRENT MILD EPISODE OF MAJOR DEPRESSIVE DISORDER, UNSPECIFIED WHETHER RECURRENT (HCC): ICD-10-CM

## 2023-08-07 DIAGNOSIS — E55.9 VITAMIN D DEFICIENCY: ICD-10-CM

## 2023-08-07 DIAGNOSIS — R40.0 DAYTIME SLEEPINESS: ICD-10-CM

## 2023-08-07 DIAGNOSIS — K21.9 GASTROESOPHAGEAL REFLUX DISEASE WITHOUT ESOPHAGITIS: ICD-10-CM

## 2023-08-07 DIAGNOSIS — R06.83 SNORING: ICD-10-CM

## 2023-08-07 DIAGNOSIS — R03.0 ELEVATED BLOOD PRESSURE READING: ICD-10-CM

## 2023-08-07 DIAGNOSIS — Z76.89 ENCOUNTER TO ESTABLISH CARE WITH NEW DOCTOR: Primary | ICD-10-CM

## 2023-08-07 DIAGNOSIS — R73.03 PRE-DIABETES: ICD-10-CM

## 2023-08-07 PROCEDURE — 1123F ACP DISCUSS/DSCN MKR DOCD: CPT | Performed by: STUDENT IN AN ORGANIZED HEALTH CARE EDUCATION/TRAINING PROGRAM

## 2023-08-07 PROCEDURE — 99215 OFFICE O/P EST HI 40 MIN: CPT | Performed by: STUDENT IN AN ORGANIZED HEALTH CARE EDUCATION/TRAINING PROGRAM

## 2023-08-07 RX ORDER — FLUTICASONE PROPIONATE 110 UG/1
2 AEROSOL, METERED RESPIRATORY (INHALATION) 2 TIMES DAILY
Qty: 3 EACH | Refills: 4 | Status: SHIPPED | OUTPATIENT
Start: 2023-08-07

## 2023-08-07 RX ORDER — HYDROXYZINE HYDROCHLORIDE 25 MG/1
25 TABLET, FILM COATED ORAL 3 TIMES DAILY PRN
COMMUNITY

## 2023-08-07 ASSESSMENT — ENCOUNTER SYMPTOMS
EYE DISCHARGE: 0
SHORTNESS OF BREATH: 0
DIARRHEA: 0
ABDOMINAL PAIN: 0
COUGH: 0
SORE THROAT: 0
RHINORRHEA: 0
NAUSEA: 1
VOMITING: 0
WHEEZING: 0

## 2023-08-07 NOTE — PROGRESS NOTES
Head: Normocephalic and atraumatic. Eyes:      Conjunctiva/sclera: Conjunctivae normal.   Neck:      Thyroid: No thyromegaly or thyroid tenderness. Cardiovascular:      Rate and Rhythm: Normal rate and regular rhythm. Heart sounds: No murmur heard. No friction rub. No gallop. Pulmonary:      Effort: Pulmonary effort is normal.      Breath sounds: Normal breath sounds. No wheezing, rhonchi or rales. Abdominal:      General: Bowel sounds are normal. There is no distension. Palpations: Abdomen is soft. Tenderness: There is no abdominal tenderness. Musculoskeletal:         General: No swelling or deformity. Cervical back: Normal range of motion and neck supple. Right lower leg: No edema. Left lower leg: No edema. Lymphadenopathy:      Cervical: No cervical adenopathy. Skin:     General: Skin is warm and dry. Findings: No rash. Neurological:      General: No focal deficit present. Mental Status: She is alert. Gait: Gait is intact. Psychiatric:         Mood and Affect: Mood normal.         Behavior: Behavior normal.          Assessment & Plan       1. Encounter to establish care with new doctor  Patient with appointment to establish care with new physician. Previous records reviewed. 2. Mild intermittent asthma without complication  Not optimally controlled. Discussed this at length. Instructed patient that she should be taking Flovent twice a day every day. She should only be using albuterol as needed. She voiced understanding. All questions answered. Flovent refilled. - fluticasone (FLOVENT HFA) 110 MCG/ACT inhaler; Inhale 2 puffs into the lungs 2 times daily  Dispense: 3 each; Refill: 4    3. Elevated blood pressure reading  Patient with elevated blood pressure reading. Will hold off on meds at this time. Consider starting ACE or ARB at next appointment given history of prediabetes. 4. Pre-diabetes  Stable, chronic.   Not currently on

## 2023-08-09 ENCOUNTER — TELEPHONE (OUTPATIENT)
Dept: FAMILY MEDICINE CLINIC | Age: 68
End: 2023-08-09

## 2023-08-10 NOTE — TELEPHONE ENCOUNTER
Response received from [facility], Flovent HFA is available at the amount listed in plan documents, no PA is needed.

## 2023-08-15 ENCOUNTER — OFFICE VISIT (OUTPATIENT)
Dept: GASTROENTEROLOGY | Age: 68
End: 2023-08-15
Payer: COMMERCIAL

## 2023-08-15 ENCOUNTER — PREP FOR PROCEDURE (OUTPATIENT)
Dept: GASTROENTEROLOGY | Age: 68
End: 2023-08-15

## 2023-08-15 VITALS — WEIGHT: 190 LBS | HEIGHT: 65 IN | BODY MASS INDEX: 31.65 KG/M2 | HEART RATE: 71 BPM | OXYGEN SATURATION: 99 %

## 2023-08-15 DIAGNOSIS — K21.9 GASTROESOPHAGEAL REFLUX DISEASE WITHOUT ESOPHAGITIS: ICD-10-CM

## 2023-08-15 DIAGNOSIS — R11.0 NAUSEA: ICD-10-CM

## 2023-08-15 DIAGNOSIS — Z87.19 HISTORY OF HIATAL HERNIA: ICD-10-CM

## 2023-08-15 DIAGNOSIS — R14.0 ABDOMINAL BLOATING: Primary | ICD-10-CM

## 2023-08-15 DIAGNOSIS — R14.2 BELCHING: ICD-10-CM

## 2023-08-15 PROBLEM — R14.3 FLATULENCE, ERUCTATION, AND GAS PAIN: Status: ACTIVE | Noted: 2023-08-15

## 2023-08-15 PROBLEM — R14.1 FLATULENCE, ERUCTATION, AND GAS PAIN: Status: ACTIVE | Noted: 2023-08-15

## 2023-08-15 PROCEDURE — 99214 OFFICE O/P EST MOD 30 MIN: CPT | Performed by: INTERNAL MEDICINE

## 2023-08-15 PROCEDURE — 1123F ACP DISCUSS/DSCN MKR DOCD: CPT | Performed by: INTERNAL MEDICINE

## 2023-08-15 NOTE — PROGRESS NOTES
Gastroenterology Clinic Follow up Visit    Tobias Neff  [id number]  Chief Complaint   Patient presents with    Follow-up     HPI: 79 y.o. female following up after last GI clinic on 1/23/2023     Due to language barrier, an  was present during the history-taking and subsequent discussion (and for part of the physical exam) with this patient. Interval change: Patient with complaints of nausea, excessive gas, worsening belching and bloating, symptoms of bad taste in the mouth specially in the morning, symptoms of excessive phlegm in the morning associated with blood/crusty mucus. She does report milk as being one of the triggers. Review of her chart reveals patient has had a number of the symptoms for at least 5 to 6 years. S/p upper endoscopy in 2018 with evidence for small hiatal hernia. At last GI clinic visit was advised to take omeprazole and FD guard, she could not obtain the [address] but has continued to be on omeprazole with suboptimal improvement in symptoms. She was in [facility] for a number of months where she reports her symptoms were significantly reduced. At this time patient also reports not being able to sleep. She does have stress. Diabetes reasonably well controlled. She reports having regular bowel movements    HPI from last GI clinic visit on 1/23/2023 summarized below:  74 y.o. female presents to the clinic with constellation of GI symptoms including nausea, abdominal bloating, belching, epigastric pain/burning sensation, increased flatulence. Patient reports symptoms for more than 3 years, has been progressively worsening. Was initially started on pantoprazole this did not help with symptoms hence was changed to omeprazole. Patient currently on omeprazole 20 mg once daily. Reports having 50% improvement in symptoms. She reports significant bloating throughout the day and worse with meals.   She has stomach discomfort with epigastric burning sensation, nausea

## 2023-08-18 RX ORDER — SODIUM CHLORIDE 0.9 % (FLUSH) 0.9 %
5-40 SYRINGE (ML) INJECTION EVERY 12 HOURS SCHEDULED
Status: CANCELLED | OUTPATIENT
Start: 2023-08-18

## 2023-08-18 RX ORDER — SODIUM CHLORIDE 9 MG/ML
INJECTION, SOLUTION INTRAVENOUS CONTINUOUS
Status: CANCELLED | OUTPATIENT
Start: 2023-08-18

## 2023-08-18 RX ORDER — SODIUM CHLORIDE 9 MG/ML
INJECTION, SOLUTION INTRAVENOUS PRN
Status: CANCELLED | OUTPATIENT
Start: 2023-08-18

## 2023-08-21 ENCOUNTER — HOSPITAL ENCOUNTER (OUTPATIENT)
Age: 68
Setting detail: OUTPATIENT SURGERY
Discharge: HOME | End: 2023-08-21
Attending: INTERNAL MEDICINE | Admitting: INTERNAL MEDICINE
Payer: MEDICAID

## 2023-08-21 ENCOUNTER — ANESTHESIA (OUTPATIENT)
Dept: ENDOSCOPY | Age: 68
End: 2023-08-21
Payer: MEDICAID

## 2023-08-21 ENCOUNTER — ANESTHESIA EVENT (OUTPATIENT)
Dept: ENDOSCOPY | Age: 68
End: 2023-08-21
Payer: MEDICAID

## 2023-08-21 VITALS
HEIGHT: 65 IN | BODY MASS INDEX: 31.65 KG/M2 | DIASTOLIC BLOOD PRESSURE: 62 MMHG | SYSTOLIC BLOOD PRESSURE: 108 MMHG | RESPIRATION RATE: 18 BRPM | OXYGEN SATURATION: 93 % | TEMPERATURE: 97.1 F | HEART RATE: 71 BPM | WEIGHT: 190 LBS

## 2023-08-21 DIAGNOSIS — R14.1 FLATULENCE, ERUCTATION, AND GAS PAIN: ICD-10-CM

## 2023-08-21 DIAGNOSIS — R14.2 FLATULENCE, ERUCTATION, AND GAS PAIN: ICD-10-CM

## 2023-08-21 DIAGNOSIS — K21.9 ESOPHAGEAL REFLUX: ICD-10-CM

## 2023-08-21 DIAGNOSIS — R11.0 NAUSEA: ICD-10-CM

## 2023-08-21 DIAGNOSIS — Z87.19 HEALED YELLOW ATROPHY OF LIVER: ICD-10-CM

## 2023-08-21 DIAGNOSIS — R14.3 FLATULENCE, ERUCTATION, AND GAS PAIN: ICD-10-CM

## 2023-08-21 LAB
GLUCOSE BLD-MCNC: 94 MG/DL (ref 70–99)
PERFORMED ON: NORMAL

## 2023-08-21 PROCEDURE — 3700000000 HC ANESTHESIA ATTENDED CARE: Performed by: INTERNAL MEDICINE

## 2023-08-21 PROCEDURE — 88305 TISSUE EXAM BY PATHOLOGIST: CPT

## 2023-08-21 PROCEDURE — 7100000010 HC PHASE II RECOVERY - FIRST 15 MIN: Performed by: INTERNAL MEDICINE

## 2023-08-21 PROCEDURE — 43239 EGD BIOPSY SINGLE/MULTIPLE: CPT | Performed by: INTERNAL MEDICINE

## 2023-08-21 PROCEDURE — 88342 IMHCHEM/IMCYTCHM 1ST ANTB: CPT

## 2023-08-21 PROCEDURE — 6370000000 HC RX 637 (ALT 250 FOR IP): Performed by: INTERNAL MEDICINE

## 2023-08-21 PROCEDURE — 3609017100 HC EGD: Performed by: INTERNAL MEDICINE

## 2023-08-21 PROCEDURE — 3700000001 HC ADD 15 MINUTES (ANESTHESIA): Performed by: INTERNAL MEDICINE

## 2023-08-21 PROCEDURE — 6360000002 HC RX W HCPCS: Performed by: NURSE ANESTHETIST, CERTIFIED REGISTERED

## 2023-08-21 PROCEDURE — 2580000003 HC RX 258: Performed by: INTERNAL MEDICINE

## 2023-08-21 PROCEDURE — 2709999900 HC NON-CHARGEABLE SUPPLY: Performed by: INTERNAL MEDICINE

## 2023-08-21 PROCEDURE — 7100000011 HC PHASE II RECOVERY - ADDTL 15 MIN: Performed by: INTERNAL MEDICINE

## 2023-08-21 RX ORDER — SODIUM CHLORIDE 0.9 % (FLUSH) 0.9 %
5-40 SYRINGE (ML) INJECTION PRN
Status: DISCONTINUED | OUTPATIENT
Start: 2023-08-21 | End: 2023-08-21 | Stop reason: HOSPADM

## 2023-08-21 RX ORDER — SODIUM CHLORIDE 9 MG/ML
INJECTION, SOLUTION INTRAVENOUS CONTINUOUS
Status: DISCONTINUED | OUTPATIENT
Start: 2023-08-21 | End: 2023-08-21 | Stop reason: HOSPADM

## 2023-08-21 RX ORDER — SODIUM CHLORIDE 0.9 % (FLUSH) 0.9 %
5-40 SYRINGE (ML) INJECTION EVERY 12 HOURS SCHEDULED
Status: DISCONTINUED | OUTPATIENT
Start: 2023-08-21 | End: 2023-08-21 | Stop reason: HOSPADM

## 2023-08-21 RX ORDER — MAGNESIUM HYDROXIDE 1200 MG/15ML
LIQUID ORAL PRN
Status: DISCONTINUED | OUTPATIENT
Start: 2023-08-21 | End: 2023-08-21 | Stop reason: ALTCHOICE

## 2023-08-21 RX ORDER — SODIUM CHLORIDE 9 MG/ML
INJECTION, SOLUTION INTRAVENOUS
Status: COMPLETED
Start: 2023-08-21 | End: 2023-08-21

## 2023-08-21 RX ORDER — ONDANSETRON 2 MG/ML
4 INJECTION INTRAMUSCULAR; INTRAVENOUS
Status: DISCONTINUED | OUTPATIENT
Start: 2023-08-21 | End: 2023-08-21 | Stop reason: HOSPADM

## 2023-08-21 RX ORDER — SIMETHICONE 20 MG/.3ML
EMULSION ORAL PRN
Status: DISCONTINUED | OUTPATIENT
Start: 2023-08-21 | End: 2023-08-21 | Stop reason: ALTCHOICE

## 2023-08-21 RX ORDER — SODIUM CHLORIDE 9 MG/ML
INJECTION, SOLUTION INTRAVENOUS PRN
Status: DISCONTINUED | OUTPATIENT
Start: 2023-08-21 | End: 2023-08-21 | Stop reason: HOSPADM

## 2023-08-21 RX ORDER — PROPOFOL 10 MG/ML
INJECTION, EMULSION INTRAVENOUS PRN
Status: DISCONTINUED | OUTPATIENT
Start: 2023-08-21 | End: 2023-08-21 | Stop reason: SDUPTHER

## 2023-08-21 RX ADMIN — PROPOFOL 100 MG: 10 INJECTION, EMULSION INTRAVENOUS at 11:09

## 2023-08-21 RX ADMIN — PROPOFOL 200 MG: 10 INJECTION, EMULSION INTRAVENOUS at 11:06

## 2023-08-21 RX ADMIN — PROPOFOL 50 MG: 10 INJECTION, EMULSION INTRAVENOUS at 11:12

## 2023-08-21 RX ADMIN — SODIUM CHLORIDE: 9 INJECTION, SOLUTION INTRAVENOUS at 10:13

## 2023-08-21 ASSESSMENT — PAIN SCALES - GENERAL
PAINLEVEL_OUTOF10: 0
PAINLEVEL_OUTOF10: 0

## 2023-08-21 ASSESSMENT — PAIN - FUNCTIONAL ASSESSMENT: PAIN_FUNCTIONAL_ASSESSMENT: 0-10

## 2023-08-21 NOTE — ANESTHESIA POSTPROCEDURE EVALUATION
Department of Anesthesiology  Postprocedure Note    Patient: Valentine Giron  MRN: [id number]  YOB: 1955  Date of evaluation: 8/21/2023      Procedure Summary     Date: 08/21/23 Room / Location: [address] [place] [address]    Anesthesia Start: 1101 Anesthesia Stop: 1115    Procedure: EGD DIAGNOSTIC ONLY with gastric biopsies Diagnosis:       Flatulence, eructation, and gas pain      Nausea      Esophageal reflux      Healed yellow atrophy of liver      (Flatulence, eructation, and gas pain [R14.3, R14.1, R14.2])      (Nausea [R11.0])      (Esophageal reflux [K21.9])      (Healed yellow atrophy of liver [Z87.19])    Surgeons: Alexander Martines MD Responsible Provider: ASAEL Hawkins CRNA    Anesthesia Type: MAC ASA Status: 3          Anesthesia Type: No value filed.     Fady Phase I: Fady Score: 10    Fady Phase II:        Anesthesia Post Evaluation    Patient location during evaluation: bedside  Patient participation: complete - patient participated  Level of consciousness: awake and awake and alert  Airway patency: patent  Nausea & Vomiting: no nausea and no vomiting  Complications: no  Cardiovascular status: blood pressure returned to baseline and hemodynamically stable  Respiratory status: acceptable  Hydration status: euvolemic  Pain management: adequate

## 2023-08-21 NOTE — H&P
(VITAMIN D3) 50 MCG ([facility]) CAPS TAKE 1 CAPSULE BY MOUTH DAILY 5/17/23   KRISTAL Goetz   escitalopram (LEXAPRO) 10 MG tablet Take 1 tablet by mouth daily 5/17/23   KRISTAL Goetz   vitamin E 180 MG (400 UNIT) CAPS capsule Take 1 capsule by mouth daily 5/17/23   KRISTAL Goetz   blood glucose monitor strips Test once a day prn 5/17/23   KRISTAL Goetz   Lancets MISC 1 each by Does not apply route daily 5/17/23   KRISTAL Goezt   acetaminophen (TYLENOL) 500 MG tablet Take 1 tablet by mouth every 6 hours as needed for Pain    Historical Provider, MD   blood glucose monitor kit and supplies Use as directed up to TID 12/2/21   Bay Guillermo MD     Allergies:    Allergies   Allergen Reactions    Other      Perfumes     Seasonal       History of allergic reaction to anesthesia:  No  Past Medical History:  Past Medical History:   Diagnosis Date    Abnormal pelvic ultrasound 10/8/2020    Acid reflux     Adenomatous colon polyp - repeat scope 02/2026 2/11/2021    Arthritis     Asthma     Chronic back pain     COPD (chronic obstructive pulmonary disease) ([facility])     Depression     Hyperlipidemia     Obesity     Venous insufficiency      Past Surgical History:  Past Surgical History:   Procedure Laterality Date    COLONOSCOPY      COLONOSCOPY N/A 2/3/2021    COLORECTAL CANCER SCREENING with polypectomies performed by Alexander Martines MD at [facility] 6/28/2021    HYSTEROSCOPY  FX  D/C performed by Donald Villagomez DO at [address], COLON, DIAGNOSTIC      KIDNEY SURGERY      OVARIAN CYST REMOVAL Left 11/06/2004    [place] ESOPHAGOGASTRODUODENOSCOPY TRANSORAL DIAGNOSTIC N/A 11/14/2018    EGD ESOPHAGOGASTRODUODENOSCOPY WITH DILATION performed by Jose Martin Mercado MD at [address] History:  Social History     Tobacco Use    Smoking status: Never    Smokeless tobacco: Never   Vaping Use    Vaping Use: Never used   Substance Use Topics

## 2023-09-06 ENCOUNTER — OFFICE VISIT (OUTPATIENT)
Dept: FAMILY MEDICINE CLINIC | Age: 68
End: 2023-09-06
Payer: COMMERCIAL

## 2023-09-06 VITALS
BODY MASS INDEX: 31.49 KG/M2 | HEART RATE: 63 BPM | DIASTOLIC BLOOD PRESSURE: 68 MMHG | WEIGHT: 189 LBS | OXYGEN SATURATION: 97 % | RESPIRATION RATE: 12 BRPM | HEIGHT: 65 IN | TEMPERATURE: 97 F | SYSTOLIC BLOOD PRESSURE: 136 MMHG

## 2023-09-06 DIAGNOSIS — R10.2 SUPRAPUBIC PRESSURE: ICD-10-CM

## 2023-09-06 DIAGNOSIS — R30.0 DYSURIA: ICD-10-CM

## 2023-09-06 DIAGNOSIS — Z11.3 SCREEN FOR STD (SEXUALLY TRANSMITTED DISEASE): ICD-10-CM

## 2023-09-06 DIAGNOSIS — A49.8 CITROBACTER INFECTION: Primary | ICD-10-CM

## 2023-09-06 DIAGNOSIS — N30.00 ACUTE CYSTITIS WITHOUT HEMATURIA: ICD-10-CM

## 2023-09-06 LAB
BILIRUBIN, POC: NORMAL
BLOOD URINE, POC: NORMAL
CLARITY, POC: CLEAR
COLOR, POC: YELLOW
GLUCOSE URINE, POC: NORMAL
KETONES, POC: NORMAL
LEUKOCYTE EST, POC: NORMAL
NITRITE, POC: NORMAL
PH, POC: 6
PROTEIN, POC: NORMAL
SPECIFIC GRAVITY, POC: 1.02
UROBILINOGEN, POC: NORMAL

## 2023-09-06 PROCEDURE — G8399 PT W/DXA RESULTS DOCUMENT: HCPCS | Performed by: NURSE PRACTITIONER

## 2023-09-06 PROCEDURE — 1090F PRES/ABSN URINE INCON ASSESS: CPT | Performed by: NURSE PRACTITIONER

## 2023-09-06 PROCEDURE — 1036F TOBACCO NON-USER: CPT | Performed by: NURSE PRACTITIONER

## 2023-09-06 PROCEDURE — 1123F ACP DISCUSS/DSCN MKR DOCD: CPT | Performed by: NURSE PRACTITIONER

## 2023-09-06 PROCEDURE — G8417 CALC BMI ABV UP PARAM F/U: HCPCS | Performed by: NURSE PRACTITIONER

## 2023-09-06 PROCEDURE — 3017F COLORECTAL CA SCREEN DOC REV: CPT | Performed by: NURSE PRACTITIONER

## 2023-09-06 PROCEDURE — G8427 DOCREV CUR MEDS BY ELIG CLIN: HCPCS | Performed by: NURSE PRACTITIONER

## 2023-09-06 RX ORDER — PHENAZOPYRIDINE HYDROCHLORIDE 200 MG/1
200 TABLET, FILM COATED ORAL 3 TIMES DAILY PRN
Qty: 6 TABLET | Refills: 0 | Status: SHIPPED | OUTPATIENT
Start: 2023-09-06 | End: 2023-09-09

## 2023-09-06 ASSESSMENT — ENCOUNTER SYMPTOMS
ABDOMINAL DISTENTION: 0
ABDOMINAL PAIN: 1
DIARRHEA: 0

## 2023-09-06 NOTE — PROGRESS NOTES
Subjective  Carmen Door, 79 y.o. female presents today with:  Chief Complaint   Patient presents with    Urinary Tract Infection     Patient present today with possible UTI. burning when I urinate and pain on her lower abdomen. She has tried hibiscus water but it had not work       HPI  Presents to [facility] for urinary concerns   Symptoms ongoing over the last couple of weeks   She has concern of STI. Partner with other women. Dysuria & suprapubic pain   Nausea when she eats certain foods. Denies emesis.    Denies diarrhea   Denies hematuria   Denies flank pain   Denies urinary frequency/urgency   Denies fever or chills  Denies vaginal discharge or itching   Eating and drinking        services used for today's visit               Past Medical History:   Diagnosis Date    Abnormal pelvic ultrasound 10/8/2020    Acid reflux     Adenomatous colon polyp - repeat scope 02/2026 2/11/2021    Arthritis     Asthma     Chronic back pain     COPD (chronic obstructive pulmonary disease) ([address] [facility])     Depression     Hyperlipidemia     Obesity     Venous insufficiency       Past Surgical History:   Procedure Laterality Date    COLONOSCOPY      COLONOSCOPY N/A 2/3/2021    COLORECTAL CANCER SCREENING with polypectomies performed by Fani Tapia MD at [address] [facility] 6/28/2021    HYSTEROSCOPY  FX  D/C performed by Terry Spencer DO at [address], COLON, DIAGNOSTIC      KIDNEY SURGERY      OVARIAN CYST REMOVAL Left 11/06/2004    [place] ESOPHAGOGASTRODUODENOSCOPY TRANSORAL DIAGNOSTIC N/A 11/14/2018    EGD ESOPHAGOGASTRODUODENOSCOPY WITH DILATION performed by Ian Mi MD at [address] [place] [address] 8/21/2023    EGD DIAGNOSTIC ONLY with gastric biopsies performed by Fani Tapia MD at [address]     Family History   Problem Relation Age of Onset    Diabetes Mother     High Blood Pressure Mother     High Blood

## 2023-09-08 LAB
BACTERIA UR CULT: ABNORMAL
BACTERIA UR CULT: ABNORMAL
C TRACH DNA UR QL NAA+PROBE: NEGATIVE
N GONORRHOEA DNA UR QL NAA+PROBE: NEGATIVE
ORGANISM: ABNORMAL
SPECIMEN SOURCE: NORMAL
T VAGINALIS RRNA SPEC QL NAA+PROBE: NEGATIVE

## 2023-09-08 RX ORDER — LEVOFLOXACIN 500 MG/1
500 TABLET, FILM COATED ORAL DAILY
Qty: 10 TABLET | Refills: 0 | Status: SHIPPED | OUTPATIENT
Start: 2023-09-08 | End: 2023-09-18

## 2023-09-24 NOTE — TELEPHONE ENCOUNTER
requesting medication refill.  Please approve or deny this request.    Rx requested:  Requested Prescriptions     Pending Prescriptions Disp Refills    escitalopram (LEXAPRO) 10 MG tablet [Pharmacy Med Name: ESCITALOPRAM 10 MG TABLET 10 Tablet] 30 tablet 0     Sig: TAKE 1 TABLET BY MOUTH DAILY    hydrOXYzine HCl (ATARAX) 25 MG tablet [Pharmacy Med Name: HYDROXYZINE HCL 25 MG TABS 25 Tablet] 60 tablet 1     Sig: NANCY 1 TABLETA POR BOCA CADA [address]    pantoprazole (PROTONIX) 40 MG tablet [Pharmacy Med Name: PANTOPRAZOLE SOD DR 40 MG T 40 Tablet] 30 tablet 1     Sig: TAKE 1 TABLET BY MOUTH DAILY         Last Office Visit:   8/12/2022      Next Visit Date:  Future Appointments   Date Time Provider Ellie Dunbar   9/30/2022  1:15 PM Gala Day  [place], [place] [address] DISPLAY PLAN FREE TEXT show

## 2023-10-17 ENCOUNTER — OFFICE VISIT (OUTPATIENT)
Dept: FAMILY MEDICINE CLINIC | Age: 68
End: 2023-10-17
Payer: MEDICAID

## 2023-10-17 VITALS
HEART RATE: 80 BPM | WEIGHT: 188.2 LBS | DIASTOLIC BLOOD PRESSURE: 70 MMHG | SYSTOLIC BLOOD PRESSURE: 110 MMHG | BODY MASS INDEX: 31.36 KG/M2 | TEMPERATURE: 97.2 F | OXYGEN SATURATION: 98 % | HEIGHT: 65 IN

## 2023-10-17 VITALS
BODY MASS INDEX: 31.36 KG/M2 | OXYGEN SATURATION: 98 % | HEIGHT: 65 IN | HEART RATE: 80 BPM | TEMPERATURE: 97.2 F | WEIGHT: 188.2 LBS | DIASTOLIC BLOOD PRESSURE: 70 MMHG | SYSTOLIC BLOOD PRESSURE: 110 MMHG

## 2023-10-17 DIAGNOSIS — J45.21 MILD INTERMITTENT ASTHMA WITH ACUTE EXACERBATION: Chronic | ICD-10-CM

## 2023-10-17 DIAGNOSIS — G56.03 BILATERAL CARPAL TUNNEL SYNDROME: Chronic | ICD-10-CM

## 2023-10-17 DIAGNOSIS — R73.03 PRE-DIABETES: ICD-10-CM

## 2023-10-17 DIAGNOSIS — Z23 NEED FOR INFLUENZA VACCINATION: ICD-10-CM

## 2023-10-17 DIAGNOSIS — H93.13 TINNITUS OF BOTH EARS: ICD-10-CM

## 2023-10-17 DIAGNOSIS — R10.13 EPIGASTRIC BURNING SENSATION: Primary | ICD-10-CM

## 2023-10-17 DIAGNOSIS — Z23 NEED FOR PNEUMOCOCCAL VACCINATION: ICD-10-CM

## 2023-10-17 DIAGNOSIS — Z00.00 MEDICARE ANNUAL WELLNESS VISIT, SUBSEQUENT: Primary | ICD-10-CM

## 2023-10-17 DIAGNOSIS — R14.2 BELCHING: ICD-10-CM

## 2023-10-17 PROCEDURE — 1036F TOBACCO NON-USER: CPT | Performed by: STUDENT IN AN ORGANIZED HEALTH CARE EDUCATION/TRAINING PROGRAM

## 2023-10-17 PROCEDURE — G8484 FLU IMMUNIZE NO ADMIN: HCPCS | Performed by: STUDENT IN AN ORGANIZED HEALTH CARE EDUCATION/TRAINING PROGRAM

## 2023-10-17 PROCEDURE — G0008 ADMIN INFLUENZA VIRUS VAC: HCPCS | Performed by: STUDENT IN AN ORGANIZED HEALTH CARE EDUCATION/TRAINING PROGRAM

## 2023-10-17 PROCEDURE — 1123F ACP DISCUSS/DSCN MKR DOCD: CPT | Performed by: STUDENT IN AN ORGANIZED HEALTH CARE EDUCATION/TRAINING PROGRAM

## 2023-10-17 PROCEDURE — G8427 DOCREV CUR MEDS BY ELIG CLIN: HCPCS | Performed by: STUDENT IN AN ORGANIZED HEALTH CARE EDUCATION/TRAINING PROGRAM

## 2023-10-17 PROCEDURE — 99215 OFFICE O/P EST HI 40 MIN: CPT | Performed by: STUDENT IN AN ORGANIZED HEALTH CARE EDUCATION/TRAINING PROGRAM

## 2023-10-17 PROCEDURE — 90677 PCV20 VACCINE IM: CPT | Performed by: STUDENT IN AN ORGANIZED HEALTH CARE EDUCATION/TRAINING PROGRAM

## 2023-10-17 PROCEDURE — G8417 CALC BMI ABV UP PARAM F/U: HCPCS | Performed by: STUDENT IN AN ORGANIZED HEALTH CARE EDUCATION/TRAINING PROGRAM

## 2023-10-17 PROCEDURE — 90694 VACC AIIV4 NO PRSRV 0.5ML IM: CPT | Performed by: STUDENT IN AN ORGANIZED HEALTH CARE EDUCATION/TRAINING PROGRAM

## 2023-10-17 PROCEDURE — G8399 PT W/DXA RESULTS DOCUMENT: HCPCS | Performed by: STUDENT IN AN ORGANIZED HEALTH CARE EDUCATION/TRAINING PROGRAM

## 2023-10-17 PROCEDURE — 3017F COLORECTAL CA SCREEN DOC REV: CPT | Performed by: STUDENT IN AN ORGANIZED HEALTH CARE EDUCATION/TRAINING PROGRAM

## 2023-10-17 PROCEDURE — G0009 ADMIN PNEUMOCOCCAL VACCINE: HCPCS | Performed by: STUDENT IN AN ORGANIZED HEALTH CARE EDUCATION/TRAINING PROGRAM

## 2023-10-17 PROCEDURE — 1090F PRES/ABSN URINE INCON ASSESS: CPT | Performed by: STUDENT IN AN ORGANIZED HEALTH CARE EDUCATION/TRAINING PROGRAM

## 2023-10-17 PROCEDURE — G0439 PPPS, SUBSEQ VISIT: HCPCS | Performed by: STUDENT IN AN ORGANIZED HEALTH CARE EDUCATION/TRAINING PROGRAM

## 2023-10-17 RX ORDER — GLUCOSAMINE HCL/CHONDROITIN SU 500-400 MG
CAPSULE ORAL
Qty: 100 STRIP | Refills: 5 | Status: SHIPPED | OUTPATIENT
Start: 2023-10-17 | End: 2023-10-18 | Stop reason: SDUPTHER

## 2023-10-17 RX ORDER — OMEPRAZOLE 40 MG/1
40 CAPSULE, DELAYED RELEASE ORAL
Qty: 90 CAPSULE | Refills: 3 | Status: SHIPPED | OUTPATIENT
Start: 2023-10-17

## 2023-10-17 ASSESSMENT — PATIENT HEALTH QUESTIONNAIRE - PHQ9
8. MOVING OR SPEAKING SO SLOWLY THAT OTHER PEOPLE COULD HAVE NOTICED. OR THE OPPOSITE, BEING SO FIGETY OR RESTLESS THAT YOU HAVE BEEN MOVING AROUND A LOT MORE THAN USUAL: 0
SUM OF ALL RESPONSES TO PHQ QUESTIONS 1-9: 3
4. FEELING TIRED OR HAVING LITTLE ENERGY: 2
6. FEELING BAD ABOUT YOURSELF - OR THAT YOU ARE A FAILURE OR HAVE LET YOURSELF OR YOUR FAMILY DOWN: 0
SUM OF ALL RESPONSES TO PHQ9 QUESTIONS 1 & 2: 1
10. IF YOU CHECKED OFF ANY PROBLEMS, HOW DIFFICULT HAVE THESE PROBLEMS MADE IT FOR YOU TO DO YOUR WORK, TAKE CARE OF THINGS AT HOME, OR GET ALONG WITH OTHER PEOPLE: 0
1. LITTLE INTEREST OR PLEASURE IN DOING THINGS: 1
2. FEELING DOWN, DEPRESSED OR HOPELESS: 0
9. THOUGHTS THAT YOU WOULD BE BETTER OFF DEAD, OR OF HURTING YOURSELF: 0
SUM OF ALL RESPONSES TO PHQ QUESTIONS 1-9: 3
5. POOR APPETITE OR OVEREATING: 0
7. TROUBLE CONCENTRATING ON THINGS, SUCH AS READING THE NEWSPAPER OR WATCHING TELEVISION: 0
SUM OF ALL RESPONSES TO PHQ QUESTIONS 1-9: 3
SUM OF ALL RESPONSES TO PHQ QUESTIONS 1-9: 3
3. TROUBLE FALLING OR STAYING ASLEEP: 0

## 2023-10-17 ASSESSMENT — COLUMBIA-SUICIDE SEVERITY RATING SCALE - C-SSRS
4. HAVE YOU HAD THESE THOUGHTS AND HAD SOME INTENTION OF ACTING ON THEM?: NO
5. HAVE YOU STARTED TO WORK OUT OR WORKED OUT THE DETAILS OF HOW TO KILL YOURSELF? DO YOU INTEND TO CARRY OUT THIS PLAN?: NO
3. HAVE YOU BEEN THINKING ABOUT HOW YOU MIGHT KILL YOURSELF?: NO
7. DID THIS OCCUR IN THE LAST THREE MONTHS: NO

## 2023-10-17 ASSESSMENT — ENCOUNTER SYMPTOMS
RHINORRHEA: 0
NAUSEA: 0
WHEEZING: 0
SORE THROAT: 0
SHORTNESS OF BREATH: 0
COUGH: 0
DIARRHEA: 0
EYE DISCHARGE: 0
VOMITING: 0
ABDOMINAL PAIN: 0

## 2023-10-17 NOTE — PATIENT INSTRUCTIONS
Incorporated disclaims any warranty or liability for your use of this information. Personalized Preventive Plan for Deni Benitez - 10/17/2023  Medicare offers a range of preventive health benefits. Some of the tests and screenings are paid in full while other may be subject to a deductible, co-insurance, and/or copay. Some of these benefits include a comprehensive review of your medical history including lifestyle, illnesses that may run in your family, and various assessments and screenings as appropriate. After reviewing your medical record and screening and assessments performed today your provider may have ordered immunizations, labs, imaging, and/or referrals for you. A list of these orders (if applicable) as well as your Preventive Care list are included within your After Visit Summary for your review. Other Preventive Recommendations:    A preventive eye exam performed by an eye specialist is recommended every 1-2 years to screen for glaucoma; cataracts, macular degeneration, and other eye disorders. A preventive dental visit is recommended every 6 months. Try to get at least 150 minutes of exercise per week or 10,000 steps per day on a pedometer . Order or download the FREE \"Exercise & Physical Activity: Your Everyday Guide\" from [employer]. Call 7-979.416.3711 or search [employer] online. You need 0028-6320 mg of calcium and 6020-7194 IU of vitamin D per day. It is possible to meet your calcium requirement with diet alone, but a vitamin D supplement is usually necessary to meet this goal.  When exposed to the sun, use a sunscreen that protects against both UVA and UVB radiation with an SPF of 30 or greater. Reapply every 2 to 3 hours or after sweating, drying off with a towel, or swimming. Always wear a seat belt when traveling in a car. Always wear a helmet when riding a bicycle or motorcycle.

## 2023-10-17 NOTE — PROGRESS NOTES
[address]     Referral Priority:   Routine     Referral Type:   Eval and Treat     Referral Reason:   Specialty Services Required     Requested Specialty:   Orthopedic Surgery     Number of Visits Requested:   1     Orders Placed This Encounter   Medications    omeprazole (PRILOSEC) 40 MG delayed release capsule     Sig: Take 1 capsule by mouth every morning (before breakfast)     Dispense:  90 capsule     Refill:  3    blood glucose monitor strips     Sig: Test once a day prn     Dispense:  100 strip     Refill:  5     Medications Discontinued During This Encounter   Medication Reason    omeprazole (PRILOSEC) 40 MG delayed release capsule REORDER    blood glucose monitor strips REORDER    escitalopram (LEXAPRO) 10 MG tablet Therapy completed    Ancelmo Oil-Levomenthol (FDGARD) 25-20.75 MG CAPS     fluticasone (FLOVENT HFA) 110 MCG/ACT inhaler Therapy completed    vitamin E 180 MG (400 UNIT) CAPS capsule Therapy completed     Return in about 4 months (around 2/17/2024). 40 minutes spent with patient and reviewing chart. Reviewed with the patient: current clinical status,medications, activities and diet. Side effects, adverse effects of the medication prescribed today, as well as treatment plan/ rationale and result expectations have been discussed with the patient who expresses understanding and desires to proceed. Close follow up to evaluate treatment results and for coordination of care. I have reviewed the patient's medical history in detail and updated the computerized patient record. Please note, this report has been partially produced using speech recognition software and may cause  and /or contain errors related to that system including grammar, punctuation and spelling as well as words and phrases that may seem inappropriate. If there are questions or concerns please feel free to contact me to clarify.     Kameron Brewster, DO

## 2023-10-18 ENCOUNTER — CLINICAL DOCUMENTATION (OUTPATIENT)
Dept: SPIRITUAL SERVICES | Age: 68
End: 2023-10-18

## 2023-10-18 ENCOUNTER — TELEPHONE (OUTPATIENT)
Dept: FAMILY MEDICINE CLINIC | Age: 68
End: 2023-10-18

## 2023-10-18 DIAGNOSIS — J45.21 MILD INTERMITTENT ASTHMA WITH ACUTE EXACERBATION: Chronic | ICD-10-CM

## 2023-10-18 DIAGNOSIS — R73.03 PRE-DIABETES: ICD-10-CM

## 2023-10-18 RX ORDER — GLUCOSAMINE HCL/CHONDROITIN SU 500-400 MG
CAPSULE ORAL
Qty: 100 STRIP | Refills: 5 | Status: SHIPPED | OUTPATIENT
Start: 2023-10-18

## 2023-10-18 RX ORDER — PEN NEEDLE, DIABETIC 31 GX5/16"
NEEDLE, DISPOSABLE MISCELLANEOUS
COMMUNITY
End: 2023-10-18 | Stop reason: SDUPTHER

## 2023-10-18 RX ORDER — LANCETS 30 GAUGE
EACH MISCELLANEOUS
Qty: 100 EACH | Refills: 5 | Status: SHIPPED | OUTPATIENT
Start: 2023-10-18

## 2023-10-18 RX ORDER — PEN NEEDLE, DIABETIC 31 GX5/16"
NEEDLE, DISPOSABLE MISCELLANEOUS
Qty: 100 EACH | Refills: 5 | Status: SHIPPED | OUTPATIENT
Start: 2023-10-18

## 2023-10-18 NOTE — ACP (ADVANCE CARE PLANNING)
Advance Care Planning   Ambulatory ACP Specialist Patient Outreach    Date:  10/18/2023    ACP Specialist:  YAMEL Bazan    Outreach call to patient in follow-up to ACP Specialist referral from:Ladarius Gordon DO    [x] PCP  [x] Provider   [] Ambulatory Care Management [] Other     For:                  [x] Advance Directive Assistance              [] Complete Portable DNR order              [] Complete POST/POLST/MOST              [] Code Status Discussion             [] Discuss Goals of Care             [] Early ACP Decision-Making              [] Other (Specify)    Date Referral Received:10/17/2023    Next Step:   [] ACP scheduled conversation  [x] Outreach again in one week               [] Email / Mail [address]  [] Email / Mail Advance Directive   [] Closing referral.  Routing closure to referring provider/staff and to ACP Specialist . [] Closure letter mailed to patient with invitation to contact ACP Specialist if / when ready. [] Other (Specify here):         [x] At this time, Healthcare Decision Maker Is:  Advance Care Planning   Healthcare Decision Maker:    Primary Decision Maker: Kate Sol - Child - [phone]    Secondary Decision Maker: Betsey Nancy  Brother/Sister - [phone]    Click here to complete Healthcare Decision Makers including selection of the Healthcare Decision Maker Relationship (ie \"Primary\"). [] Primary agent named in scanned advance directive. [x] Legal Next of Kin. [] Unable to determine legal decision maker at this time. Outreaches:       [x] 1st -  Date:  10/18/2023               Intervention:  [] Spoke with Patient   [x] Left Voice mail [] Email / Mail    [] Make My plate  [] Other [phone] : Outcomes: An attempt was made to reach the patient offering an ACP conversation, but the patient was not reached, and a voice message has been left at the listed phone number. The HCDM follows the NOK.  A follow-up will

## 2023-10-18 NOTE — TELEPHONE ENCOUNTER
Maria Luisa' PA request for blood glucose test strips. Looks like insurance no longer covers strips for device she currently has. Please send new Rx for newly covered device & supplies.

## 2023-10-27 ENCOUNTER — OFFICE VISIT (OUTPATIENT)
Dept: ORTHOPEDIC SURGERY | Age: 68
End: 2023-10-27
Payer: MEDICAID

## 2023-10-27 DIAGNOSIS — M54.12 CERVICAL RADICULOPATHY: Primary | ICD-10-CM

## 2023-10-27 PROCEDURE — 1123F ACP DISCUSS/DSCN MKR DOCD: CPT | Performed by: STUDENT IN AN ORGANIZED HEALTH CARE EDUCATION/TRAINING PROGRAM

## 2023-10-27 PROCEDURE — 99204 OFFICE O/P NEW MOD 45 MIN: CPT | Performed by: STUDENT IN AN ORGANIZED HEALTH CARE EDUCATION/TRAINING PROGRAM

## 2023-10-27 PROCEDURE — G8417 CALC BMI ABV UP PARAM F/U: HCPCS | Performed by: STUDENT IN AN ORGANIZED HEALTH CARE EDUCATION/TRAINING PROGRAM

## 2023-10-27 PROCEDURE — 3017F COLORECTAL CA SCREEN DOC REV: CPT | Performed by: STUDENT IN AN ORGANIZED HEALTH CARE EDUCATION/TRAINING PROGRAM

## 2023-10-27 PROCEDURE — G8399 PT W/DXA RESULTS DOCUMENT: HCPCS | Performed by: STUDENT IN AN ORGANIZED HEALTH CARE EDUCATION/TRAINING PROGRAM

## 2023-10-27 PROCEDURE — 1036F TOBACCO NON-USER: CPT | Performed by: STUDENT IN AN ORGANIZED HEALTH CARE EDUCATION/TRAINING PROGRAM

## 2023-10-27 PROCEDURE — G8484 FLU IMMUNIZE NO ADMIN: HCPCS | Performed by: STUDENT IN AN ORGANIZED HEALTH CARE EDUCATION/TRAINING PROGRAM

## 2023-10-27 PROCEDURE — 1090F PRES/ABSN URINE INCON ASSESS: CPT | Performed by: STUDENT IN AN ORGANIZED HEALTH CARE EDUCATION/TRAINING PROGRAM

## 2023-10-27 PROCEDURE — G8427 DOCREV CUR MEDS BY ELIG CLIN: HCPCS | Performed by: STUDENT IN AN ORGANIZED HEALTH CARE EDUCATION/TRAINING PROGRAM

## 2023-10-27 ASSESSMENT — ENCOUNTER SYMPTOMS
EYES NEGATIVE: 1
ALLERGIC/IMMUNOLOGIC NEGATIVE: 1
RESPIRATORY NEGATIVE: 1
GASTROINTESTINAL NEGATIVE: 1

## 2023-10-27 NOTE — PROGRESS NOTES
Orthopedic Surgery and Sports Medicine    Subjective:      Patient ID: Stefany Law is a 79 y.o. female who presents today for:  Chief Complaint   Patient presents with    New Patient       HPI  Consuelo Delatorre is a 42-year-old female who presents today for her bilateral shoulder and arm pain. This has been chronic. At least a couple years. She denies any injury. She locates the pain over both of her shoulders, that radiates down her arms, past her elbows, and into the forearms. She denies any pain in her neck. She reports numbness in her bilateral hands globally. She has had multiple EMGs done in the past.  She has an EMG from 2021 showing a left C6 radiculopathy and right C6 and C7 radiculopathy. She states that she has done physical therapy a couple times in the past without relief. Previous treatment: Physical therapy  NSAIDs: No  Physical therapy: Yes, has done PT twice     Hand Dominance: right  Occupation:   Workers Compensation:   Have you missed work for this issue? No  Is this issue being addressed under a worker's compensation claim?  No    Past Medical History:   Diagnosis Date    Abnormal pelvic ultrasound 10/8/2020    Acid reflux     Adenomatous colon polyp - repeat scope 02/2026 2/11/2021    Arthritis     Asthma     Chronic back pain     COPD (chronic obstructive pulmonary disease) ([address])     Depression     Hyperlipidemia     Obesity     Venous insufficiency       Past Surgical History:   Procedure Laterality Date    COLONOSCOPY      COLONOSCOPY N/A 2/3/2021    COLORECTAL CANCER SCREENING with polypectomies performed by Vearl Bloch, MD at [facility] 6/28/2021    HYSTEROSCOPY  FX  D/C performed by Jaison Barnhart DO at [address], COLON, DIAGNOSTIC      KIDNEY SURGERY      OVARIAN CYST REMOVAL Left 11/06/2004    [place] ESOPHAGOGASTRODUODENOSCOPY TRANSORAL DIAGNOSTIC N/A 11/14/2018    EGD ESOPHAGOGASTRODUODENOSCOPY WITH DILATION

## 2023-11-01 ENCOUNTER — HOSPITAL ENCOUNTER (OUTPATIENT)
Dept: ORTHOPEDIC SURGERY | Age: 68
Discharge: HOME | End: 2023-11-03
Payer: MEDICARE

## 2023-11-01 ENCOUNTER — OFFICE VISIT (OUTPATIENT)
Dept: ORTHOPEDIC SURGERY | Age: 68
End: 2023-11-01

## 2023-11-01 VITALS
HEIGHT: 65 IN | HEART RATE: 84 BPM | BODY MASS INDEX: 31.32 KG/M2 | TEMPERATURE: 97.6 F | WEIGHT: 188 LBS | OXYGEN SATURATION: 98 %

## 2023-11-01 DIAGNOSIS — R52 PAIN: ICD-10-CM

## 2023-11-01 DIAGNOSIS — M25.521 RIGHT ELBOW PAIN: ICD-10-CM

## 2023-11-01 DIAGNOSIS — M25.511 ACUTE PAIN OF RIGHT SHOULDER: ICD-10-CM

## 2023-11-01 DIAGNOSIS — M77.11 LATERAL EPICONDYLITIS, RIGHT ELBOW: Primary | ICD-10-CM

## 2023-11-01 DIAGNOSIS — M25.511 RIGHT SHOULDER PAIN, UNSPECIFIED CHRONICITY: ICD-10-CM

## 2023-11-01 DIAGNOSIS — M54.12 CERVICAL RADICULOPATHY: ICD-10-CM

## 2023-11-01 PROCEDURE — 73030 X-RAY EXAM OF SHOULDER: CPT

## 2023-11-01 PROCEDURE — 73080 X-RAY EXAM OF ELBOW: CPT

## 2023-11-01 PROCEDURE — 72110 X-RAY EXAM L-2 SPINE 4/>VWS: CPT

## 2023-11-01 PROCEDURE — 72050 X-RAY EXAM NECK SPINE 4/5VWS: CPT

## 2023-11-01 RX ORDER — BLOOD-GLUCOSE METER
EACH MISCELLANEOUS
COMMUNITY
Start: 2023-10-18

## 2023-11-01 NOTE — PROGRESS NOTES
Subjective:      Patient ID: Keturah Hodgson is a 79 y.o. female who presents today for:  Chief Complaint   Patient presents with    New Patient     Pt presents today for a new patient apt for neck, shoulders and arms  This pain started Oct. 2021  Symptoms Include pain and numbness  Date of Injury October 2021  Pain radiates to arms    The pain does disturb pts sleep. Pain worsens with lying down  Tried the following treatments Pt  Taking tylenol for pain. Pt is taking pain medication. PT does not see pain management. She is a non-smoker. Subjective/Objective/Assessment/Plan:     SUBJECTIVE -the patient is here with her relative who is an  for her. She has pain in the right shoulder and right elbow predominantly. This was difficult in the sense that she does not speak [place]. This began a year ago. She describes pain and numbness. Henrene Feathers down the stairs on October 2021. Lying down makes it worse. She has tried physical therapy. She takes Tylenol. She is diabetic. OBJECTIVE -pain at the right lateral epicondyle with resisted wrist extension. Positive Adrianne's test right shoulder. Full range of motion right shoulder. XR SHOULDER RIGHT (MIN 2 VIEWS)  3 views right shoulder. Acromioclavicular joint osteoarthritis moderate. XR ELBOW RIGHT (MIN 3 VIEWS)  3 views of the right x-ray taken. No acute osseous abnormalities however   she does appear to have a large medial epicondyle. XR LUMBAR SPINE (MIN 4 VIEWS)  4 views of the lumbar spine were taken. Severe degenerative disc disease   at L5-S1. Mild L4-5 spondylolisthesis. No scoliosis  XR CERVICAL SPINE (4-5 VIEWS)  4 views of the cervical spine were taken. Mild C4-5 spondylolisthesis. Multilevel cervical degenerative disc disease. Multilevel facet   arthritis. ASSESSMENT -right elbow lateral epicondylitis. Right shoulder pain. Possible rotator cuff pathology.       PLAN -I offered her an injection into the

## 2023-11-17 ENCOUNTER — HOSPITAL ENCOUNTER (OUTPATIENT)
Dept: MRI IMAGING | Age: 68
End: 2023-11-17
Attending: ORTHOPAEDIC SURGERY
Payer: MEDICARE

## 2023-11-17 DIAGNOSIS — M77.11 LATERAL EPICONDYLITIS, RIGHT ELBOW: ICD-10-CM

## 2023-11-17 DIAGNOSIS — M25.511 ACUTE PAIN OF RIGHT SHOULDER: ICD-10-CM

## 2023-11-17 PROCEDURE — 73221 MRI JOINT UPR EXTREM W/O DYE: CPT

## 2024-01-03 ENCOUNTER — OFFICE VISIT (OUTPATIENT)
Dept: FAMILY MEDICINE CLINIC | Age: 69
End: 2024-01-03
Payer: MEDICARE

## 2024-01-03 VITALS
SYSTOLIC BLOOD PRESSURE: 136 MMHG | HEART RATE: 71 BPM | HEIGHT: 65 IN | DIASTOLIC BLOOD PRESSURE: 70 MMHG | TEMPERATURE: 97.1 F | OXYGEN SATURATION: 97 % | WEIGHT: 182.6 LBS | BODY MASS INDEX: 30.42 KG/M2

## 2024-01-03 DIAGNOSIS — L30.9 ECZEMA, UNSPECIFIED TYPE: ICD-10-CM

## 2024-01-03 DIAGNOSIS — L29.9 EAR ITCHING: ICD-10-CM

## 2024-01-03 DIAGNOSIS — E55.9 VITAMIN D DEFICIENCY: ICD-10-CM

## 2024-01-03 DIAGNOSIS — R73.03 PRE-DIABETES: Primary | ICD-10-CM

## 2024-01-03 DIAGNOSIS — F39 MOOD DISORDER (HCC): Chronic | ICD-10-CM

## 2024-01-03 DIAGNOSIS — K21.9 GASTROESOPHAGEAL REFLUX DISEASE WITHOUT ESOPHAGITIS: ICD-10-CM

## 2024-01-03 PROCEDURE — 99214 OFFICE O/P EST MOD 30 MIN: CPT | Performed by: STUDENT IN AN ORGANIZED HEALTH CARE EDUCATION/TRAINING PROGRAM

## 2024-01-03 PROCEDURE — G8399 PT W/DXA RESULTS DOCUMENT: HCPCS | Performed by: STUDENT IN AN ORGANIZED HEALTH CARE EDUCATION/TRAINING PROGRAM

## 2024-01-03 PROCEDURE — 1090F PRES/ABSN URINE INCON ASSESS: CPT | Performed by: STUDENT IN AN ORGANIZED HEALTH CARE EDUCATION/TRAINING PROGRAM

## 2024-01-03 PROCEDURE — 3017F COLORECTAL CA SCREEN DOC REV: CPT | Performed by: STUDENT IN AN ORGANIZED HEALTH CARE EDUCATION/TRAINING PROGRAM

## 2024-01-03 PROCEDURE — G8427 DOCREV CUR MEDS BY ELIG CLIN: HCPCS | Performed by: STUDENT IN AN ORGANIZED HEALTH CARE EDUCATION/TRAINING PROGRAM

## 2024-01-03 PROCEDURE — 1123F ACP DISCUSS/DSCN MKR DOCD: CPT | Performed by: STUDENT IN AN ORGANIZED HEALTH CARE EDUCATION/TRAINING PROGRAM

## 2024-01-03 PROCEDURE — G8484 FLU IMMUNIZE NO ADMIN: HCPCS | Performed by: STUDENT IN AN ORGANIZED HEALTH CARE EDUCATION/TRAINING PROGRAM

## 2024-01-03 PROCEDURE — G8417 CALC BMI ABV UP PARAM F/U: HCPCS | Performed by: STUDENT IN AN ORGANIZED HEALTH CARE EDUCATION/TRAINING PROGRAM

## 2024-01-03 PROCEDURE — 1036F TOBACCO NON-USER: CPT | Performed by: STUDENT IN AN ORGANIZED HEALTH CARE EDUCATION/TRAINING PROGRAM

## 2024-01-03 ASSESSMENT — PATIENT HEALTH QUESTIONNAIRE - PHQ9
6. FEELING BAD ABOUT YOURSELF - OR THAT YOU ARE A FAILURE OR HAVE LET YOURSELF OR YOUR FAMILY DOWN: 0
7. TROUBLE CONCENTRATING ON THINGS, SUCH AS READING THE NEWSPAPER OR WATCHING TELEVISION: 3
4. FEELING TIRED OR HAVING LITTLE ENERGY: 3
2. FEELING DOWN, DEPRESSED OR HOPELESS: 2
SUM OF ALL RESPONSES TO PHQ9 QUESTIONS 1 & 2: 2
10. IF YOU CHECKED OFF ANY PROBLEMS, HOW DIFFICULT HAVE THESE PROBLEMS MADE IT FOR YOU TO DO YOUR WORK, TAKE CARE OF THINGS AT HOME, OR GET ALONG WITH OTHER PEOPLE: 1
8. MOVING OR SPEAKING SO SLOWLY THAT OTHER PEOPLE COULD HAVE NOTICED. OR THE OPPOSITE, BEING SO FIGETY OR RESTLESS THAT YOU HAVE BEEN MOVING AROUND A LOT MORE THAN USUAL: 0
9. THOUGHTS THAT YOU WOULD BE BETTER OFF DEAD, OR OF HURTING YOURSELF: 0
1. LITTLE INTEREST OR PLEASURE IN DOING THINGS: 0
SUM OF ALL RESPONSES TO PHQ QUESTIONS 1-9: 14
5. POOR APPETITE OR OVEREATING: 3
SUM OF ALL RESPONSES TO PHQ QUESTIONS 1-9: 14
3. TROUBLE FALLING OR STAYING ASLEEP: 3

## 2024-01-03 NOTE — PROGRESS NOTES
Subjective  Maggie HendricksJaimekoltonjob, 68 y.o. female presents today with:  Chief Complaint   Patient presents with    Follow-up    Asthma     Controlled at this time.     Gastroesophageal Reflux     Has improved & is controlled with medication.     Osteoarthritis    Hyperlipidemia     Lipid panel done 5/17/23.     Pre Diabetes     A1c was 6.3 on 5/17/23.     Depression     Is struggling. States she has been going through some things in life. Was taking Lexapro, but felt this was causing nausea, so stopped taking it. No longer having nausea, since stopping.     Otalgia     States she has been having pain & itching in her ears.         used.    Patient with appointment for 4-month follow-up appointment.    Patient with prediabetes.  Last A1c was 6.3%.  Currently on no medication for diabetes.  She does check blood sugar at home.    Patient also with mood disorder.  She has been more depressed lately.  She feels like she is managing okay.  Her daughter recently went through the death of her child.  She did have some nausea from one of the anxiety med so stopped taking it.  No longer having the nausea after not taking it.  She states she is managing okay.  Does not feel like she needs medication or counseling at this time.    Patient also with GERD.  Doing well with omeprazole 40 mg daily.  It has greatly improved since starting on the medication.  And is now controlled.    Patient also with eczema.  She is using steroid cream.  Working well.    Patient also with vitamin D deficiency.  She does need refill today.    Patient also with itching and pain in her ears.  No drainage.  No upper respiratory symptoms.  She has no other concerns at this time.    PHQ-2 Over the past 2 weeks, how often have you been bothered by any of the following problems?  Little interest or pleasure in doing things: Not at all  Feeling down, depressed, or hopeless: More than half the days  PHQ-2 Score: 2  PHQ-9 Over the past 2

## 2024-01-08 ENCOUNTER — TELEPHONE (OUTPATIENT)
Dept: FAMILY MEDICINE CLINIC | Age: 69
End: 2024-01-08

## 2024-01-08 ENCOUNTER — OFFICE VISIT (OUTPATIENT)
Dept: ORTHOPEDIC SURGERY | Age: 69
End: 2024-01-08
Payer: MEDICARE

## 2024-01-08 VITALS
TEMPERATURE: 97.1 F | OXYGEN SATURATION: 99 % | HEIGHT: 65 IN | HEART RATE: 88 BPM | WEIGHT: 182 LBS | BODY MASS INDEX: 30.32 KG/M2

## 2024-01-08 DIAGNOSIS — L30.9 ECZEMA, UNSPECIFIED TYPE: ICD-10-CM

## 2024-01-08 DIAGNOSIS — M77.11 LATERAL EPICONDYLITIS OF RIGHT ELBOW: ICD-10-CM

## 2024-01-08 DIAGNOSIS — M75.121 NONTRAUMATIC COMPLETE TEAR OF RIGHT ROTATOR CUFF: Primary | ICD-10-CM

## 2024-01-08 DIAGNOSIS — Z12.31 SCREENING MAMMOGRAM FOR BREAST CANCER: Primary | ICD-10-CM

## 2024-01-08 DIAGNOSIS — R14.2 BELCHING: ICD-10-CM

## 2024-01-08 DIAGNOSIS — R10.13 EPIGASTRIC BURNING SENSATION: ICD-10-CM

## 2024-01-08 PROCEDURE — 1036F TOBACCO NON-USER: CPT | Performed by: ORTHOPAEDIC SURGERY

## 2024-01-08 PROCEDURE — 1123F ACP DISCUSS/DSCN MKR DOCD: CPT | Performed by: ORTHOPAEDIC SURGERY

## 2024-01-08 PROCEDURE — G8484 FLU IMMUNIZE NO ADMIN: HCPCS | Performed by: ORTHOPAEDIC SURGERY

## 2024-01-08 PROCEDURE — G8399 PT W/DXA RESULTS DOCUMENT: HCPCS | Performed by: ORTHOPAEDIC SURGERY

## 2024-01-08 PROCEDURE — 99214 OFFICE O/P EST MOD 30 MIN: CPT | Performed by: ORTHOPAEDIC SURGERY

## 2024-01-08 PROCEDURE — G8427 DOCREV CUR MEDS BY ELIG CLIN: HCPCS | Performed by: ORTHOPAEDIC SURGERY

## 2024-01-08 PROCEDURE — 3017F COLORECTAL CA SCREEN DOC REV: CPT | Performed by: ORTHOPAEDIC SURGERY

## 2024-01-08 PROCEDURE — G8417 CALC BMI ABV UP PARAM F/U: HCPCS | Performed by: ORTHOPAEDIC SURGERY

## 2024-01-08 PROCEDURE — 1090F PRES/ABSN URINE INCON ASSESS: CPT | Performed by: ORTHOPAEDIC SURGERY

## 2024-01-08 RX ORDER — TRIAMCINOLONE ACETONIDE 1 MG/G
CREAM TOPICAL
Qty: 1 EACH | Refills: 2 | Status: SHIPPED | OUTPATIENT
Start: 2024-01-08

## 2024-01-08 RX ORDER — ACETAMINOPHEN 160 MG
TABLET,DISINTEGRATING ORAL
Qty: 90 CAPSULE | Refills: 3 | Status: SHIPPED | OUTPATIENT
Start: 2024-01-08

## 2024-01-08 RX ORDER — OMEPRAZOLE 40 MG/1
40 CAPSULE, DELAYED RELEASE ORAL
Qty: 90 CAPSULE | Refills: 3 | OUTPATIENT
Start: 2024-01-08

## 2024-01-08 RX ORDER — CLOBETASOL PROPIONATE 0.5 MG/G
CREAM TOPICAL 2 TIMES DAILY
COMMUNITY
End: 2024-01-08 | Stop reason: SDUPTHER

## 2024-01-08 ASSESSMENT — ENCOUNTER SYMPTOMS
SHORTNESS OF BREATH: 0
EYE DISCHARGE: 0
COUGH: 0
DIARRHEA: 0
VOMITING: 0
NAUSEA: 0
SORE THROAT: 0
ABDOMINAL PAIN: 0
RHINORRHEA: 0
WHEEZING: 0

## 2024-01-08 NOTE — TELEPHONE ENCOUNTER
Patient requesting medication refill. Please approve or deny this request.    Rx requested:  Requested Prescriptions     Pending Prescriptions Disp Refills    omeprazole (PRILOSEC) 40 MG delayed release capsule 90 capsule 3     Sig: Take 1 capsule by mouth every morning (before breakfast)    hydrOXYzine HCl (ATARAX) 25 MG tablet       Sig: Take 1 tablet by mouth 3 times daily as needed for Itching         Last Office Visit:   1/3/2024      Next Visit Date:  Future Appointments   Date Time Provider Department Center   4/3/2024  2:00 PM Nona Gordon DO [facility]   4/29/2024 11:00 AM Wood Arredondo DO [facility]

## 2024-01-08 NOTE — TELEPHONE ENCOUNTER
Patient called in requesting an order for her annual mammogram.     She is also asking about a cream for her skin. She said it was suppose to be sent in to the pharmacy after her appt.      Please advise, thank you.

## 2024-01-08 NOTE — TELEPHONE ENCOUNTER
Pharmacy requesting medication refill. Please approve or deny this request.    Rx requested:  Requested Prescriptions     Pending Prescriptions Disp Refills    hydrOXYzine HCl (ATARAX) 25 MG tablet       Sig: Take 1 tablet by mouth 3 times daily as needed for Itching     Refused Prescriptions Disp Refills    omeprazole (PRILOSEC) 40 MG delayed release capsule 90 capsule 3     Sig: Take 1 capsule by mouth every morning (before breakfast)         Last Office Visit:   1/3/2024      Next Visit Date:  Future Appointments   Date Time Provider Department Center   4/3/2024  2:00 PM Nona Gordon DO [facility]   4/29/2024 11:00 AM Wood Arredondo DO [facility]

## 2024-01-08 NOTE — PROGRESS NOTES
Subjective:      Patient ID: Maggie Vaughn is a 68 y.o. female who presents today for:  Chief Complaint   Patient presents with    Follow-up     Pt present here for f/u on right elbow she states she doing okay and she is not in any pain right now Pain scale 8/10 at night when she is trying to sleep       Subjective/Objective/Assessment/Plan:     SUBJECTIVE -the patient is here with her relative who is an  for her.  She has pain in the right shoulder and right elbow predominantly.  This was difficult in the sense that she does not speak English.  This began a year ago.  She describes pain and numbness.  Fell down the stairs on October 2021.  Lying down makes it worse.  She has tried physical therapy.  She takes Tylenol.  She is diabetic.    OBJECTIVE -pain at the right lateral epicondyle with resisted wrist extension.  Positive Adrianne's test right shoulder.  Full range of motion right shoulder.  MRI SHOULDER RIGHT WO CONTRAST  Narrative: EXAMINATION:  MRI OF THE RIGHT SHOULDER WITHOUT CONTRAST   11/17/2023 1:53 pm    TECHNIQUE:  Multiplanar multisequence MRI of the right shoulder was performed without the  administration of intravenous contrast.    COMPARISON:  None.    HISTORY:  ORDERING SYSTEM PROVIDED HISTORY: Lateral epicondylitis, right elbow, Acute  pain of right shoulder  TECHNOLOGIST PROVIDED HISTORY:  What reading provider will be dictating this exam?->CRC    FINDINGS:  ROTATOR CUFF: 1.2 cm full-thickness tear of the anterior fibers of the distal  supraspinatus.  There is mild tendinosis of the more proximal tendon.  There  is advanced tendinosis of the infraspinatus tendon, with minimal perforation  of the more proximal musculotendinous fibers.  The subscapularis and teres  minor are intact.  There is moderate muscular volume loss with fat replacement    BICEPS TENDON: Intact vertical and horizontal portions of the long head of  the biceps tendon.    LABRUM: The glenoid labrum is

## 2024-01-09 RX ORDER — HYDROXYZINE HYDROCHLORIDE 25 MG/1
25 TABLET, FILM COATED ORAL 3 TIMES DAILY PRN
Qty: 90 TABLET | Refills: 2 | Status: SHIPPED | OUTPATIENT
Start: 2024-01-09

## 2024-02-16 ENCOUNTER — HOSPITAL ENCOUNTER (OUTPATIENT)
Dept: WOMENS IMAGING | Age: 69
End: 2024-02-16
Payer: MEDICAID

## 2024-02-16 DIAGNOSIS — Z12.31 SCREENING MAMMOGRAM FOR BREAST CANCER: ICD-10-CM

## 2024-02-16 PROCEDURE — 77067 SCR MAMMO BI INCL CAD: CPT

## 2024-04-03 ENCOUNTER — OFFICE VISIT (OUTPATIENT)
Dept: FAMILY MEDICINE CLINIC | Age: 69
End: 2024-04-03
Payer: MEDICARE

## 2024-04-03 VITALS
WEIGHT: 181 LBS | OXYGEN SATURATION: 98 % | HEIGHT: 65 IN | SYSTOLIC BLOOD PRESSURE: 118 MMHG | HEART RATE: 73 BPM | DIASTOLIC BLOOD PRESSURE: 70 MMHG | BODY MASS INDEX: 30.16 KG/M2 | TEMPERATURE: 97.8 F

## 2024-04-03 VITALS
DIASTOLIC BLOOD PRESSURE: 70 MMHG | HEART RATE: 73 BPM | TEMPERATURE: 97.8 F | OXYGEN SATURATION: 98 % | HEIGHT: 65 IN | SYSTOLIC BLOOD PRESSURE: 118 MMHG | WEIGHT: 181 LBS | BODY MASS INDEX: 30.16 KG/M2

## 2024-04-03 DIAGNOSIS — L30.9 ECZEMA, UNSPECIFIED TYPE: ICD-10-CM

## 2024-04-03 DIAGNOSIS — G89.29 CHRONIC PAIN OF RIGHT KNEE: ICD-10-CM

## 2024-04-03 DIAGNOSIS — M25.561 CHRONIC PAIN OF RIGHT KNEE: ICD-10-CM

## 2024-04-03 DIAGNOSIS — E78.00 PURE HYPERCHOLESTEROLEMIA: Chronic | ICD-10-CM

## 2024-04-03 DIAGNOSIS — F39 MOOD DISORDER (HCC): ICD-10-CM

## 2024-04-03 DIAGNOSIS — Z00.00 MEDICARE ANNUAL WELLNESS VISIT, SUBSEQUENT: Primary | ICD-10-CM

## 2024-04-03 DIAGNOSIS — J45.20 MILD INTERMITTENT ASTHMA WITHOUT COMPLICATION: Chronic | ICD-10-CM

## 2024-04-03 DIAGNOSIS — R14.2 BELCHING: ICD-10-CM

## 2024-04-03 DIAGNOSIS — R10.13 EPIGASTRIC BURNING SENSATION: ICD-10-CM

## 2024-04-03 DIAGNOSIS — R73.03 PRE-DIABETES: Primary | ICD-10-CM

## 2024-04-03 DIAGNOSIS — E55.9 VITAMIN D DEFICIENCY: ICD-10-CM

## 2024-04-03 LAB — HBA1C MFR BLD: 6.2 %

## 2024-04-03 PROCEDURE — 99214 OFFICE O/P EST MOD 30 MIN: CPT | Performed by: STUDENT IN AN ORGANIZED HEALTH CARE EDUCATION/TRAINING PROGRAM

## 2024-04-03 PROCEDURE — 1123F ACP DISCUSS/DSCN MKR DOCD: CPT | Performed by: STUDENT IN AN ORGANIZED HEALTH CARE EDUCATION/TRAINING PROGRAM

## 2024-04-03 PROCEDURE — G0439 PPPS, SUBSEQ VISIT: HCPCS | Performed by: STUDENT IN AN ORGANIZED HEALTH CARE EDUCATION/TRAINING PROGRAM

## 2024-04-03 PROCEDURE — 1090F PRES/ABSN URINE INCON ASSESS: CPT | Performed by: STUDENT IN AN ORGANIZED HEALTH CARE EDUCATION/TRAINING PROGRAM

## 2024-04-03 PROCEDURE — 1036F TOBACCO NON-USER: CPT | Performed by: STUDENT IN AN ORGANIZED HEALTH CARE EDUCATION/TRAINING PROGRAM

## 2024-04-03 PROCEDURE — G8417 CALC BMI ABV UP PARAM F/U: HCPCS | Performed by: STUDENT IN AN ORGANIZED HEALTH CARE EDUCATION/TRAINING PROGRAM

## 2024-04-03 PROCEDURE — 3017F COLORECTAL CA SCREEN DOC REV: CPT | Performed by: STUDENT IN AN ORGANIZED HEALTH CARE EDUCATION/TRAINING PROGRAM

## 2024-04-03 PROCEDURE — G8399 PT W/DXA RESULTS DOCUMENT: HCPCS | Performed by: STUDENT IN AN ORGANIZED HEALTH CARE EDUCATION/TRAINING PROGRAM

## 2024-04-03 PROCEDURE — G8427 DOCREV CUR MEDS BY ELIG CLIN: HCPCS | Performed by: STUDENT IN AN ORGANIZED HEALTH CARE EDUCATION/TRAINING PROGRAM

## 2024-04-03 PROCEDURE — 83036 HEMOGLOBIN GLYCOSYLATED A1C: CPT | Performed by: STUDENT IN AN ORGANIZED HEALTH CARE EDUCATION/TRAINING PROGRAM

## 2024-04-03 RX ORDER — GLUCOSAMINE HCL/CHONDROITIN SU 500-400 MG
CAPSULE ORAL
Qty: 100 STRIP | Refills: 5 | Status: SHIPPED | OUTPATIENT
Start: 2024-04-03

## 2024-04-03 RX ORDER — ALBUTEROL SULFATE 90 UG/1
2 AEROSOL, METERED RESPIRATORY (INHALATION) 4 TIMES DAILY PRN
Qty: 3 EACH | Refills: 3 | Status: SHIPPED | OUTPATIENT
Start: 2024-04-03

## 2024-04-03 RX ORDER — TRIAMCINOLONE ACETONIDE 1 MG/G
CREAM TOPICAL
Qty: 1 EACH | Refills: 2 | Status: SHIPPED | OUTPATIENT
Start: 2024-04-03

## 2024-04-03 RX ORDER — PEN NEEDLE, DIABETIC 31 GX5/16"
NEEDLE, DISPOSABLE MISCELLANEOUS
Qty: 100 EACH | Refills: 5 | Status: SHIPPED | OUTPATIENT
Start: 2024-04-03

## 2024-04-03 RX ORDER — OMEPRAZOLE 40 MG/1
40 CAPSULE, DELAYED RELEASE ORAL
Qty: 90 CAPSULE | Refills: 3 | Status: SHIPPED | OUTPATIENT
Start: 2024-04-03

## 2024-04-03 RX ORDER — HYDROXYZINE HYDROCHLORIDE 25 MG/1
25 TABLET, FILM COATED ORAL 3 TIMES DAILY PRN
Qty: 90 TABLET | Refills: 2 | Status: SHIPPED | OUTPATIENT
Start: 2024-04-03

## 2024-04-03 RX ORDER — LANCETS 30 GAUGE
EACH MISCELLANEOUS
Qty: 100 EACH | Refills: 5 | Status: SHIPPED | OUTPATIENT
Start: 2024-04-03

## 2024-04-03 RX ORDER — ACETAMINOPHEN 160 MG
TABLET,DISINTEGRATING ORAL
Qty: 90 CAPSULE | Refills: 3 | Status: SHIPPED | OUTPATIENT
Start: 2024-04-03

## 2024-04-03 ASSESSMENT — PATIENT HEALTH QUESTIONNAIRE - PHQ9
4. FEELING TIRED OR HAVING LITTLE ENERGY: SEVERAL DAYS
7. TROUBLE CONCENTRATING ON THINGS, SUCH AS READING THE NEWSPAPER OR WATCHING TELEVISION: NOT AT ALL
10. IF YOU CHECKED OFF ANY PROBLEMS, HOW DIFFICULT HAVE THESE PROBLEMS MADE IT FOR YOU TO DO YOUR WORK, TAKE CARE OF THINGS AT HOME, OR GET ALONG WITH OTHER PEOPLE: NOT DIFFICULT AT ALL
3. TROUBLE FALLING OR STAYING ASLEEP: NOT AT ALL
2. FEELING DOWN, DEPRESSED OR HOPELESS: SEVERAL DAYS
SUM OF ALL RESPONSES TO PHQ9 QUESTIONS 1 & 2: 1
SUM OF ALL RESPONSES TO PHQ QUESTIONS 1-9: 5
6. FEELING BAD ABOUT YOURSELF - OR THAT YOU ARE A FAILURE OR HAVE LET YOURSELF OR YOUR FAMILY DOWN: NOT AT ALL
SUM OF ALL RESPONSES TO PHQ QUESTIONS 1-9: 5
1. LITTLE INTEREST OR PLEASURE IN DOING THINGS: NOT AT ALL
9. THOUGHTS THAT YOU WOULD BE BETTER OFF DEAD, OR OF HURTING YOURSELF: NOT AT ALL
SUM OF ALL RESPONSES TO PHQ QUESTIONS 1-9: 5
5. POOR APPETITE OR OVEREATING: NEARLY EVERY DAY
SUM OF ALL RESPONSES TO PHQ QUESTIONS 1-9: 5
8. MOVING OR SPEAKING SO SLOWLY THAT OTHER PEOPLE COULD HAVE NOTICED. OR THE OPPOSITE, BEING SO FIGETY OR RESTLESS THAT YOU HAVE BEEN MOVING AROUND A LOT MORE THAN USUAL: NOT AT ALL

## 2024-04-03 ASSESSMENT — LIFESTYLE VARIABLES
HOW MANY STANDARD DRINKS CONTAINING ALCOHOL DO YOU HAVE ON A TYPICAL DAY: PATIENT DOES NOT DRINK
HOW OFTEN DO YOU HAVE A DRINK CONTAINING ALCOHOL: NEVER

## 2024-04-03 NOTE — PATIENT INSTRUCTIONS
nursing home?)  Do you want to donate your organs when you die?  Do you want certain [employer] practices performed before you die?  When should you call for help?  Be sure to contact your doctor if you have any questions.  Where can you learn more?  Go to https://www.SCM-GL.net/patientEd and enter R264 to learn more about \"Advance Directives: Care Instructions.\"  Current as of: November 16, 2023               Content Version: 14.0  © 3214-0282 [employer].   Care instructions adapted under license by [employer]. If you have questions about a medical condition or this instruction, always ask your healthcare professional. [employer] disclaims any warranty or liability for your use of this information.           Starting a Weight Loss Plan: Care Instructions  Overview     It can be a challenge to lose weight. But your doctor can help you make a weight-loss plan that meets your needs.  You don't have to make a lot of big changes at once. A better idea might be to focus on small changes and stick with them. When those changes become habit, you can add a few more changes.  Some people find it helpful to take an exercise or nutrition class. If you have questions, ask your doctor about seeing a registered dietitian or an exercise specialist. You might also think about joining a weight-loss support group.  If you're not ready to make changes right now, try to pick a date in the future. Then make an appointment with your doctor to talk about when and how you'll get started with a plan.  Follow-up care is a key part of your treatment and safety. Be sure to make and go to all appointments, and call your doctor if you are having problems. It's also a good idea to know your test results and keep a list of the medicines you take.  How can you care for yourself at home?  Set realistic goals. Many people expect to lose much more weight than is likely. A weight loss of 5% to 10% of your body weight

## 2024-04-03 NOTE — PROGRESS NOTES
speech recognition software and may cause  and /or contain errors related to that system including grammar, punctuation and spelling as well as words and phrases that may seem inappropriate. If there are questions or concerns please feel free to contact me to clarify.    Nona Gordon, DO

## 2024-04-03 NOTE — PROGRESS NOTES
Medicare Annual Wellness Visit    Maggie Vaughn is here for Medicare AWV    Assessment & Plan   Medicare annual wellness visit, subsequent  Recommendations for Preventive Services Due: see orders and patient instructions/AVS.  Recommended screening schedule for the next 5-10 years is provided to the patient in written form: see Patient Instructions/AVS.     No follow-ups on file.     Subjective       Patient's complete Health Risk Assessment and screening values have been reviewed and are found in Flowsheets. The following problems were reviewed today and where indicated follow up appointments were made and/or referrals ordered.    Positive Risk Factor Screenings with Interventions:       Cognitive:   Clock Drawing Test (CDT): (!) Abnormal  Words recalled: 3 Words Recalled  Total Score: 3  Total Score Interpretation: Normal Mini-Cog  Interventions:  Patient declines any further evaluation or treatment    Depression:  PHQ-2 Score: 1  PHQ-9 Total Score: 5    Interpretation:  5-9 mild   10-14 moderate   15-19 moderately severe   20-27 severe   Interventions:  Patient advised to follow-up in this office for further evaluation and treatment           Activity, Diet, and Weight:  On average, how many days per week do you engage in moderate to strenuous exercise (like a brisk walk)?: 0 days  On average, how many minutes do you engage in exercise at this level?: 0 min    Do you eat balanced/healthy meals regularly?: Yes    Body mass index is 30.12 kg/m². (!) Abnormal      Inactivity Interventions:  Patient declined any further interventions or treatment  Obesity Interventions:  Patient declines any further evaluation or treatment            Dentist Screen:  Have you seen the dentist within the past year?: (!) No    Intervention:  Advised to schedule with their dentist      Safety:  Do you have any tripping hazards - loose or unsecured carpets or rugs?: (!) Yes  Interventions:  Home safety tips provided

## 2024-04-09 ASSESSMENT — ENCOUNTER SYMPTOMS
COUGH: 0
NAUSEA: 0
EYE DISCHARGE: 0
VOMITING: 0
DIARRHEA: 0
ABDOMINAL PAIN: 0
SHORTNESS OF BREATH: 0
RHINORRHEA: 0
WHEEZING: 0
SORE THROAT: 0

## 2024-04-29 ENCOUNTER — OFFICE VISIT (OUTPATIENT)
Dept: ORTHOPEDIC SURGERY | Age: 69
End: 2024-04-29
Payer: MEDICARE

## 2024-04-29 VITALS
BODY MASS INDEX: 30.16 KG/M2 | WEIGHT: 181 LBS | HEIGHT: 65 IN | TEMPERATURE: 97.3 F | OXYGEN SATURATION: 98 % | HEART RATE: 75 BPM

## 2024-04-29 DIAGNOSIS — M75.121 NONTRAUMATIC COMPLETE TEAR OF RIGHT ROTATOR CUFF: Primary | ICD-10-CM

## 2024-04-29 DIAGNOSIS — M77.11 LATERAL EPICONDYLITIS, RIGHT ELBOW: ICD-10-CM

## 2024-04-29 PROCEDURE — G8399 PT W/DXA RESULTS DOCUMENT: HCPCS | Performed by: ORTHOPAEDIC SURGERY

## 2024-04-29 PROCEDURE — 3017F COLORECTAL CA SCREEN DOC REV: CPT | Performed by: ORTHOPAEDIC SURGERY

## 2024-04-29 PROCEDURE — 1090F PRES/ABSN URINE INCON ASSESS: CPT | Performed by: ORTHOPAEDIC SURGERY

## 2024-04-29 PROCEDURE — G8417 CALC BMI ABV UP PARAM F/U: HCPCS | Performed by: ORTHOPAEDIC SURGERY

## 2024-04-29 PROCEDURE — G8427 DOCREV CUR MEDS BY ELIG CLIN: HCPCS | Performed by: ORTHOPAEDIC SURGERY

## 2024-04-29 PROCEDURE — 1036F TOBACCO NON-USER: CPT | Performed by: ORTHOPAEDIC SURGERY

## 2024-04-29 PROCEDURE — 99213 OFFICE O/P EST LOW 20 MIN: CPT | Performed by: ORTHOPAEDIC SURGERY

## 2024-04-29 PROCEDURE — 1123F ACP DISCUSS/DSCN MKR DOCD: CPT | Performed by: ORTHOPAEDIC SURGERY

## 2024-04-29 NOTE — PROGRESS NOTES
Subjective:      Patient ID: Maggie Vaughn is a 69 y.o. female who presents today for:  Chief Complaint   Patient presents with    Follow-up     Pt presents here for f/u on right elbow she states it hurts when she's trying to do a chore pain scale 5/10 the pain gets worse at night        Subjective/Objective/Assessment/Plan:     SUBJECTIVE -the patient is here with her relative who is an  for her.  She has pain in the right shoulder and right elbow predominantly.  This was difficult in the sense that she does not speak English.  This began a year ago.  She describes pain and numbness.  Fell down the stairs on October 2021.  Lying down makes it worse.  She has tried physical therapy.  She takes Tylenol.  She is diabetic.    OBJECTIVE -pain at the right lateral epicondyle with resisted wrist extension.  Positive Adrianne's test right shoulder.  Full range of motion right shoulder.  XR KNEE RIGHT (1-2 VIEWS)  Narrative: EXAMINATION:  TWO XRAY VIEWS OF THE RIGHT KNEE    4/3/2024 3:16 pm    COMPARISON:  None.    HISTORY:  ORDERING SYSTEM PROVIDED HISTORY: Chronic pain of right knee, Chronic pain of  right knee  TECHNOLOGIST PROVIDED HISTORY:  What reading provider will be dictating this exam?->CRC    FINDINGS:  There is mild tricompartmental joint space narrowing, slightly greatest in  the patellofemoral compartment.  There is no calcification.  There is no  joint effusion.  Impression: Mild tricompartmental osteoarthritis.      ASSESSMENT -right elbow lateral epicondylitis.  Right shoulder pain.  Right shoulder rotator cuff tear    PLAN -she is going to try physical therapy for the right shoulder for 2 months before proceeding with surgery.  She does not want an injection because she feels that that makes her gain weight.  She would need right shoulder rotator cuff repair with distal clavicle excision.  States that she is not diabetic nor does she smoke.  I told her that the recovery time on this

## 2024-05-10 ENCOUNTER — HOSPITAL ENCOUNTER (OUTPATIENT)
Dept: PHYSICAL THERAPY | Age: 69
Setting detail: THERAPIES SERIES
Discharge: HOME | End: 2024-05-10
Attending: ORTHOPAEDIC SURGERY
Payer: MEDICARE

## 2024-05-10 PROCEDURE — 97162 PT EVAL MOD COMPLEX 30 MIN: CPT

## 2024-05-10 NOTE — PROGRESS NOTES
[place] [address] [place] [address]  Phone: [phone]                             Physical Therapy: Initial Evaluation    Patient: Maggie Vaughn (69 y.o.     female)   Examination Date: 05/10/2024   :  1955 ;    Confirmed: Yes MRN: [id number]  CSN: [id number]   Insurance: Payor: [facility] MEDICARE / Plan: [employer] DUAL COMPLETE / Product Type: *No Product type* /   Insurance ID: [id number] - (Medicare Managed)  PT Insurance Information: [facility] Medicare Secondary Insurance (if applicable):     Referring Physician: Wood Arredondo DO       Visits to Date/Visits Approved:  (BMN)    No Show/Cancelled Appts:      Medical Diagnosis: Nontraumatic complete tear of right rotator cuff [M75.121]  Lateral epicondylitis, right elbow [M77.11]        Treatment Diagnosis: janeth shoulder pain, janeth shoulder weakness, impaired lifting tolerance, neck pain, scapular pain bilateral     PERTINENT MEDICAL HISTORY   Patient Assessed for Rehabilitation Services: Yes       Medical History: Chart Reviewed: Yes   Past Medical History:   Diagnosis Date    Abnormal pelvic ultrasound 10/08/2020    Acid reflux     Adenomatous colon polyp - repeat scope 2021    Arthritis     Asthma     Chronic back pain     COPD (chronic obstructive pulmonary disease) (HCC)     Depression     Hyperlipidemia     Obesity     Venous insufficiency      Surgical History:   Past Surgical History:   Procedure Laterality Date    COLONOSCOPY      COLONOSCOPY N/A 2021    COLORECTAL CANCER SCREENING with polypectomies performed by Fidel Banuelos MD at [facility]    DILATION AND CURETTAGE OF UTERUS N/A 2021    HYSTEROSCOPY  FX  D/C performed by Mehul Bruner DO at [facility] OR    ENDOSCOPY, COLON, DIAGNOSTIC      KIDNEY SURGERY      OVARIAN CYST REMOVAL Left 2004    WI ESOPHAGOGASTRODUODENOSCOPY TRANSORAL DIAGNOSTIC N/A 2018    EGD ESOPHAGOGASTRODUODENOSCOPY WITH

## 2024-05-10 NOTE — PLAN OF CARE
[facility]  [address] [place] [address]  Phone: [phone]    [x] Certification  [] Recertification [x]  Plan of Care  [] Progress Note [] Discharge      Referring Provider: Wood Arredondo DO     From:  Jose Vera, PT, DPT  Patient: Maggie Vaughn (69 y.o. female) : 1955 Date: 5/10/2024  Medical Diagnosis: Nontraumatic complete tear of right rotator cuff [M75.121]  Lateral epicondylitis, right elbow [M77.11]       Treatment Diagnosis: janeth shoulder pain, janeth shoulder weakness, impaired lifting tolerance, neck pain, scapular pain bilateral    Plan of Care/Certification Expiration Date: 07/10/24   Progress Report Period from:  5/10/2024  to 5/10/2024    Visits to Date: 1 No Show: 0 Cancelled Appts: 0    OBJECTIVE:   Long Term Goals - Time Frame for Long Term Goals : 4-6 weeks  Goals Current/ Discharge status Status   Long Term Goal 1: Pt will report at least 25% decrease in janeth shoulder pain for improved functional tolerance and decreased medication dependence.  Pain Screening  Patient Currently in Pain: Yes  Pain Assessment: 0-10  Numerically not rated this date; pt expresses pain in multiple body parts including neck, janeth shoulders, L wrist, low back, and R leg   New   Long Term Goal 2: Pt will report 9 point increase via Ivorian UEFI to represent improved functional tolerance  Exam: UEFI: 63/80 (pain bilateral shoulders)     New   Long Term Goal 3: Pt will demo 5/5 janeth shoulder strength w/o pain to MMT for carryover to improved lifting and carrying tolerance.  Strength LUE  L Shoulder Flexion: 4/5  L Shoulder Extension: 5/5  L Shoulder ABduction: 4/5  L Shoulder Internal Rotation: 5/5  L Shoulder External Rotation: 4+/5  L Elbow Flexion: 5/5  L Elbow Extension: 5/5    Strength RUE  R Shoulder Flexion: 4/5  R Shoulder Extension: 5/5  R Shoulder ABduction: 4-/5  R Shoulder Internal Rotation: 5/5  R Shoulder External Rotation: 4+/5  R

## 2024-05-15 ENCOUNTER — HOSPITAL ENCOUNTER (OUTPATIENT)
Dept: PHYSICAL THERAPY | Age: 69
Setting detail: THERAPIES SERIES
Discharge: HOME | End: 2024-05-15
Attending: ORTHOPAEDIC SURGERY
Payer: MEDICARE

## 2024-05-15 PROCEDURE — 97110 THERAPEUTIC EXERCISES: CPT

## 2024-05-15 NOTE — PROGRESS NOTES
[place] [address] [place] [address]  Phone: [phone]      Date: 5/15/2024  Patient: Maggie Vaughn  : 1955   Confirmed: Yes  MRN: [id number]  Referring Provider: Wood Arredondo DO    Medical Diagnosis: Nontraumatic complete tear of right rotator cuff [M75.121]  Lateral epicondylitis, right elbow [M77.11]       Treatment Diagnosis: janeth shoulder pain, janeth shoulder weakness, impaired lifting tolerance, neck pain, scapular pain bilateral    Visit Information:  Insurance: Payor: [facility] MEDICARE / Plan: [email or web address] DUAL COMPLETE / Product Type: *No Product type* /   PT Visit Information  PT Insurance Information: [facility] Medicare  Total # of Visits Approved: 99  Total # of Visits to Date: 2  Plan of Care/Certification Expiration Date: 07/10/24  No Show: 0  Progress Note Due Date: 06/10/24  Canceled Appointment: 0  Progress Note Counter:     Subjective Information:  Subjective:  Tyler   #[id number] Pt reports pain in both shoulders, I know the doctor has only sent me her for my right shoulder. Cuttent pain of 5/10 while taking pain meds. Pt reports following last visit no increase in pain levels.  HEP Compliance:  [] Good [] Fair [] Poor [] Reports not doing due to:    Pain Screening  Patient Currently in Pain: Yes    Treatment:  Exercises:  Exercises  Exercise 1: shoulder pulleys 2' flex / 2' Scaption  Exercise 2: neck stretches (UT, levator, SCM, scalenes)  15\" x 3  Exercise 4: prone scap: Performed from standing position with Light Blue P-ball Rows, Ext Horz ABD in Neutral  Exercise 6: Supine Chest pulls IR/ ER YTB x 10 ea.  Exercise 7: Janeth ER YTB x 10 performed in supine.  Exercise 18: *consider KT to R shoulder  Exercise 19: *consider modalities for pain control  Exercise 20: HEP: UT, Levator, Scalens, SCM stretches, T-band chest pulls IR,ER and Janeth ER  Treatment Reasoning  Limitations addressed: Mobility, Strength    Manual:           Modalities:

## 2024-05-22 ENCOUNTER — HOSPITAL ENCOUNTER (OUTPATIENT)
Dept: PHYSICAL THERAPY | Age: 69
Setting detail: THERAPIES SERIES
Discharge: HOME | End: 2024-05-22
Attending: ORTHOPAEDIC SURGERY
Payer: MEDICARE

## 2024-05-22 PROCEDURE — 97110 THERAPEUTIC EXERCISES: CPT

## 2024-05-22 NOTE — PROGRESS NOTES
[place] [address] [place] [address]  Phone: [phone]      Date: 2024  Patient: Maggie Vaughn  : 1955   Confirmed: Yes  MRN: [id number]  Referring Provider: Wood Arredondo DO    Medical Diagnosis: Nontraumatic complete tear of right rotator cuff [M75.121]  Lateral epicondylitis, right elbow [M77.11]       Treatment Diagnosis: janeth shoulder pain, janeth shoulder weakness, impaired lifting tolerance, neck pain, scapular pain bilateral    Visit Information:  Insurance: Payor: [facility] MEDICARE / Plan: [employer] DUAL COMPLETE / Product Type: *No Product type* /   PT Visit Information  PT Insurance Information: [facility] Medicare  Total # of Visits Approved: 99  Total # of Visits to Date: 3  Plan of Care/Certification Expiration Date: 07/10/24  No Show: 0  Progress Note Due Date: 06/10/24  Canceled Appointment: 0  Progress Note Counter: 3/6    Subjective Information:  Subjective:  Dorota #[id number]; Pt reports she is improving little by little. Pt reports no pain currently.  HEP Compliance:  [x] Good [] Fair [] Poor [] Reports not doing due to:    Pain Screening  Patient Currently in Pain: Denies    Treatment:  Exercises:  Exercises  Exercise 1: shoulder pulleys x 3 min  Exercise 2: rows: YTB x 20  Exercise 3: shoulder ER: YTB, 2 x 10  Exercise 4: shoulder IR: YTB, 2x10  Exercise 5: shoulder flex to 90 de#, 2 x 10 alternating L/R  Exercise 6: shoulder abd to 90 de#, 2 x 10 alternating L/R  Exercise 7: janeth bicep curl: 2# x 15, 4# 2 x 10         *Indicates exercise, modality, or manual techniques to be initiated when appropriate    Objective Measures:   Pain in R shoulder reaching into overhead planes    Assessment:   Body Structures, Functions, Activity Limitations Requiring Skilled Therapeutic Intervention: Increased pain, Decreased ADL status, Decreased high-level IADLs, Decreased posture, Decreased ROM, Decreased strength  Assessment: Pt w/o pain at arrival

## 2024-05-29 ENCOUNTER — HOSPITAL ENCOUNTER (OUTPATIENT)
Dept: PHYSICAL THERAPY | Age: 69
Setting detail: THERAPIES SERIES
Discharge: HOME | End: 2024-05-29
Attending: ORTHOPAEDIC SURGERY
Payer: MEDICARE

## 2024-05-29 PROCEDURE — 97110 THERAPEUTIC EXERCISES: CPT

## 2024-05-29 NOTE — PROGRESS NOTES
Heat/Cold, Ultrasound, E-stim - unattended  Pt to continue current HEP.  See objective section for any therapeutic exercise changes, additions or modifications this date.    Therapy Time:      PT Individual Minutes  Time In: 1330  Time Out: 1416  Minutes: 46  Timed Code Treatment Minutes: 46 Minutes  Procedure Minutes:0  Timed Activity Minutes Units   Ther Ex 46 3     Electronically signed by Wood Bravo PTA on 5/29/24 at 4:44 PM EDT

## 2024-06-05 ENCOUNTER — HOSPITAL ENCOUNTER (OUTPATIENT)
Dept: PHYSICAL THERAPY | Age: 69
Setting detail: THERAPIES SERIES
Discharge: HOME | End: 2024-06-05
Attending: ORTHOPAEDIC SURGERY
Payer: MEDICARE

## 2024-06-05 PROCEDURE — 97110 THERAPEUTIC EXERCISES: CPT

## 2024-06-05 NOTE — PROGRESS NOTES
[facility]  [address] [place] [address]  Phone: [phone]    [] Certification  [] Recertification []  Plan of Care  [] Progress Note [x] Discharge      Referring Provider: Wood Arredondo DO     From:  Jose Vera, PT, DPT  Patient: Maggie Vaughn (69 y.o. female) : 1955 Date: 2024  Medical Diagnosis: Nontraumatic complete tear of right rotator cuff [M75.121]  Lateral epicondylitis, right elbow [M77.11]       Treatment Diagnosis: janeth shoulder pain, janeth shoulder weakness, impaired lifting tolerance, neck pain, scapular pain bilateral    Plan of Care/Certification Expiration Date: 07/10/24   Progress Report Period from:  5/10/2024  to 2024    Visits to Date: 5 No Show: 0 Cancelled Appts: 0    OBJECTIVE:   Long Term Goals - Time Frame for Long Term Goals : 4-6 weeks  Goals Current/ Discharge status Status   Long Term Goal 1: Pt will report at least 25% decrease in janeth shoulder pain for improved functional tolerance and decreased medication dependence. LTG 1 Current Status:: 24: Pt reports no pain currently.   Met   Long Term Goal 2: Pt will report 9 point increase via Estonian UEFI to represent improved functional tolerance LTG 2 Current Status:: 24: UEFI score 71/80 (+8 point improvment)    Partially met   Long Term Goal 3: Pt will demo 5/5 janeth shoulder strength w/o pain to MMT for carryover to improved lifting and carrying tolerance. LTG 3 Current Status:: 24: Right shoulder Flex 5/5, ABD 4+/5 (pain) IR 5/5, ER 5/5, Elbow:  5/5 grossly      Left shoulder Flex 5/5, ABD 5/5, IR 5/5, ER 5/5, Elbow 5/5 grossly   Partially met   Long Term Goal 4: Pt will be independent with recommended HEP and pain mgmt modalities. LTG 4 Current Status:: 24: Pt reports good compliance with current program   Partially met     Body Structures, Functions, Activity Limitations Requiring Skilled Therapeutic Intervention: Increased pain, Decreased ADL status, Decreased high-level

## 2024-06-05 NOTE — PROGRESS NOTES
[facility]  [address] [place] [address]  Phone: [phone]      Date: 2024  Patient: Maggie Vaughn  : 1955   Confirmed: Yes  MRN: [id number]  Referring Provider: Wood Arredondo DO    Medical Diagnosis: Nontraumatic complete tear of right rotator cuff [M75.121]  Lateral epicondylitis, right elbow [M77.11]       Treatment Diagnosis: janeth shoulder pain, janeth shoulder weakness, impaired lifting tolerance, neck pain, scapular pain bilateral    Visit Information:  Insurance: Payor: [facility] MEDICARE / Plan: [employer] DUAL COMPLETE / Product Type: *No Product type* /   PT Visit Information  PT Insurance Information: [facility] Medicare  Total # of Visits Approved: 99  Total # of Visits to Date: 5  Plan of Care/Certification Expiration Date: 07/10/24  No Show: 0  Progress Note Due Date: 06/10/24  Canceled Appointment: 0  Progress Note Counter:     Subjective Information:  Subjective:  Marco # [id number] Pt denies any pain currently with no pain or complaints following last tx session. Pt reports pain at best 0/10 to 5/10 At worse when laying in bed at night.  HEP Compliance:  [x] Good [] Fair [] Poor [] Reports not doing due to:    Pain Screening  Patient Currently in Pain: Denies    Treatment:  Exercises:  Exercises  Exercise 19: functional test and measurse.  Exercise 20: HEP: continue with current.       Manual:           Modalities:          *Indicates exercise, modality, or manual techniques to be initiated when appropriate    Objective Measures:                                                  LTG 1 Current Status:: 24: Pt reports no pain currently.  LTG 2 Current Status:: 24: UEFI score 71/80 (+8 point improvment) Step by step reading with intrep. assist.  LTG 3 Current Status:: 24: Right shoulder Flex 5/5, ABD 4+/5 (pain) IR 5/5, ER 5/5, Elbow:  5/5 grossly  Left shoulder Flex 5/5, ABD 5/5, IR 5/5, ER 5/5, Elbow 5/5 grossly  LTG 4 Current Status::

## 2024-08-08 DIAGNOSIS — F39 MOOD DISORDER (HCC): ICD-10-CM

## 2024-08-08 RX ORDER — HYDROXYZINE HYDROCHLORIDE 25 MG/1
25 TABLET, FILM COATED ORAL 3 TIMES DAILY PRN
Qty: 90 TABLET | Refills: 2 | Status: SHIPPED | OUTPATIENT
Start: 2024-08-08

## 2024-08-08 NOTE — TELEPHONE ENCOUNTER
Patient requesting medication refill. Please approve or deny this request.    Rx requested:  Requested Prescriptions     Pending Prescriptions Disp Refills    hydrOXYzine HCl (ATARAX) 25 MG tablet 90 tablet 2     Sig: Take 1 tablet by mouth 3 times daily as needed for Itching         Last Office Visit:   4/3/2024      Next Visit Date:  Future Appointments   Date Time Provider Department Center   8/9/2024  2:00 PM Nona Gordon DO [facility] DEP

## 2024-08-09 ENCOUNTER — OFFICE VISIT (OUTPATIENT)
Dept: FAMILY MEDICINE CLINIC | Age: 69
End: 2024-08-09
Payer: MEDICARE

## 2024-08-09 VITALS
HEIGHT: 65 IN | HEART RATE: 77 BPM | BODY MASS INDEX: 29.52 KG/M2 | SYSTOLIC BLOOD PRESSURE: 130 MMHG | WEIGHT: 177.2 LBS | DIASTOLIC BLOOD PRESSURE: 76 MMHG | TEMPERATURE: 97.7 F | OXYGEN SATURATION: 99 %

## 2024-08-09 DIAGNOSIS — J45.20 MILD INTERMITTENT ASTHMA WITHOUT COMPLICATION: Chronic | ICD-10-CM

## 2024-08-09 DIAGNOSIS — G89.29 CHRONIC PAIN OF RIGHT KNEE: Primary | ICD-10-CM

## 2024-08-09 DIAGNOSIS — R73.03 PRE-DIABETES: ICD-10-CM

## 2024-08-09 DIAGNOSIS — K21.9 GASTROESOPHAGEAL REFLUX DISEASE WITHOUT ESOPHAGITIS: ICD-10-CM

## 2024-08-09 DIAGNOSIS — M25.561 CHRONIC PAIN OF RIGHT KNEE: Primary | ICD-10-CM

## 2024-08-09 PROCEDURE — G8399 PT W/DXA RESULTS DOCUMENT: HCPCS | Performed by: STUDENT IN AN ORGANIZED HEALTH CARE EDUCATION/TRAINING PROGRAM

## 2024-08-09 PROCEDURE — G8427 DOCREV CUR MEDS BY ELIG CLIN: HCPCS | Performed by: STUDENT IN AN ORGANIZED HEALTH CARE EDUCATION/TRAINING PROGRAM

## 2024-08-09 PROCEDURE — 99214 OFFICE O/P EST MOD 30 MIN: CPT | Performed by: STUDENT IN AN ORGANIZED HEALTH CARE EDUCATION/TRAINING PROGRAM

## 2024-08-09 PROCEDURE — 3017F COLORECTAL CA SCREEN DOC REV: CPT | Performed by: STUDENT IN AN ORGANIZED HEALTH CARE EDUCATION/TRAINING PROGRAM

## 2024-08-09 PROCEDURE — 1123F ACP DISCUSS/DSCN MKR DOCD: CPT | Performed by: STUDENT IN AN ORGANIZED HEALTH CARE EDUCATION/TRAINING PROGRAM

## 2024-08-09 PROCEDURE — 1090F PRES/ABSN URINE INCON ASSESS: CPT | Performed by: STUDENT IN AN ORGANIZED HEALTH CARE EDUCATION/TRAINING PROGRAM

## 2024-08-09 PROCEDURE — 1036F TOBACCO NON-USER: CPT | Performed by: STUDENT IN AN ORGANIZED HEALTH CARE EDUCATION/TRAINING PROGRAM

## 2024-08-09 PROCEDURE — G8417 CALC BMI ABV UP PARAM F/U: HCPCS | Performed by: STUDENT IN AN ORGANIZED HEALTH CARE EDUCATION/TRAINING PROGRAM

## 2024-08-09 SDOH — ECONOMIC STABILITY: FOOD INSECURITY: WITHIN THE PAST 12 MONTHS, THE FOOD YOU BOUGHT JUST DIDN'T LAST AND YOU DIDN'T HAVE MONEY TO GET MORE.: NEVER TRUE

## 2024-08-09 SDOH — ECONOMIC STABILITY: FOOD INSECURITY: WITHIN THE PAST 12 MONTHS, YOU WORRIED THAT YOUR FOOD WOULD RUN OUT BEFORE YOU GOT MONEY TO BUY MORE.: NEVER TRUE

## 2024-08-09 SDOH — ECONOMIC STABILITY: INCOME INSECURITY: HOW HARD IS IT FOR YOU TO PAY FOR THE VERY BASICS LIKE FOOD, HOUSING, MEDICAL CARE, AND HEATING?: NOT HARD AT ALL

## 2024-08-09 NOTE — PROGRESS NOTES
Subjective  [facility], 69 y.[facility]. female presents today with:  Chief Complaint   Patient presents with    Follow-up     4 month follow up - Patient was referred to physical therapy and states she has not taken her medications yet.        Patient with 4-month follow-up appointment.   used.    Patient with chronic pain of the right knee.  She was referred to physical therapy and has been following.  Has been working pretty well.  Feels that pain is slightly better.    Patient also with prediabetes.  Not currently on any meds.  Overall doing okay.  Has been trying to eat healthier and get more movement.    Patient also with GERD.  Currently on omeprazole 40 mg daily.  Working well.  No refill needed.  She states that it is helping to control her symptoms.    Patient is with mild intermittent asthma.  Using albuterol as needed which does help.  She does not need a refill today.  She has no other concerns at this time.      Review of Systems   Constitutional:  Negative for chills, fatigue, fever and unexpected weight change.   HENT:  Negative for congestion, rhinorrhea and sore throat.    Eyes:  Negative for discharge.   Respiratory:  Negative for cough, shortness of breath and wheezing.    Cardiovascular:  Negative for chest pain, palpitations and leg swelling.   Gastrointestinal:  Negative for abdominal pain, diarrhea, nausea and vomiting.   Genitourinary:  Negative for difficulty urinating.   Musculoskeletal:  Negative for arthralgias and joint swelling.   Skin:  Negative for rash.   Neurological:  Negative for weakness, light-headedness, numbness and headaches.   Psychiatric/Behavioral:  Negative for confusion and suicidal ideas. The patient is not nervous/anxious.        Past Medical History:   Diagnosis Date    Abnormal pelvic ultrasound 10/08/2020    Acid reflux     Adenomatous colon polyp - repeat scope 02/2026 02/11/2021    Arthritis     Asthma     Chronic back pain     COPD (chronic

## 2024-08-13 ASSESSMENT — ENCOUNTER SYMPTOMS
COUGH: 0
VOMITING: 0
ABDOMINAL PAIN: 0
SORE THROAT: 0
RHINORRHEA: 0
NAUSEA: 0
WHEEZING: 0
EYE DISCHARGE: 0
DIARRHEA: 0
SHORTNESS OF BREATH: 0

## 2024-08-17 ENCOUNTER — APPOINTMENT (OUTPATIENT)
Dept: ULTRASOUND IMAGING | Age: 69
DRG: 392 | End: 2024-08-17
Payer: MEDICARE

## 2024-08-17 ENCOUNTER — APPOINTMENT (OUTPATIENT)
Dept: CT IMAGING | Age: 69
DRG: 392 | End: 2024-08-17
Payer: MEDICARE

## 2024-08-17 ENCOUNTER — HOSPITAL ENCOUNTER (INPATIENT)
Age: 69
LOS: 3 days | Discharge: HOME | DRG: 392 | End: 2024-08-20
Attending: EMERGENCY MEDICINE | Admitting: INTERNAL MEDICINE
Payer: MEDICARE

## 2024-08-17 DIAGNOSIS — R17 TOTAL BILIRUBIN, ELEVATED: ICD-10-CM

## 2024-08-17 DIAGNOSIS — R11.2 NAUSEA & VOMITING: ICD-10-CM

## 2024-08-17 DIAGNOSIS — D69.6 THROMBOCYTOPENIA (HCC): ICD-10-CM

## 2024-08-17 DIAGNOSIS — R10.11 ABDOMINAL PAIN, RIGHT UPPER QUADRANT: ICD-10-CM

## 2024-08-17 DIAGNOSIS — K80.50 BILIARY COLIC: Primary | ICD-10-CM

## 2024-08-17 PROBLEM — R10.9 ABDOMINAL PAIN: Status: ACTIVE | Noted: 2024-08-17

## 2024-08-17 LAB
ALBUMIN SERPL-MCNC: 4.1 G/DL (ref 3.5–4.6)
ALP SERPL-CCNC: 102 U/L (ref 40–130)
ALT SERPL-CCNC: 40 U/L (ref 0–33)
ANION GAP SERPL CALCULATED.3IONS-SCNC: 11 MEQ/L (ref 9–15)
AST SERPL-CCNC: 38 U/L (ref 0–35)
BASOPHILS # BLD: 0.1 K/UL (ref 0–0.2)
BASOPHILS NFR BLD: 0.4 %
BILIRUB SERPL-MCNC: 1.3 MG/DL (ref 0.2–0.7)
BILIRUB UR QL STRIP: NEGATIVE
BUN SERPL-MCNC: 7 MG/DL (ref 8–23)
CALCIUM SERPL-MCNC: 9.5 MG/DL (ref 8.5–9.9)
CHLORIDE SERPL-SCNC: 103 MEQ/L (ref 95–107)
CLARITY UR: CLEAR
CO2 SERPL-SCNC: 27 MEQ/L (ref 20–31)
COLOR UR: YELLOW
CREAT SERPL-MCNC: 0.61 MG/DL (ref 0.5–0.9)
EOSINOPHIL # BLD: 0.2 K/UL (ref 0–0.7)
EOSINOPHIL NFR BLD: 1.3 %
ERYTHROCYTE [DISTWIDTH] IN BLOOD BY AUTOMATED COUNT: 12.6 % (ref 11.5–14.5)
GLOBULIN SER CALC-MCNC: 2.8 G/DL (ref 2.3–3.5)
GLUCOSE SERPL-MCNC: 114 MG/DL (ref 70–99)
GLUCOSE UR STRIP-MCNC: NEGATIVE MG/DL
HCT VFR BLD AUTO: 43.2 % (ref 37–47)
HGB BLD-MCNC: 15.4 G/DL (ref 12–16)
HGB UR QL STRIP: NEGATIVE
KETONES UR STRIP-MCNC: NEGATIVE MG/DL
LEUKOCYTE ESTERASE UR QL STRIP: NEGATIVE
LIPASE SERPL-CCNC: 36 U/L (ref 12–95)
LYMPHOCYTES # BLD: 3 K/UL (ref 1–4.8)
LYMPHOCYTES NFR BLD: 26.8 %
MCH RBC QN AUTO: 32 PG (ref 27–31.3)
MCHC RBC AUTO-ENTMCNC: 35.6 % (ref 33–37)
MCV RBC AUTO: 89.6 FL (ref 79.4–94.8)
MONOCYTES # BLD: 1.2 K/UL (ref 0.2–0.8)
MONOCYTES NFR BLD: 10.3 %
NEUTROPHILS # BLD: 6.8 K/UL (ref 1.4–6.5)
NEUTS SEG NFR BLD: 60.9 %
NITRITE UR QL STRIP: NEGATIVE
PH UR STRIP: 7 [PH] (ref 5–9)
PLATELET # BLD AUTO: 163 K/UL (ref 130–400)
POTASSIUM SERPL-SCNC: 4.3 MEQ/L (ref 3.4–4.9)
PROT SERPL-MCNC: 6.9 G/DL (ref 6.3–8)
PROT UR STRIP-MCNC: NEGATIVE MG/DL
RBC # BLD AUTO: 4.82 M/UL (ref 4.2–5.4)
SODIUM SERPL-SCNC: 141 MEQ/L (ref 135–144)
SP GR UR STRIP: 1 (ref 1–1.03)
TROPONIN, HIGH SENSITIVITY: 7 NG/L (ref 0–19)
URINE REFLEX TO CULTURE: NORMAL
UROBILINOGEN UR STRIP-ACNC: 0.2 E.U./DL
WBC # BLD AUTO: 11.2 K/UL (ref 4.8–10.8)

## 2024-08-17 PROCEDURE — 6360000004 HC RX CONTRAST MEDICATION: Performed by: EMERGENCY MEDICINE

## 2024-08-17 PROCEDURE — 96374 THER/PROPH/DIAG INJ IV PUSH: CPT

## 2024-08-17 PROCEDURE — 96361 HYDRATE IV INFUSION ADD-ON: CPT

## 2024-08-17 PROCEDURE — 76705 ECHO EXAM OF ABDOMEN: CPT

## 2024-08-17 PROCEDURE — 2500000003 HC RX 250 WO HCPCS: Performed by: EMERGENCY MEDICINE

## 2024-08-17 PROCEDURE — 74177 CT ABD & PELVIS W/CONTRAST: CPT

## 2024-08-17 PROCEDURE — 6360000002 HC RX W HCPCS: Performed by: EMERGENCY MEDICINE

## 2024-08-17 PROCEDURE — 93005 ELECTROCARDIOGRAM TRACING: CPT

## 2024-08-17 PROCEDURE — 99285 EMERGENCY DEPT VISIT HI MDM: CPT

## 2024-08-17 PROCEDURE — 85025 COMPLETE CBC W/AUTO DIFF WBC: CPT

## 2024-08-17 PROCEDURE — 2580000003 HC RX 258: Performed by: EMERGENCY MEDICINE

## 2024-08-17 PROCEDURE — 84484 ASSAY OF TROPONIN QUANT: CPT

## 2024-08-17 PROCEDURE — 93005 ELECTROCARDIOGRAM TRACING: CPT | Performed by: EMERGENCY MEDICINE

## 2024-08-17 PROCEDURE — 83690 ASSAY OF LIPASE: CPT

## 2024-08-17 PROCEDURE — 81003 URINALYSIS AUTO W/O SCOPE: CPT

## 2024-08-17 PROCEDURE — 80053 COMPREHEN METABOLIC PANEL: CPT

## 2024-08-17 PROCEDURE — 1210000000 HC MED SURG R&B

## 2024-08-17 PROCEDURE — 96375 TX/PRO/DX INJ NEW DRUG ADDON: CPT

## 2024-08-17 RX ORDER — ONDANSETRON 2 MG/ML
4 INJECTION INTRAMUSCULAR; INTRAVENOUS EVERY 6 HOURS PRN
Status: DISCONTINUED | OUTPATIENT
Start: 2024-08-17 | End: 2024-08-20 | Stop reason: HOSPADM

## 2024-08-17 RX ORDER — SODIUM CHLORIDE 9 MG/ML
INJECTION, SOLUTION INTRAVENOUS CONTINUOUS
Status: DISPENSED | OUTPATIENT
Start: 2024-08-17 | End: 2024-08-18

## 2024-08-17 RX ORDER — ONDANSETRON 2 MG/ML
4 INJECTION INTRAMUSCULAR; INTRAVENOUS ONCE
Status: COMPLETED | OUTPATIENT
Start: 2024-08-17 | End: 2024-08-17

## 2024-08-17 RX ORDER — POTASSIUM CHLORIDE 20 MEQ/1
40 TABLET, EXTENDED RELEASE ORAL PRN
Status: DISCONTINUED | OUTPATIENT
Start: 2024-08-17 | End: 2024-08-20 | Stop reason: HOSPADM

## 2024-08-17 RX ORDER — POLYETHYLENE GLYCOL 3350 17 G/17G
17 POWDER, FOR SOLUTION ORAL DAILY PRN
Status: DISCONTINUED | OUTPATIENT
Start: 2024-08-17 | End: 2024-08-20 | Stop reason: HOSPADM

## 2024-08-17 RX ORDER — SODIUM CHLORIDE 9 MG/ML
INJECTION, SOLUTION INTRAVENOUS PRN
Status: DISCONTINUED | OUTPATIENT
Start: 2024-08-17 | End: 2024-08-20 | Stop reason: HOSPADM

## 2024-08-17 RX ORDER — MAGNESIUM SULFATE IN WATER 40 MG/ML
2000 INJECTION, SOLUTION INTRAVENOUS PRN
Status: DISCONTINUED | OUTPATIENT
Start: 2024-08-17 | End: 2024-08-20 | Stop reason: HOSPADM

## 2024-08-17 RX ORDER — ENOXAPARIN SODIUM 100 MG/ML
40 INJECTION SUBCUTANEOUS DAILY
Status: DISCONTINUED | OUTPATIENT
Start: 2024-08-18 | End: 2024-08-20 | Stop reason: HOSPADM

## 2024-08-17 RX ORDER — LANOLIN ALCOHOL/MO/W.PET/CERES
3 CREAM (GRAM) TOPICAL NIGHTLY
Status: DISCONTINUED | OUTPATIENT
Start: 2024-08-17 | End: 2024-08-20 | Stop reason: HOSPADM

## 2024-08-17 RX ORDER — POTASSIUM CHLORIDE 7.45 MG/ML
10 INJECTION INTRAVENOUS PRN
Status: DISCONTINUED | OUTPATIENT
Start: 2024-08-17 | End: 2024-08-20 | Stop reason: HOSPADM

## 2024-08-17 RX ORDER — ACETAMINOPHEN 650 MG/1
650 SUPPOSITORY RECTAL EVERY 6 HOURS PRN
Status: DISCONTINUED | OUTPATIENT
Start: 2024-08-17 | End: 2024-08-20 | Stop reason: HOSPADM

## 2024-08-17 RX ORDER — SODIUM CHLORIDE 0.9 % (FLUSH) 0.9 %
5-40 SYRINGE (ML) INJECTION EVERY 12 HOURS SCHEDULED
Status: DISCONTINUED | OUTPATIENT
Start: 2024-08-17 | End: 2024-08-20 | Stop reason: HOSPADM

## 2024-08-17 RX ORDER — MORPHINE SULFATE 2 MG/ML
2 INJECTION, SOLUTION INTRAMUSCULAR; INTRAVENOUS EVERY 4 HOURS PRN
Status: DISCONTINUED | OUTPATIENT
Start: 2024-08-17 | End: 2024-08-20 | Stop reason: HOSPADM

## 2024-08-17 RX ORDER — SODIUM CHLORIDE 0.9 % (FLUSH) 0.9 %
5-40 SYRINGE (ML) INJECTION PRN
Status: DISCONTINUED | OUTPATIENT
Start: 2024-08-17 | End: 2024-08-20 | Stop reason: HOSPADM

## 2024-08-17 RX ORDER — KETOROLAC TROMETHAMINE 15 MG/ML
15 INJECTION, SOLUTION INTRAMUSCULAR; INTRAVENOUS ONCE
Status: COMPLETED | OUTPATIENT
Start: 2024-08-17 | End: 2024-08-17

## 2024-08-17 RX ORDER — 0.9 % SODIUM CHLORIDE 0.9 %
1000 INTRAVENOUS SOLUTION INTRAVENOUS ONCE
Status: COMPLETED | OUTPATIENT
Start: 2024-08-17 | End: 2024-08-17

## 2024-08-17 RX ORDER — ONDANSETRON 4 MG/1
4 TABLET, ORALLY DISINTEGRATING ORAL EVERY 8 HOURS PRN
Status: DISCONTINUED | OUTPATIENT
Start: 2024-08-17 | End: 2024-08-20 | Stop reason: HOSPADM

## 2024-08-17 RX ORDER — ACETAMINOPHEN 325 MG/1
650 TABLET ORAL EVERY 6 HOURS PRN
Status: DISCONTINUED | OUTPATIENT
Start: 2024-08-17 | End: 2024-08-20 | Stop reason: HOSPADM

## 2024-08-17 RX ADMIN — ONDANSETRON 4 MG: 2 INJECTION INTRAMUSCULAR; INTRAVENOUS at 19:33

## 2024-08-17 RX ADMIN — KETOROLAC TROMETHAMINE 15 MG: 15 INJECTION, SOLUTION INTRAMUSCULAR; INTRAVENOUS at 12:23

## 2024-08-17 RX ADMIN — PIPERACILLIN AND TAZOBACTAM 4500 MG: 4; .5 INJECTION, POWDER, LYOPHILIZED, FOR SOLUTION INTRAVENOUS at 19:32

## 2024-08-17 RX ADMIN — FAMOTIDINE 20 MG: 10 INJECTION, SOLUTION INTRAVENOUS at 12:25

## 2024-08-17 RX ADMIN — ONDANSETRON 4 MG: 2 INJECTION INTRAMUSCULAR; INTRAVENOUS at 12:22

## 2024-08-17 RX ADMIN — IOPAMIDOL 75 ML: 755 INJECTION, SOLUTION INTRAVENOUS at 13:05

## 2024-08-17 RX ADMIN — SODIUM CHLORIDE 1000 ML: 9 INJECTION, SOLUTION INTRAVENOUS at 12:21

## 2024-08-17 ASSESSMENT — PAIN SCALES - GENERAL
PAINLEVEL_OUTOF10: 8
PAINLEVEL_OUTOF10: 3
PAINLEVEL_OUTOF10: 8

## 2024-08-17 ASSESSMENT — PAIN DESCRIPTION - PAIN TYPE: TYPE: ACUTE PAIN

## 2024-08-17 ASSESSMENT — PAIN DESCRIPTION - FREQUENCY: FREQUENCY: CONTINUOUS

## 2024-08-17 ASSESSMENT — PAIN DESCRIPTION - LOCATION
LOCATION: CHEST
LOCATION: ABDOMEN
LOCATION: ABDOMEN

## 2024-08-17 ASSESSMENT — PAIN DESCRIPTION - ONSET: ONSET: ON-GOING

## 2024-08-17 ASSESSMENT — PAIN - FUNCTIONAL ASSESSMENT
PAIN_FUNCTIONAL_ASSESSMENT: 0-10
PAIN_FUNCTIONAL_ASSESSMENT: 0-10

## 2024-08-17 ASSESSMENT — PAIN DESCRIPTION - ORIENTATION
ORIENTATION: RIGHT;LEFT;LOWER;UPPER
ORIENTATION: RIGHT;LEFT;MID;LOWER

## 2024-08-17 ASSESSMENT — PAIN DESCRIPTION - DESCRIPTORS: DESCRIPTORS: ACHING

## 2024-08-17 NOTE — ED TRIAGE NOTES
Pt to ed from home via triage with c/o abdominal pain, chest pain, nausea, vomiting diarrhea and dental pain  Pt reports taking a pill yesterday for dental pain, then developing NVD  Medication provided, labled ampicila, given to pt in April for \"problems with my molars\"  On arrival pt skin WDI, respirations even and unlabored   Pt calm and cooperative, alert and oriented.   No s/s of acute distress noted.

## 2024-08-17 NOTE — ED PROVIDER NOTES
Abnormal; Notable for the following components:    MCH 31.6 (*)     Platelets 111 (*)     Monocytes Absolute 1.2 (*)     All other components within normal limits   LIPASE   TROPONIN   URINALYSIS WITH REFLEX TO CULTURE   POCT VENOUS         MDM:   Vitals:    Vitals:    08/17/24 2114 08/18/24 0053 08/18/24 0600 08/18/24 0724   BP: (!) 152/77   (!) 125/57   Pulse: 65   64   Resp: 18 17 18   Temp: 98.8 °F (37.1 °C)   98.2 °F (36.8 °C)   TempSrc: Oral   Oral   SpO2: 100%   99%   Weight:   79.4 kg (175 lb)    Height:           ED Course as of 08/18/24 1353   Sat Aug 17, 2024   1212 Differential includes but is not limited to ACS, appendicitis, constipation, colitis, diverticulitis, gastroenteritis.  I have a low suspicion for ACS at this time given that her symptoms appear to be GI in nature primarily.    Does have right lower quadrant tenderness and so will evaluate CT imaging for appendicitis versus colitis versus diverticulitis.  Will treat symptoms with fluids Toradol and Pepcid [SG]   1228 EKG as interpreted by me shows sinus bradycardia with a rate of 50 bpm.  There is a normal axis, normal MD and QTc interval.  No ST elevations or depressions.  T wave inversion in aVR and V1 as well as lead III.  EKG nonischemic [SG]   1257 Troponin, High Sensitivity: 7  Low suspicion for ACS [SG]   1259 LFTs mildly elevated [SG]   1259 Lipase: 36  Low suspicion for pancreatitis [SG]   1330 Urinalysis is negative [SG]   1330 CT ABDOMEN PELVIS W IV CONTRAST Additional Contrast? None  IMPRESSION:  1. Questionable pericholecystic fluid. No definite calcified stones. No  biliary ductal dilatation. Correlation to right upper quadrant ultrasound is  recommended if clinical symptoms exist.  2. Decompression of the bladder with wall thickening. The wall thickening may  be related to the decompression however correlation to a urinalysis is  recommended to exclude possible cystitis/UTI.  3. Increased stool burden to suggest clinical

## 2024-08-18 PROBLEM — R11.2 NAUSEA AND VOMITING: Status: ACTIVE | Noted: 2024-08-18

## 2024-08-18 LAB
ALBUMIN SERPL-MCNC: 3.5 G/DL (ref 3.5–4.6)
ALP SERPL-CCNC: 88 U/L (ref 40–130)
ALT SERPL-CCNC: 31 U/L (ref 0–33)
ANION GAP SERPL CALCULATED.3IONS-SCNC: 10 MEQ/L (ref 9–15)
AST SERPL-CCNC: 26 U/L (ref 0–35)
BASOPHILS # BLD: 0.1 K/UL (ref 0–0.2)
BASOPHILS NFR BLD: 0.6 %
BILIRUB SERPL-MCNC: 1.9 MG/DL (ref 0.2–0.7)
BUN SERPL-MCNC: 8 MG/DL (ref 8–23)
CALCIUM SERPL-MCNC: 8.3 MG/DL (ref 8.5–9.9)
CHLORIDE SERPL-SCNC: 105 MEQ/L (ref 95–107)
CO2 SERPL-SCNC: 26 MEQ/L (ref 20–31)
CREAT SERPL-MCNC: 0.79 MG/DL (ref 0.5–0.9)
EOSINOPHIL # BLD: 0.2 K/UL (ref 0–0.7)
EOSINOPHIL NFR BLD: 1.8 %
ERYTHROCYTE [DISTWIDTH] IN BLOOD BY AUTOMATED COUNT: 12.6 % (ref 11.5–14.5)
GLOBULIN SER CALC-MCNC: 2.2 G/DL (ref 2.3–3.5)
GLUCOSE SERPL-MCNC: 110 MG/DL (ref 70–99)
HCT VFR BLD AUTO: 38.4 % (ref 37–47)
HGB BLD-MCNC: 13.7 G/DL (ref 12–16)
LYMPHOCYTES # BLD: 2.5 K/UL (ref 1–4.8)
LYMPHOCYTES NFR BLD: 28.6 %
MCH RBC QN AUTO: 31.6 PG (ref 27–31.3)
MCHC RBC AUTO-ENTMCNC: 35.7 % (ref 33–37)
MCV RBC AUTO: 88.7 FL (ref 79.4–94.8)
MONOCYTES # BLD: 1.2 K/UL (ref 0.2–0.8)
MONOCYTES NFR BLD: 13.4 %
NEUTROPHILS # BLD: 4.9 K/UL (ref 1.4–6.5)
NEUTS SEG NFR BLD: 55.3 %
PERFORMED ON: NORMAL
PLATELET # BLD AUTO: 111 K/UL (ref 130–400)
POC CREATININE: 0.7 MG/DL (ref 0.6–1.2)
POC SAMPLE TYPE: NORMAL
POTASSIUM SERPL-SCNC: 4.3 MEQ/L (ref 3.4–4.9)
PROT SERPL-MCNC: 5.7 G/DL (ref 6.3–8)
RBC # BLD AUTO: 4.33 M/UL (ref 4.2–5.4)
SODIUM SERPL-SCNC: 141 MEQ/L (ref 135–144)
WBC # BLD AUTO: 8.8 K/UL (ref 4.8–10.8)

## 2024-08-18 PROCEDURE — 99221 1ST HOSP IP/OBS SF/LOW 40: CPT | Performed by: SURGERY

## 2024-08-18 PROCEDURE — 85025 COMPLETE CBC W/AUTO DIFF WBC: CPT

## 2024-08-18 PROCEDURE — 2580000003 HC RX 258

## 2024-08-18 PROCEDURE — 1210000000 HC MED SURG R&B

## 2024-08-18 PROCEDURE — 6360000002 HC RX W HCPCS

## 2024-08-18 PROCEDURE — 6370000000 HC RX 637 (ALT 250 FOR IP)

## 2024-08-18 PROCEDURE — 99222 1ST HOSP IP/OBS MODERATE 55: CPT | Performed by: NURSE PRACTITIONER

## 2024-08-18 PROCEDURE — 80053 COMPREHEN METABOLIC PANEL: CPT

## 2024-08-18 PROCEDURE — 36415 COLL VENOUS BLD VENIPUNCTURE: CPT

## 2024-08-18 RX ORDER — PANTOPRAZOLE SODIUM 40 MG/1
40 TABLET, DELAYED RELEASE ORAL
Status: DISCONTINUED | OUTPATIENT
Start: 2024-08-18 | End: 2024-08-20 | Stop reason: HOSPADM

## 2024-08-18 RX ORDER — ALBUTEROL SULFATE 90 UG/1
2 AEROSOL, METERED RESPIRATORY (INHALATION) 4 TIMES DAILY PRN
Status: DISCONTINUED | OUTPATIENT
Start: 2024-08-18 | End: 2024-08-20 | Stop reason: HOSPADM

## 2024-08-18 RX ORDER — HYDRALAZINE HYDROCHLORIDE 20 MG/ML
10 INJECTION INTRAMUSCULAR; INTRAVENOUS EVERY 4 HOURS PRN
Status: DISCONTINUED | OUTPATIENT
Start: 2024-08-18 | End: 2024-08-20 | Stop reason: HOSPADM

## 2024-08-18 RX ORDER — HYDROXYZINE HYDROCHLORIDE 25 MG/1
25 TABLET, FILM COATED ORAL 3 TIMES DAILY PRN
Status: DISCONTINUED | OUTPATIENT
Start: 2024-08-18 | End: 2024-08-20 | Stop reason: HOSPADM

## 2024-08-18 RX ADMIN — PIPERACILLIN AND TAZOBACTAM 3375 MG: 3; .375 INJECTION, POWDER, LYOPHILIZED, FOR SOLUTION INTRAVENOUS at 05:16

## 2024-08-18 RX ADMIN — ENOXAPARIN SODIUM 40 MG: 100 INJECTION SUBCUTANEOUS at 21:52

## 2024-08-18 RX ADMIN — PIPERACILLIN AND TAZOBACTAM 3375 MG: 3; .375 INJECTION, POWDER, LYOPHILIZED, FOR SOLUTION INTRAVENOUS at 21:56

## 2024-08-18 RX ADMIN — Medication 10 ML: at 00:27

## 2024-08-18 RX ADMIN — MORPHINE SULFATE 2 MG: 2 INJECTION, SOLUTION INTRAMUSCULAR; INTRAVENOUS at 00:23

## 2024-08-18 RX ADMIN — Medication 10 ML: at 21:52

## 2024-08-18 RX ADMIN — SODIUM CHLORIDE: 9 INJECTION, SOLUTION INTRAVENOUS at 00:19

## 2024-08-18 RX ADMIN — Medication 3 MG: at 21:52

## 2024-08-18 RX ADMIN — PIPERACILLIN AND TAZOBACTAM 3375 MG: 3; .375 INJECTION, POWDER, LYOPHILIZED, FOR SOLUTION INTRAVENOUS at 12:42

## 2024-08-18 RX ADMIN — ONDANSETRON 4 MG: 2 INJECTION INTRAMUSCULAR; INTRAVENOUS at 09:36

## 2024-08-18 ASSESSMENT — PAIN DESCRIPTION - LOCATION
LOCATION: ABDOMEN
LOCATION: ABDOMEN

## 2024-08-18 ASSESSMENT — PAIN DESCRIPTION - ORIENTATION
ORIENTATION: UPPER
ORIENTATION: UPPER

## 2024-08-18 ASSESSMENT — PAIN DESCRIPTION - DESCRIPTORS: DESCRIPTORS: ACHING;CRAMPING

## 2024-08-18 ASSESSMENT — PAIN SCALES - GENERAL
PAINLEVEL_OUTOF10: 8
PAINLEVEL_OUTOF10: 7

## 2024-08-18 NOTE — H&P
MD Medina       Allergies:    Other and Seasonal    Social History:    reports that she has never smoked. She has never used smokeless tobacco. She reports that she does not drink alcohol and does not use drugs.    Family History:      Problem Relation Age of Onset    Diabetes Mother     High Blood Pressure Mother     High Blood Pressure Sister     Diabetes Brother     Colon Cancer Maternal Grandmother     Diabetes Maternal Grandmother     Diabetes Maternal Grandfather     Diabetes Paternal Grandmother     Diabetes Paternal Grandfather     Diabetes Other        PHYSICAL EXAM:   Vitals:  BP (!) 149/62   Pulse 69   Temp 97.8 °F (36.6 °C)   Resp 10   Ht 1.651 m (5' 5\")   Wt 80.7 kg (178 lb)   LMP  (LMP Unknown)   SpO2 100%   BMI 29.62 kg/m²     Constitutional:       Appearance: Awake, alert, cooperative, and appears stated age. Negative for activity change. Not diaphoretic.  HEENT:      Head: Normocephalic and atraumatic.      Eyes: PERRLA     Ears: No obvious deformities noted.     Nose: No obvious deformities noted.     Mouth/Throat: Mucous membranes are dry. Oropharynx is clear.   NECK:      Trachea midline.   BACK:       Symmetric. No deformities noted  Cardiovascular:      Rate and Rhythm: Normal rate and regular rhythm.      Heart sounds: Normal heart sounds, S1 normal and S2 normal.   Pulmonary:      Effort: Pulmonary effort is normal.      Breath sounds: Normal breath sounds and good air exchange. Clear to auscultation bilaterally, no crackles or wheezing.   Abdominal:      General: Abdomen is non-distended. Bowel sounds are present but hypoactive      Palpations: Abdomen is soft. RUQ tenderness to palpation.   Musculoskeletal:      Comments: No redness, warmth, or swelling of the joints. Normal ROM. Movements are equal bilaterally.   Skin:     General: Skin is warm and dry. No swelling noted. No wounds noted.   Neurological:      General: No focal deficit present. Alert and oriented x 4     Motor:

## 2024-08-19 ENCOUNTER — ANESTHESIA EVENT (OUTPATIENT)
Dept: ENDOSCOPY | Age: 69
DRG: 392 | End: 2024-08-19
Payer: MEDICARE

## 2024-08-19 ENCOUNTER — ANESTHESIA (OUTPATIENT)
Dept: ENDOSCOPY | Age: 69
DRG: 392 | End: 2024-08-19
Payer: MEDICARE

## 2024-08-19 PROBLEM — K80.50 BILIARY COLIC: Status: ACTIVE | Noted: 2024-08-19

## 2024-08-19 PROBLEM — R11.2 NAUSEA & VOMITING: Status: ACTIVE | Noted: 2024-08-17

## 2024-08-19 LAB
ALBUMIN SERPL-MCNC: 3.3 G/DL (ref 3.5–4.6)
ALP SERPL-CCNC: 85 U/L (ref 40–130)
ALT SERPL-CCNC: 26 U/L (ref 0–33)
ANION GAP SERPL CALCULATED.3IONS-SCNC: 10 MEQ/L (ref 9–15)
AST SERPL-CCNC: 22 U/L (ref 0–35)
BASOPHILS # BLD: 0.1 K/UL (ref 0–0.2)
BASOPHILS NFR BLD: 0.9 %
BILIRUB SERPL-MCNC: 1.4 MG/DL (ref 0.2–0.7)
BUN SERPL-MCNC: 7 MG/DL (ref 8–23)
CALCIUM SERPL-MCNC: 8.3 MG/DL (ref 8.5–9.9)
CHLORIDE SERPL-SCNC: 106 MEQ/L (ref 95–107)
CO2 SERPL-SCNC: 24 MEQ/L (ref 20–31)
CREAT SERPL-MCNC: 0.61 MG/DL (ref 0.5–0.9)
EKG ATRIAL RATE: 50 BPM
EKG ATRIAL RATE: 73 BPM
EKG P AXIS: 54 DEGREES
EKG P AXIS: 56 DEGREES
EKG P-R INTERVAL: 150 MS
EKG P-R INTERVAL: 156 MS
EKG Q-T INTERVAL: 358 MS
EKG Q-T INTERVAL: 374 MS
EKG QRS DURATION: 82 MS
EKG QRS DURATION: 86 MS
EKG QTC CALCULATION (BAZETT): 340 MS
EKG QTC CALCULATION (BAZETT): 394 MS
EKG R AXIS: 19 DEGREES
EKG R AXIS: 4 DEGREES
EKG T AXIS: 0 DEGREES
EKG T AXIS: 20 DEGREES
EKG VENTRICULAR RATE: 50 BPM
EKG VENTRICULAR RATE: 73 BPM
EOSINOPHIL # BLD: 0.4 K/UL (ref 0–0.7)
EOSINOPHIL NFR BLD: 4.3 %
ERYTHROCYTE [DISTWIDTH] IN BLOOD BY AUTOMATED COUNT: 12.8 % (ref 11.5–14.5)
GLOBULIN SER CALC-MCNC: 2.3 G/DL (ref 2.3–3.5)
GLUCOSE SERPL-MCNC: 99 MG/DL (ref 70–99)
HCT VFR BLD AUTO: 38.3 % (ref 37–47)
HGB BLD-MCNC: 13.9 G/DL (ref 12–16)
LYMPHOCYTES # BLD: 3.7 K/UL (ref 1–4.8)
LYMPHOCYTES NFR BLD: 45.1 %
MCH RBC QN AUTO: 31.8 PG (ref 27–31.3)
MCHC RBC AUTO-ENTMCNC: 36.3 % (ref 33–37)
MCV RBC AUTO: 87.6 FL (ref 79.4–94.8)
MONOCYTES # BLD: 1 K/UL (ref 0.2–0.8)
MONOCYTES NFR BLD: 12.5 %
NEUTROPHILS # BLD: 3 K/UL (ref 1.4–6.5)
NEUTS SEG NFR BLD: 37 %
PLATELET # BLD AUTO: 75 K/UL (ref 130–400)
PLATELET BLD QL SMEAR: ABNORMAL
POTASSIUM SERPL-SCNC: 3.8 MEQ/L (ref 3.4–4.9)
PROT SERPL-MCNC: 5.6 G/DL (ref 6.3–8)
RBC # BLD AUTO: 4.37 M/UL (ref 4.2–5.4)
RBC MORPH BLD: NORMAL
SLIDE REVIEW: ABNORMAL
SODIUM SERPL-SCNC: 140 MEQ/L (ref 135–144)
WBC # BLD AUTO: 8.2 K/UL (ref 4.8–10.8)

## 2024-08-19 PROCEDURE — 88342 IMHCHEM/IMCYTCHM 1ST ANTB: CPT

## 2024-08-19 PROCEDURE — 93010 ELECTROCARDIOGRAM REPORT: CPT | Performed by: INTERNAL MEDICINE

## 2024-08-19 PROCEDURE — 3609017100 HC EGD: Performed by: SPECIALIST

## 2024-08-19 PROCEDURE — 85025 COMPLETE CBC W/AUTO DIFF WBC: CPT

## 2024-08-19 PROCEDURE — 2580000003 HC RX 258

## 2024-08-19 PROCEDURE — 2060000000 HC ICU INTERMEDIATE R&B

## 2024-08-19 PROCEDURE — 2500000003 HC RX 250 WO HCPCS: Performed by: NURSE ANESTHETIST, CERTIFIED REGISTERED

## 2024-08-19 PROCEDURE — 7100000010 HC PHASE II RECOVERY - FIRST 15 MIN: Performed by: SPECIALIST

## 2024-08-19 PROCEDURE — 3700000000 HC ANESTHESIA ATTENDED CARE: Performed by: SPECIALIST

## 2024-08-19 PROCEDURE — 2580000003 HC RX 258: Performed by: SPECIALIST

## 2024-08-19 PROCEDURE — 0DB68ZX EXCISION OF STOMACH, VIA NATURAL OR ARTIFICIAL OPENING ENDOSCOPIC, DIAGNOSTIC: ICD-10-PCS | Performed by: SPECIALIST

## 2024-08-19 PROCEDURE — 7100000011 HC PHASE II RECOVERY - ADDTL 15 MIN: Performed by: SPECIALIST

## 2024-08-19 PROCEDURE — 6360000002 HC RX W HCPCS: Performed by: NURSE ANESTHETIST, CERTIFIED REGISTERED

## 2024-08-19 PROCEDURE — 6360000002 HC RX W HCPCS

## 2024-08-19 PROCEDURE — 88305 TISSUE EXAM BY PATHOLOGIST: CPT

## 2024-08-19 PROCEDURE — 2709999900 HC NON-CHARGEABLE SUPPLY: Performed by: SPECIALIST

## 2024-08-19 PROCEDURE — 6370000000 HC RX 637 (ALT 250 FOR IP): Performed by: INTERNAL MEDICINE

## 2024-08-19 PROCEDURE — 6370000000 HC RX 637 (ALT 250 FOR IP)

## 2024-08-19 PROCEDURE — 80053 COMPREHEN METABOLIC PANEL: CPT

## 2024-08-19 PROCEDURE — 36415 COLL VENOUS BLD VENIPUNCTURE: CPT

## 2024-08-19 PROCEDURE — 97166 OT EVAL MOD COMPLEX 45 MIN: CPT

## 2024-08-19 RX ORDER — SODIUM CHLORIDE 9 MG/ML
INJECTION, SOLUTION INTRAVENOUS
Status: COMPLETED
Start: 2024-08-19 | End: 2024-08-19

## 2024-08-19 RX ORDER — SODIUM CHLORIDE 9 MG/ML
25 INJECTION, SOLUTION INTRAVENOUS PRN
Status: DISCONTINUED | OUTPATIENT
Start: 2024-08-19 | End: 2024-08-19 | Stop reason: HOSPADM

## 2024-08-19 RX ORDER — SODIUM CHLORIDE 0.9 % (FLUSH) 0.9 %
5-40 SYRINGE (ML) INJECTION PRN
Status: DISCONTINUED | OUTPATIENT
Start: 2024-08-19 | End: 2024-08-19 | Stop reason: HOSPADM

## 2024-08-19 RX ORDER — CALCIUM CARBONATE 500 MG/1
500 TABLET, CHEWABLE ORAL 3 TIMES DAILY PRN
Status: DISCONTINUED | OUTPATIENT
Start: 2024-08-19 | End: 2024-08-20 | Stop reason: HOSPADM

## 2024-08-19 RX ORDER — NALOXONE HYDROCHLORIDE 0.4 MG/ML
INJECTION, SOLUTION INTRAMUSCULAR; INTRAVENOUS; SUBCUTANEOUS PRN
Status: DISCONTINUED | OUTPATIENT
Start: 2024-08-19 | End: 2024-08-19 | Stop reason: HOSPADM

## 2024-08-19 RX ORDER — SODIUM CHLORIDE 0.9 % (FLUSH) 0.9 %
5-40 SYRINGE (ML) INJECTION EVERY 12 HOURS SCHEDULED
Status: DISCONTINUED | OUTPATIENT
Start: 2024-08-19 | End: 2024-08-19 | Stop reason: HOSPADM

## 2024-08-19 RX ORDER — FLUCONAZOLE 100 MG/1
150 TABLET ORAL ONCE
Status: COMPLETED | OUTPATIENT
Start: 2024-08-19 | End: 2024-08-19

## 2024-08-19 RX ORDER — SODIUM CHLORIDE 9 MG/ML
INJECTION, SOLUTION INTRAVENOUS PRN
Status: DISCONTINUED | OUTPATIENT
Start: 2024-08-19 | End: 2024-08-19 | Stop reason: HOSPADM

## 2024-08-19 RX ORDER — PROPOFOL 10 MG/ML
INJECTION, EMULSION INTRAVENOUS PRN
Status: DISCONTINUED | OUTPATIENT
Start: 2024-08-19 | End: 2024-08-19 | Stop reason: SDUPTHER

## 2024-08-19 RX ORDER — LIDOCAINE HYDROCHLORIDE 20 MG/ML
INJECTION, SOLUTION INFILTRATION; PERINEURAL PRN
Status: DISCONTINUED | OUTPATIENT
Start: 2024-08-19 | End: 2024-08-19 | Stop reason: SDUPTHER

## 2024-08-19 RX ADMIN — PIPERACILLIN AND TAZOBACTAM 3375 MG: 3; .375 INJECTION, POWDER, LYOPHILIZED, FOR SOLUTION INTRAVENOUS at 06:13

## 2024-08-19 RX ADMIN — SODIUM CHLORIDE 500 ML: 9 INJECTION, SOLUTION INTRAVENOUS at 12:48

## 2024-08-19 RX ADMIN — PROPOFOL 150 MG: 10 INJECTION, EMULSION INTRAVENOUS at 13:05

## 2024-08-19 RX ADMIN — FLUCONAZOLE 150 MG: 100 TABLET ORAL at 15:05

## 2024-08-19 RX ADMIN — Medication 5 ML: at 19:36

## 2024-08-19 RX ADMIN — ONDANSETRON 4 MG: 2 INJECTION INTRAMUSCULAR; INTRAVENOUS at 22:36

## 2024-08-19 RX ADMIN — LIDOCAINE HYDROCHLORIDE 60 MG: 20 INJECTION, SOLUTION INFILTRATION; PERINEURAL at 13:05

## 2024-08-19 RX ADMIN — PIPERACILLIN AND TAZOBACTAM 3375 MG: 3; .375 INJECTION, POWDER, LYOPHILIZED, FOR SOLUTION INTRAVENOUS at 15:58

## 2024-08-19 RX ADMIN — Medication 10 ML: at 09:52

## 2024-08-19 RX ADMIN — Medication 3 MG: at 22:36

## 2024-08-19 ASSESSMENT — PAIN - FUNCTIONAL ASSESSMENT: PAIN_FUNCTIONAL_ASSESSMENT: 0-10

## 2024-08-19 NOTE — ANESTHESIA PRE PROCEDURE
Abdominal pain R10.9    Nausea and vomiting R11.2    Nausea & vomiting R11.2       Past Medical History:        Diagnosis Date    Abnormal pelvic ultrasound 10/08/2020    Acid reflux     Adenomatous colon polyp - repeat scope 02/2026 02/11/2021    Arthritis     Asthma     Chronic back pain     COPD (chronic obstructive pulmonary disease) (HCC)     Depression     Hyperlipidemia     Nausea and vomiting 8/18/2024    Obesity     Venous insufficiency        Past Surgical History:        Procedure Laterality Date    COLONOSCOPY      COLONOSCOPY N/A 02/03/2021    COLORECTAL CANCER SCREENING with polypectomies performed by Fidel Banuelos MD at [facility]    DILATION AND CURETTAGE OF UTERUS N/A 06/28/2021    HYSTEROSCOPY  FX  D/C performed by Mehul Bruner DO at [facility] OR    ENDOSCOPY, COLON, DIAGNOSTIC      KIDNEY SURGERY      OVARIAN CYST REMOVAL Left 11/06/2004    GA ESOPHAGOGASTRODUODENOSCOPY TRANSORAL DIAGNOSTIC N/A 11/14/2018    EGD ESOPHAGOGASTRODUODENOSCOPY WITH DILATION performed by Holden De La Fuente MD at [facility]    UPPER GASTROINTESTINAL ENDOSCOPY N/A 08/21/2023    EGD DIAGNOSTIC ONLY with gastric biopsies performed by Fidel Banuelos MD at [facility]       Social History:    Social History     Tobacco Use    Smoking status: Never    Smokeless tobacco: Never   Substance Use Topics    Alcohol use: No                                Counseling given: Not Answered      Vital Signs (Current):   Vitals:    08/18/24 1451 08/18/24 1921 08/19/24 0202 08/19/24 0951   BP: 131/65 138/76 (!) 144/68 (!) 144/66   Pulse: 58 66 60 56   Resp: 18 16 18 16   Temp: 36.8 °C (98.2 °F) 36.9 °C (98.4 °F) 36.9 °C (98.4 °F) 36.7 °C (98.1 °F)   TempSrc: Oral Oral Oral Oral   SpO2: 99% 98% 98% 97%   Weight:       Height:                                                  BP Readings from Last 3 Encounters:   08/19/24 (!) 144/66   08/09/24 130/76   04/03/24 118/70       NPO Status:

## 2024-08-19 NOTE — ANESTHESIA POSTPROCEDURE EVALUATION
Department of Anesthesiology  Postprocedure Note    Patient: Maggie Vaughn  MRN: [id number]  YOB: 1955  Date of evaluation: 8/19/2024    Procedure Summary       Date: 08/19/24 Room / Location: [facility] OR 01 / [facility]    Anesthesia Start: 1302 Anesthesia Stop:     Procedure: ESOPHAGOGASTRODUODENOSCOPY with biopsies Diagnosis:       Nausea & vomiting      (Nausea & vomiting [R11.2])    Surgeons: Baldomero Mondragon MD Responsible Provider: Jaclyn Rushing APRN - MARLON    Anesthesia Type: MAC ASA Status: 3            Anesthesia Type: No value filed.    Fady Phase I: Fady Score: 10    Fady Phase II:      Anesthesia Post Evaluation    Patient location during evaluation: bedside  Patient participation: complete - patient participated  Level of consciousness: awake and awake and alert  Pain score: 0  Airway patency: patent  Nausea & Vomiting: no nausea and no vomiting  Cardiovascular status: blood pressure returned to baseline and hemodynamically stable  Respiratory status: acceptable and spontaneous ventilation  Hydration status: euvolemic  Pain management: adequate        No notable events documented.

## 2024-08-19 NOTE — ACP (ADVANCE CARE PLANNING)
Advance Care Planning   Healthcare Decision Maker:    Primary Decision Maker: Melissa Wesley - Child - [phone]    Secondary Decision Maker: Davey Hendricks - Brother/Sister - [phone]    Supplemental (Other) Decision Maker: Davey Winter - Niece/Nephew - [phone]

## 2024-08-19 NOTE — PLAN OF CARE
See OT evaluation for all goals and OT POC. Electronically signed by Sandra Dubose OT on 8/19/2024 at 2:57 PM

## 2024-08-19 NOTE — CARE COORDINATION
Case Management Assessment  Initial Evaluation    Date/Time of Evaluation: 8/19/2024 2:40 PM  Assessment Completed by: Farida Levine RN    If patient is discharged prior to next notation, then this note serves as note for discharge by case management.    Patient Name: Maggie Vaughn                   YOB: 1955  Diagnosis: Abdominal pain [R10.9]                   Date / Time: 8/17/2024 10:57 AM    Patient Admission Status: Inpatient   Readmission Risk (Low < 19, Mod (19-27), High > 27): Readmission Risk Score: 8.2    Current PCP: Nona Gordon, DO  PCP verified by CM? Yes    Chart Reviewed: Yes      History Provided by: Patient  Patient Orientation: Alert and Oriented    Patient Cognition: Alert    Hospitalization in the last 30 days (Readmission):  No    If yes, Readmission Assessment in CM Navigator will be completed.    Advance Directives:      Code Status: Full Code   Patient's Primary Decision Maker is: Named in Scanned ACP Document    Primary Decision Maker: Melissa Wesley - Child - [phone]    Secondary Decision Maker: EladioDavey - Brother/Sister - [phone]    Discharge Planning:    Patient lives with: Family Members Type of Home: House  Primary Care Giver: Self  Patient Support Systems include: Family Members   Current Financial resources: Medicare  Current community resources: None  Current services prior to admission: None            Current DME:  HAS DME; CANE,WALKER NOT CURRENTLY UTILIZING             Type of Home Care services:  None    ADLS  Prior functional level: Independent in ADLs/IADLs  Current functional level: Independent in ADLs/IADLs    PT AM-PAC:   /24  OT AM-PAC:   /24    Family can provide assistance at DC: Yes  Would you like Case Management to discuss the discharge plan with any other family members/significant others, and if so, who? Yes  Plans to Return to Present Housing: Yes  Other Identified Issues/Barriers to RETURNING to current housing:

## 2024-08-20 VITALS
DIASTOLIC BLOOD PRESSURE: 67 MMHG | BODY MASS INDEX: 29.16 KG/M2 | TEMPERATURE: 97.3 F | WEIGHT: 175 LBS | OXYGEN SATURATION: 98 % | SYSTOLIC BLOOD PRESSURE: 138 MMHG | HEIGHT: 65 IN | HEART RATE: 53 BPM | RESPIRATION RATE: 18 BRPM

## 2024-08-20 LAB
ALBUMIN SERPL-MCNC: 3.5 G/DL (ref 3.5–4.6)
ALP SERPL-CCNC: 87 U/L (ref 40–130)
ALT SERPL-CCNC: 25 U/L (ref 0–33)
ANION GAP SERPL CALCULATED.3IONS-SCNC: 10 MEQ/L (ref 9–15)
AST SERPL-CCNC: 21 U/L (ref 0–35)
BASOPHILS # BLD: 0.1 K/UL (ref 0–0.2)
BASOPHILS NFR BLD: 0.6 %
BILIRUB SERPL-MCNC: 1.2 MG/DL (ref 0.2–0.7)
BUN SERPL-MCNC: 8 MG/DL (ref 8–23)
CALCIUM SERPL-MCNC: 8.8 MG/DL (ref 8.5–9.9)
CHLORIDE SERPL-SCNC: 102 MEQ/L (ref 95–107)
CO2 SERPL-SCNC: 27 MEQ/L (ref 20–31)
CREAT SERPL-MCNC: 0.76 MG/DL (ref 0.5–0.9)
EOSINOPHIL # BLD: 0.3 K/UL (ref 0–0.7)
EOSINOPHIL NFR BLD: 3.5 %
ERYTHROCYTE [DISTWIDTH] IN BLOOD BY AUTOMATED COUNT: 12.8 % (ref 11.5–14.5)
GLOBULIN SER CALC-MCNC: 2.6 G/DL (ref 2.3–3.5)
GLUCOSE SERPL-MCNC: 96 MG/DL (ref 70–99)
HCT VFR BLD AUTO: 40.8 % (ref 37–47)
HGB BLD-MCNC: 14.6 G/DL (ref 12–16)
LYMPHOCYTES # BLD: 3.7 K/UL (ref 1–4.8)
LYMPHOCYTES NFR BLD: 41.4 %
MCH RBC QN AUTO: 31.3 PG (ref 27–31.3)
MCHC RBC AUTO-ENTMCNC: 35.8 % (ref 33–37)
MCV RBC AUTO: 87.6 FL (ref 79.4–94.8)
MONOCYTES # BLD: 1 K/UL (ref 0.2–0.8)
MONOCYTES NFR BLD: 11.4 %
NEUTROPHILS # BLD: 3.8 K/UL (ref 1.4–6.5)
NEUTS SEG NFR BLD: 42.9 %
PLATELET # BLD AUTO: 69 K/UL (ref 130–400)
POTASSIUM SERPL-SCNC: 3.9 MEQ/L (ref 3.4–4.9)
PROT SERPL-MCNC: 6.1 G/DL (ref 6.3–8)
RBC # BLD AUTO: 4.66 M/UL (ref 4.2–5.4)
SODIUM SERPL-SCNC: 139 MEQ/L (ref 135–144)
WBC # BLD AUTO: 8.9 K/UL (ref 4.8–10.8)

## 2024-08-20 PROCEDURE — 99231 SBSQ HOSP IP/OBS SF/LOW 25: CPT | Performed by: NURSE PRACTITIONER

## 2024-08-20 PROCEDURE — 97161 PT EVAL LOW COMPLEX 20 MIN: CPT

## 2024-08-20 PROCEDURE — 2580000003 HC RX 258

## 2024-08-20 PROCEDURE — 6370000000 HC RX 637 (ALT 250 FOR IP)

## 2024-08-20 PROCEDURE — 85025 COMPLETE CBC W/AUTO DIFF WBC: CPT

## 2024-08-20 PROCEDURE — 36415 COLL VENOUS BLD VENIPUNCTURE: CPT

## 2024-08-20 PROCEDURE — 6360000002 HC RX W HCPCS

## 2024-08-20 PROCEDURE — 80053 COMPREHEN METABOLIC PANEL: CPT

## 2024-08-20 RX ORDER — POLYETHYLENE GLYCOL 3350 17 G/17G
17 POWDER, FOR SOLUTION ORAL DAILY PRN
Qty: 510 G | Refills: 1 | Status: SHIPPED | OUTPATIENT
Start: 2024-08-20 | End: 2024-09-19

## 2024-08-20 RX ADMIN — PIPERACILLIN AND TAZOBACTAM 3375 MG: 3; .375 INJECTION, POWDER, LYOPHILIZED, FOR SOLUTION INTRAVENOUS at 00:22

## 2024-08-20 RX ADMIN — PIPERACILLIN AND TAZOBACTAM 3375 MG: 3; .375 INJECTION, POWDER, LYOPHILIZED, FOR SOLUTION INTRAVENOUS at 08:35

## 2024-08-20 RX ADMIN — ANTACID TABLETS 500 MG: 500 TABLET, CHEWABLE ORAL at 00:22

## 2024-08-20 RX ADMIN — PANTOPRAZOLE SODIUM 40 MG: 40 TABLET, DELAYED RELEASE ORAL at 05:18

## 2024-08-20 ASSESSMENT — PAIN SCALES - GENERAL: PAINLEVEL_OUTOF10: 0

## 2024-08-20 NOTE — PLAN OF CARE
Problem: Discharge Planning  Goal: Discharge to home or other facility with appropriate resources  Outcome: Progressing     Problem: Pain  Goal: Verbalizes/displays adequate comfort level or baseline comfort level  Outcome: Progressing     Problem: Occupational Therapy - Adult  Goal: By Discharge: Performs self-care activities at highest level of function for planned discharge setting.  See evaluation for individualized goals.  8/19/2024 1457 by Sandra Dubose, OT  Outcome: Progressing

## 2024-08-20 NOTE — PLAN OF CARE
Problem: Discharge Planning  Goal: Discharge to home or other facility with appropriate resources  8/20/2024 0931 by Marine Madden RN  Outcome: Progressing  8/20/2024 0035 by Wong Cueto RN  Outcome: Progressing     Problem: Discharge Planning  Goal: Discharge to home or other facility with appropriate resources  8/20/2024 0931 by Marine Madden RN  Outcome: Progressing  8/20/2024 0035 by Wong Cueto RN  Outcome: Progressing     Problem: Pain  Goal: Verbalizes/displays adequate comfort level or baseline comfort level  8/20/2024 0931 by Marine Madden RN  Outcome: Progressing  8/20/2024 0035 by Wong Cueto RN  Outcome: Progressing

## 2024-08-20 NOTE — PROGRESS NOTES
08/18/24 0221   RT Protocol   History Pulmonary Disease 2   Respiratory pattern 0   Breath sounds 0   Cough 0   Indications for Bronchodilator Therapy On home bronchodilators   Bronchodilator Assessment Score 2       
  GENERAL SURGERY  PROGRESS NOTE    Pt Name: Maggie Vaughn  MRN: [id number]  Date: 8/19/2024    Subjective  GRACY overnight. Afebrile, VSS, satting well. Pt doing well, reports pain is controlled. No nausea/ vomiting. Ambulating.    Vitals  /74   Pulse 55   Temp 97.4 °F (36.3 °C) (Temporal)   Resp 18   Ht 1.651 m (5' 5\")   Wt 79.4 kg (175 lb)   LMP  (LMP Unknown)   SpO2 99%   BMI 29.12 kg/m²      Physical Exam  GEN: A&Ox3, NAD, cooperative   PULM: no increased work of breathing, satting well  CV: regular rate  ABD: Soft, NTND,   EXT: Warm, dry, no lower extremity edema    Intake/ Output    Intake/Output Summary (Last 24 hours) at 8/19/2024 1356  Last data filed at 8/19/2024 1319  Gross per 24 hour   Intake 2091.03 ml   Output 0 ml   Net 2091.03 ml     Date 08/19/24 0000 - 08/19/24 2359   Shift 4662-5293 7825-2649 7163-0534 24 Hour Total   INTAKE   P.O.(mL/kg/hr)  0  0   I.V.(mL/kg)  150(1.9)  150(1.9)   Shift Total(mL/kg)  150(1.9)  150(1.9)   OUTPUT   Shift Total(mL/kg)       Weight (kg) 79.4 79.4 79.4 79.4       Labs  Recent Labs     08/17/24  1211 08/17/24  1229 08/17/24  1312 08/18/24  0710 08/19/24  0620   WBC 11.2*  --   --  8.8 8.2   HGB 15.4  --   --  13.7 13.9   HCT 43.2  --   --  38.4 38.3     --   --  111* 75*     --   --  141 140   K 4.3  --   --  4.3 3.8     --   --  105 106   CO2 27  --   --  26 24   BUN 7*  --   --  8 7*   CREATININE 0.61 0.7  --  0.79 0.61   CALCIUM 9.5  --   --  8.3* 8.3*   AST 38*  --   --  26 22   ALT 40*  --   --  31 26   BILITOT 1.3*  --   --  1.9* 1.4*   LIPASE 36  --   --   --   --    NITRU  --   --  Negative  --   --    COLORU  --   --  Yellow  --   --          Assessment/ Plan    Maggie Vaughn is a 69 y.o. female presenting with complaints of lower abdominal pain as periumbilical with associated symptoms of green diarrhea.  No hematochezia.  Symptoms started on 8/17/2024.  Associate symptoms of nausea and vomiting x 1.  
0700 Assumed care of patient    0900 Shift assessment complete, see flowsheets. Patient denies pain or nausea this morning, tolerated breakfast tray well. IV zosyn infusing per MAR. Patient denies any further needs at this time. Call light within reach and safety maintained    1200 Attempted to review discharge paperwork with patient, patient requesting to speak with doctor first regarding questions about test results. Dr. Mccormack made aware    1615 Discharge instructions provided and reviewed with patient at bedside. All questions answered appropriately and patient verbalized understanding. IV removed with catheter fully intact and no complications. Tele monitor removed and sent back to W. Patient now awaiting ride    Electronically signed by Marine Madden RN on 8/20/2024 at 9:32 AM      
Admission assessment and med rec completed, see flow sheets. NP notified. Oriented to room and call light. Family at bedside. Call light within reach. No other needs stated by pt at this time.  
Gastroenterology Progress Note    Maggie Vaughn is a 69 y.o. female patient.  Hospitalization Day:2    Chief C/O: Abdominal pain, nausea    SUBJECTIVE: Seen and examined, has persistent right upper quadrant abdominal pain associated with nausea, no vomiting, no fever, no leukocytosis, was seen by general surgery with no plan for surgical intervention at this time.    ROS:  Gastrointestinal ROS: reports abdominal pain, no change in bowel habits, or black or bloody stools    Physical    VITALS:  BP (!) 144/66   Pulse 56   Temp 98.1 °F (36.7 °C) (Oral)   Resp 16   Ht 1.651 m (5' 5\")   Wt 79.4 kg (175 lb)   LMP  (LMP Unknown)   SpO2 97%   BMI 29.12 kg/m²   TEMPERATURE:  Current - Temp: 98.1 °F (36.7 °C); Max - Temp  Av.3 °F (36.8 °C)  Min: 98.1 °F (36.7 °C)  Max: 98.4 °F (36.9 °C)    General:  Alert and oriented,  No apparent distress  Skin- without jaundice  Eyes: anicteric sclera  Cardiac: RRR, Nl s1s2, without murmurs  Lungs CTA Bilaterally, normal effort  Abdomen soft, ND, NT, no HSM, Bowel sounds normal  Ext: without edema  Neuro: no asterixis     Data    Data Review:    Recent Labs     24  1211 24  0710 24  0620   WBC 11.2* 8.8 8.2   HGB 15.4 13.7 13.9   HCT 43.2 38.4 38.3   MCV 89.6 88.7 87.6    111* 75*     Recent Labs     24  1211 24  1229 24  0710 24  0620     --  141 140   K 4.3  --  4.3 3.8     --  105 106   CO2 27  --  26 24   BUN 7*  --  8 7*   CREATININE 0.61 0.7 0.79 0.61     Recent Labs     24  1211 24  0710 24  0620   AST 38* 26 22   ALT 40* 31 26   BILITOT 1.3* 1.9* 1.4*   ALKPHOS 102 88 85     Recent Labs     24  1211   LIPASE 36     No results for input(s): \"PROTIME\", \"INR\" in the last 72 hours.    Endoscopic hx:  EGD Dr Banuelos   Impression:         -  Normal esophagus.         -  Z-line irregular, 36 cm from the incisors.         -  Normal stomach.  Biopsied.         -  Normal examined 
Gastroenterology Progress Note    Maggie Vauhgn is a 69 y.o. female patient.  Hospitalization Day:3    Chief C/O: N/V/D    SUBJECTIVE: Seen and examined, no acute events overnight, status post EGD yesterday which was normal pathology is pending, patient is clinically improved and is tolerating a regular diet with no further nausea vomit diarrhea or pain    ROS:  Gastrointestinal ROS: no abdominal pain, change in bowel habits, or black or bloody stools    Physical    VITALS:  BP (!) 164/71   Pulse 50   Temp 97.7 °F (36.5 °C) (Oral)   Resp 18   Ht 1.651 m (5' 5\")   Wt 79.4 kg (175 lb)   LMP  (LMP Unknown)   SpO2 97%   BMI 29.12 kg/m²   TEMPERATURE:  Current - Temp: 97.7 °F (36.5 °C); Max - Temp  Av.7 °F (36.5 °C)  Min: 97.3 °F (36.3 °C)  Max: 98.6 °F (37 °C)    General:  Alert and oriented,  No apparent distress  Skin- without jaundice  Eyes: anicteric sclera  Cardiac: RRR, Nl s1s2, without murmurs  Lungs CTA Bilaterally, normal effort  Abdomen soft, ND, NT, no HSM, Bowel sounds normal  Ext: without edema  Neuro: no asterixis     Data    Data Review:    Recent Labs     24  0710 24  0620 24  0604   WBC 8.8 8.2 8.9   HGB 13.7 13.9 14.6   HCT 38.4 38.3 40.8   MCV 88.7 87.6 87.6   * 75* 69*     Recent Labs     24  0710 24  0620 24  0603    140 139   K 4.3 3.8 3.9    106 102   CO2 26 24 27   BUN 8 7* 8   CREATININE 0.79 0.61 0.76     Recent Labs     24  0710 24  0620 24  0603   AST 26 22 21   ALT 31 26 25   BILITOT 1.9* 1.4* 1.2*   ALKPHOS 88 85 87     Recent Labs     24  1211   LIPASE 36     No results for input(s): \"PROTIME\", \"INR\" in the last 72 hours.    Endoscopic hx:  EGD Dr Mondragon 24  Impression:         -  Normal esophagus.         -  Z-line regular, 35 cm from the incisors.         -  Normal stomach.         - Biopsies were taken from the antrum to rule out possible H. pylori gastritis         -  
Hospitalist Progress Note      PCP: Nona Gordon DO    Date of Admission: 8/17/2024    Chief Complaint:  no acute events, afebrile, stable HD    Medications:  Reviewed    Infusion Medications    sodium chloride       Scheduled Medications    pantoprazole  40 mg Oral QAM AC    sodium chloride flush  5-40 mL IntraVENous 2 times per day    enoxaparin  40 mg SubCUTAneous Daily    melatonin  3 mg Oral Nightly    piperacillin-tazobactam  3,375 mg IntraVENous Q8H     PRN Meds: albuterol sulfate HFA, hydrOXYzine HCl, hydrALAZINE, sodium chloride flush, sodium chloride, potassium chloride **OR** potassium alternative oral replacement **OR** potassium chloride, magnesium sulfate, ondansetron **OR** ondansetron, polyethylene glycol, acetaminophen **OR** acetaminophen, morphine      Intake/Output Summary (Last 24 hours) at 8/18/2024 1036  Last data filed at 8/18/2024 0956  Gross per 24 hour   Intake 1000.01 ml   Output --   Net 1000.01 ml       Exam:    BP (!) 125/57   Pulse 64   Temp 98.2 °F (36.8 °C) (Oral)   Resp 18   Ht 1.651 m (5' 5\")   Wt 79.4 kg (175 lb)   LMP  (LMP Unknown)   SpO2 99%   BMI 29.12 kg/m²     General appearance: appears stated age and cooperative.  Respiratory:  clear to auscultation, bilaterally   Cardiovascular: Regular rate and rhythm, S1/S2 .  Abdomen: Soft, active bowel sounds.  Musculoskeletal: No edema bilaterally.     Labs:   Recent Labs     08/17/24  1211 08/18/24  0710   WBC 11.2* 8.8   HGB 15.4 13.7   HCT 43.2 38.4    111*     Recent Labs     08/17/24  1211 08/17/24  1229 08/18/24  0710     --  141   K 4.3  --  4.3     --  105   CO2 27  --  26   BUN 7*  --  8   CREATININE 0.61 0.7 0.79   CALCIUM 9.5  --  8.3*     Recent Labs     08/17/24  1211 08/18/24  0710   AST 38* 26   ALT 40* 31   BILITOT 1.3* 1.9*   ALKPHOS 102 88     No results for input(s): \"INR\" in the last 72 hours.  No results for input(s): \"CKTOTAL\", \"TROPONINI\" in the last 72 hours.    Urinalysis:    
[facility] [place] OCC[facility]ATIONAL THERAPY EVALUATION - ACUTE     NAME: Maggie Vaughn  : 1955 (69 y.o.)  MRN: [id number]  CODE STATUS: Full Code  Room: W475/W475-01    Date of Service: 2024    Patient Diagnosis(es): Abdominal pain [R10.9]   Patient Active Problem List    Diagnosis Date Noted    Biliary colic 2024    Nausea and vomiting 2024    Abdominal pain 2024    Nausea & vomiting 2024    Nontraumatic complete tear of right rotator cuff 2024    Lateral epicondylitis of right elbow 2023    Acute pain of right shoulder 2023    Flatulence, eructation, and gas pain 08/15/2023    Healed yellow atrophy of liver 08/15/2023    Pre-diabetes 2023    High risk medication use 2021    Postoperative pain     Adenomatous colon polyp - repeat scope 2021    Polyp of ascending colon     Polyp of transverse colon     Generalized body aches 2020    Abnormal pelvic ultrasound 10/08/2020    Osteoarthritis of spine with radiculopathy, cervical region 2020    Osteoarthritis of spine with radiculopathy, lumbar region 2020    Lumbosacral radiculopathy at L5 2020    C6 radiculopathy 2020    C7 radiculopathy 2020    Tinea versicolor 10/29/2019    Abnormal glucose 2019    Hiatal hernia 2019    Mild intermittent asthma without complication 10/19/2018    LUQ pain 10/19/2018    Pruritic dermatitis 10/19/2018    Pure hypercholesterolemia 2018    Itching 2018    Vitamin D deficiency 2018    Nausea 2018    Mood disorder (HCC) 2018    Atrophic vaginitis 2018    Bilateral carpal tunnel syndrome 11/15/2017    Cervicalgia 11/15/2017    Chronic midline low back pain with bilateral sciatica 11/15/2017    Sensory hearing loss, bilateral 2010    Temporomandibular joint disorder 2010    Chronic non-seasonal allergic rhinitis 2010    Gastroesophageal reflux disease without 
Physician Progress Note    8/19/2024   12:49 PM    Name:  Maggie Vaughn  MRN:    [id number]     IP Day: 2     Admit Date: 8/17/2024 10:57 AM  PCP: Nona Gordon DO    Code Status:  Full Code    Subjective:     Has not moved bowels in last 2 days and has not really eaten anything.  Still having discomfort all the time in her abdomen-particular the lower part.    Current Facility-Administered Medications   Medication Dose Route Frequency Provider Last Rate Last Admin    sodium chloride flush 0.9 % injection 5-40 mL  5-40 mL IntraVENous 2 times per day Cherie Gar APRN - CNP        sodium chloride flush 0.9 % injection 5-40 mL  5-40 mL IntraVENous PRN Cherie Gar APRN - CNP        0.9 % sodium chloride infusion  25 mL IntraVENous PRN Cherie Gar APRN - CNP        naloxone 0.4 mg in 10 mL sodium chloride syringe   IntraVENous PRN Baldomero Mondragon MD        sodium chloride flush 0.9 % injection 5-40 mL  5-40 mL IntraVENous 2 times per day Baldomero Mondragon MD        sodium chloride flush 0.9 % injection 5-40 mL  5-40 mL IntraVENous PRN Baldomero Mondragon MD        0.9 % sodium chloride infusion   IntraVENous PRN Baldomero Mondragon MD        sodium chloride 0.9 % infusion             albuterol sulfate HFA (PROVENTIL;VENTOLIN;PROAIR) 108 (90 Base) MCG/ACT inhaler 2 puff  2 puff Inhalation 4x Daily PRN Mary Dickson APRN - OMAR        hydrOXYzine HCl (ATARAX) tablet 25 mg  25 mg Oral TID PRN Mary Dickson APRN - CNP        pantoprazole (PROTONIX) tablet 40 mg  40 mg Oral QAM AC Mary Dickson APRN - OMAR        hydrALAZINE (APRESOLINE) injection 10 mg  10 mg IntraVENous Q4H PRN Mary Dickson APRN - CNP        sodium chloride flush 0.9 % injection 5-40 mL  5-40 mL IntraVENous 2 times per day Mary Dickson APRN - CNP   10 mL at 08/19/24 0952    sodium chloride flush 0.9 % injection 5-40 mL  5-40 mL IntraVENous PRN Repko, Mary, APRN - CNP        0.9 % sodium chloride 
Shift assessment complete. VSS. A&Ox4. Patient is Bengali speaking:  utilized. Calm and cooperative. Denies having pain or SOB this morning. Complaining of feeling nauseous. Medicated with PRN Zofran. Abdomen is soft and round. Bowel sounds are present. NPO ( Diet advanced to CLD later this morning). Up in room x SB assist. Voiding. No bowel movement today. Currently in bed with call light in reach. No needs at this time. Care ongoing.     - To be NPO at midnight    Electronically signed by Pito Mcintosh RN on 8/18/2024 at 3:55 PM   
pain.    Prior Level of Function:  Social/Functional History  Lives With: Family  Type of Home: House  Home Layout: Two level, Able to Live on Main level with bedroom/bathroom (first floor full bath. Pt states that she normally doesn't use the second floor)  Home Access: Stairs to enter with rails  Entrance Stairs - Number of Steps: 4  Entrance Stairs - Rails: Both  Bathroom Shower/Tub: Tub/Shower unit  Bathroom Equipment:  (has a shower chair, but it is ill fitting)  ADL Assistance:  (except assist required for bathing and dressing PRN d/t R shoulder pain. 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    OBJECTIVE:   Vision  Vision: Impaired  Vision Exceptions: Wears glasses at all times  Hearing: Within functional limits    Cognition:  Overall Orientation Status: Within Functional Limits  Orientation Level: Oriented X4  Follows Commands: Within Functional Limits  Overall Cognitive Status: WFL    Observation/Palpation  Observation: No acute distress noted. Pleasant and motivated.    ROM:  AROM: Within functional limits  PROM: Within functional limits    Strength:  Strength: Within functional limits (only pain limited with end range motion or exertion)    Neuro:  Balance  Sitting - Static: Good  Sitting - Dynamic: Good  Standing - Static: Good  Standing - Dynamic: Good  Comments: retrieves object from floor indep                      Tone: Normal  Coordination: Within functional limits    Sensation: Intact    Bed mobility  Bed Mobility Comments: NT - pt up ad hilda in room. Denies difficulty with in/OOB    Transfers  Sit to Stand: Independent;Modified independent  Stand to Sit: Independent  Bed to Chair: Independent    Ambulation  Surface: Level tile  Device: No

## 2024-08-20 NOTE — DISCHARGE SUMMARY
PRILOSEC  Take 1 capsule by mouth every morning (before breakfast)     triamcinolone 0.1 % cream  Commonly known as: KENALOG  Apply topically 2 times daily.     Vitamin D3 50 MCG (2000 UT) Caps  TAKE 1 CAPSULE BY MOUTH DAILY           * This list has 2 medication(s) that are the same as other medications prescribed for you. Read the directions carefully, and ask your doctor or other care provider to review them with you.                   Where to Get Your Medications        These medications were sent to [id number] - [place], [place] - [address] - P [phone] - F [phone] [address] [place] [address] [place] [address]      Phone: [phone]   polyethylene glycol 17 GM/SCOOP powder         DC time 43 minutes    Signed:  Jeovanny Mccormack DO  8/20/2024, 3:19 PM

## 2024-08-20 NOTE — CARE COORDINATION
Session ID:   Language: Mozambican   ID: #[id number]   Name: MOHINI        PT AND NIECE IN ROOM.  PT DENIES HOME GOING NEEDS.  IV REMOVED PER PT REQUEST OF PAIN.  BEDSIDE RN MADE AWARE.

## 2024-08-20 NOTE — CONSULTS
GENERAL SURGERY NOTE    Pt Name: Maggie Vaughn  MRN: [id number]  Date: 8/18/2024        SUBJECTIVE:     History of Chief Complaint:    Maggie is a 69 y.o. female presenting with complaints of lower abdominal pain as periumbilical with associated symptoms of green diarrhea.  No hematochezia.  Symptoms started yesterday.  Associate symptoms of nausea and vomiting x 1.  States that she does get anxious and does have some chest tightness as well associated with it since all of this started happening as well.  No history of ACS or coronary artery disease.  No history of stents.  Denies any surgical history.  Denies any fevers or chills.    Past Medical History:   Diagnosis Date    Abnormal pelvic ultrasound 10/08/2020    Acid reflux     Adenomatous colon polyp - repeat scope 02/2026 02/11/2021    Arthritis     Asthma     Chronic back pain     COPD (chronic obstructive pulmonary disease) (HCC)     Depression     Hyperlipidemia     Obesity     Venous insufficiency      Past Surgical History:   Procedure Laterality Date    COLONOSCOPY      COLONOSCOPY N/A 02/03/2021    COLORECTAL CANCER SCREENING with polypectomies performed by Fidel Banuelos MD at [facility]    DILATION AND CURETTAGE OF UTERUS N/A 06/28/2021    HYSTEROSCOPY  FX  D/C performed by Mehul Bruner DO at [facility]    ENDOSCOPY, COLON, DIAGNOSTIC      KIDNEY SURGERY      OVARIAN CYST REMOVAL Left 11/06/2004    AR ESOPHAGOGASTRODUODENOSCOPY TRANSORAL DIAGNOSTIC N/A 11/14/2018    EGD ESOPHAGOGASTRODUODENOSCOPY WITH DILATION performed by Holden De La Fuente MD at [facility]    UPPER GASTROINTESTINAL ENDOSCOPY N/A 08/21/2023    EGD DIAGNOSTIC ONLY with gastric biopsies performed by Fidel Banuelos MD at [facility]     Prior to Admission medications    Medication Sig Start Date End Date Taking? Authorizing Provider   hydrOXYzine HCl (ATARAX) 25 MG tablet Take 1 tablet by mouth 3 times daily as needed for Itching 8/8/24  Yes Evan 
Session ID: [id number]  Language: Kazakh   ID: #[id number]   Name: Corina
Session ID: [id number]  Language: Upper sorbian   ID: #[id number]   Name: Adams
Session ID: [id number]  Language: Greek   ID: #[id number]   Name: Justino
Session ID: [id number]  Language: Chinese   ID: #[id number]   Name: Bashir
Session ID: [id number]  Language: Frisian   ID: #[id number]   Name: Memo
Session ID: [id number]  Language: English   ID: #[id number]   Name: Martinez
Session ID: [id number]  Language: Turkmen   ID: #[id number]   Name: Lizzette
Session ID: [id number]  Language: Telugu   ID: #[id number]   Name: Lizzette
Session ID: [id number]  Language: Telugu   ID: #[id number]   Name: Ruy
Session ID: [id number]  Language: Persian   ID: #[id number]   Name: Dee Dee
Session ID: [id number]  Language: Romanian   ID: #[id number]   Name: Vibha
Session ID: [id number]  Language: French   ID: #[id number]   Name: Jaiden
Session ID: [id number]  Language: German   ID: #[id number]   Name: Altagracia
normal echogenicity without evidence of intrahepatic biliary ductal dilatation.  The liver measures 15.7 cm BILIARY SYSTEM: Gallbladder is unremarkable without evidence of  wall thickening or stones.  Questionable small amount of pericholecystic fluid. Negative sonographic Meyers's sign.  Common bile duct is within normal limits measuring 4 mm. RIGHT KIDNEY: The right kidney is grossly unremarkable without evidence of hydronephrosis. The right kidney measures in length from pole to pole 9.8 cm. There is no solid cortical mass or renal cortical cyst seen within the right kidney.  No hydronephrosis or nephrolithiasis. PANCREAS:  Not well visualized due to overlying bowel gas. OTHER: No evidence of right upper quadrant ascites.     1. Questionable small amount of pericholecystic fluid. No evidence of cholelithiasis or acute cholecystitis. 2. The pancreas is not well visualized due to overlying bowel gas.  Remainder of the right upper quadrant ultrasound is unremarkable.     CT ABDOMEN PELVIS W IV CONTRAST Additional Contrast? None    Result Date: 8/17/2024  EXAMINATION: CT OF THE ABDOMEN AND PELVIS WITH CONTRAST 8/17/2024 1:03 pm TECHNIQUE: CT of the abdomen and pelvis was performed with the administration of intravenous contrast. Multiplanar reformatted images are provided for review. Automated exposure control, iterative reconstruction, and/or weight based adjustment of the mA/kV was utilized to reduce the radiation dose to as low as reasonably achievable. COMPARISON: None. HISTORY: ORDERING SYSTEM PROVIDED HISTORY: RLQ abdomina pain, diarrhea, TECHNOLOGIST PROVIDED HISTORY: Reason for exam:->RLQ abdomina pain, diarrhea, Additional Contrast?->None Decision Support Exception - unselect if not a suspected or confirmed emergency medical condition->Emergency Medical Condition (MA) What reading provider will be dictating this exam?->CRC FINDINGS: Lower Chest: Lung bases are clear. Organs: Liver is homogeneous.  The

## 2024-08-21 ENCOUNTER — TELEPHONE (OUTPATIENT)
Dept: FAMILY MEDICINE CLINIC | Age: 69
End: 2024-08-21

## 2024-08-21 NOTE — TELEPHONE ENCOUNTER
Care Transitions Initial Follow Up Call    Outreach made within 2 business days of discharge: Yes    Patient: Maggie Vaughn Patient : 1955   MRN: [id number]  Reason for Admission: Abdominal pain   Discharge Date: 24       Spoke with: RICKY X1       Discharge department/facility: [place]    Additional needs identified to be addressed with provider  No needs identified             Scheduled appointment with PCP within 7-14 days    Follow Up  Future Appointments   Date Time Provider Department Center   2024  2:15 PM Jaiden Ochoa MD [facility] DEP   2024  2:15 PM Nona Gordon DO [facility] DEP       Farida Oswald, MA

## 2024-09-17 ENCOUNTER — OFFICE VISIT (OUTPATIENT)
Age: 69
End: 2024-09-17
Payer: MEDICARE

## 2024-09-17 VITALS
BODY MASS INDEX: 30.29 KG/M2 | DIASTOLIC BLOOD PRESSURE: 72 MMHG | WEIGHT: 182 LBS | OXYGEN SATURATION: 97 % | SYSTOLIC BLOOD PRESSURE: 130 MMHG | HEART RATE: 69 BPM

## 2024-09-17 DIAGNOSIS — L30.8 PRURITIC DERMATITIS: Chronic | ICD-10-CM

## 2024-09-17 DIAGNOSIS — E66.9 OBESITY (BMI 30-39.9): ICD-10-CM

## 2024-09-17 DIAGNOSIS — D12.2 ADENOMATOUS POLYP OF ASCENDING COLON: ICD-10-CM

## 2024-09-17 DIAGNOSIS — H91.90 HEARING LOSS, UNSPECIFIED HEARING LOSS TYPE, UNSPECIFIED LATERALITY: ICD-10-CM

## 2024-09-17 DIAGNOSIS — R79.89 OTHER SPECIFIED ABNORMAL FINDINGS OF BLOOD CHEMISTRY: ICD-10-CM

## 2024-09-17 DIAGNOSIS — L30.9 ECZEMA, UNSPECIFIED TYPE: ICD-10-CM

## 2024-09-17 DIAGNOSIS — J45.20 MILD INTERMITTENT ASTHMA WITHOUT COMPLICATION: Primary | Chronic | ICD-10-CM

## 2024-09-17 DIAGNOSIS — Z23 FLU VACCINE NEED: ICD-10-CM

## 2024-09-17 PROCEDURE — 1036F TOBACCO NON-USER: CPT | Performed by: FAMILY MEDICINE

## 2024-09-17 PROCEDURE — G8417 CALC BMI ABV UP PARAM F/U: HCPCS | Performed by: FAMILY MEDICINE

## 2024-09-17 PROCEDURE — 1111F DSCHRG MED/CURRENT MED MERGE: CPT | Performed by: FAMILY MEDICINE

## 2024-09-17 PROCEDURE — 3017F COLORECTAL CA SCREEN DOC REV: CPT | Performed by: FAMILY MEDICINE

## 2024-09-17 PROCEDURE — 1090F PRES/ABSN URINE INCON ASSESS: CPT | Performed by: FAMILY MEDICINE

## 2024-09-17 PROCEDURE — 99214 OFFICE O/P EST MOD 30 MIN: CPT | Performed by: FAMILY MEDICINE

## 2024-09-17 PROCEDURE — G8399 PT W/DXA RESULTS DOCUMENT: HCPCS | Performed by: FAMILY MEDICINE

## 2024-09-17 PROCEDURE — 1123F ACP DISCUSS/DSCN MKR DOCD: CPT | Performed by: FAMILY MEDICINE

## 2024-09-17 PROCEDURE — G0008 ADMIN INFLUENZA VIRUS VAC: HCPCS | Performed by: FAMILY MEDICINE

## 2024-09-17 PROCEDURE — 90653 IIV ADJUVANT VACCINE IM: CPT | Performed by: FAMILY MEDICINE

## 2024-09-17 PROCEDURE — G8427 DOCREV CUR MEDS BY ELIG CLIN: HCPCS | Performed by: FAMILY MEDICINE

## 2024-09-17 RX ORDER — TRIAMCINOLONE ACETONIDE 1 MG/G
CREAM TOPICAL
Qty: 1 EACH | Refills: 2 | Status: SHIPPED | OUTPATIENT
Start: 2024-09-17

## 2024-09-19 DIAGNOSIS — E66.9 OBESITY (BMI 30-39.9): ICD-10-CM

## 2024-09-19 DIAGNOSIS — R79.89 OTHER SPECIFIED ABNORMAL FINDINGS OF BLOOD CHEMISTRY: ICD-10-CM

## 2024-09-19 DIAGNOSIS — L30.8 PRURITIC DERMATITIS: Chronic | ICD-10-CM

## 2024-09-19 LAB
CHOLEST SERPL-MCNC: 263 MG/DL (ref 0–199)
ESTIMATED AVERAGE GLUCOSE: 123 MG/DL
HBA1C MFR BLD: 5.9 % (ref 4–6)
HDLC SERPL-MCNC: 49 MG/DL (ref 40–59)
LDLC SERPL CALC-MCNC: 189 MG/DL (ref 0–129)
TRIGL SERPL-MCNC: 126 MG/DL (ref 0–150)

## 2024-10-19 ENCOUNTER — HOSPITAL ENCOUNTER (EMERGENCY)
Age: 69
Discharge: HOME | End: 2024-10-19
Attending: EMERGENCY MEDICINE
Payer: MEDICARE

## 2024-10-19 ENCOUNTER — APPOINTMENT (OUTPATIENT)
Dept: GENERAL RADIOLOGY | Age: 69
End: 2024-10-19
Payer: MEDICARE

## 2024-10-19 VITALS
HEIGHT: 65 IN | WEIGHT: 178.8 LBS | HEART RATE: 68 BPM | TEMPERATURE: 98.2 F | DIASTOLIC BLOOD PRESSURE: 81 MMHG | SYSTOLIC BLOOD PRESSURE: 154 MMHG | BODY MASS INDEX: 29.79 KG/M2 | OXYGEN SATURATION: 99 % | RESPIRATION RATE: 18 BRPM

## 2024-10-19 DIAGNOSIS — G44.89 OTHER HEADACHE SYNDROME: Primary | ICD-10-CM

## 2024-10-19 DIAGNOSIS — R11.0 NAUSEA: ICD-10-CM

## 2024-10-19 DIAGNOSIS — Z77.098 CHEMICAL EXPOSURE: ICD-10-CM

## 2024-10-19 LAB
ALBUMIN SERPL-MCNC: 3.9 G/DL (ref 3.5–4.6)
ALP SERPL-CCNC: 92 U/L (ref 40–130)
ALT SERPL-CCNC: 20 U/L (ref 0–33)
ANION GAP SERPL CALCULATED.3IONS-SCNC: 9 MEQ/L (ref 9–15)
AST SERPL-CCNC: 21 U/L (ref 0–35)
BASOPHILS # BLD: 0.1 K/UL (ref 0–0.2)
BASOPHILS NFR BLD: 0.9 %
BILIRUB SERPL-MCNC: 0.5 MG/DL (ref 0.2–0.7)
BUN SERPL-MCNC: 13 MG/DL (ref 8–23)
CALCIUM SERPL-MCNC: 9 MG/DL (ref 8.5–9.9)
CHLORIDE SERPL-SCNC: 105 MEQ/L (ref 95–107)
CO2 SERPL-SCNC: 28 MEQ/L (ref 20–31)
CREAT SERPL-MCNC: 0.72 MG/DL (ref 0.5–0.9)
EOSINOPHIL # BLD: 0.3 K/UL (ref 0–0.7)
EOSINOPHIL NFR BLD: 4.7 %
ERYTHROCYTE [DISTWIDTH] IN BLOOD BY AUTOMATED COUNT: 12.5 % (ref 11.5–14.5)
GLOBULIN SER CALC-MCNC: 2.8 G/DL (ref 2.3–3.5)
GLUCOSE SERPL-MCNC: 105 MG/DL (ref 70–99)
HCT VFR BLD AUTO: 43.8 % (ref 37–47)
HGB BLD-MCNC: 15.3 G/DL (ref 12–16)
LIPASE SERPL-CCNC: 52 U/L (ref 12–95)
LYMPHOCYTES # BLD: 2.9 K/UL (ref 1–4.8)
LYMPHOCYTES NFR BLD: 45.7 %
MCH RBC QN AUTO: 31.5 PG (ref 27–31.3)
MCHC RBC AUTO-ENTMCNC: 34.9 % (ref 33–37)
MCV RBC AUTO: 90.3 FL (ref 79.4–94.8)
MONOCYTES # BLD: 0.5 K/UL (ref 0.2–0.8)
MONOCYTES NFR BLD: 7.6 %
NEUTROPHILS # BLD: 2.6 K/UL (ref 1.4–6.5)
NEUTS SEG NFR BLD: 40.9 %
PLATELET # BLD AUTO: 254 K/UL (ref 130–400)
POTASSIUM SERPL-SCNC: 4.7 MEQ/L (ref 3.4–4.9)
PROT SERPL-MCNC: 6.7 G/DL (ref 6.3–8)
RBC # BLD AUTO: 4.85 M/UL (ref 4.2–5.4)
SARS-COV-2 RDRP RESP QL NAA+PROBE: NOT DETECTED
SODIUM SERPL-SCNC: 142 MEQ/L (ref 135–144)
WBC # BLD AUTO: 6.4 K/UL (ref 4.8–10.8)

## 2024-10-19 PROCEDURE — 80053 COMPREHEN METABOLIC PANEL: CPT

## 2024-10-19 PROCEDURE — 71045 X-RAY EXAM CHEST 1 VIEW: CPT

## 2024-10-19 PROCEDURE — 36415 COLL VENOUS BLD VENIPUNCTURE: CPT

## 2024-10-19 PROCEDURE — 87635 SARS-COV-2 COVID-19 AMP PRB: CPT

## 2024-10-19 PROCEDURE — 99284 EMERGENCY DEPT VISIT MOD MDM: CPT

## 2024-10-19 PROCEDURE — 85025 COMPLETE CBC W/AUTO DIFF WBC: CPT

## 2024-10-19 PROCEDURE — 6370000000 HC RX 637 (ALT 250 FOR IP): Performed by: EMERGENCY MEDICINE

## 2024-10-19 PROCEDURE — 83690 ASSAY OF LIPASE: CPT

## 2024-10-19 RX ORDER — ONDANSETRON 4 MG/1
4 TABLET, ORALLY DISINTEGRATING ORAL ONCE
Status: COMPLETED | OUTPATIENT
Start: 2024-10-19 | End: 2024-10-19

## 2024-10-19 RX ORDER — ONDANSETRON 4 MG/1
4 TABLET, ORALLY DISINTEGRATING ORAL EVERY 4 HOURS PRN
Qty: 12 TABLET | Refills: 0 | Status: SHIPPED | OUTPATIENT
Start: 2024-10-19

## 2024-10-19 RX ADMIN — ONDANSETRON 4 MG: 4 TABLET, ORALLY DISINTEGRATING ORAL at 11:20

## 2024-10-19 ASSESSMENT — PAIN - FUNCTIONAL ASSESSMENT: PAIN_FUNCTIONAL_ASSESSMENT: NONE - DENIES PAIN

## 2024-10-19 ASSESSMENT — LIFESTYLE VARIABLES
HOW MANY STANDARD DRINKS CONTAINING ALCOHOL DO YOU HAVE ON A TYPICAL DAY: 1 OR 2
HOW OFTEN DO YOU HAVE A DRINK CONTAINING ALCOHOL: MONTHLY OR LESS

## 2024-10-19 NOTE — ED PROVIDER NOTES
[facility] ED  eMERGENCY dEPARTMENT eNCOUnter      Pt Name: Maggie Vaughn  MRN: [id number]  Birthdate 1955  Date of evaluation: 10/19/2024  Provider: Manpreet Jimenez MD    CHIEF COMPLAINT       Chief Complaint   Patient presents with    Headache     Mixed some chemicals on Tuesday (Edgar and Foca soaps) and thinks she is having a reaction to them. Has severe HA, nausea, funny taste in mouth, can't sleep         HISTORY OF PRESENT ILLNESS   (Location/Symptom, Timing/Onset,Context/Setting, Quality, Duration, Modifying Factors, Severity)  Note limiting factors.   Maggie Vaughn is a 69 y.o. female who presents to the emergency department with complaint that last week Tuesday she was boiling several different soaps together and now she has a mild frontal headache not a severe headache.  Is in the frontal sinuses.  Patient have some nausea vomited once.  Has not vomited since that time several days ago.  Patient has this soap taste in her mouth since being exposed to the fumes from the boiling soap.  Patient denies any abdominal pain chest pain shortness of breath neck pain fever chills photophobia heat or cold intolerance rash numbness or tingling in the arms or legs.  No aggravating or alleviating factors.    HPI    NursingNotes were reviewed.    REVIEW OF SYSTEMS    (2-9 systems for level 4, 10 or more for level 5)     Review of Systems    Except as noted above the remainder of the review of systems was reviewed and negative.       PAST MEDICAL HISTORY     Past Medical History:   Diagnosis Date    Abnormal pelvic ultrasound 10/08/2020    Acid reflux     Adenomatous colon polyp - repeat scope 02/2026 02/11/2021    Arthritis     Asthma     Chronic back pain     COPD (chronic obstructive pulmonary disease) (HCC)     Depression     Hyperlipidemia     Nausea and vomiting 8/18/2024    Obesity     Venous insufficiency          SURGICALHISTORY       Past Surgical History:   Procedure  but occasionally words are mis-transcribed.)    Manpreet Jimenez MD (electronically signed)  Attending Emergency Physician         Manpreet Jimenez MD  10/19/24 2183

## 2024-10-19 NOTE — DISCHARGE INSTR - COC
Continuity of Care Form    Patient Name: Maggie Vaughn   :  1955  MRN:  [id number]    Admit date:  10/19/2024  Discharge date:  ***    Code Status Order: Prior   Advance Directives:   Advance Care Flowsheet Documentation             Admitting Physician:  No admitting provider for patient encounter.  PCP: Nona Gordon DO    Discharging Nurse: ***  Discharging Hospital Unit/Room#:   Discharging Unit Phone Number: ***    Emergency Contact:   Extended Emergency Contact Information  Primary Emergency Contact: Davey Hendricks   [facility]  Home Phone: [phone]  Work Phone: [phone]  Mobile Phone: [phone]  Relation: Brother/Sister  Secondary Emergency Contact: Melissa Wesley  Home Phone: [phone]  Mobile Phone: [phone]  Relation: Child    Past Surgical History:  Past Surgical History:   Procedure Laterality Date    COLONOSCOPY      COLONOSCOPY N/A 2021    COLORECTAL CANCER SCREENING with polypectomies performed by Fidel Banuelos MD at [facility]    DILATION AND CURETTAGE OF UTERUS N/A 2021    HYSTEROSCOPY  FX  D/C performed by Mehul Bruner DO at [facility]    ENDOSCOPY, COLON, DIAGNOSTIC      KIDNEY SURGERY      OVARIAN CYST REMOVAL Left 2004    SC ESOPHAGOGASTRODUODENOSCOPY TRANSORAL DIAGNOSTIC N/A 2018    EGD ESOPHAGOGASTRODUODENOSCOPY WITH DILATION performed by Holden De La Fuente MD at [facility]    UPPER GASTROINTESTINAL ENDOSCOPY N/A 2023    EGD DIAGNOSTIC ONLY with gastric biopsies performed by Fidel Banuelos MD at [facility]    UPPER GASTROINTESTINAL ENDOSCOPY N/A 2024    ESOPHAGOGASTRODUODENOSCOPY with biopsies performed by Baldomero Mondragon MD at [facility]       Immunization History:   Immunization History   Administered Date(s) Administered    COVID-19, MODERNA BLUE border, Primary or Immunocompromised, (age 12y+), IM, 100 mcg/0.5mL 2021, 2021    COVID-19, PFIZER GRAY top,  SECTION    Prognosis: {Prognosis:6234578715}    Condition at Discharge: { Patient Condition:894061002}    Rehab Potential (if transferring to Rehab): {Prognosis:2077383231}    Recommended Labs or Other Treatments After Discharge: ***    Physician Certification: I certify the above information and transfer of Maggie Vaughn  is necessary for the continuing treatment of the diagnosis listed and that she requires {Admit to Appropriate Level of Care:65661} for {GREATER/LESS:428976326} 30 days.     Update Admission H&P: {CHP DME Changes in HandP:363969595}    PHYSICIAN SIGNATURE:  {Esignature:795445598}

## 2024-10-19 NOTE — ED TRIAGE NOTES
A & Ox4. Skin pink warm and dry. Pt states every time she breathes in through her nose, she gets a bad nauseating feeling and it makes her wants to vomit. States everything tastes funny. States this started shortly after inhaling the fumes from the soaps she mixed on Tuesday. States symptoms keep getting worse. States unable to eat. States she vomited yellow frothy once last night. Denies diarrhea.

## 2024-12-09 DIAGNOSIS — R10.13 EPIGASTRIC BURNING SENSATION: ICD-10-CM

## 2024-12-09 DIAGNOSIS — F39 MOOD DISORDER (HCC): ICD-10-CM

## 2024-12-09 DIAGNOSIS — R14.2 BELCHING: ICD-10-CM

## 2024-12-09 RX ORDER — OMEPRAZOLE 40 MG/1
40 CAPSULE, DELAYED RELEASE ORAL
Qty: 90 CAPSULE | Refills: 3 | Status: SHIPPED | OUTPATIENT
Start: 2024-12-09

## 2024-12-09 RX ORDER — HYDROXYZINE HYDROCHLORIDE 25 MG/1
25 TABLET, FILM COATED ORAL 3 TIMES DAILY PRN
Qty: 90 TABLET | Refills: 0 | Status: SHIPPED | OUTPATIENT
Start: 2024-12-09

## 2025-02-04 ENCOUNTER — OFFICE VISIT (OUTPATIENT)
Age: 70
End: 2025-02-04
Payer: MEDICARE

## 2025-02-04 VITALS
BODY MASS INDEX: 30.89 KG/M2 | HEIGHT: 65 IN | RESPIRATION RATE: 16 BRPM | HEART RATE: 76 BPM | DIASTOLIC BLOOD PRESSURE: 84 MMHG | SYSTOLIC BLOOD PRESSURE: 138 MMHG | OXYGEN SATURATION: 100 % | WEIGHT: 185.4 LBS

## 2025-02-04 DIAGNOSIS — Z75.8 LANGUAGE BARRIER AFFECTING HEALTH CARE: ICD-10-CM

## 2025-02-04 DIAGNOSIS — Z60.3 LANGUAGE BARRIER AFFECTING HEALTH CARE: ICD-10-CM

## 2025-02-04 DIAGNOSIS — F39 MOOD DISORDER (HCC): ICD-10-CM

## 2025-02-04 DIAGNOSIS — R73.03 PREDIABETES: ICD-10-CM

## 2025-02-04 DIAGNOSIS — J45.20 MILD INTERMITTENT ASTHMA WITHOUT COMPLICATION: Primary | Chronic | ICD-10-CM

## 2025-02-04 DIAGNOSIS — R10.11 RIGHT UPPER QUADRANT ABDOMINAL PAIN: ICD-10-CM

## 2025-02-04 DIAGNOSIS — R10.13 EPIGASTRIC BURNING SENSATION: ICD-10-CM

## 2025-02-04 DIAGNOSIS — Z12.39 ENCOUNTER FOR SCREENING FOR MALIGNANT NEOPLASM OF BREAST, UNSPECIFIED SCREENING MODALITY: ICD-10-CM

## 2025-02-04 DIAGNOSIS — Z12.31 ENCOUNTER FOR SCREENING MAMMOGRAM FOR MALIGNANT NEOPLASM OF BREAST: ICD-10-CM

## 2025-02-04 PROCEDURE — 1036F TOBACCO NON-USER: CPT | Performed by: FAMILY MEDICINE

## 2025-02-04 PROCEDURE — 1123F ACP DISCUSS/DSCN MKR DOCD: CPT | Performed by: FAMILY MEDICINE

## 2025-02-04 PROCEDURE — G8417 CALC BMI ABV UP PARAM F/U: HCPCS | Performed by: FAMILY MEDICINE

## 2025-02-04 PROCEDURE — G8427 DOCREV CUR MEDS BY ELIG CLIN: HCPCS | Performed by: FAMILY MEDICINE

## 2025-02-04 PROCEDURE — 1160F RVW MEDS BY RX/DR IN RCRD: CPT | Performed by: FAMILY MEDICINE

## 2025-02-04 PROCEDURE — 99214 OFFICE O/P EST MOD 30 MIN: CPT | Performed by: FAMILY MEDICINE

## 2025-02-04 PROCEDURE — 3017F COLORECTAL CA SCREEN DOC REV: CPT | Performed by: FAMILY MEDICINE

## 2025-02-04 PROCEDURE — 1090F PRES/ABSN URINE INCON ASSESS: CPT | Performed by: FAMILY MEDICINE

## 2025-02-04 PROCEDURE — G8399 PT W/DXA RESULTS DOCUMENT: HCPCS | Performed by: FAMILY MEDICINE

## 2025-02-04 PROCEDURE — 1159F MED LIST DOCD IN RCRD: CPT | Performed by: FAMILY MEDICINE

## 2025-02-04 RX ORDER — METFORMIN HYDROCHLORIDE 500 MG/1
500 TABLET, EXTENDED RELEASE ORAL
Qty: 90 TABLET | Refills: 1 | Status: SHIPPED | OUTPATIENT
Start: 2025-02-04

## 2025-02-04 RX ORDER — ONDANSETRON 4 MG/1
4 TABLET, ORALLY DISINTEGRATING ORAL EVERY 4 HOURS PRN
Qty: 12 TABLET | Refills: 0 | Status: SHIPPED | OUTPATIENT
Start: 2025-02-04

## 2025-02-04 RX ORDER — ALBUTEROL SULFATE 90 UG/1
2 INHALANT RESPIRATORY (INHALATION) 4 TIMES DAILY PRN
Qty: 3 EACH | Refills: 3 | Status: SHIPPED | OUTPATIENT
Start: 2025-02-04

## 2025-02-04 RX ORDER — TRAZODONE HYDROCHLORIDE 50 MG/1
50 TABLET, FILM COATED ORAL NIGHTLY PRN
Qty: 30 TABLET | Refills: 5 | Status: SHIPPED | OUTPATIENT
Start: 2025-02-04

## 2025-02-04 RX ORDER — OMEPRAZOLE 40 MG/1
40 CAPSULE, DELAYED RELEASE ORAL
Qty: 90 CAPSULE | Refills: 3 | Status: SHIPPED | OUTPATIENT
Start: 2025-02-04

## 2025-02-04 RX ORDER — HYDROXYZINE HYDROCHLORIDE 25 MG/1
25 TABLET, FILM COATED ORAL 3 TIMES DAILY PRN
Qty: 90 TABLET | Refills: 0 | Status: CANCELLED | OUTPATIENT
Start: 2025-02-04

## 2025-02-04 SDOH — ECONOMIC STABILITY: FOOD INSECURITY: WITHIN THE PAST 12 MONTHS, THE FOOD YOU BOUGHT JUST DIDN'T LAST AND YOU DIDN'T HAVE MONEY TO GET MORE.: NEVER TRUE

## 2025-02-04 SDOH — ECONOMIC STABILITY: FOOD INSECURITY: WITHIN THE PAST 12 MONTHS, YOU WORRIED THAT YOUR FOOD WOULD RUN OUT BEFORE YOU GOT MONEY TO BUY MORE.: NEVER TRUE

## 2025-02-04 SDOH — SOCIAL STABILITY - SOCIAL INSECURITY: ACCULTURATION DIFFICULTY: Z60.3

## 2025-02-04 ASSESSMENT — PATIENT HEALTH QUESTIONNAIRE - PHQ9
SUM OF ALL RESPONSES TO PHQ QUESTIONS 1-9: 13
5. POOR APPETITE OR OVEREATING: NOT AT ALL
SUM OF ALL RESPONSES TO PHQ QUESTIONS 1-9: 13
7. TROUBLE CONCENTRATING ON THINGS, SUCH AS READING THE NEWSPAPER OR WATCHING TELEVISION: NEARLY EVERY DAY
SUM OF ALL RESPONSES TO PHQ QUESTIONS 1-9: 13
SUM OF ALL RESPONSES TO PHQ QUESTIONS 1-9: 13
10. IF YOU CHECKED OFF ANY PROBLEMS, HOW DIFFICULT HAVE THESE PROBLEMS MADE IT FOR YOU TO DO YOUR WORK, TAKE CARE OF THINGS AT HOME, OR GET ALONG WITH OTHER PEOPLE: VERY DIFFICULT
9. THOUGHTS THAT YOU WOULD BE BETTER OFF DEAD, OR OF HURTING YOURSELF: NOT AT ALL
3. TROUBLE FALLING OR STAYING ASLEEP: NEARLY EVERY DAY
4. FEELING TIRED OR HAVING LITTLE ENERGY: NEARLY EVERY DAY
6. FEELING BAD ABOUT YOURSELF - OR THAT YOU ARE A FAILURE OR HAVE LET YOURSELF OR YOUR FAMILY DOWN: SEVERAL DAYS
2. FEELING DOWN, DEPRESSED OR HOPELESS: NEARLY EVERY DAY
8. MOVING OR SPEAKING SO SLOWLY THAT OTHER PEOPLE COULD HAVE NOTICED. OR THE OPPOSITE, BEING SO FIGETY OR RESTLESS THAT YOU HAVE BEEN MOVING AROUND A LOT MORE THAN USUAL: NOT AT ALL

## 2025-02-04 NOTE — PROGRESS NOTES
Cc: Here for follow up ED visit      Subjective  Maggie Vaughn, 69 y.o. female presents today with:  Chief Complaint   Patient presents with    Follow-up     Bumps on right side below breast, and red bumps on left breast.   Discuss colonoscopy  Referral for psychology  Not sleeping well    Medication Refill     Requesting refill for Zofran that was prescribed in ER   Patient also requesting refill on all medication due to a trip to [place] coming up and she doesn't want to run out     Due to language barrier, a virtual  was present during the history-taking and subsequent discussion (and for part of the physical exam) with this patient.    Patient is here for follow up from the ED visit, she was seen in the ED for nausea and was given medications that seems to have improved her symptoms.She reports that she is compliant with Nexium. Without the Nexium she gets heart burn.    Depression: was being followed in the [facility] but has not been seen recently.  She is planning to travel to [place]. She was on lexapro but states that it caused her SOB??. Initially depressed after her  cheated on her. She is reporting poor sleep.     Hearing loss: did not get audiology testing, states no one called her.      REVIEW OF SYSTEMS:   CONSTITUTIONAL: Denies: fever, chills, weakness, fatigue  EYES: Denies: Denies: blurry vision, photophobia  CARDIOVASCULAR: Denies: chest pain, edema, palpitations  RESPIRATORY: Denies: shortness of breath, wheezing  GI: abdominal pain, nausea, vomiting     Past Medical History:   Diagnosis Date    Abnormal pelvic ultrasound 10/08/2020    Acid reflux     Adenomatous colon polyp - repeat scope 02/2026 02/11/2021    Arthritis     Asthma     Chronic back pain     COPD (chronic obstructive pulmonary disease) (HCC)     Depression     Hyperlipidemia     Nausea and vomiting 8/18/2024    Obesity     Venous insufficiency      Past Surgical History:   Procedure Laterality Date

## 2025-02-06 NOTE — PATIENT INSTRUCTIONS
Servicios públicos de [place]: recursos financieros*  (Llame a [facility]/211 si necesita más recursos).  SERVICIOS PÚBLICOS:  [facility]/211:  Lo que ofrecen: ayudan a encontrar opciones de mayela costo para el teléfono o Internet, así zackery a pagar facturas de servicios públicos.  Número de teléfono: [phone]  Sitio web: https://www.[facility].org/get-help/utilities-expenses [employer]  Lo que ofrecen: asistencia con servicios públicos.  Teléfono: [phone]    [facility]  Lo que ofrecen: asistencia con servicios públicos a través del Programa de asistencia de energía para hogares (Home Energy Assistance Program, HEAP) y el Programa de crisis de invierno (Winter Crisis Program, WCP), también llamados en ocasiones HEAP de emergencia (E-HEAP). Estos programas están diseñados para ayudar a los consumidores elegibles según nancy ingresos con los costos de calefacción en invierno. También ayuda con la solicitud del Plan de pago por porcentaje de ingresos (Percentage of Income Payment Plan, PIPP) que permite a los residentes elegibles reducir nancy facturas de energía a un porcentaje de nancy ingresos y evitar situaciones de crisis.  Teléfono: [phone]  Sitio web: https://www.[email or web address].net/  [facility] [place]:  Lo que ofrecen: asistencia con servicios públicos.  Teléfono: [phone]  Sitio web: [email or web address].[email or web address]/    Organizaciones benéficas católicas  Lo que ofrecen: asistencia con servicios públicos y el alquiler.  Teléfono: [phone]  Sitio web: [email or web address].[facility].org  Dirección: [address], [place] [address]    [facility] ([facility])  Lo que ofrecen: esta red de asistencia comunitaria proporciona a los residentes del condado de [facility] acceso benito todo el año a asistencia financiera de emergencia para alquiler, servicios públicos u otras necesidades básicas de emergencia, en un único sitio geográficamente ubicado en ladd vecindario de residencia.  Sitio web:

## 2025-02-17 DIAGNOSIS — F39 MOOD DISORDER: ICD-10-CM

## 2025-02-17 DIAGNOSIS — E55.9 VITAMIN D DEFICIENCY: ICD-10-CM

## 2025-02-21 ENCOUNTER — HOSPITAL ENCOUNTER (OUTPATIENT)
Dept: ULTRASOUND IMAGING | Age: 70
Discharge: HOME | End: 2025-02-23
Attending: FAMILY MEDICINE
Payer: MEDICARE

## 2025-02-21 ENCOUNTER — HOSPITAL ENCOUNTER (OUTPATIENT)
Dept: WOMENS IMAGING | Age: 70
Discharge: HOME | End: 2025-02-23
Attending: FAMILY MEDICINE
Payer: MEDICARE

## 2025-02-21 DIAGNOSIS — Z12.31 ENCOUNTER FOR SCREENING MAMMOGRAM FOR MALIGNANT NEOPLASM OF BREAST: ICD-10-CM

## 2025-02-21 DIAGNOSIS — Z12.39 ENCOUNTER FOR SCREENING FOR MALIGNANT NEOPLASM OF BREAST, UNSPECIFIED SCREENING MODALITY: ICD-10-CM

## 2025-02-21 DIAGNOSIS — R10.11 RIGHT UPPER QUADRANT ABDOMINAL PAIN: ICD-10-CM

## 2025-02-21 PROCEDURE — 77063 BREAST TOMOSYNTHESIS BI: CPT

## 2025-02-21 PROCEDURE — 76705 ECHO EXAM OF ABDOMEN: CPT

## 2025-02-24 RX ORDER — ACETAMINOPHEN 160 MG
TABLET,DISINTEGRATING ORAL
Qty: 90 CAPSULE | Refills: 3 | Status: SHIPPED | OUTPATIENT
Start: 2025-02-24

## 2025-02-24 RX ORDER — HYDROXYZINE HYDROCHLORIDE 25 MG/1
25 TABLET, FILM COATED ORAL 3 TIMES DAILY PRN
Qty: 90 TABLET | Refills: 0 | OUTPATIENT
Start: 2025-02-24

## 2025-05-06 ENCOUNTER — OFFICE VISIT (OUTPATIENT)
Age: 70
End: 2025-05-06
Payer: MEDICARE

## 2025-05-06 VITALS
SYSTOLIC BLOOD PRESSURE: 118 MMHG | BODY MASS INDEX: 29.82 KG/M2 | HEART RATE: 69 BPM | DIASTOLIC BLOOD PRESSURE: 78 MMHG | OXYGEN SATURATION: 98 % | WEIGHT: 179 LBS | HEIGHT: 65 IN

## 2025-05-06 DIAGNOSIS — Z75.8 LANGUAGE BARRIER: ICD-10-CM

## 2025-05-06 DIAGNOSIS — Z60.3 LANGUAGE BARRIER: ICD-10-CM

## 2025-05-06 DIAGNOSIS — J30.89 CHRONIC NON-SEASONAL ALLERGIC RHINITIS: ICD-10-CM

## 2025-05-06 DIAGNOSIS — Z00.00 MEDICARE ANNUAL WELLNESS VISIT, SUBSEQUENT: Primary | ICD-10-CM

## 2025-05-06 DIAGNOSIS — E78.00 PURE HYPERCHOLESTEROLEMIA: Chronic | ICD-10-CM

## 2025-05-06 PROCEDURE — 90471 IMMUNIZATION ADMIN: CPT | Performed by: FAMILY MEDICINE

## 2025-05-06 PROCEDURE — 90746 HEPB VACCINE 3 DOSE ADULT IM: CPT | Performed by: FAMILY MEDICINE

## 2025-05-06 PROCEDURE — 3017F COLORECTAL CA SCREEN DOC REV: CPT | Performed by: FAMILY MEDICINE

## 2025-05-06 PROCEDURE — 1159F MED LIST DOCD IN RCRD: CPT | Performed by: FAMILY MEDICINE

## 2025-05-06 PROCEDURE — 1160F RVW MEDS BY RX/DR IN RCRD: CPT | Performed by: FAMILY MEDICINE

## 2025-05-06 PROCEDURE — G0439 PPPS, SUBSEQ VISIT: HCPCS | Performed by: FAMILY MEDICINE

## 2025-05-06 PROCEDURE — 1123F ACP DISCUSS/DSCN MKR DOCD: CPT | Performed by: FAMILY MEDICINE

## 2025-05-06 PROCEDURE — 90632 HEPA VACCINE ADULT IM: CPT | Performed by: FAMILY MEDICINE

## 2025-05-06 PROCEDURE — G0010 ADMIN HEPATITIS B VACCINE: HCPCS | Performed by: FAMILY MEDICINE

## 2025-05-06 RX ORDER — ATORVASTATIN CALCIUM 40 MG/1
40 TABLET, FILM COATED ORAL DAILY
Qty: 30 TABLET | Refills: 3 | Status: SHIPPED | OUTPATIENT
Start: 2025-05-06

## 2025-05-06 RX ORDER — HYDROXYZINE HYDROCHLORIDE 25 MG/1
25 TABLET, FILM COATED ORAL NIGHTLY
Qty: 30 TABLET | Refills: 0 | Status: SHIPPED | OUTPATIENT
Start: 2025-05-06 | End: 2025-06-05

## 2025-05-06 SDOH — SOCIAL STABILITY - SOCIAL INSECURITY: ACCULTURATION DIFFICULTY: Z60.3

## 2025-05-06 ASSESSMENT — PATIENT HEALTH QUESTIONNAIRE - PHQ9
7. TROUBLE CONCENTRATING ON THINGS, SUCH AS READING THE NEWSPAPER OR WATCHING TELEVISION: NOT AT ALL
3. TROUBLE FALLING OR STAYING ASLEEP: NOT AT ALL
2. FEELING DOWN, DEPRESSED OR HOPELESS: NOT AT ALL
10. IF YOU CHECKED OFF ANY PROBLEMS, HOW DIFFICULT HAVE THESE PROBLEMS MADE IT FOR YOU TO DO YOUR WORK, TAKE CARE OF THINGS AT HOME, OR GET ALONG WITH OTHER PEOPLE: NOT DIFFICULT AT ALL
2. FEELING DOWN, DEPRESSED OR HOPELESS: NOT AT ALL
9. THOUGHTS THAT YOU WOULD BE BETTER OFF DEAD, OR OF HURTING YOURSELF: NOT AT ALL
SUM OF ALL RESPONSES TO PHQ QUESTIONS 1-9: 0
4. FEELING TIRED OR HAVING LITTLE ENERGY: NOT AT ALL
SUM OF ALL RESPONSES TO PHQ QUESTIONS 1-9: 0
8. MOVING OR SPEAKING SO SLOWLY THAT OTHER PEOPLE COULD HAVE NOTICED. OR THE OPPOSITE, BEING SO FIGETY OR RESTLESS THAT YOU HAVE BEEN MOVING AROUND A LOT MORE THAN USUAL: NOT AT ALL
1. LITTLE INTEREST OR PLEASURE IN DOING THINGS: NOT AT ALL
4. FEELING TIRED OR HAVING LITTLE ENERGY: NOT AT ALL
6. FEELING BAD ABOUT YOURSELF - OR THAT YOU ARE A FAILURE OR HAVE LET YOURSELF OR YOUR FAMILY DOWN: NOT AT ALL
7. TROUBLE CONCENTRATING ON THINGS, SUCH AS READING THE NEWSPAPER OR WATCHING TELEVISION: NOT AT ALL
SUM OF ALL RESPONSES TO PHQ QUESTIONS 1-9: 0
5. POOR APPETITE OR OVEREATING: NOT AT ALL
9. THOUGHTS THAT YOU WOULD BE BETTER OFF DEAD, OR OF HURTING YOURSELF: NOT AT ALL
6. FEELING BAD ABOUT YOURSELF - OR THAT YOU ARE A FAILURE OR HAVE LET YOURSELF OR YOUR FAMILY DOWN: NOT AT ALL
8. MOVING OR SPEAKING SO SLOWLY THAT OTHER PEOPLE COULD HAVE NOTICED. OR THE OPPOSITE, BEING SO FIGETY OR RESTLESS THAT YOU HAVE BEEN MOVING AROUND A LOT MORE THAN USUAL: NOT AT ALL
SUM OF ALL RESPONSES TO PHQ QUESTIONS 1-9: 0
SUM OF ALL RESPONSES TO PHQ QUESTIONS 1-9: 0
3. TROUBLE FALLING OR STAYING ASLEEP: NOT AT ALL
SUM OF ALL RESPONSES TO PHQ QUESTIONS 1-9: 0
10. IF YOU CHECKED OFF ANY PROBLEMS, HOW DIFFICULT HAVE THESE PROBLEMS MADE IT FOR YOU TO DO YOUR WORK, TAKE CARE OF THINGS AT HOME, OR GET ALONG WITH OTHER PEOPLE: NOT DIFFICULT AT ALL
SUM OF ALL RESPONSES TO PHQ QUESTIONS 1-9: 0
SUM OF ALL RESPONSES TO PHQ QUESTIONS 1-9: 0
5. POOR APPETITE OR OVEREATING: NOT AT ALL

## 2025-05-06 NOTE — PROGRESS NOTES
primary prevention of cardiovascular disease for men 45-79 and women 55-79: Not indicated. Educational materials for lifestyle changes were provided. Patient will follow-up in 3 month(s) with PCP. Provider spent 10 minutes counseling patient.            Objective   Vitals:    05/06/25 1246   BP: 118/78   BP Site: Left Upper Arm   Patient Position: Sitting   BP Cuff Size: Medium Adult   Pulse: 69   SpO2: 98%   Weight: 81.2 kg (179 lb)   Height: 1.651 m (5' 5\")      Body mass index is 29.79 kg/m².            Constitutional:       Appearance: Normal appearance.   HENT:      Head: Normocephalic and atraumatic.      Nose: Nose normal.      Mouth/Throat: Normal mucousa, no exudates     Mouth: Mucous membranes are moist.   Eyes:      Extraocular Movements: Extraocular movements intact.      Pupils: Pupils are equal, round, and reactive to light.   Cardiovascular:      Rate and Rhythm: Normal rate and regular rhythm.      Heart sounds: Normal heart sounds.   Pulmonary:      Effort: Pulmonary effort is normal.      Breath sounds: Normal breath sounds.   Abdominal:      Palpations: Abdomen is soft.   Musculoskeletal:      Cervical back: Normal range of motion and neck supple.      Lower extremity: no edema or cyanosis.  Skin:     General: Skin is warm and dry.   Neurological:      General: No focal deficit present.      Mental Status: alert.          Allergies   Allergen Reactions    Other      Perfumes     Seasonal      Prior to Visit Medications    Medication Sig Taking? Authorizing Provider   vitamin D (VITAMIN D3) 50 MCG (2000 UT) CAPS capsule TAKE 1 CAPSULE BY MOUTH DAILY Yes SaidYael MD   metFORMIN (GLUCOPHAGE-XR) 500 MG extended release tablet Take 1 tablet by mouth daily (with breakfast) Yes SaidYael MD   traZODone (DESYREL) 50 MG tablet Take 1 tablet by mouth nightly as needed for Sleep Yes Said, MD Yael   ondansetron (ZOFRAN-ODT) 4 MG disintegrating tablet Take 1 tablet by mouth every 4 hours as needed for

## 2025-05-23 ENCOUNTER — TELEPHONE (OUTPATIENT)
Age: 70
End: 2025-05-23

## 2025-05-23 NOTE — TELEPHONE ENCOUNTER
Patient is asking for a referral to gastro. She would like to speak to a specialist regarding her fatty liver.    Please advise    Geno [phone]

## 2025-05-27 DIAGNOSIS — K76.0 FATTY LIVER: Primary | ICD-10-CM

## 2025-06-12 DIAGNOSIS — F39 MOOD DISORDER: ICD-10-CM

## 2025-06-16 RX ORDER — HYDROXYZINE HYDROCHLORIDE 25 MG/1
25 TABLET, FILM COATED ORAL NIGHTLY
Qty: 30 TABLET | Refills: 0 | OUTPATIENT
Start: 2025-06-16

## 2025-06-29 ENCOUNTER — APPOINTMENT (OUTPATIENT)
Dept: CT IMAGING | Age: 70
End: 2025-06-29
Payer: MEDICARE

## 2025-06-29 ENCOUNTER — HOSPITAL ENCOUNTER (EMERGENCY)
Age: 70
Discharge: HOME | End: 2025-06-29
Payer: MEDICARE

## 2025-06-29 VITALS
OXYGEN SATURATION: 99 % | SYSTOLIC BLOOD PRESSURE: 134 MMHG | TEMPERATURE: 98.4 F | BODY MASS INDEX: 29.16 KG/M2 | RESPIRATION RATE: 18 BRPM | DIASTOLIC BLOOD PRESSURE: 72 MMHG | HEART RATE: 68 BPM | HEIGHT: 65 IN | WEIGHT: 175 LBS

## 2025-06-29 DIAGNOSIS — K52.9 COLITIS: Primary | ICD-10-CM

## 2025-06-29 LAB
ALBUMIN SERPL-MCNC: 4.1 G/DL (ref 3.5–4.6)
ALP SERPL-CCNC: 111 U/L (ref 40–130)
ALT SERPL-CCNC: 15 U/L (ref 0–33)
ANION GAP SERPL CALCULATED.3IONS-SCNC: 11 MEQ/L (ref 9–15)
AST SERPL-CCNC: 27 U/L (ref 0–35)
BASOPHILS # BLD: 0 K/UL (ref 0–0.2)
BASOPHILS NFR BLD: 0.3 %
BILIRUB SERPL-MCNC: 0.8 MG/DL (ref 0.2–0.7)
BILIRUB UR QL STRIP: NEGATIVE
BUN SERPL-MCNC: 7 MG/DL (ref 8–23)
CALCIUM SERPL-MCNC: 8.9 MG/DL (ref 8.5–9.9)
CHLORIDE SERPL-SCNC: 103 MEQ/L (ref 95–107)
CLARITY UR: CLEAR
CO2 SERPL-SCNC: 26 MEQ/L (ref 20–31)
COLOR UR: YELLOW
CREAT SERPL-MCNC: 0.7 MG/DL (ref 0.5–0.9)
EOSINOPHIL # BLD: 0.2 K/UL (ref 0–0.7)
EOSINOPHIL NFR BLD: 1.9 %
ERYTHROCYTE [DISTWIDTH] IN BLOOD BY AUTOMATED COUNT: 12.7 % (ref 11.5–14.5)
GLOBULIN SER CALC-MCNC: 2.9 G/DL (ref 2.3–3.5)
GLUCOSE SERPL-MCNC: 96 MG/DL (ref 70–99)
GLUCOSE UR STRIP-MCNC: NEGATIVE MG/DL
HCT VFR BLD AUTO: 42.8 % (ref 37–47)
HGB BLD-MCNC: 14.5 G/DL (ref 12–16)
HGB UR QL STRIP: NEGATIVE
KETONES UR STRIP-MCNC: NEGATIVE MG/DL
LEUKOCYTE ESTERASE UR QL STRIP: NEGATIVE
LIPASE SERPL-CCNC: 46 U/L (ref 12–95)
LYMPHOCYTES # BLD: 1.7 K/UL (ref 1–4.8)
LYMPHOCYTES NFR BLD: 17.9 %
MCH RBC QN AUTO: 31.5 PG (ref 27–31.3)
MCHC RBC AUTO-ENTMCNC: 33.9 % (ref 33–37)
MCV RBC AUTO: 93 FL (ref 79.4–94.8)
MONOCYTES # BLD: 1.2 K/UL (ref 0.2–0.8)
MONOCYTES NFR BLD: 12.1 %
NEUTROPHILS # BLD: 6.5 K/UL (ref 1.4–6.5)
NEUTS SEG NFR BLD: 67.6 %
NITRITE UR QL STRIP: NEGATIVE
PH UR STRIP: 7 [PH] (ref 5–9)
PLATELET # BLD AUTO: 225 K/UL (ref 130–400)
POC CREATININE WHOLE BLOOD: 0.9
POTASSIUM SERPL-SCNC: 4 MEQ/L (ref 3.4–4.9)
PROT SERPL-MCNC: 7 G/DL (ref 6.3–8)
PROT UR STRIP-MCNC: NEGATIVE MG/DL
RBC # BLD AUTO: 4.6 M/UL (ref 4.2–5.4)
SODIUM SERPL-SCNC: 140 MEQ/L (ref 135–144)
SP GR UR STRIP: 1.03 (ref 1–1.03)
URINE REFLEX TO CULTURE: NORMAL
UROBILINOGEN UR STRIP-ACNC: 0.2 E.U./DL
WBC # BLD AUTO: 9.7 K/UL (ref 4.8–10.8)

## 2025-06-29 PROCEDURE — 85025 COMPLETE CBC W/AUTO DIFF WBC: CPT

## 2025-06-29 PROCEDURE — 80053 COMPREHEN METABOLIC PANEL: CPT

## 2025-06-29 PROCEDURE — 83690 ASSAY OF LIPASE: CPT

## 2025-06-29 PROCEDURE — 6360000002 HC RX W HCPCS: Performed by: PHYSICIAN ASSISTANT

## 2025-06-29 PROCEDURE — 99285 EMERGENCY DEPT VISIT HI MDM: CPT

## 2025-06-29 PROCEDURE — 74177 CT ABD & PELVIS W/CONTRAST: CPT

## 2025-06-29 PROCEDURE — 96374 THER/PROPH/DIAG INJ IV PUSH: CPT

## 2025-06-29 PROCEDURE — 81003 URINALYSIS AUTO W/O SCOPE: CPT

## 2025-06-29 PROCEDURE — 6360000004 HC RX CONTRAST MEDICATION: Performed by: PHYSICIAN ASSISTANT

## 2025-06-29 PROCEDURE — 2580000003 HC RX 258: Performed by: PHYSICIAN ASSISTANT

## 2025-06-29 PROCEDURE — 96375 TX/PRO/DX INJ NEW DRUG ADDON: CPT

## 2025-06-29 RX ORDER — ONDANSETRON 2 MG/ML
4 INJECTION INTRAMUSCULAR; INTRAVENOUS ONCE
Status: COMPLETED | OUTPATIENT
Start: 2025-06-29 | End: 2025-06-29

## 2025-06-29 RX ORDER — 0.9 % SODIUM CHLORIDE 0.9 %
1000 INTRAVENOUS SOLUTION INTRAVENOUS ONCE
Status: COMPLETED | OUTPATIENT
Start: 2025-06-29 | End: 2025-06-29

## 2025-06-29 RX ORDER — ONDANSETRON 4 MG/1
4 TABLET, ORALLY DISINTEGRATING ORAL 3 TIMES DAILY PRN
Qty: 21 TABLET | Refills: 0 | Status: SHIPPED | OUTPATIENT
Start: 2025-06-29

## 2025-06-29 RX ORDER — DICYCLOMINE HCL 20 MG
20 TABLET ORAL EVERY 6 HOURS PRN
Qty: 20 TABLET | Refills: 0 | Status: SHIPPED | OUTPATIENT
Start: 2025-06-29

## 2025-06-29 RX ORDER — MORPHINE SULFATE 4 MG/ML
4 INJECTION, SOLUTION INTRAMUSCULAR; INTRAVENOUS ONCE
Refills: 0 | Status: COMPLETED | OUTPATIENT
Start: 2025-06-29 | End: 2025-06-29

## 2025-06-29 RX ORDER — IOPAMIDOL 755 MG/ML
75 INJECTION, SOLUTION INTRAVASCULAR
Status: COMPLETED | OUTPATIENT
Start: 2025-06-29 | End: 2025-06-29

## 2025-06-29 RX ADMIN — ONDANSETRON 4 MG: 2 INJECTION, SOLUTION INTRAMUSCULAR; INTRAVENOUS at 10:09

## 2025-06-29 RX ADMIN — SODIUM CHLORIDE 1000 ML: 0.9 INJECTION, SOLUTION INTRAVENOUS at 10:07

## 2025-06-29 RX ADMIN — IOPAMIDOL 75 ML: 755 INJECTION, SOLUTION INTRAVENOUS at 10:54

## 2025-06-29 RX ADMIN — MORPHINE SULFATE 4 MG: 4 INJECTION, SOLUTION INTRAMUSCULAR; INTRAVENOUS at 10:09

## 2025-06-29 ASSESSMENT — PAIN DESCRIPTION - PAIN TYPE: TYPE: ACUTE PAIN

## 2025-06-29 ASSESSMENT — PAIN - FUNCTIONAL ASSESSMENT: PAIN_FUNCTIONAL_ASSESSMENT: 0-10

## 2025-06-29 ASSESSMENT — PAIN DESCRIPTION - FREQUENCY: FREQUENCY: CONTINUOUS

## 2025-06-29 ASSESSMENT — PAIN DESCRIPTION - DESCRIPTORS: DESCRIPTORS: ACHING

## 2025-06-29 ASSESSMENT — PAIN DESCRIPTION - ORIENTATION: ORIENTATION: LEFT

## 2025-06-29 ASSESSMENT — PAIN DESCRIPTION - LOCATION: LOCATION: ABDOMEN

## 2025-06-29 ASSESSMENT — PAIN SCALES - GENERAL: PAINLEVEL_OUTOF10: 5

## 2025-06-29 NOTE — ED TRIAGE NOTES
Patient arrived via private car due to abdominal pain LLQ x 1 week with diarrhea. Patient denies any vomiting, had emesis this morning and drank suki tea. Patient denies flank pain. Patient is still eating.  A/O x 4, cool and dry, ambulates by self.   Patient is Bhutanese speaking only

## 2025-06-29 NOTE — ED PROVIDER NOTES
Arthritis     Asthma     Chronic back pain     COPD (chronic obstructive pulmonary disease) (HCC)     Depression     Hyperlipidemia     Nausea and vomiting 8/18/2024    Obesity     Venous insufficiency          SURGICAL HISTORY       Past Surgical History:   Procedure Laterality Date    COLONOSCOPY      COLONOSCOPY N/A 02/03/2021    COLORECTAL CANCER SCREENING with polypectomies performed by Fidel Banuelos MD at [facility]    DILATION AND CURETTAGE OF UTERUS N/A 06/28/2021    HYSTEROSCOPY  FX  D/C performed by Mehul Bruner DO at [facility]    ENDOSCOPY, COLON, DIAGNOSTIC      KIDNEY SURGERY      OVARIAN CYST REMOVAL Left 11/06/2004    MD ESOPHAGOGASTRODUODENOSCOPY TRANSORAL DIAGNOSTIC N/A 11/14/2018    EGD ESOPHAGOGASTRODUODENOSCOPY WITH DILATION performed by Holden De La Fuente MD at [facility]    UPPER GASTROINTESTINAL ENDOSCOPY N/A 08/21/2023    EGD DIAGNOSTIC ONLY with gastric biopsies performed by Fidel Banuelos MD at [facility]    UPPER GASTROINTESTINAL ENDOSCOPY N/A 8/19/2024    ESOPHAGOGASTRODUODENOSCOPY with biopsies performed by Baldomero Mondragon MD at [facility]         CURRENT MEDICATIONS       Discharge Medication List as of 6/29/2025 12:13 PM        CONTINUE these medications which have NOT CHANGED    Details   atorvastatin (LIPITOR) 40 MG tablet Take 1 tablet by mouth daily, Disp-30 tablet, R-3Normal      vitamin D (VITAMIN D3) 50 MCG (2000 UT) CAPS capsule TAKE 1 CAPSULE BY MOUTH DAILY, Disp-90 capsule, R-3Normal      metFORMIN (GLUCOPHAGE-XR) 500 MG extended release tablet Take 1 tablet by mouth daily (with breakfast), Disp-90 tablet, R-1Normal      traZODone (DESYREL) 50 MG tablet Take 1 tablet by mouth nightly as needed for Sleep, Disp-30 tablet, R-5Normal      !! ondansetron (ZOFRAN-ODT) 4 MG disintegrating tablet Take 1 tablet by mouth every 4 hours as needed for Nausea or Vomiting, Disp-12 tablet, R-0Normal      omeprazole (PRILOSEC) 40 MG delayed release capsule

## 2025-06-30 LAB
PERFORMED ON: NORMAL
POC CREATININE: 0.9 MG/DL (ref 0.6–1.2)
POC SAMPLE TYPE: NORMAL

## 2025-07-03 ENCOUNTER — PREP FOR PROCEDURE (OUTPATIENT)
Dept: GASTROENTEROLOGY | Age: 70
End: 2025-07-03

## 2025-07-03 ENCOUNTER — OFFICE VISIT (OUTPATIENT)
Dept: GASTROENTEROLOGY | Age: 70
End: 2025-07-03
Payer: MEDICARE

## 2025-07-03 VITALS — SYSTOLIC BLOOD PRESSURE: 118 MMHG | DIASTOLIC BLOOD PRESSURE: 82 MMHG | BODY MASS INDEX: 29.12 KG/M2 | WEIGHT: 175 LBS

## 2025-07-03 DIAGNOSIS — K76.0 HEPATIC STEATOSIS: Primary | ICD-10-CM

## 2025-07-03 DIAGNOSIS — R19.7 DIARRHEA, UNSPECIFIED TYPE: ICD-10-CM

## 2025-07-03 DIAGNOSIS — R94.5 ABNORMAL RESULTS OF LIVER FUNCTION STUDIES: ICD-10-CM

## 2025-07-03 PROCEDURE — G8427 DOCREV CUR MEDS BY ELIG CLIN: HCPCS | Performed by: NURSE PRACTITIONER

## 2025-07-03 PROCEDURE — 99214 OFFICE O/P EST MOD 30 MIN: CPT | Performed by: NURSE PRACTITIONER

## 2025-07-03 PROCEDURE — 1159F MED LIST DOCD IN RCRD: CPT | Performed by: NURSE PRACTITIONER

## 2025-07-03 PROCEDURE — G8417 CALC BMI ABV UP PARAM F/U: HCPCS | Performed by: NURSE PRACTITIONER

## 2025-07-03 PROCEDURE — 1036F TOBACCO NON-USER: CPT | Performed by: NURSE PRACTITIONER

## 2025-07-03 PROCEDURE — 1123F ACP DISCUSS/DSCN MKR DOCD: CPT | Performed by: NURSE PRACTITIONER

## 2025-07-03 PROCEDURE — G8399 PT W/DXA RESULTS DOCUMENT: HCPCS | Performed by: NURSE PRACTITIONER

## 2025-07-03 PROCEDURE — 1090F PRES/ABSN URINE INCON ASSESS: CPT | Performed by: NURSE PRACTITIONER

## 2025-07-03 PROCEDURE — 3017F COLORECTAL CA SCREEN DOC REV: CPT | Performed by: NURSE PRACTITIONER

## 2025-07-03 RX ORDER — SODIUM CHLORIDE 9 MG/ML
INJECTION, SOLUTION INTRAVENOUS PRN
Status: CANCELLED | OUTPATIENT
Start: 2025-07-03

## 2025-07-03 RX ORDER — SODIUM CHLORIDE 0.9 % (FLUSH) 0.9 %
5-40 SYRINGE (ML) INJECTION EVERY 12 HOURS SCHEDULED
Status: CANCELLED | OUTPATIENT
Start: 2025-07-03

## 2025-07-03 RX ORDER — SODIUM CHLORIDE 0.9 % (FLUSH) 0.9 %
5-40 SYRINGE (ML) INJECTION PRN
Status: CANCELLED | OUTPATIENT
Start: 2025-07-03

## 2025-07-03 RX ORDER — POLYETHYLENE GLYCOL 3350, SODIUM CHLORIDE, SODIUM BICARBONATE, POTASSIUM CHLORIDE 420; 11.2; 5.72; 1.48 G/4L; G/4L; G/4L; G/4L
4000 POWDER, FOR SOLUTION ORAL ONCE
Qty: 4000 ML | Refills: 0 | Status: SHIPPED | OUTPATIENT
Start: 2025-07-03 | End: 2025-07-03

## 2025-07-03 RX ORDER — SODIUM CHLORIDE 9 MG/ML
INJECTION, SOLUTION INTRAVENOUS CONTINUOUS
Status: CANCELLED | OUTPATIENT
Start: 2025-07-03

## 2025-07-03 ASSESSMENT — ENCOUNTER SYMPTOMS
VOICE CHANGE: 0
RECTAL PAIN: 0
PHOTOPHOBIA: 0
COLOR CHANGE: 0
WHEEZING: 0
NAUSEA: 0
CONSTIPATION: 0
EYE PAIN: 0
BLOOD IN STOOL: 0
SHORTNESS OF BREATH: 0
CHEST TIGHTNESS: 0
VOMITING: 0
ANAL BLEEDING: 0
DIARRHEA: 0
TROUBLE SWALLOWING: 0
ABDOMINAL DISTENTION: 0
ABDOMINAL PAIN: 0
EYE REDNESS: 0

## 2025-07-03 NOTE — CONSULTS
Session ID: [id number]  Session Duration: Longer than 54 minutes  Language: Malay   ID: #[id number]   Name: Sandoval

## 2025-07-03 NOTE — PROGRESS NOTES
Subjective:      Patient ID: Maggie Vaughn is a 70 y.o. female who presents today for:  Chief Complaint   Patient presents with    Follow-up       HPI  Patient came in as follow-up, her primary language is Iranian and a certified  was used for this visit, she was seen in the emergency department approximately a week ago for abdominal pain and diarrhea, symptoms are resolved at this time.  Had CT imaging notable for colitis and had incidental finding on imaging of hepatic steatosis.  Most recent colonoscopy was 2021 with polyps otherwise normal.  Had CBC with no leukocytosis or anemia.  Had CMP with no significant electrolyte abnormalities, LFTs were normal had very mild elevation of total bilirubin at 0.8.  No DM repoted. Patient endorses Hx of dyslipidemia on statins and HTN.  No prior Liver biopsy noted . No hx of juandice, pruritis, easy bruising or admission for liver related conditions.  No ETOH use, reports previously been told she has hep A and B, those records are not available for review.  No FHX of Liver diseases or cirrhosis noted.     8/18/24 GI consult for abdominal pain-patient's primary language is Iranian and a certified  was used for this interview, patient came in with 2-day history of right upper quadrant pain with radiation to her shoulder blades associated with nausea vomiting fever and diarrhea, diarrhea is resolved has persistent nausea and vomiting.  Reports a history of intermittent similar symptoms for the past 3 to 6 months.  At baseline patient does have GERD and is on a PPI.  Most recent EGD was approximately 1 year prior and normal.  She denies any increased NSAIDs, nicotine, EtOH or excessive caffeine.  Had essentially normal CBC and CMP.  Had ultrasound of the abdomen with small amount of pericholecystic fluid, no evidence of cholelithiasis or definitive cholecystitis.  Had CT of the abdomen with no biliary ductal dilation, urinary bladder wall

## 2025-07-15 ENCOUNTER — ANCILLARY PROCEDURE (OUTPATIENT)
Dept: ENDOSCOPY | Age: 70
End: 2025-07-15
Payer: MEDICARE

## 2025-07-15 DIAGNOSIS — K76.0 HEPATIC STEATOSIS: ICD-10-CM

## 2025-07-15 PROCEDURE — 91200 LIVER ELASTOGRAPHY: CPT

## 2025-08-03 DIAGNOSIS — E78.00 PURE HYPERCHOLESTEROLEMIA: Chronic | ICD-10-CM

## 2025-08-05 ENCOUNTER — ANESTHESIA EVENT (OUTPATIENT)
Dept: ENDOSCOPY | Age: 70
End: 2025-08-05
Payer: MEDICARE

## 2025-08-05 ENCOUNTER — ANESTHESIA (OUTPATIENT)
Dept: ENDOSCOPY | Age: 70
End: 2025-08-05
Payer: MEDICARE

## 2025-08-05 ENCOUNTER — HOSPITAL ENCOUNTER (OUTPATIENT)
Age: 70
Setting detail: OUTPATIENT SURGERY
Discharge: HOME | End: 2025-08-05
Attending: INTERNAL MEDICINE | Admitting: INTERNAL MEDICINE
Payer: MEDICARE

## 2025-08-05 VITALS
HEIGHT: 64 IN | OXYGEN SATURATION: 98 % | SYSTOLIC BLOOD PRESSURE: 136 MMHG | HEART RATE: 68 BPM | BODY MASS INDEX: 29.88 KG/M2 | WEIGHT: 175 LBS | RESPIRATION RATE: 16 BRPM | TEMPERATURE: 97.3 F | DIASTOLIC BLOOD PRESSURE: 68 MMHG

## 2025-08-05 DIAGNOSIS — K76.0 HEPATIC STEATOSIS: ICD-10-CM

## 2025-08-05 DIAGNOSIS — R19.7 DIARRHEA, UNSPECIFIED TYPE: ICD-10-CM

## 2025-08-05 DIAGNOSIS — R19.7 DIARRHEA, UNSPECIFIED: ICD-10-CM

## 2025-08-05 DIAGNOSIS — R94.5 ABNORMAL RESULTS OF LIVER FUNCTION STUDIES: ICD-10-CM

## 2025-08-05 LAB
FERRITIN: 277 NG/ML
HAV IGM SER IA-ACNC: NONREACTIVE
HBV SURFACE AG SERPL QL IA: NORMAL
HEPATITIS C ANTIBODY: NONREACTIVE
IRON % SATURATION: 34 % (ref 20–55)
IRON: 119 UG/DL (ref 37–145)
TOTAL IRON BINDING CAPACITY: 355 UG/DL (ref 250–450)
UNSATURATED IRON BINDING CAPACITY: 236 UG/DL (ref 112–347)

## 2025-08-05 PROCEDURE — 2500000003 HC RX 250 WO HCPCS: Performed by: INTERNAL MEDICINE

## 2025-08-05 PROCEDURE — 6360000002 HC RX W HCPCS: Performed by: NURSE ANESTHETIST, CERTIFIED REGISTERED

## 2025-08-05 PROCEDURE — 3700000000 HC ANESTHESIA ATTENDED CARE: Performed by: INTERNAL MEDICINE

## 2025-08-05 PROCEDURE — 3700000001 HC ADD 15 MINUTES (ANESTHESIA): Performed by: INTERNAL MEDICINE

## 2025-08-05 PROCEDURE — 7100000011 HC PHASE II RECOVERY - ADDTL 15 MIN: Performed by: INTERNAL MEDICINE

## 2025-08-05 PROCEDURE — 3609027000 HC COLONOSCOPY: Performed by: INTERNAL MEDICINE

## 2025-08-05 PROCEDURE — 2709999900 HC NON-CHARGEABLE SUPPLY: Performed by: INTERNAL MEDICINE

## 2025-08-05 PROCEDURE — 88305 TISSUE EXAM BY PATHOLOGIST: CPT

## 2025-08-05 PROCEDURE — 2580000003 HC RX 258: Performed by: INTERNAL MEDICINE

## 2025-08-05 PROCEDURE — 7100000010 HC PHASE II RECOVERY - FIRST 15 MIN: Performed by: INTERNAL MEDICINE

## 2025-08-05 RX ORDER — PROPOFOL 10 MG/ML
INJECTION, EMULSION INTRAVENOUS
Status: DISCONTINUED | OUTPATIENT
Start: 2025-08-05 | End: 2025-08-05 | Stop reason: SDUPTHER

## 2025-08-05 RX ORDER — SODIUM CHLORIDE 0.9 % (FLUSH) 0.9 %
5-40 SYRINGE (ML) INJECTION EVERY 12 HOURS SCHEDULED
Status: DISCONTINUED | OUTPATIENT
Start: 2025-08-05 | End: 2025-08-05 | Stop reason: HOSPADM

## 2025-08-05 RX ORDER — SODIUM CHLORIDE 0.9 % (FLUSH) 0.9 %
5-40 SYRINGE (ML) INJECTION PRN
Status: DISCONTINUED | OUTPATIENT
Start: 2025-08-05 | End: 2025-08-05 | Stop reason: HOSPADM

## 2025-08-05 RX ORDER — SODIUM CHLORIDE 9 MG/ML
INJECTION, SOLUTION INTRAVENOUS PRN
Status: DISCONTINUED | OUTPATIENT
Start: 2025-08-05 | End: 2025-08-05 | Stop reason: HOSPADM

## 2025-08-05 RX ORDER — SODIUM CHLORIDE 9 MG/ML
INJECTION, SOLUTION INTRAVENOUS CONTINUOUS
Status: DISCONTINUED | OUTPATIENT
Start: 2025-08-05 | End: 2025-08-05 | Stop reason: HOSPADM

## 2025-08-05 RX ORDER — LIDOCAINE HYDROCHLORIDE 20 MG/ML
INJECTION, SOLUTION INFILTRATION; PERINEURAL
Status: DISCONTINUED | OUTPATIENT
Start: 2025-08-05 | End: 2025-08-05 | Stop reason: SDUPTHER

## 2025-08-05 RX ORDER — ATORVASTATIN CALCIUM 40 MG/1
40 TABLET, FILM COATED ORAL DAILY
Qty: 30 TABLET | Refills: 0 | Status: SHIPPED | OUTPATIENT
Start: 2025-08-05

## 2025-08-05 RX ADMIN — SODIUM CHLORIDE: 0.9 INJECTION, SOLUTION INTRAVENOUS at 11:29

## 2025-08-05 RX ADMIN — LIDOCAINE HYDROCHLORIDE 60 MG: 20 INJECTION, SOLUTION INFILTRATION; PERINEURAL at 12:01

## 2025-08-05 RX ADMIN — PROPOFOL 180 MCG/KG/MIN: 10 INJECTION, EMULSION INTRAVENOUS at 12:02

## 2025-08-05 ASSESSMENT — PAIN - FUNCTIONAL ASSESSMENT: PAIN_FUNCTIONAL_ASSESSMENT: 0-10

## 2025-08-07 LAB
HAV AB SER QL IA: POSITIVE
HBV CORE AB SERPL QL IA: NEGATIVE

## 2025-08-09 ENCOUNTER — RESULTS FOLLOW-UP (OUTPATIENT)
Dept: GASTROENTEROLOGY | Age: 70
End: 2025-08-09

## 2025-08-19 ENCOUNTER — OFFICE VISIT (OUTPATIENT)
Age: 70
End: 2025-08-19

## 2025-08-19 ENCOUNTER — OFFICE VISIT (OUTPATIENT)
Dept: GASTROENTEROLOGY | Age: 70
End: 2025-08-19
Payer: MEDICARE

## 2025-08-19 VITALS
HEIGHT: 64 IN | DIASTOLIC BLOOD PRESSURE: 80 MMHG | BODY MASS INDEX: 30.01 KG/M2 | RESPIRATION RATE: 16 BRPM | HEART RATE: 70 BPM | WEIGHT: 175.8 LBS | SYSTOLIC BLOOD PRESSURE: 122 MMHG | OXYGEN SATURATION: 99 %

## 2025-08-19 VITALS
HEART RATE: 65 BPM | WEIGHT: 175 LBS | SYSTOLIC BLOOD PRESSURE: 128 MMHG | BODY MASS INDEX: 30.04 KG/M2 | DIASTOLIC BLOOD PRESSURE: 72 MMHG | OXYGEN SATURATION: 96 %

## 2025-08-19 DIAGNOSIS — R73.03 PRE-DIABETES: ICD-10-CM

## 2025-08-19 DIAGNOSIS — R10.11 RIGHT UPPER QUADRANT ABDOMINAL PAIN: ICD-10-CM

## 2025-08-19 DIAGNOSIS — E55.9 VITAMIN D DEFICIENCY: ICD-10-CM

## 2025-08-19 DIAGNOSIS — K64.9 HEMORRHOIDS, UNSPECIFIED HEMORRHOID TYPE: ICD-10-CM

## 2025-08-19 DIAGNOSIS — R73.03 PREDIABETES: ICD-10-CM

## 2025-08-19 DIAGNOSIS — D12.5 ADENOMATOUS POLYP OF SIGMOID COLON: Primary | ICD-10-CM

## 2025-08-19 PROCEDURE — 1159F MED LIST DOCD IN RCRD: CPT | Performed by: NURSE PRACTITIONER

## 2025-08-19 PROCEDURE — 1090F PRES/ABSN URINE INCON ASSESS: CPT | Performed by: NURSE PRACTITIONER

## 2025-08-19 PROCEDURE — 99213 OFFICE O/P EST LOW 20 MIN: CPT | Performed by: NURSE PRACTITIONER

## 2025-08-19 PROCEDURE — G8399 PT W/DXA RESULTS DOCUMENT: HCPCS | Performed by: NURSE PRACTITIONER

## 2025-08-19 PROCEDURE — 1036F TOBACCO NON-USER: CPT | Performed by: NURSE PRACTITIONER

## 2025-08-19 PROCEDURE — G8417 CALC BMI ABV UP PARAM F/U: HCPCS | Performed by: NURSE PRACTITIONER

## 2025-08-19 PROCEDURE — 1123F ACP DISCUSS/DSCN MKR DOCD: CPT | Performed by: NURSE PRACTITIONER

## 2025-08-19 PROCEDURE — G8427 DOCREV CUR MEDS BY ELIG CLIN: HCPCS | Performed by: NURSE PRACTITIONER

## 2025-08-19 PROCEDURE — 3017F COLORECTAL CA SCREEN DOC REV: CPT | Performed by: NURSE PRACTITIONER

## 2025-08-19 RX ORDER — METFORMIN HYDROCHLORIDE 500 MG/1
500 TABLET, EXTENDED RELEASE ORAL
Qty: 90 TABLET | Refills: 0 | Status: SHIPPED | OUTPATIENT
Start: 2025-08-19

## 2025-08-19 RX ORDER — ONDANSETRON 4 MG/1
4 TABLET, ORALLY DISINTEGRATING ORAL EVERY 4 HOURS PRN
Qty: 12 TABLET | Refills: 0 | Status: CANCELLED | OUTPATIENT
Start: 2025-08-19

## 2025-08-19 RX ORDER — ACETAMINOPHEN 160 MG
TABLET,DISINTEGRATING ORAL
Qty: 90 CAPSULE | Refills: 3 | Status: SHIPPED | OUTPATIENT
Start: 2025-08-19

## 2025-08-19 RX ORDER — GLUCOSAMINE HCL/CHONDROITIN SU 500-400 MG
CAPSULE ORAL
Qty: 100 STRIP | Refills: 5 | Status: SHIPPED | OUTPATIENT
Start: 2025-08-19

## 2025-08-19 ASSESSMENT — ENCOUNTER SYMPTOMS
EYE PAIN: 0
NAUSEA: 0
BLOOD IN STOOL: 0
ABDOMINAL PAIN: 0
DIARRHEA: 0
CHEST TIGHTNESS: 0
VOMITING: 0
TROUBLE SWALLOWING: 0
RECTAL PAIN: 0
PHOTOPHOBIA: 0
WHEEZING: 0
COLOR CHANGE: 0
ABDOMINAL DISTENTION: 0
ANAL BLEEDING: 0
SHORTNESS OF BREATH: 0
EYE REDNESS: 0
VOICE CHANGE: 0
CONSTIPATION: 0

## (undated) DEVICE — TUBING IRRIGATION 140/160/180/190 SER GI ENDOSCP SMARTCAP

## (undated) DEVICE — GAUZE,SPONGE,4"X4",16PLY,XRAY,STRL,LF: Brand: MEDLINE

## (undated) DEVICE — PAD ADH AD ELECTRD 2 PLT W 5M CRD

## (undated) DEVICE — TUBE SET 96 MM 64 MM H2O PERISTALTIC STD AUX CHANNEL

## (undated) DEVICE — ENDO CARRY-ON PROCEDURE KIT: Brand: ENDO CARRY-ON PROCEDURE KIT

## (undated) DEVICE — LITHOTOMY DRAPE WITH FLUID CONTROL POUCH: Brand: CONVERTORS

## (undated) DEVICE — TOWEL,OR,DSP,ST,BLUE,STD,4/PK,20PK/CS: Brand: MEDLINE

## (undated) DEVICE — SINGLE PORT MANIFOLD: Brand: NEPTUNE 2

## (undated) DEVICE — BRUSH ENDO CLN L90.5IN SHTH DIA1.7MM BRIST DIA5-7MM 2-6MM

## (undated) DEVICE — GOWN,AURORA,NONRNF,XL,30/CS: Brand: MEDLINE

## (undated) DEVICE — TRAP POLYP BALEEN

## (undated) DEVICE — LABEL MED MINI W/ MARKER

## (undated) DEVICE — WARMER SCP 2 ANTIFOG LAP DISP

## (undated) DEVICE — TUBING SCTION CONN 1/4X10 RIB

## (undated) DEVICE — LINER PAD CONTOUR SUPER PEACH 7X14IN

## (undated) DEVICE — COVER LT HNDL BLU PLAS

## (undated) DEVICE — FORCEPS ENDOSCP BX OVL CUP SERR W/NEEDLE 2.3MM DIA 160CML

## (undated) DEVICE — GLOVE ORTHO 8   MSG9480

## (undated) DEVICE — TRAY PREP DRY W/ PREM GLV 2 APPL 6 SPNG 2 UNDPD 1 OVERWRAP

## (undated) DEVICE — Device: Brand: ENDO SMARTCAP

## (undated) DEVICE — SNARE POLYP SM AD W13MMXL240CM SHTH DIA2.4MM HEX STIFF

## (undated) DEVICE — KIT CANSTR VAC TANTEM TB FOR AQUILEX FLD CTRL SYS

## (undated) DEVICE — CONMED SCOPE SAVER BITE BLOCK, 20X27 MM: Brand: SCOPE SAVER

## (undated) DEVICE — MANIFOLD SUCT SMK EVAC SGL PRT DISP NEPTUNE 2

## (undated) DEVICE — FORCEPS BX L240CM JAW DIA2.8MM L CAP W/ NDL MIC MESH TOOTH

## (undated) DEVICE — GLOVE ORANGE PI 8   MSG9080

## (undated) DEVICE — CATHETER,URETHRAL,VINYL,FEMALE,6",14FR: Brand: MEDLINE

## (undated) DEVICE — GLOVE ORANGE PI 7 1/2   MSG9075

## (undated) DEVICE — ADAPTER FLSH PMP FLD MGMT GI IRRIG OFP 2 DISPOSABLE

## (undated) DEVICE — PACK,SET UP,DRAPE: Brand: MEDLINE

## (undated) DEVICE — GLOVE ORANGE PI 8 1/2   MSG9085

## (undated) DEVICE — SET ENDOSCP SEAL HYSTEROSCOPE RIG OUTFLO CHN DISP MYOSURE

## (undated) DEVICE — SET FLD CTRL SYS INFLO AND OUTFLO TB AQUILEX

## (undated) DEVICE — TUBING, SUCTION, 1/4" X 10', STRAIGHT: Brand: MEDLINE

## (undated) DEVICE — COVER DUST PERIMETER HUMPREY

## (undated) DEVICE — SNARE ENDOSCP AD L240CM LOOP W10MM SHTH DIA2.4MM RND INSUL

## (undated) DEVICE — SURGICAL PROCEDURE KIT ENDOSCP CUST 883 CARRY-ON

## (undated) DEVICE — PAD N ADH W3XL4IN POLY COT SFT PERF FLM EASILY CUT ABSRB